# Patient Record
Sex: FEMALE | Race: BLACK OR AFRICAN AMERICAN | NOT HISPANIC OR LATINO | Employment: FULL TIME | URBAN - METROPOLITAN AREA
[De-identification: names, ages, dates, MRNs, and addresses within clinical notes are randomized per-mention and may not be internally consistent; named-entity substitution may affect disease eponyms.]

---

## 2017-07-13 ENCOUNTER — CONVERSION ENCOUNTER (OUTPATIENT)
Dept: MAMMOGRAPHY | Facility: CLINIC | Age: 47
End: 2017-07-13

## 2018-08-13 ENCOUNTER — APPOINTMENT (EMERGENCY)
Dept: RADIOLOGY | Facility: HOSPITAL | Age: 48
End: 2018-08-13
Payer: COMMERCIAL

## 2018-08-13 ENCOUNTER — HOSPITAL ENCOUNTER (EMERGENCY)
Facility: HOSPITAL | Age: 48
Discharge: HOME | End: 2018-08-13
Attending: EMERGENCY MEDICINE
Payer: COMMERCIAL

## 2018-08-13 VITALS — TEMPERATURE: 97.8 F | OXYGEN SATURATION: 98 % | RESPIRATION RATE: 20 BRPM

## 2018-08-13 DIAGNOSIS — M25.512 ACUTE PAIN OF LEFT SHOULDER: ICD-10-CM

## 2018-08-13 DIAGNOSIS — M65.20 CALCIFIC TENDONITIS: Primary | ICD-10-CM

## 2018-08-13 LAB
ANION GAP BLD CALC-SCNC: 17 MMOL/L (ref 4–13)
BUN BLD-MCNC: 18 MG/DL (ref 5–25)
CA-I BLD-SCNC: 1.18 MMOL/L (ref 1.12–1.32)
CHLORIDE BLD-SCNC: 104 MMOL/L (ref 100–108)
CREAT BLD-MCNC: 0.7 MG/DL (ref 0.6–1.3)
GFR SERPL CREATININE-BSD FRML MDRD: 117 ML/MIN/1.73SQ M
GLUCOSE SERPL-MCNC: 100 MG/DL (ref 65–140)
HCT VFR BLD CALC: 41 % (ref 34.8–46.1)
HGB BLDA-MCNC: 13.9 G/DL (ref 11.5–15.4)
PCO2 BLD: 26 MMOL/L (ref 21–32)
POTASSIUM BLD-SCNC: 3.9 MMOL/L (ref 3.5–5.3)
SODIUM BLD-SCNC: 142 MMOL/L (ref 136–145)
SPECIMEN SOURCE: ABNORMAL

## 2018-08-13 PROCEDURE — 99283 EMERGENCY DEPT VISIT LOW MDM: CPT

## 2018-08-13 PROCEDURE — 85014 HEMATOCRIT: CPT

## 2018-08-13 PROCEDURE — 73030 X-RAY EXAM OF SHOULDER: CPT

## 2018-08-13 PROCEDURE — 96372 THER/PROPH/DIAG INJ SC/IM: CPT

## 2018-08-13 PROCEDURE — 80047 BASIC METABLC PNL IONIZED CA: CPT

## 2018-08-13 RX ORDER — LIDOCAINE 50 MG/G
1 PATCH TOPICAL DAILY
Qty: 6 PATCH | Refills: 0 | Status: SHIPPED | OUTPATIENT
Start: 2018-08-13 | End: 2019-03-21

## 2018-08-13 RX ORDER — KETOROLAC TROMETHAMINE 30 MG/ML
15 INJECTION, SOLUTION INTRAMUSCULAR; INTRAVENOUS ONCE
Status: COMPLETED | OUTPATIENT
Start: 2018-08-13 | End: 2018-08-13

## 2018-08-13 RX ORDER — NAPROXEN 500 MG/1
500 TABLET ORAL 2 TIMES DAILY WITH MEALS
Qty: 30 TABLET | Refills: 0 | Status: SHIPPED | OUTPATIENT
Start: 2018-08-13 | End: 2019-03-21

## 2018-08-13 RX ORDER — LIDOCAINE 50 MG/G
1 PATCH TOPICAL ONCE
Status: DISCONTINUED | OUTPATIENT
Start: 2018-08-13 | End: 2018-08-13 | Stop reason: HOSPADM

## 2018-08-13 RX ADMIN — LIDOCAINE 1 PATCH: 50 PATCH TOPICAL at 01:48

## 2018-08-13 RX ADMIN — KETOROLAC TROMETHAMINE 15 MG: 30 INJECTION, SOLUTION INTRAMUSCULAR at 02:01

## 2018-08-13 NOTE — ED PROVIDER NOTES
History  Chief Complaint   Patient presents with    Shoulder Pain     pt  reports shoulder pain and neck pain  "I felt like I slept wrong and I took tylenol with improvement a few days ago and now today I think I did too much again and now it hurts again " Pt  reports taking alieve 30 min prior to arrival with no relief at this time     This is 46year old morbidly obese female who states woke up Friday with left shoulder pain and felt like she slept wrong, she states today she was cleaning and now the shoulder pain is worse  She states she has been taking aleve w/o relief  She states she feels a lump at the deltoid and is unable to lift her arm up  She denies falling  She states she is right handed  History provided by:  Patient and medical records  Shoulder Pain   Location:  Shoulder  Shoulder location:  L shoulder  Injury: no    Pain details:     Quality:  Aching    Radiates to:  Does not radiate    Onset quality:  Gradual    Duration:  3 days    Timing:  Constant    Progression:  Worsening  Handedness:  Right-handed  Dislocation: no    Relieved by:  Nothing  Ineffective treatments:  NSAIDs  Associated symptoms: stiffness        None       Past Medical History:   Diagnosis Date    Hypertension        Past Surgical History:   Procedure Laterality Date    CHOLECYSTECTOMY         History reviewed  No pertinent family history  I have reviewed and agree with the history as documented  Social History   Substance Use Topics    Smoking status: Never Smoker    Smokeless tobacco: Never Used    Alcohol use Yes      Comment: socially        Review of Systems   Constitutional: Negative  HENT: Negative  Eyes: Negative  Respiratory: Negative  Cardiovascular: Negative  Gastrointestinal: Negative  Endocrine: Negative  Genitourinary: Negative  Musculoskeletal: Positive for joint swelling and stiffness  Skin: Negative  Allergic/Immunologic: Negative  Neurological: Negative  Hematological: Negative  Psychiatric/Behavioral: Negative  Physical Exam  Physical Exam   Constitutional: She is oriented to person, place, and time  She appears well-developed and well-nourished  She appears distressed  HENT:   Head: Normocephalic and atraumatic  Eyes: EOM are normal  Pupils are equal, round, and reactive to light  Neck: Normal range of motion  Neck supple  Musculoskeletal: Normal range of motion  She exhibits tenderness  She exhibits no edema or deformity  Unable to lift left arm for eval  TTP with swelling at deltoid area  No redness or warmth    Neurological: She is alert and oriented to person, place, and time  Skin: Skin is warm and dry  Capillary refill takes less than 2 seconds  She is not diaphoretic  Psychiatric: She has a normal mood and affect  Her behavior is normal  Judgment and thought content normal    Nursing note and vitals reviewed        Vital Signs  ED Triage Vitals [08/13/18 0038]   Temperature Pulse Respirations BP SpO2   97 8 °F (36 6 °C) -- 20 -- 98 %      Temp Source Heart Rate Source Patient Position - Orthostatic VS BP Location FiO2 (%)   Temporal Monitor Sitting Right arm --      Pain Score       7           Vitals:    08/13/18 0038   Patient Position - Orthostatic VS: Sitting       Visual Acuity      ED Medications  Medications   lidocaine (LIDODERM) 5 % patch 1 patch (1 patch Transdermal Medication Applied 8/13/18 0148)   ketorolac (TORADOL) injection 15 mg (not administered)       Diagnostic Studies  Results Reviewed     Procedure Component Value Units Date/Time    POCT Chem 8+ [[id number]]  (Abnormal) Collected:  08/13/18 0150    Lab Status:  Final result Updated:  08/13/18 0155     SODIUM, I-STAT 142 mmol/l      Potassium, i-STAT 3 9 mmol/L      Chloride, istat 104 mmol/L      CO2, i-STAT 26 mmol/L      Anion Gap, Istat 17 (H) mmol/L      Calcium, Ionized i-STAT 1 18 mmol/L      BUN, I-STAT 18 mg/dl      Creatinine, i-STAT 0 7 mg/dl eGFR 117 ml/min/1 73sq m      Glucose, i-STAT 100 mg/dl      Hct, i-STAT 41 %      Hgb, i-STAT 13 9 g/dl      Specimen Type VENOUS                 XR shoulder 2+ views LEFT   ED Interpretation by DAVID Mckeon (08/13 0143)   Preliminary reading   + calcifications   No acute osseous abnormality   Waiting on reading  Procedures  Procedures       Phone Contacts  ED Phone Contact    ED Course  ED Course as of Aug 13 0201   Mon Aug 13, 2018   0200 Labs and radiology results reviewed and discussed with pt  Pt verbalizes understanding of all d/c instructions and follow up  MDM  Number of Diagnoses or Management Options  Diagnosis management comments: Differential diagnosis  Bursitis  Muscle spasms    Xray left shoulder  Lidocaine patch  POC Chem 8         Amount and/or Complexity of Data Reviewed  Clinical lab tests: ordered and reviewed  Tests in the radiology section of CPT®: ordered and reviewed  Review and summarize past medical records: yes      CritCare Time    Disposition  Final diagnoses:   Calcific tendonitis   Acute pain of left shoulder     Time reflects when diagnosis was documented in both MDM as applicable and the Disposition within this note     Time User Action Codes Description Comment    8/13/2018  1:43 AM [place] Ink Add [M65 20] Calcific tendonitis     8/13/2018  1:43 AM [place] Ink Add [M25 512] Acute pain of left shoulder       ED Disposition     ED Disposition Condition Comment    Discharge  [facility] discharge to home/self care      Condition at discharge: Good        Follow-up Information     Follow up With Specialties Details Why Contact Info Additional Information       follow up with your PCP      Gayle Juares MD Orthopedic Surgery Schedule an appointment as soon as possible for a visit If symptoms worsen [address] [facility] Emergency Department Emergency Medicine   [address] [place] [address]  [phone] [facility], [address] [facility] [address] [place], [place], [address]          Patient's Medications   Discharge Prescriptions    LIDOCAINE (LIDODERM) 5 %    Place 1 patch on the skin daily Remove & Discard patch within 12 hours or as directed by MD       Start Date: 8/13/2018 End Date: --       Order Dose: 1 patch       Quantity: 6 patch    Refills: 0    NAPROXEN (NAPROSYN) 500 MG TABLET    Take 1 tablet (500 mg total) by mouth 2 (two) times a day with meals       Start Date: 8/13/2018 End Date: --       Order Dose: 500 mg       Quantity: 30 tablet    Refills: 0     No discharge procedures on file      ED Provider  Electronically Signed by           DAVID White  08/13/18 0205

## 2018-08-13 NOTE — DISCHARGE INSTRUCTIONS
Your xray was read by your provider  A radiologist will read the xray and if it is abnormal you will be notified  You appear to have calcific tendonitis  You are being prescribed lidocaine patches  You are to follow up with your PCP and see orthopedics if pain persists  Arm Pain   WHAT YOU NEED TO KNOW:   Your arm pain may be caused by a number of conditions  Examples include arthritis, nerve problems, or an awkward position while you sleep  X-rays did not show a broken bone in your arm or wrist  Arm pain may be a sign of a serious condition that needs immediate care, such as a heart attack  DISCHARGE INSTRUCTIONS:   Call 911 for any of the following: You have any of the following signs of a heart attack:   · Squeezing, pressure, or pain in your chest that lasts longer than 5 minutes or returns    · Discomfort or pain in your back, neck, jaw, stomach, or arm     · Trouble breathing or a fast, fluttery heartbeat    · Nausea or vomiting    · Lightheadedness or a sudden cold sweat, especially with chest pain or trouble breathing  Return to the emergency department if:   · You have severe pain, or pain that spreads from your arm to other areas  · You have swelling, tingling, or numbness in your hand or fingers, or the skin turns blue  · You cannot move your arm  Contact your healthcare provider if:   · You have questions or concerns about your condition or care  Medicines: You may need any of the following:  · Prescription pain medicine  may be given  Ask how to take this medicine safely  · NSAIDs , such as ibuprofen, help decrease swelling, pain, and fever  This medicine is available with or without a doctor's order  NSAIDs can cause stomach bleeding or kidney problems in certain people  If you take blood thinner medicine, always ask your healthcare provider if NSAIDs are safe for you  Always read the medicine label and follow directions  · Take your medicine as directed    Contact your healthcare provider if you think your medicine is not helping or if you have side effects  Tell him or her if you are allergic to any medicine  Keep a list of the medicines, vitamins, and herbs you take  Include the amounts, and when and why you take them  Bring the list or the pill bottles to follow-up visits  Carry your medicine list with you in case of an emergency  Self-care:   · Rest your arm as directed  A sling may be used to keep your arm from moving while it heals  · Apply ice as directed  Ice helps decrease pain and swelling  Ice may also help prevent tissue damage  Use an ice pack, or put crushed ice in a plastic bag  Cover it with a towel  Apply it to your arm for 20 minutes every few hours, or as directed  Ask how many times to apply ice each day, and for how many days  · Elevate your arm above the level of your heart as often as you can  This will help decrease swelling and pain  Prop your arm on pillows or blankets to keep the area elevated comfortably  · Adjust your position if you work in front of a computer  You may need arm or wrist supports or change the height of your chair  · Keep a pain record  Write down when your pain happens and how severe it is  Include any other symptoms you have with your pain  A record will help you keep track of pain cycles  Bring the record with you to your follow-up visits  It may also help your healthcare provider find out what is causing your pain  Follow up with your healthcare provider as directed: You may need physical therapy  You may need to see an orthopedic specialist  Write down your questions so you remember to ask them during your visits  © 2017 [address] Information is for End User's use only and may not be sold, redistributed or otherwise used for commercial purposes  All illustrations and images included in CareNotes® are the copyrighted property of [employer]  or [employer]    The above information is an  only  It is not intended as medical advice for individual conditions or treatments  Talk to your doctor, nurse or pharmacist before following any medical regimen to see if it is safe and effective for you  Shoulder Pain, Ambulatory Care   GENERAL INFORMATION:   Shoulder pain  is a common problem and can affect your daily activities  Pain can be caused by a problem within your shoulder  Shoulder pain may also be caused by pain that spreads to your shoulder from another part of your body  Seek immediate care for the following symptoms:   · Severe pain    · Inability to move your arm or shoulder    · Numbness or tingling in your shoulder or arm  Treatment for shoulder pain  may include any of the following:  · Acetaminophen  decreases pain and fever  It is available without a doctor's order  Ask how much to take and how often to take it  Follow directions  Acetaminophen can cause liver damage if not taken correctly  · NSAIDs  help decrease swelling and pain or fever  This medicine is available with or without a doctor's order  NSAIDs can cause stomach bleeding or kidney problems in certain people  If you take blood thinner medicine, always ask your healthcare provider if NSAIDs are safe for you  Always read the medicine label and follow directions  · A steroid injection  may help decrease pain and swelling  · Surgery  may be needed for long-term pain and loss of function  Manage your symptoms:   · Apply ice  on your shoulder for 20 to 30 minutes every 2 hours or as directed  Use an ice pack, or put crushed ice in a plastic bag  Cover it with a towel  Ice helps prevent tissue damage and decreases swelling and pain  · Apply heat if ice does not help your symptoms  Apply heat on your shoulder for 20 to 30 minutes every 2 hours for as many days as directed  Heat helps decrease pain and muscle spasms  · Go to physical or occupational therapy as directed    A physical therapist teaches you exercises to help improve movement and strength, and to decrease pain  An occupational therapist teaches you skills to help with your daily activities  Prevent shoulder pain:   · Stretch and strengthen your shoulder  Use proper technique during exercises and sports  · Limit activities as directed  Try to avoid repeated overhead movements  Follow up with your healthcare provider or orthopedist as directed:  Write down your questions so you remember to ask them during your visits  CARE AGREEMENT:   You have the right to help plan your care  Learn about your health condition and how it may be treated  Discuss treatment options with your caregivers to decide what care you want to receive  You always have the right to refuse treatment  The above information is an  only  It is not intended as medical advice for individual conditions or treatments  Talk to your doctor, nurse or pharmacist before following any medical regimen to see if it is safe and effective for you  © 2014 [address] is for End User's use only and may not be sold, redistributed or otherwise used for commercial purposes  All illustrations and images included in CareNotes® are the copyrighted property of [employer]  or [employer]  Calcific Tendinitis   WHAT YOU NEED TO KNOW:   Calcific tendinitis is a condition that occurs when calcium collects in the tendons of the shoulder  Tendons are bands of tissue that connect muscle to bone  The calcium can make the tendons swell and cause severe pain  DISCHARGE INSTRUCTIONS:   Medicines: You may need any of the following:  NSAIDs  may decrease swelling and pain  This medicine can be bought with or without a doctor's order  This medicine can cause stomach bleeding or kidney problems in certain people  If you take blood thinner medicine, always ask your healthcare provider if NSAIDs are safe for you   Always read the medicine label and follow the directions on it before using this medicine  Steroids  help decrease inflammation  Take your medicine as directed  Contact your healthcare provider if you think your medicine is not helping or if you have side effects  Tell him or her if you are allergic to any medicine  Keep a list of the medicines, vitamins, and herbs you take  Include the amounts, and when and why you take them  Bring the list or the pill bottles to follow-up visits  Carry your medicine list with you in case of an emergency  Go to physical therapy:  A physical therapist can teach you exercises to help improve movement and strength, and to decrease pain  Apply ice:  Apply ice on your shoulder for 15 to 20 minutes every hour or as directed  Use an ice pack, or put crushed ice in a plastic bag  Cover it with a towel  Ice helps prevent tissue damage and decreases swelling and pain  Apply heat:  Apply heat on your shoulder for 20 to 30 minutes every 2 hours for as many days as directed  Heat helps decrease pain and muscle spasms  Rest your arm:  Healthcare providers may have you place an item, such as a ball, between your side and elbow while you rest  This may help decrease stiffness and pain  Follow up with your healthcare provider as directed:  Bring a list of any questions you have so you remember to ask them during your visits  Contact your healthcare provider if:   You have worse pain and stiffness in your shoulder  You have new or more trouble moving your arm  You have questions or concerns about your condition or care  Return to the emergency department if:   You cannot move your arm  You have severe pain in your arm or shoulder  © 2017 [address] [facility] Information is for End User's use only and may not be sold, redistributed or otherwise used for commercial purposes   All illustrations and images included in CareNotes® are the copyrighted property of [employer] Inc  or [employer]  The above information is an  only  It is not intended as medical advice for individual conditions or treatments  Talk to your doctor, nurse or pharmacist before following any medical regimen to see if it is safe and effective for you

## 2018-10-01 DIAGNOSIS — I10 HYPERTENSION, UNSPECIFIED TYPE: Primary | ICD-10-CM

## 2018-10-01 RX ORDER — LISINOPRIL 10 MG/1
10 TABLET ORAL DAILY
Qty: 30 TABLET | Refills: 0 | Status: SHIPPED | OUTPATIENT
Start: 2018-10-01 | End: 2018-10-19 | Stop reason: SDUPTHER

## 2018-10-01 RX ORDER — AMLODIPINE BESYLATE 5 MG/1
5 TABLET ORAL DAILY
Qty: 30 TABLET | Refills: 0 | Status: SHIPPED | OUTPATIENT
Start: 2018-10-01 | End: 2018-10-19 | Stop reason: SDUPTHER

## 2018-10-10 ENCOUNTER — HOSPITAL ENCOUNTER (OUTPATIENT)
Facility: HOSPITAL | Age: 48
Setting detail: OBSERVATION
Discharge: HOME | End: 2018-10-11
Attending: EMERGENCY MEDICINE | Admitting: FAMILY MEDICINE
Payer: COMMERCIAL

## 2018-10-10 ENCOUNTER — APPOINTMENT (EMERGENCY)
Dept: RADIOLOGY | Facility: HOSPITAL | Age: 48
End: 2018-10-10
Payer: COMMERCIAL

## 2018-10-10 DIAGNOSIS — R10.9 ABDOMINAL PAIN: ICD-10-CM

## 2018-10-10 DIAGNOSIS — R11.2 NAUSEA AND VOMITING: ICD-10-CM

## 2018-10-10 DIAGNOSIS — R07.9 CHEST PAIN: Primary | ICD-10-CM

## 2018-10-10 LAB
ALBUMIN SERPL BCP-MCNC: 4.6 G/DL (ref 3–5.2)
ALP SERPL-CCNC: 52 U/L (ref 43–122)
ALT SERPL W P-5'-P-CCNC: 45 U/L (ref 9–52)
ANION GAP SERPL CALCULATED.3IONS-SCNC: 10 MMOL/L (ref 5–14)
AST SERPL W P-5'-P-CCNC: 25 U/L (ref 14–36)
BASOPHILS # BLD AUTO: 0 THOUSANDS/ΜL (ref 0–0.1)
BASOPHILS NFR BLD AUTO: 1 % (ref 0–1)
BILIRUB SERPL-MCNC: 0.7 MG/DL
BUN SERPL-MCNC: 16 MG/DL (ref 5–25)
CALCIUM SERPL-MCNC: 8.9 MG/DL (ref 8.4–10.2)
CHLORIDE SERPL-SCNC: 103 MMOL/L (ref 97–108)
CO2 SERPL-SCNC: 25 MMOL/L (ref 22–30)
CREAT SERPL-MCNC: 0.58 MG/DL (ref 0.6–1.2)
EOSINOPHIL # BLD AUTO: 0 THOUSAND/ΜL (ref 0–0.4)
EOSINOPHIL NFR BLD AUTO: 0 % (ref 0–6)
ERYTHROCYTE [DISTWIDTH] IN BLOOD BY AUTOMATED COUNT: 13.5 %
EXT PREG TEST URINE: NEGATIVE
GFR SERPL CREATININE-BSD FRML MDRD: 124 ML/MIN/1.73SQ M
GLUCOSE SERPL-MCNC: 130 MG/DL (ref 70–99)
HCT VFR BLD AUTO: 41.1 % (ref 36–46)
HGB BLD-MCNC: 13.7 G/DL (ref 12–16)
LIPASE SERPL-CCNC: 24 U/L (ref 23–300)
LYMPHOCYTES # BLD AUTO: 1.1 THOUSANDS/ΜL (ref 0.5–4)
LYMPHOCYTES NFR BLD AUTO: 14 % (ref 20–50)
MCH RBC QN AUTO: 30.7 PG (ref 26–34)
MCHC RBC AUTO-ENTMCNC: 33.3 G/DL (ref 31–36)
MCV RBC AUTO: 92 FL (ref 80–100)
MONOCYTES # BLD AUTO: 0.3 THOUSAND/ΜL (ref 0.2–0.9)
MONOCYTES NFR BLD AUTO: 4 % (ref 1–10)
NEUTROPHILS # BLD AUTO: 6.1 THOUSANDS/ΜL (ref 1.8–7.8)
NEUTS SEG NFR BLD AUTO: 81 % (ref 45–65)
PLATELET # BLD AUTO: 193 THOUSANDS/UL (ref 150–450)
PMV BLD AUTO: 9.8 FL (ref 8.9–12.7)
POTASSIUM SERPL-SCNC: 4 MMOL/L (ref 3.6–5)
PROT SERPL-MCNC: 7.8 G/DL (ref 5.9–8.4)
RBC # BLD AUTO: 4.47 MILLION/UL (ref 4–5.2)
SODIUM SERPL-SCNC: 138 MMOL/L (ref 137–147)
TROPONIN I SERPL-MCNC: <0.01 NG/ML (ref 0–0.03)
WBC # BLD AUTO: 7.6 THOUSAND/UL (ref 4.5–11)

## 2018-10-10 PROCEDURE — 85025 COMPLETE CBC W/AUTO DIFF WBC: CPT | Performed by: EMERGENCY MEDICINE

## 2018-10-10 PROCEDURE — 71046 X-RAY EXAM CHEST 2 VIEWS: CPT

## 2018-10-10 PROCEDURE — 99285 EMERGENCY DEPT VISIT HI MDM: CPT

## 2018-10-10 PROCEDURE — 83690 ASSAY OF LIPASE: CPT | Performed by: EMERGENCY MEDICINE

## 2018-10-10 PROCEDURE — 84484 ASSAY OF TROPONIN QUANT: CPT | Performed by: EMERGENCY MEDICINE

## 2018-10-10 PROCEDURE — 80053 COMPREHEN METABOLIC PANEL: CPT | Performed by: EMERGENCY MEDICINE

## 2018-10-10 PROCEDURE — 81025 URINE PREGNANCY TEST: CPT | Performed by: EMERGENCY MEDICINE

## 2018-10-10 PROCEDURE — 36415 COLL VENOUS BLD VENIPUNCTURE: CPT | Performed by: EMERGENCY MEDICINE

## 2018-10-10 PROCEDURE — 96374 THER/PROPH/DIAG INJ IV PUSH: CPT

## 2018-10-10 PROCEDURE — 93005 ELECTROCARDIOGRAM TRACING: CPT

## 2018-10-10 PROCEDURE — 96375 TX/PRO/DX INJ NEW DRUG ADDON: CPT

## 2018-10-10 RX ORDER — NITROGLYCERIN 0.4 MG/1
TABLET SUBLINGUAL
Status: DISPENSED
Start: 2018-10-10 | End: 2018-10-11

## 2018-10-10 RX ORDER — MAGNESIUM HYDROXIDE/ALUMINUM HYDROXICE/SIMETHICONE 120; 1200; 1200 MG/30ML; MG/30ML; MG/30ML
SUSPENSION ORAL
Status: DISPENSED
Start: 2018-10-10 | End: 2018-10-11

## 2018-10-10 RX ORDER — ONDANSETRON 2 MG/ML
INJECTION INTRAMUSCULAR; INTRAVENOUS
Status: DISPENSED
Start: 2018-10-10 | End: 2018-10-11

## 2018-10-10 RX ORDER — ASPIRIN 81 MG/1
324 TABLET, CHEWABLE ORAL ONCE
Status: COMPLETED | OUTPATIENT
Start: 2018-10-11 | End: 2018-10-11

## 2018-10-10 RX ORDER — MAGNESIUM HYDROXIDE/ALUMINUM HYDROXICE/SIMETHICONE 120; 1200; 1200 MG/30ML; MG/30ML; MG/30ML
30 SUSPENSION ORAL ONCE
Status: COMPLETED | OUTPATIENT
Start: 2018-10-10 | End: 2018-10-10

## 2018-10-10 RX ORDER — ONDANSETRON 2 MG/ML
4 INJECTION INTRAMUSCULAR; INTRAVENOUS ONCE
Status: COMPLETED | OUTPATIENT
Start: 2018-10-10 | End: 2018-10-10

## 2018-10-10 RX ORDER — NITROGLYCERIN 0.4 MG/1
0.4 TABLET SUBLINGUAL ONCE
Status: COMPLETED | OUTPATIENT
Start: 2018-10-10 | End: 2018-10-10

## 2018-10-10 RX ADMIN — NITROGLYCERIN 0.4 MG: 0.4 TABLET SUBLINGUAL at 23:54

## 2018-10-10 RX ADMIN — ALUMINUM HYDROXIDE, MAGNESIUM HYDROXIDE, AND SIMETHICONE 30 ML: 200; 200; 20 SUSPENSION ORAL at 23:13

## 2018-10-10 RX ADMIN — ONDANSETRON 4 MG: 2 INJECTION, SOLUTION INTRAMUSCULAR; INTRAVENOUS at 23:13

## 2018-10-10 RX ADMIN — FAMOTIDINE 20 MG: 10 INJECTION INTRAVENOUS at 23:13

## 2018-10-10 NOTE — LETTER
[address] [facility]  [address] [facility] [id number] [phone]  Dept: [phone]    October 11, 2018     Patient: René Guaman   YOB: 1970   Date of Visit: 10/10/2018       To Whom it May Concern:    René Guaman is under my professional care  She was seen in the hospital from 10/10/2018  to 10/11/18  She may return to school on 10/12/2018  If you have any questions or concerns, please don't hesitate to call           Sincerely,          Hansel Valenzuela MD  10/11/18  2:11 PM

## 2018-10-11 VITALS
HEIGHT: 68 IN | DIASTOLIC BLOOD PRESSURE: 85 MMHG | TEMPERATURE: 96.5 F | RESPIRATION RATE: 20 BRPM | OXYGEN SATURATION: 96 % | WEIGHT: 293 LBS | BODY MASS INDEX: 44.41 KG/M2 | HEART RATE: 75 BPM | SYSTOLIC BLOOD PRESSURE: 144 MMHG

## 2018-10-11 PROBLEM — K29.70 GASTRITIS: Status: ACTIVE | Noted: 2018-10-11

## 2018-10-11 PROBLEM — R07.9 CHEST PAIN: Status: RESOLVED | Noted: 2018-10-11 | Resolved: 2018-10-11

## 2018-10-11 PROBLEM — K29.70 GASTRITIS: Status: RESOLVED | Noted: 2018-10-11 | Resolved: 2018-10-11

## 2018-10-11 PROBLEM — R07.9 CHEST PAIN: Status: ACTIVE | Noted: 2018-10-11

## 2018-10-11 PROBLEM — E78.5 HLD (HYPERLIPIDEMIA): Status: ACTIVE | Noted: 2018-10-11

## 2018-10-11 LAB
ALBUMIN SERPL BCP-MCNC: 4 G/DL (ref 3–5.2)
ALP SERPL-CCNC: 45 U/L (ref 43–122)
ALT SERPL W P-5'-P-CCNC: 48 U/L (ref 9–52)
ANION GAP SERPL CALCULATED.3IONS-SCNC: 9 MMOL/L (ref 5–14)
AST SERPL W P-5'-P-CCNC: 22 U/L (ref 14–36)
ATRIAL RATE: 83 BPM
ATRIAL RATE: 85 BPM
BASOPHILS # BLD AUTO: 0 THOUSANDS/ΜL (ref 0–0.1)
BASOPHILS NFR BLD AUTO: 0 % (ref 0–1)
BILIRUB SERPL-MCNC: 0.8 MG/DL
BUN SERPL-MCNC: 13 MG/DL (ref 5–25)
CALCIUM SERPL-MCNC: 8.7 MG/DL (ref 8.4–10.2)
CHLORIDE SERPL-SCNC: 104 MMOL/L (ref 97–108)
CHOLEST SERPL-MCNC: 172 MG/DL
CO2 SERPL-SCNC: 25 MMOL/L (ref 22–30)
CREAT SERPL-MCNC: 0.59 MG/DL (ref 0.6–1.2)
EOSINOPHIL # BLD AUTO: 0 THOUSAND/ΜL (ref 0–0.4)
EOSINOPHIL NFR BLD AUTO: 0 % (ref 0–6)
ERYTHROCYTE [DISTWIDTH] IN BLOOD BY AUTOMATED COUNT: 13.7 %
EST. AVERAGE GLUCOSE BLD GHB EST-MCNC: 128 MG/DL
GFR SERPL CREATININE-BSD FRML MDRD: 124 ML/MIN/1.73SQ M
GLUCOSE SERPL-MCNC: 106 MG/DL (ref 70–99)
HBA1C MFR BLD: 6.1 % (ref 4.2–6.3)
HCT VFR BLD AUTO: 38.8 % (ref 36–46)
HDLC SERPL-MCNC: 41 MG/DL (ref 40–59)
HGB BLD-MCNC: 12.9 G/DL (ref 12–16)
LDLC SERPL CALC-MCNC: 120 MG/DL
LYMPHOCYTES # BLD AUTO: 1.5 THOUSANDS/ΜL (ref 0.5–4)
LYMPHOCYTES NFR BLD AUTO: 15 % (ref 20–50)
MAGNESIUM SERPL-MCNC: 1.9 MG/DL (ref 1.6–2.3)
MCH RBC QN AUTO: 30.4 PG (ref 26–34)
MCHC RBC AUTO-ENTMCNC: 33.1 G/DL (ref 31–36)
MCV RBC AUTO: 92 FL (ref 80–100)
MONOCYTES # BLD AUTO: 0.7 THOUSAND/ΜL (ref 0.2–0.9)
MONOCYTES NFR BLD AUTO: 6 % (ref 1–10)
NEUTROPHILS # BLD AUTO: 8 THOUSANDS/ΜL (ref 1.8–7.8)
NEUTS SEG NFR BLD AUTO: 78 % (ref 45–65)
NONHDLC SERPL-MCNC: 131 MG/DL
P AXIS: 22 DEGREES
P AXIS: 70 DEGREES
PHOSPHATE SERPL-MCNC: 4.8 MG/DL (ref 2.5–4.8)
PLATELET # BLD AUTO: 168 THOUSANDS/UL (ref 150–450)
PLATELET # BLD AUTO: 173 THOUSANDS/UL (ref 150–450)
PMV BLD AUTO: 9.2 FL (ref 8.9–12.7)
PMV BLD AUTO: 9.4 FL (ref 8.9–12.7)
POTASSIUM SERPL-SCNC: 3.9 MMOL/L (ref 3.6–5)
PR INTERVAL: 176 MS
PR INTERVAL: 180 MS
PROT SERPL-MCNC: 7.1 G/DL (ref 5.9–8.4)
QRS AXIS: 11 DEGREES
QRS AXIS: 7 DEGREES
QRSD INTERVAL: 84 MS
QRSD INTERVAL: 86 MS
QT INTERVAL: 360 MS
QT INTERVAL: 366 MS
QTC INTERVAL: 423 MS
QTC INTERVAL: 435 MS
RBC # BLD AUTO: 4.24 MILLION/UL (ref 4–5.2)
SODIUM SERPL-SCNC: 138 MMOL/L (ref 137–147)
T WAVE AXIS: -7 DEGREES
T WAVE AXIS: 2 DEGREES
TRIGL SERPL-MCNC: 55 MG/DL
TROPONIN I SERPL-MCNC: 0.02 NG/ML (ref 0–0.03)
TROPONIN I SERPL-MCNC: <0.01 NG/ML (ref 0–0.03)
TSH SERPL DL<=0.05 MIU/L-ACNC: 0.9 UIU/ML (ref 0.47–4.68)
VENTRICULAR RATE: 83 BPM
VENTRICULAR RATE: 85 BPM
WBC # BLD AUTO: 10.2 THOUSAND/UL (ref 4.5–11)

## 2018-10-11 PROCEDURE — 80053 COMPREHEN METABOLIC PANEL: CPT | Performed by: FAMILY MEDICINE

## 2018-10-11 PROCEDURE — 84100 ASSAY OF PHOSPHORUS: CPT | Performed by: FAMILY MEDICINE

## 2018-10-11 PROCEDURE — 84443 ASSAY THYROID STIM HORMONE: CPT | Performed by: FAMILY MEDICINE

## 2018-10-11 PROCEDURE — 85049 AUTOMATED PLATELET COUNT: CPT | Performed by: FAMILY MEDICINE

## 2018-10-11 PROCEDURE — 80061 LIPID PANEL: CPT | Performed by: FAMILY MEDICINE

## 2018-10-11 PROCEDURE — 85025 COMPLETE CBC W/AUTO DIFF WBC: CPT | Performed by: FAMILY MEDICINE

## 2018-10-11 PROCEDURE — 99235 HOSP IP/OBS SAME DATE MOD 70: CPT | Performed by: FAMILY MEDICINE

## 2018-10-11 PROCEDURE — 93005 ELECTROCARDIOGRAM TRACING: CPT

## 2018-10-11 PROCEDURE — 93010 ELECTROCARDIOGRAM REPORT: CPT | Performed by: INTERNAL MEDICINE

## 2018-10-11 PROCEDURE — 84484 ASSAY OF TROPONIN QUANT: CPT | Performed by: FAMILY MEDICINE

## 2018-10-11 PROCEDURE — 83735 ASSAY OF MAGNESIUM: CPT | Performed by: FAMILY MEDICINE

## 2018-10-11 PROCEDURE — 83036 HEMOGLOBIN GLYCOSYLATED A1C: CPT | Performed by: FAMILY MEDICINE

## 2018-10-11 PROCEDURE — 36415 COLL VENOUS BLD VENIPUNCTURE: CPT | Performed by: FAMILY MEDICINE

## 2018-10-11 RX ORDER — HEPARIN SODIUM 5000 [USP'U]/ML
5000 INJECTION, SOLUTION INTRAVENOUS; SUBCUTANEOUS EVERY 8 HOURS SCHEDULED
Status: DISCONTINUED | OUTPATIENT
Start: 2018-10-11 | End: 2018-10-11 | Stop reason: HOSPADM

## 2018-10-11 RX ORDER — ASPIRIN 81 MG/1
TABLET, CHEWABLE ORAL
Status: DISPENSED
Start: 2018-10-11 | End: 2018-10-11

## 2018-10-11 RX ORDER — AMLODIPINE BESYLATE 5 MG/1
5 TABLET ORAL DAILY
Status: DISCONTINUED | OUTPATIENT
Start: 2018-10-11 | End: 2018-10-11 | Stop reason: HOSPADM

## 2018-10-11 RX ORDER — NITROGLYCERIN 0.4 MG/1
0.4 TABLET SUBLINGUAL
Status: DISCONTINUED | OUTPATIENT
Start: 2018-10-11 | End: 2018-10-11 | Stop reason: HOSPADM

## 2018-10-11 RX ORDER — LISINOPRIL 10 MG/1
10 TABLET ORAL DAILY
Status: DISCONTINUED | OUTPATIENT
Start: 2018-10-11 | End: 2018-10-11 | Stop reason: HOSPADM

## 2018-10-11 RX ORDER — ATORVASTATIN CALCIUM 40 MG/1
40 TABLET, FILM COATED ORAL
Status: DISCONTINUED | OUTPATIENT
Start: 2018-10-11 | End: 2018-10-11 | Stop reason: HOSPADM

## 2018-10-11 RX ORDER — ASPIRIN 81 MG/1
81 TABLET ORAL DAILY
Status: DISCONTINUED | OUTPATIENT
Start: 2018-10-11 | End: 2018-10-11 | Stop reason: HOSPADM

## 2018-10-11 RX ORDER — FAMOTIDINE 20 MG/1
20 TABLET, FILM COATED ORAL 2 TIMES DAILY
Qty: 60 TABLET | Refills: 0 | Status: SHIPPED | OUTPATIENT
Start: 2018-10-11 | End: 2018-11-01

## 2018-10-11 RX ADMIN — LISINOPRIL 10 MG: 10 TABLET ORAL at 08:55

## 2018-10-11 RX ADMIN — ASPIRIN 81 MG: 81 TABLET, COATED ORAL at 08:55

## 2018-10-11 RX ADMIN — ASPIRIN 81 MG 324 MG: 81 TABLET ORAL at 00:16

## 2018-10-11 RX ADMIN — HEPARIN SODIUM 5000 UNITS: 5000 INJECTION, SOLUTION INTRAVENOUS; SUBCUTANEOUS at 05:00

## 2018-10-11 RX ADMIN — AMLODIPINE BESYLATE 5 MG: 5 TABLET ORAL at 08:55

## 2018-10-11 NOTE — NURSING NOTE
Patient admitted from ER into 506  Alert and orientated x3  Complaints of abdominal tenderness to upper quadrants upon palpation, denies same to lower quadrants  Bowel sounds hyperactive throughout, no nausea or vomiting at this time  Last bowel movement 10/10, denies diarrhea  Lungs clear throughout, no shortness of breath or chest pain  Trace edema to lower extremities bilaterally, pedal pulses present and strong  Telemetry normal sinus to sinus tach when ambulating and washing up in washroom  Patient ambulates independently, steady gait  Voiding sufficient quantities  Call mccall in reach, educated on plan of care and medication  Will monitor

## 2018-10-11 NOTE — H&P
History and Physical - Dana Mccormack    Patient Information: Jamshid Paez 46 y o  female MRN: [id number]  Unit/Bed#: 5T -01 Encounter: [id number]  Admitting Physician: Moises Patel MD  PCP: Roger Cheema DO  Date of Admission:  10/11/18    Assessment and Plan    * Chest pain   Assessment & Plan    Stable  EKG showed non specific changes, inverted T wave in leads V3-4  No previous ECG available for comparison  Received loading of  mg in ER, found relief with nitroglycerin, first troponin negative  [place] protocol  24 hour telemetry  AM EKG  Keep 02 sat > 94%  Trend troponins  x 2   Atorvastatin 40 mg qd   AM- TSH, A1c           Gastritis   Assessment & Plan    Given onset shortly after eating, associated with emesis and nausea makes this highly suspicious for gastritis, most likely viral   Currently asymptomatic s/p maalox, pepcid and zofran, no emesis noted since admission, will advance diet in morning as tolerated  Benign hypertension   Assessment & Plan    Currently well controlled 147/ 85  Will continue home regimen of Amlodipine and Lisinopril  1+ pitting edema noted on bilateral lower extremities, likely secondary to amlodipine use and occupation as patient is on her feet most of the time  BMI 45 0-49 9, adult [facility])   Assessment & Plan    Patient is educated on the importance of portion control and dieting  Patient is also educated on the importance of exercise  Patient is encouraged to walk 30 min at least 5 times a week  Discussed about benefits of losing weight  Patient acknowledges that they understood what is discussed  - HBa1c / lipid profile in AM     HLD (hyperlipidemia)   Assessment & Plan    As per patient PMHx  Started on Atorvastatin 40mg qd  Discussed and emphasized the importance of diet and exercise  Patient acknowledges that they understood what was discussed    Lipid profile in the AM         VTE Prophylaxis: Heparin  Code Status: Level 1 - Full Code  Anticipated Length of Stay:  Patient will be admitted on an Observation basis with an anticipated length of stay of  Less than 2 midnights  Justification for Hospital Stay: Chest pain  Total Time for Visit, including Counseling / Coordination of Care: 46 mins  Greater than 50% of this total time spent on direct patient counseling and coordination of care  Chief Complaint:     Chief Complaint   Patient presents with    Abdominal Pain     History of Present Illness:    Pam Sahni is a 46 y o  female with a previous medical history of hypertension, hyperlipidemia and cholecystectomy in 2008  presents to ER for mid epigastric pain  States it began this evening around 5pm after eating a buffalo chicken burger and an [employer] chocolate milkshake was associated with nausea and 6 bouts of bilious emesis, found no relief with home Zantac  Describes the pain as a constant pressure in the mid epigastrium initially 10/10 with no radiation, states this has never happened before  Alleviated with rest, exacerbated with exertion, pain reproducible to light palpation  Pain currently 0/10 s/p nitroglycerin, pepcid, maalox and zofran  States she usually moves her bowels once daily and had a BM yesterday  Denies any headache, blurred vision, syncope, radiation to back, constipation, diarrhea, belching, hematemesis, sick contacts, ETOH or tobacco use       Review of Systems:  Review of Systems   Constitutional: Negative for activity change, chills, diaphoresis, fatigue, fever and unexpected weight change  HENT: Negative for congestion, facial swelling, hearing loss, postnasal drip, sneezing, sore throat and trouble swallowing  Eyes: Negative for photophobia, pain and visual disturbance  Respiratory: Negative for apnea, cough, choking, chest tightness, shortness of breath and wheezing  Cardiovascular: Positive for chest pain and leg swelling     Gastrointestinal: Positive for abdominal pain, nausea and vomiting  Negative for constipation and diarrhea  Endocrine: Negative for polyuria  Genitourinary: Negative for difficulty urinating, flank pain and hematuria  Musculoskeletal: Negative for back pain  Neurological: Negative for dizziness, tremors, seizures, syncope, facial asymmetry, numbness and headaches  Psychiatric/Behavioral: Negative for agitation, sleep disturbance and suicidal ideas  Past Medical and Surgical History:   Past Medical History:   Diagnosis Date    Hypertension      Past Surgical History:   Procedure Laterality Date    CHOLECYSTECTOMY       Meds/Allergies: Allergies: No Known Allergies  Prior to Admission Medications   Prescriptions Last Dose Informant Patient Reported? Taking? amLODIPine (NORVASC) 5 mg tablet 10/10/2018 at 0900  No Yes   Sig: Take 1 tablet (5 mg total) by mouth daily   lisinopril (ZESTRIL) 10 mg tablet 10/10/2018 at 0900  No Yes   Sig: Take 1 tablet (10 mg total) by mouth daily      Facility-Administered Medications: None     Social History:     Social History     Social History    Marital status: /Civil Union     Spouse name: N/A    Number of children: N/A    Years of education: N/A     Occupational History    Not on file  Social History Main Topics    Smoking status: Never Smoker    Smokeless tobacco: Never Used    Alcohol use No    Drug use: No    Sexual activity: Not on file     Other Topics Concern    Not on file     Social History Narrative    No narrative on file     Patient Pre-hospital Living Situation: At home with  and child  Patient Pre-hospital Level of Mobility: Full  Patient Pre-hospital Diet Restrictions: None    Family History:  History reviewed  No pertinent family history    Physical Exam:   Vitals:   Blood Pressure: 152/78 (10/11/18 0256)  Pulse: 77 (10/11/18 0256)  Temperature: (!) 97 °F (36 1 °C) (10/11/18 0256)  Temp Source: Temporal (10/11/18 0256)  Respirations: 18 (10/11/18 0256)  Height: 5' 8" (172 7 cm) (10/11/18 0256)  Weight - Scale: 135 kg (298 lb 1 oz) (10/11/18 0256)  SpO2: 97 % (10/11/18 0256)    Physical Exam   Constitutional: She is oriented to person, place, and time  She appears well-developed and well-nourished  No distress  HENT:   Head: Normocephalic  Mouth/Throat: No oropharyngeal exudate  Eyes: Conjunctivae are normal    Neck: Normal range of motion  Neck supple  No thyromegaly present  Cardiovascular: Normal rate, regular rhythm, normal heart sounds and intact distal pulses  No murmur heard  Pulmonary/Chest: Effort normal and breath sounds normal  No respiratory distress  She has no wheezes  Abdominal: Soft  Bowel sounds are normal  She exhibits no mass  There is tenderness  There is no rebound and no guarding  Obese, Mid epigastric tenderness   Musculoskeletal: Normal range of motion  She exhibits edema  1+ pitting edema bilateral lower extremities   Neurological: She is alert and oriented to person, place, and time  Skin: Skin is warm  She is not diaphoretic  Psychiatric: She has a normal mood and affect  Lab Results: I have personally reviewed pertinent reports  Results from last 7 days  Lab Units 10/10/18  2314   WBC Thousand/uL 7 60   HEMOGLOBIN g/dL 13 7   HEMATOCRIT % 41 1   PLATELETS Thousands/uL 193   NEUTROS PCT % 81*   LYMPHS PCT % 14*   MONOS PCT % 4   EOS PCT % 0       Results from last 7 days  Lab Units 10/10/18  2314   SODIUM mmol/L 138   POTASSIUM mmol/L 4 0   CHLORIDE mmol/L 103   CO2 mmol/L 25   BUN mg/dL 16   CREATININE mg/dL 0 58*   CALCIUM mg/dL 8 9   ALK PHOS U/L 52   ALT U/L 45   AST U/L 25   EGFR ml/min/1 73sq m 124           Results from last 7 days  Lab Units 10/11/18  0234 10/10/18  2314   TROPONIN I ng/mL 0 02 <0 01                        Invalid input(s): URIBILINOGEN          Imaging: I have personally reviewed pertinent reports  No results found      EKG, Pathology, and Other Studies Reviewed on Admission:   EKG  Result Date: 10/11/18  Impression:  Normal sinus rhythm, non specific t wave changes V3, V4    [employer] Records Reviewed: Yes    Entire H&P was discussed with Dr Jj Wadsworth who agreed to what is noted above    Hector Tapia MD  10/11/18  3:12 AM

## 2018-10-11 NOTE — PLAN OF CARE
Problem: PAIN - ADULT  Goal: Verbalizes/displays adequate comfort level or baseline comfort level  Interventions:  - Encourage patient to monitor pain and request assistance  - Assess pain using appropriate pain scale  - Administer analgesics based on type and severity of pain and evaluate response  - Implement non-pharmacological measures as appropriate and evaluate response  - Consider cultural and social influences on pain and pain management  - Notify physician/advanced practitioner if interventions unsuccessful or patient reports new pain  Outcome: Progressing      Problem: SAFETY ADULT  Goal: Maintain or return to baseline ADL function  INTERVENTIONS:  -  Assess patient's ability to carry out ADLs; assess patient's baseline for ADL function and identify physical deficits which impact ability to perform ADLs (bathing, care of mouth/teeth, toileting, grooming, dressing, etc )  - Assess/evaluate cause of self-care deficits   - Assess range of motion  - Assess patient's mobility; develop plan if impaired  - Assess patient's need for assistive devices and provide as appropriate  - Encourage maximum independence but intervene and supervise when necessary  ¯ Involve family in performance of ADLs  ¯ Assess for home care needs following discharge   ¯ Request OT consult to assist with ADL evaluation and planning for discharge  ¯ Provide patient education as appropriate  Outcome: Progressing    Goal: Maintain or return mobility status to optimal level  INTERVENTIONS:  - Assess patient's baseline mobility status (ambulation, transfers, stairs, etc )    - Identify cognitive and physical deficits and behaviors that affect mobility  - Identify mobility aids required to assist with transfers and/or ambulation (gait belt, sit-to-stand, lift, walker, cane, etc )  - [place] fall precautions as indicated by assessment  - Record patient progress and toleration of activity level on Mobility SBAR; progress patient to next Phase/Stage  - Instruct patient to call for assistance with activity based on assessment  - Request Rehabilitation consult to assist with strengthening/weightbearing, etc   Outcome: Progressing      Problem: DISCHARGE PLANNING  Goal: Discharge to home or other facility with appropriate resources  INTERVENTIONS:  - Identify barriers to discharge w/patient and caregiver  - Arrange for needed discharge resources and transportation as appropriate  - Identify discharge learning needs (meds, wound care, etc )  - Arrange for interpretive services to assist at discharge as needed  - Refer to Case Management Department for coordinating discharge planning if the patient needs post-hospital services based on physician/advanced practitioner order or complex needs related to functional status, cognitive ability, or social support system  Outcome: Progressing      Problem: Knowledge Deficit  Goal: Patient/family/caregiver demonstrates understanding of disease process, treatment plan, medications, and discharge instructions  Complete learning assessment and assess knowledge base    Interventions:  - Provide teaching at level of understanding  - Provide teaching via preferred learning methods  Outcome: Progressing

## 2018-10-11 NOTE — ED NOTES
RN not available at this time to take report   RN in another room will call back      Gennaro Hampton RN  10/11/18 1263

## 2018-10-11 NOTE — ED TRIAGE NOTES
"At 4:00p m  This afternoon I ate a chicken buffalo sliider and a  Chocolate milk shake and since then I am having this excrutiating abdominal pain  Pain 10/10, with nausea and vomited times 3 since, no diarrhea  Moved bowels today, normal bowel routine is daily  "

## 2018-10-11 NOTE — DISCHARGE INSTRUCTIONS
Angina   AMBULATORY CARE:   Angina  is pain, pressure, or tightness that is usually felt in your chest  It is caused by decreased blood flow and oxygen to your heart  Angina is usually caused by atherosclerosis (hardening of the arteries)  Chest pain may come on when you are stressed or do physical activities, such as walking or exercising  If left untreated, angina may get worse, increase your risk for a heart attack, or become life-threatening  Common symptoms include the following:   · Pressure, tightness, or pain in your neck, jaw, shoulder, or back    · Pain or numbness in either arm    · Discomfort that feels like heartburn    · Shortness of breath, sweating, nausea, or lightheadedness  Call 911 if:   · You have any of the following signs of a heart attack:      ¨ Squeezing, pressure, or pain in your chest that lasts longer than 5 minutes or returns    ¨ Discomfort or pain in your back, neck, jaw, stomach, or arm     ¨ Trouble breathing    ¨ Nausea or vomiting    ¨ Lightheadedness or a sudden cold sweat, especially with chest pain or trouble breathing    · You have chest pain that does not go away after you take medicine as directed  · You lose feeling in your face, arms, or legs, or you suddenly feel weak  Seek care immediately if:   · Your angina is happening more frequently, lasting longer, or causing worse pain  Contact your healthcare provider if:   · You are dizzy or nauseated after you take your medicine  · You have shortness of breath at rest     · You have new or worse swelling in your feet or ankles  · You have questions or concerns about your condition or care  Treatment for angina  may include any of the following  Take your medicine as directed  Call your healthcare provider if you think your medicine is not helping or if you have side effects  Tell him if you are allergic to any medicine  Keep a list of the medicines, vitamins, and herbs you take   Include the amounts, and when and why you take them  Bring the list or the pill bottles to follow-up visits  Carry your medicine list with you in case of an emergency  · Antiplatelets , such as aspirin, help prevent blood clots  Take your antiplatelet medicine exactly as directed  These medicines make it more likely for you to bleed or bruise  If you are told to take aspirin, do not take acetaminophen or ibuprofen instead  · Blood thinners  keep clots from forming in your blood  Clots may cause heart attacks, strokes, or death  This medicine makes it more likely for you to bleed or bruise  · Other medicines  may be given to open the arteries to your heart, slow your heartbeat, or decrease your blood pressure or cholesterol  · Do not take certain medicines without asking your healthcare provider first   These include NSAIDs, herbal or vitamin supplements, or hormones (estrogen or progestin)  · Angioplasty and stenting  help open the coronary arteries and allow blood to flow to the heart  Ask for more information about these procedures  · Coronary artery bypass graft (CABG) , or open heart surgery, can improve blood flow to the heart  This will help decrease your chest pain and prevent a heart attack  Manage angina:   · Do not smoke  Nicotine and other chemicals in cigarettes and cigars can cause heart and lung damage  Ask your healthcare provider for information if you currently smoke and need help to quit  E-cigarettes or smokeless tobacco still contain nicotine  Talk to your healthcare provider before you use these products  · Maintain a healthy weight  When you weigh more than is healthy for you, your heart must work harder  You are at higher risk for serious health problems if you are overweight  Ask your healthcare provider how much you should weigh  Ask him to help you create a weight loss plan if you are overweight  · Ask about activity  Your healthcare provider will tell you which activities to limit or avoid  Ask about the best exercise plan for you  · Eat heart-healthy foods  Include fresh fruits and vegetables in your meal plan  Choose low-fat foods, such as skim or 1% fat milk, low-fat cheese and yogurt, fish, chicken (without skin), and lean meats  Eat two 4-ounce servings of fish high in omega-3 fats each week, such as salmon, fresh tuna, and herring  Do not eat foods that are high in sodium, such as canned foods, potato chips, salty snacks, and cold cuts  Put less table salt on your food  · Avoid activities that cause angina  Pay attention to your symptoms and find out what seems to make your angina worse  · Ask if you should have a flu vaccine  The flu vaccine will decrease your risk for an infection  An infection can put more stress on your heart and worsen your angina  Follow up with your healthcare provider as directed:  Write down your questions so you remember to ask them during your visits  © 2017 [facility] Information is for End User's use only and may not be sold, redistributed or otherwise used for commercial purposes  All illustrations and images included in CareNotes® are the copyrighted property of [employer]  or [employer]  The above information is an  only  It is not intended as medical advice for individual conditions or treatments  Talk to your doctor, nurse or pharmacist before following any medical regimen to see if it is safe and effective for you

## 2018-10-11 NOTE — DISCHARGE SUMMARY
Discharge Summary - Dana Mccormack    Patient Information: Africa Lancaster 46 y o  female MRN: [id number]  Unit/Bed#: 5T -01 Encounter: [id number]    Discharging Physician / Practitioner: Sarah Velez MD  PCP: Aranza Isaac DO  Admission Date:   Admission Orders     Ordered        10/11/18 0133  Place in Observation (expected length of stay for this patient is less than two midnights)  Once             Discharge Date: 10/11/18    Reason for Admission: Epigastric pain    Discharge Diagnoses:     Principal Problem (Resolved):    Chest pain  Active Problems:    Benign hypertension    BMI 45 0-49 9, adult [facility])  Resolved Problems:    Gastritis      Consultations During Hospital Stay:  · None    Procedures Performed:   · None    Significant Findings / Test Results:   · Negative troponins x 3  · EKG normal sinus rhythm, T wave inversions in lead III, AVF, V3- unchanged from prior EKG August 2016  Incidental Findings:   · none    Test Results Pending at Discharge (will require follow up): · None     Outpatient Tests Requested:  · None    Complications:  none    Hospital Course:     Africa Lancaster is a 46 y o  female  who originally presented to the hospital on 10/10/2018 due to epigastric pain s/p ingestion of fast food  Admitted because of EKG abnormalities and significant improvement of abdominal pain after sublingual nitro  Throughout her stay she continued to improve, had a cardiac workup including troponin's and telemetry monitoring which was negative  On day of discharge she has no complaints, states abdominal pain had resolved  EKG has nonspecific ST wave findings but was unchanged when compared to an EKG from 2016  Instructed to follow up with PCP as directed  Given pepcid for suspected GERD       Condition at Discharge: good     Discharge Day Visit / Exam:     Vitals: Blood Pressure: 132/86 (10/11/18 0741)  Pulse: 80 (10/11/18 0741)  Temperature: (!) 97 1 °F (36 2 °C) (10/11/18 0741)  Temp Source: Temporal (10/11/18 0741)  Respirations: 20 (10/11/18 0741)  Height: 5' 8" (172 7 cm) (10/11/18 0256)  Weight - Scale: 135 kg (298 lb 1 oz) (10/11/18 0256)  SpO2: 98 % (10/11/18 0741)  Exam:   Physical Exam   Constitutional: She is oriented to person, place, and time  She appears well-developed and well-nourished  obese   HENT:   Head: Normocephalic and atraumatic  Mouth/Throat: No oropharyngeal exudate  Eyes: Pupils are equal, round, and reactive to light  Conjunctivae and EOM are normal  No scleral icterus  Neck: Normal range of motion  Neck supple  Cardiovascular: Normal rate, regular rhythm and intact distal pulses  Exam reveals no gallop and no friction rub  No murmur heard  Pulmonary/Chest: Effort normal and breath sounds normal  No respiratory distress  She has no wheezes  She has no rales  Abdominal: Soft  Bowel sounds are normal  She exhibits no distension and no mass  There is no tenderness  There is no rebound  Musculoskeletal: Normal range of motion  She exhibits no edema  Neurological: She is alert and oriented to person, place, and time  Skin: Skin is warm and dry  Psychiatric: She has a normal mood and affect  Discharge instructions/Information to patient and family:   See after visit summary for information provided to patient and family  Discharge Medications: AmLODIPine Besylate 5 mg Oral Daily      Famotidine 20 mg Oral 2 times daily      Lisinopril 10 mg Oral Daily     Provisions for Follow-Up Care:  See after visit summary for information related to follow-up care and any pertinent home health orders  Disposition:     Home    For Discharges to [facility] SNF:   · Not Applicable to this Patient - Not Applicable to this Patient    Planned Readmission: none     Discharge Statement:  I spent 30 minutes discharging the patient  This time was spent on the day of discharge  I had direct contact with the patient on the day of discharge  Greater than 50% of the total time was spent examining patient, answering all patient questions, arranging and discussing plan of care with patient as well as directly providing post-discharge instructions  Additional time then spent on discharge activities           ** Please Note: This note has been constructed using a voice recognition system **    Vel Adrian MD  10/11/18  12:12 PM

## 2018-10-11 NOTE — ASSESSMENT & PLAN NOTE
As per patient PMHx  Started on Atorvastatin 40mg qd  Discussed and emphasized the importance of diet and exercise  Patient acknowledges that they understood what was discussed    Lipid profile in the AM

## 2018-10-11 NOTE — NURSING NOTE
Pt  Given DC teaching  Given AVS, has no questions following teaching  Pt  Has all needs within reach  Stated her daughter will be here shortly to pick her up  VSS  No complaints to offer  Will continue to monitor until she leaves the unit

## 2018-10-11 NOTE — ED PROVIDER NOTES
History  Chief Complaint   Patient presents with    Abdominal Pain     78-year-old female with history of hypertension, cholecystectomy many years ago presents with acute onset epigastric burning abdominal pain which started after she ate at [employer] at around 4 o'clock this afternoon  She states that she ate a buffalo chicken Slider and a chocolate milkshake and then developed gradually worsening epigastric discomfort  She states that she had multiple episodes of nonbilious nonbloody vomitus  No diarrhea  The pain is constant and has been getting worse  She did take some Zantac earlier today and it relieved the pain temporarily  She tried taking Pepto-Bismol but threw it up  Otherwise not tried taking anything else for the pain  No similar history in the past   She does have a history of cholecystectomy many years ago but no other abdominal surgeries  She has a history of hypertension and hyperlipidemia but otherwise does not smoke, drink alcohol, family history of heart disease in her father  Prior to Admission Medications   Prescriptions Last Dose Informant Patient Reported? Taking? amLODIPine (NORVASC) 5 mg tablet 10/10/2018 at 0900  No Yes   Sig: Take 1 tablet (5 mg total) by mouth daily   lisinopril (ZESTRIL) 10 mg tablet 10/10/2018 at 0900  No Yes   Sig: Take 1 tablet (10 mg total) by mouth daily      Facility-Administered Medications: None       Past Medical History:   Diagnosis Date    Hypertension        Past Surgical History:   Procedure Laterality Date    CHOLECYSTECTOMY         History reviewed  No pertinent family history  I have reviewed and agree with the history as documented  Social History   Substance Use Topics    Smoking status: Never Smoker    Smokeless tobacco: Never Used    Alcohol use No        Review of Systems   Constitutional: Negative for appetite change and fever  HENT: Negative for rhinorrhea and sore throat      Eyes: Negative for photophobia and visual disturbance  Respiratory: Negative for cough, chest tightness, shortness of breath and wheezing  Cardiovascular: Negative for chest pain, palpitations and leg swelling  Gastrointestinal: Positive for abdominal pain, nausea and vomiting  Negative for abdominal distention, blood in stool, constipation and diarrhea  Genitourinary: Negative for dysuria, flank pain, frequency, hematuria and urgency  Musculoskeletal: Negative for back pain  Skin: Negative for rash  Neurological: Negative for dizziness, weakness and headaches  All other systems reviewed and are negative  Physical Exam  Physical Exam   Constitutional: She is oriented to person, place, and time  She appears well-developed and well-nourished  Obese female in no acute respiratory distress   HENT:   Head: Normocephalic and atraumatic  Eyes: Pupils are equal, round, and reactive to light  EOM are normal    Neck: Normal range of motion  Neck supple  Cardiovascular: Normal rate and regular rhythm  Exam reveals no gallop and no friction rub  No murmur heard  Pulmonary/Chest: Effort normal  She has no wheezes  She has no rales  She exhibits no tenderness  Abdominal: Soft  She exhibits no distension and no mass  There is no rebound and no guarding  Obese abdomen tender to palpation in the epigastric region without rebound or guarding, no overlying skin lesions   Neurological: She is alert and oriented to person, place, and time  Skin: Skin is warm and dry  Psychiatric: She has a normal mood and affect  Nursing note and vitals reviewed        Vital Signs  ED Triage Vitals [10/10/18 2234]   Temperature Pulse Respirations Blood Pressure SpO2   99 °F (37 2 °C) 87 18 (!) 163/105 97 %      Temp Source Heart Rate Source Patient Position - Orthostatic VS BP Location FiO2 (%)   Tympanic Monitor Sitting Left arm --      Pain Score       Worst Possible Pain           Vitals:    10/11/18 0100 10/11/18 0213 10/11/18 0256 10/11/18 0456   BP: 117/68 128/77 152/78 148/72   Pulse: 76 77 77 70   Patient Position - Orthostatic VS: Lying Lying Sitting Lying       Visual Acuity      ED Medications  Medications   lisinopril (ZESTRIL) tablet 10 mg (not administered)   amLODIPine (NORVASC) tablet 5 mg (not administered)   heparin (porcine) subcutaneous injection 5,000 Units (5,000 Units Subcutaneous Given 10/11/18 0500)   atorvastatin (LIPITOR) tablet 40 mg (not administered)   aspirin (ECOTRIN LOW STRENGTH) EC tablet 81 mg (not administered)   nitroglycerin (NITROSTAT) SL tablet 0 4 mg (not administered)   ondansetron (ZOFRAN) injection 4 mg (4 mg Intravenous Given 10/10/18 2313)   aluminum-magnesium hydroxide-simethicone (MYLANTA) 200-200-20 mg/5 mL oral suspension 30 mL (30 mL Oral Given 10/10/18 2313)   famotidine (PEPCID) injection 20 mg (20 mg Intravenous Given 10/10/18 2313)   nitroglycerin (NITROSTAT) SL tablet 0 4 mg (0 4 mg Sublingual Given 10/10/18 2354)   aspirin chewable tablet 324 mg (324 mg Oral Given 10/11/18 0016)       Diagnostic Studies  Results Reviewed     Procedure Component Value Units Date/Time    Troponin I [[id number]]     Lab Status:  No result Specimen:  Blood     Troponin I [[id number]]  (Normal) Collected:  10/11/18 0234    Lab Status:  Final result Specimen:  Blood from Arm, Right Updated:  10/11/18 0303     Troponin I 0 02 ng/mL     Narrative:       Hemolysis    Troponin I [[id number]]  (Normal) Collected:  10/10/18 2314    Lab Status:  Final result Specimen:  Blood from Arm, Left Updated:  10/10/18 2352     Troponin I <0 01 ng/mL     Lipase [[id number]]  (Normal) Collected:  10/10/18 2314    Lab Status:  Final result Specimen:  Blood from Arm, Left Updated:  10/10/18 2342     Lipase 24 u/L     Comprehensive metabolic panel [[id number]]  (Abnormal) Collected:  10/10/18 2314    Lab Status:  Final result Specimen:  Blood from Arm, Left Updated:  10/10/18 2342     Sodium 138 mmol/L      Potassium 4 0 mmol/L      Chloride 103 mmol/L CO2 25 mmol/L      ANION GAP 10 mmol/L      BUN 16 mg/dL      Creatinine 0 58 (L) mg/dL      Glucose 130 (H) mg/dL      Calcium 8 9 mg/dL      AST 25 U/L      ALT 45 U/L      Alkaline Phosphatase 52 U/L      Total Protein 7 8 g/dL      Albumin 4 6 g/dL      Total Bilirubin 0 70 mg/dL      eGFR 124 ml/min/1 73sq m     Narrative:         National Kidney Disease Education Program recommendations are as follows:  GFR calculation is accurate only with a steady state creatinine  Chronic Kidney disease less than 60 ml/min/1 73 sq  meters  Kidney failure less than 15 ml/min/1 73 sq  meters      CBC and differential [[id number]]  (Abnormal) Collected:  10/10/18 2314    Lab Status:  Final result Specimen:  Blood from Arm, Left Updated:  10/10/18 2328     WBC 7 60 Thousand/uL      RBC 4 47 Million/uL      Hemoglobin 13 7 g/dL      Hematocrit 41 1 %      MCV 92 fL      MCH 30 7 pg      MCHC 33 3 g/dL      RDW 13 5 %      MPV 9 8 fL      Platelets 286 Thousands/uL      Neutrophils Relative 81 (H) %      Lymphocytes Relative 14 (L) %      Monocytes Relative 4 %      Eosinophils Relative 0 %      Basophils Relative 1 %      Neutrophils Absolute 6 10 Thousands/µL      Lymphocytes Absolute 1 10 Thousands/µL      Monocytes Absolute 0 30 Thousand/µL      Eosinophils Absolute 0 00 Thousand/µL      Basophils Absolute 0 00 Thousands/µL     POCT pregnancy, urine [[id number]]  (Normal) Resulted:  10/10/18 2316    Lab Status:  Final result Updated:  10/10/18 2316     EXT PREG TEST UR (Ref: Negative) Negative                 XR chest 2 views    (Results Pending)              Procedures  Procedures       Phone Contacts  ED Phone Contact    ED Course  ED Course as of Oct 11 0510   Wed Oct 10, 2018   2243 Procedure Note: EKG  Date/Time: 10/10/18 10:44 PM   Performed by: [facility]  Authorized by: [facility]  Indications / Diagnosis: Abd Pain  ECG reviewed by me, the ED Provider: yes   The EKG demonstrates:  Rhythm: normal sinus  Intervals: normal intervals  Axis: Left axis  QRS/Blocks: Incomplete RBBB  ST Changes: nonspecific ST changes  No previous          2354 Patient states that her pain mildly improved but still having abdominal discomfort, will repeat EKG    Thu Oct 11, 2018   0002 Repeat EKG unchanged                                [facility]  Number of Diagnoses or Management Options  Diagnosis management comments: 68-year-old female presents for evaluation of acute onset abdominal pain  Will treat symptomatically with GI cocktail and will obtain lab work including EKG and troponin to evaluate for cardiac disease  Will re-evaluate patient's response to treatment    CritCare Time    Disposition  Final diagnoses:   Chest pain   Abdominal pain   Nausea and vomiting     Time reflects when diagnosis was documented in both MDM as applicable and the Disposition within this note     Time User Action Codes Description Comment    10/11/2018  1:32 AM Julianne Legions Add [R07 9] Chest pain     10/11/2018  1:32 AM Julianne Legions Add [R10 9] Abdominal pain     10/11/2018  1:32 AM Julianne Legions Add [R11 2] Nausea and vomiting       ED Disposition     ED Disposition Condition Comment    Admit  Case was discussed with [facility] and the patient's admission status was agreed to be Admission Status: observation status to the service of Dr Tianna Campbell   Follow-up Information    None         Current Discharge Medication List      CONTINUE these medications which have NOT CHANGED    Details   amLODIPine (NORVASC) 5 mg tablet Take 1 tablet (5 mg total) by mouth daily  Qty: 30 tablet, Refills: 0    Associated Diagnoses: Hypertension, unspecified type      lisinopril (ZESTRIL) 10 mg tablet Take 1 tablet (10 mg total) by mouth daily  Qty: 30 tablet, Refills: 0    Associated Diagnoses: Hypertension, unspecified type           No discharge procedures on file      ED Provider  Electronically Signed by           Albert Edgar MD  10/11/18 1163

## 2018-10-11 NOTE — ASSESSMENT & PLAN NOTE
Patient is educated on the importance of portion control and dieting  Patient is also educated on the importance of exercise  Patient is encouraged to walk 30 min at least 5 times a week  Discussed about benefits of losing weight  Patient acknowledges that they understood what is discussed      - HBa1c / lipid profile in AM

## 2018-10-11 NOTE — UTILIZATION REVIEW
[address] in the [facility] by [employer] for 2017  Network Utilization Review Department  Phone: [phone]; Fax [phone]  ATTENTION: The Network Utilization Review Department is now centralized for our 7 Facilities  Please call with any questions or concerns to [phone] and carefully follow the prompts so that you are directed to the right person  All voicemails are confidential  Fax any determinations, approvals, denials, and requests for initial or continue stay review clinical to [phone]  Due to HIGH CALL volume, it would be easier if you could please send faxed requests to expedite your requests and in part, help us provide discharge notifications faster   /////////////////////////////////////////////////////////////////////////////////////////////////////////////////    Initial Clinical Review    ADMIT OBS on  10/11  @  0133    Orders Placed This Encounter   Procedures    Place in Observation (expected length of stay for this patient is less than two midnights)     Standing Status:   Standing     Number of Occurrences:   1     Order Specific Question:   Admitting Physician     Answer:   Renetta Yepez [[id number]]     Order Specific Question:   Level of Care     Answer:   Med Surg [16]         ED: Date/Time/Mode of Arrival:   ED Arrival Information     Expected Arrival Acuity Means of Arrival Escorted By Service Admission Type    - 10/10/2018 22:26 Urgent Walk-In Self General Medicine Urgent    Arrival Complaint    abd pain           Chief Complaint:   Chief Complaint   Patient presents with    Abdominal Pain       History of Illness:   49-year-old female with history of hypertension, HLD, cholecystectomy   - acute onset epigastric burning abdominal pain which started after she ate at [employer] at around 4 this afternoon  - multiple episodes of nonbilious nonbloody vomitus  No diarrhea    The pain is constant  -  did take some Zantac earlier today and it relieved the pain temporarily, vomited up Peptobismol    EKG showed non specific changes, inverted T wave in leads V3-4  No previous ECG available for comparison  Received loading of  mg in ER, found relief with nitroglycerin, first troponin negative  Describes the pain as a constant pressure in the mid epigastrium initially 10/10 with no radiation, states this has never happened before  Alleviated with rest, exacerbated with exertion, pain reproducible to light palpation  Pain currently 0/10 s/p nitroglycerin, pepcid, maalox and zofran  PE:  Obese, Mid epigastric tenderness   Maynor Merle Maynor Merle 1+ pitting edema bilateral lower extremities     ED Vital Signs:   ED Triage Vitals [10/10/18 2234]   Temperature Pulse Respirations Blood Pressure SpO2   99 °F (37 2 °C) 87 18 (!) 163/105 97 %      Temp Source Heart Rate Source Patient Position - Orthostatic VS BP Location FiO2 (%)   Tympanic Monitor Sitting Left arm --      Pain Score       Worst Possible Pain        Wt Readings from Last 1 Encounters:   10/11/18 135 kg (298 lb 1 oz)     Abnormal Labs/Diagnostic Test Results:   ekg -  Normal sinus rhythm, non specific t wave changes V3, V4    crt  0 58   0 59  gfr  124    124   Trop  <0 01    0 02    <0 01      ED Treatment:   Medication Administration from 10/10/2018 2226 to 10/11/2018 0247       Date/Time Order Dose Route Action Action by Comments     10/10/2018 2313 ondansetron (ZOFRAN) injection 4 mg 4 mg Intravenous Given Carmelita So RN      10/10/2018 2313 aluminum-magnesium hydroxide-simethicone (MYLANTA) 200-200-20 mg/5 mL oral suspension 30 mL 30 mL Oral Given Carmelita So RN      10/10/2018 2313 famotidine (PEPCID) injection 20 mg 20 mg Intravenous Given Carmelita So RN      10/10/2018 9894 nitroglycerin (NITROSTAT) SL tablet 0 4 mg 0 4 mg Sublingual Given Carmelita So RN      10/11/2018 0016 aspirin chewable tablet 324 mg 324 mg Oral Given Carmelita So RN           Past Medical/Surgical History:  HTN, HLD,  Knee pain    Admitting Diagnosis: Chest pain [R07 9]  Abdominal pain [R10 9]  Nausea and vomiting [R11 2]    Assessment/Plan:   CHEST PAIN  Assessment & Plan     [id number] hour telemetry  AM EKG  Keep 02 sat > 94%  Trend troponins  x 2   Atorvastatin 40 mg qd   AM- TSH, A1c   Gastritis   Assessment & Plan     highly suspicious for gastritis, most likely viral   Currently asymptomatic s/p maalox, pepcid and zofran, no emesis noted since admission, will advance diet in morning as tolerated  Benign hypertension   Assessment & Plan     Currently well controlled 147/ 85  Will continue home regimen of Amlodipine and Lisinopril  1+ pitting edema noted on bilateral lower extremities, likely secondary to amlodipine use and occupation as patient is on her feet most of the time       BMI 45 0-49 9, adult [facility])   Assessment & Plan     Patient is educated on the importance of portion control and dieting, exercise , losing wgt    HLD (hyperlipidemia)   Assessment & Plan     Started on Atorvastatin 40mg qd  Discussed and emphasized the importance of diet and exercise           VTE Prophylaxis: Heparin    Anticipated Length of Stay:  Patient will be admitted on an Observation basis with an anticipated length of stay of  Less than 2 midnights       Justification for Hospital Stay: Chest pain    Admission Orders:  Admit telemetry  Oxygen prn  Sequential compression device to b/l LE  VS q 4 hrs  Up w/assist    Scheduled Meds:   Current Facility-Administered Medications:  amLODIPine 5 mg Oral Daily Sherin Ibarra MD   aspirin 81 mg Oral Daily Sherin Ibarra MD   atorvastatin 40 mg Oral Daily With Anish Vogt MD   heparin (porcine) 5,000 Units Subcutaneous [facility] Sherin Ibarra MD   lisinopril 10 mg Oral Daily Sherin Ibarra MD   nitroglycerin 0 4 mg Sublingual Q5 Min PRN Sherin Ibarra MD

## 2018-10-11 NOTE — NURSING NOTE
Pt  VSS, Prior to leaving with her daughter  Pt  Has no complaints to offer prior to leaving  Pt  Has work excusal note at  to be picked up later  Pt  Left walking with daughter

## 2018-10-11 NOTE — ASSESSMENT & PLAN NOTE
Currently well controlled 147/ 85  Will continue home regimen of Amlodipine and Lisinopril  1+ pitting edema noted on bilateral lower extremities, likely secondary to amlodipine use and occupation as patient is on her feet most of the time

## 2018-10-11 NOTE — ASSESSMENT & PLAN NOTE
Stable  EKG showed non specific changes, inverted T wave in leads V3-4  No previous ECG available for comparison  Received loading of  mg in ER, found relief with nitroglycerin, first troponin negative    [place] protocol  24 hour telemetry  AM EKG  Keep 02 sat > 94%  Trend troponins  x 2   Atorvastatin 40 mg qd   AM- TSH, A1c

## 2018-10-11 NOTE — SOCIAL WORK
SW met with this pt  Pt here with possible gastritis  She is independent with all activities of life and works full time  Pt will likely discharge home with no further assistance needed  SW is following

## 2018-10-11 NOTE — ED NOTES
Pt ambulated to assigned with steady, stable gait  Pt in no distress  Visitor with patient        Kei Crockett RN  10/10/18 5569

## 2018-10-19 ENCOUNTER — OFFICE VISIT (OUTPATIENT)
Dept: FAMILY MEDICINE CLINIC | Facility: CLINIC | Age: 48
End: 2018-10-19
Payer: COMMERCIAL

## 2018-10-19 VITALS
SYSTOLIC BLOOD PRESSURE: 120 MMHG | RESPIRATION RATE: 20 BRPM | TEMPERATURE: 96.9 F | OXYGEN SATURATION: 97 % | WEIGHT: 289 LBS | DIASTOLIC BLOOD PRESSURE: 86 MMHG | HEIGHT: 68 IN | HEART RATE: 113 BPM | BODY MASS INDEX: 43.8 KG/M2

## 2018-10-19 DIAGNOSIS — J06.9 UPPER RESPIRATORY TRACT INFECTION, UNSPECIFIED TYPE: Primary | ICD-10-CM

## 2018-10-19 DIAGNOSIS — I10 HYPERTENSION, UNSPECIFIED TYPE: ICD-10-CM

## 2018-10-19 LAB
ATRIAL RATE: 72 BPM
P AXIS: 34 DEGREES
PR INTERVAL: 204 MS
QRS AXIS: 15 DEGREES
QRSD INTERVAL: 84 MS
QT INTERVAL: 408 MS
QTC INTERVAL: 446 MS
T WAVE AXIS: -14 DEGREES
VENTRICULAR RATE: 72 BPM

## 2018-10-19 PROCEDURE — 93010 ELECTROCARDIOGRAM REPORT: CPT | Performed by: INTERNAL MEDICINE

## 2018-10-19 PROCEDURE — 99213 OFFICE O/P EST LOW 20 MIN: CPT | Performed by: PHYSICIAN ASSISTANT

## 2018-10-19 PROCEDURE — 3008F BODY MASS INDEX DOCD: CPT | Performed by: PHYSICIAN ASSISTANT

## 2018-10-19 RX ORDER — AZITHROMYCIN 250 MG/1
TABLET, FILM COATED ORAL
Qty: 6 TABLET | Refills: 0 | Status: SHIPPED | OUTPATIENT
Start: 2018-10-19 | End: 2018-10-23

## 2018-10-19 RX ORDER — LISINOPRIL 10 MG/1
10 TABLET ORAL DAILY
Qty: 30 TABLET | Refills: 2 | Status: SHIPPED | OUTPATIENT
Start: 2018-10-19 | End: 2019-01-30 | Stop reason: SDUPTHER

## 2018-10-19 RX ORDER — AMLODIPINE BESYLATE 5 MG/1
5 TABLET ORAL DAILY
Qty: 30 TABLET | Refills: 2 | Status: SHIPPED | OUTPATIENT
Start: 2018-10-19 | End: 2019-01-30 | Stop reason: SDUPTHER

## 2018-10-19 NOTE — PROGRESS NOTES
Assessment/Plan:    Upper respiratory tract infection  - will prescribe z-pack since symptoms have been ongoing for almost one week without resolution with nyquil and dayquil   - ibuprofen and warm compress for relief of pain from enlarged lymph node          Diagnoses and all orders for this visit:    Upper respiratory tract infection, unspecified type  -     azithromycin (ZITHROMAX) 250 mg tablet; Take 2 tablets today then 1 tablet daily x 4 days    Hypertension, unspecified type  -     lisinopril (ZESTRIL) 10 mg tablet; Take 1 tablet (10 mg total) by mouth daily  -     amLODIPine (NORVASC) 5 mg tablet; Take 1 tablet (5 mg total) by mouth daily          Subjective: Patient was admitted to [facility] from 10/10- 10/11 after coming in with initial complain of epigastric pain following consumption of fast food  EKG abnormalities warranted a cardiac workup, which all returned negative for ischemia/infarction  EKG was noted to have the same nonspecific ST wave findings as another reading in 2016, lessening concern for new cardiac issues  Patient was discharged in stable condition and given Pepcid to take for suspected GERD  States that she is feeling better taking the Pepcid  Patient states that since Saturday, she has had a worsening cold  She has a dry cough that keeps her awake at night  She denies wheezing and shortness of breath  She has congestion in her nose, and states that the posterior portion of her ear hurts  There is no pain in her ears, no fluid in ears, and no discharge  She has tried taking Dayquil/Nyquil and Coricidin  No improvement  She denies sick contacts  Patient ID: Clarissa Huff is a 46 y o  female  HPI    The following portions of the patient's history were reviewed and updated as appropriate: She  has a past medical history of Hypertension    Patient Active Problem List    Diagnosis Date Noted    Upper respiratory tract infection 10/19/2018    BMI 45 0-49 9, adult ([address] ) 10/11/2018    Abnormal glucose tolerance test 12/29/2015    Benign hypertension 12/07/2015    Knee pain 12/07/2015     She  has a past surgical history that includes Cholecystectomy  Her family history is not on file  She  reports that she has never smoked  She has never used smokeless tobacco  She reports that she does not drink alcohol or use drugs  Current Outpatient Prescriptions   Medication Sig Dispense Refill    amLODIPine (NORVASC) 5 mg tablet Take 1 tablet (5 mg total) by mouth daily 30 tablet 2    azithromycin (ZITHROMAX) 250 mg tablet Take 2 tablets today then 1 tablet daily x 4 days 6 tablet 0    famotidine (PEPCID) 20 mg tablet Take 1 tablet (20 mg total) by mouth 2 (two) times a day 60 tablet 0    lidocaine (LIDODERM) 5 % Place 1 patch on the skin daily Remove & Discard patch within 12 hours or as directed by MD 6 patch 0    lisinopril (ZESTRIL) 10 mg tablet Take 1 tablet (10 mg total) by mouth daily 30 tablet 2    naproxen (NAPROSYN) 500 mg tablet Take 1 tablet (500 mg total) by mouth 2 (two) times a day with meals 30 tablet 0     No current facility-administered medications for this visit  Current Outpatient Prescriptions on File Prior to Visit   Medication Sig    famotidine (PEPCID) 20 mg tablet Take 1 tablet (20 mg total) by mouth 2 (two) times a day    lidocaine (LIDODERM) 5 % Place 1 patch on the skin daily Remove & Discard patch within 12 hours or as directed by MD    naproxen (NAPROSYN) 500 mg tablet Take 1 tablet (500 mg total) by mouth 2 (two) times a day with meals    [DISCONTINUED] amLODIPine (NORVASC) 5 mg tablet Take 1 tablet (5 mg total) by mouth daily    [DISCONTINUED] lisinopril (ZESTRIL) 10 mg tablet Take 1 tablet (10 mg total) by mouth daily     No current facility-administered medications on file prior to visit  She has No Known Allergies       Review of Systems   Constitutional: Negative  HENT: Positive for congestion and sore throat   Negative for rhinorrhea, sinus pain and sinus pressure  Pain behind the right ear    Eyes: Negative for pain and itching  Respiratory: Negative for chest tightness, shortness of breath and wheezing  Cardiovascular: Negative for chest pain and palpitations  Gastrointestinal: Negative for diarrhea, nausea, rectal pain and vomiting  Musculoskeletal: Negative for back pain and myalgias  Skin: Negative for color change and pallor  Neurological: Negative for dizziness, weakness, numbness and headaches  Hematological: Positive for adenopathy  Objective:      /86   Pulse (!) 113   Temp (!) 96 9 °F (36 1 °C) (Temporal)   Resp 20   Ht 5' 8" (1 727 m)   Wt 131 kg (289 lb)   SpO2 97%   BMI 43 94 kg/m²          Physical Exam   Constitutional: She is oriented to person, place, and time  She appears well-developed and well-nourished  No distress  HENT:   Head: Normocephalic and atraumatic  Right Ear: External ear normal    Left Ear: External ear normal    Nose: Nose normal    Mouth/Throat: Oropharynx is clear and moist    Post auricular lymph node on right side    Eyes: Pupils are equal, round, and reactive to light  Conjunctivae and EOM are normal    Neck: Normal range of motion  Neck supple  Cardiovascular: Regular rhythm and normal heart sounds  Pulmonary/Chest: Effort normal and breath sounds normal  No respiratory distress  She has no wheezes  Neurological: She is alert and oriented to person, place, and time  Skin: Skin is warm and dry

## 2018-10-19 NOTE — ASSESSMENT & PLAN NOTE
- will prescribe z-pack since symptoms have been ongoing for almost one week without resolution with nyquil and dayquil   - ibuprofen and warm compress for relief of pain from enlarged lymph node

## 2018-11-01 ENCOUNTER — OFFICE VISIT (OUTPATIENT)
Dept: FAMILY MEDICINE CLINIC | Facility: CLINIC | Age: 48
End: 2018-11-01
Payer: COMMERCIAL

## 2018-11-01 VITALS
HEIGHT: 68 IN | DIASTOLIC BLOOD PRESSURE: 92 MMHG | OXYGEN SATURATION: 96 % | WEIGHT: 292 LBS | SYSTOLIC BLOOD PRESSURE: 144 MMHG | RESPIRATION RATE: 18 BRPM | BODY MASS INDEX: 44.25 KG/M2 | HEART RATE: 90 BPM | TEMPERATURE: 96.9 F

## 2018-11-01 DIAGNOSIS — R73.03 PREDIABETES: Primary | ICD-10-CM

## 2018-11-01 DIAGNOSIS — Z12.39 SCREENING FOR BREAST CANCER: ICD-10-CM

## 2018-11-01 DIAGNOSIS — Z23 NEED FOR VACCINATION: ICD-10-CM

## 2018-11-01 DIAGNOSIS — Z12.11 SCREENING FOR COLON CANCER: ICD-10-CM

## 2018-11-01 PROBLEM — E66.9 OBESITY: Status: ACTIVE | Noted: 2018-11-01

## 2018-11-01 PROBLEM — Z00.00 WELL ADULT EXAM: Status: ACTIVE | Noted: 2018-11-01

## 2018-11-01 PROBLEM — M19.90 ARTHRITIS: Status: ACTIVE | Noted: 2018-11-01

## 2018-11-01 PROBLEM — J06.9 UPPER RESPIRATORY TRACT INFECTION: Status: RESOLVED | Noted: 2018-10-19 | Resolved: 2018-11-01

## 2018-11-01 PROBLEM — H92.01 RIGHT EAR PAIN: Status: ACTIVE | Noted: 2018-11-01

## 2018-11-01 PROCEDURE — 99396 PREV VISIT EST AGE 40-64: CPT | Performed by: FAMILY MEDICINE

## 2018-11-01 PROCEDURE — 90471 IMMUNIZATION ADMIN: CPT | Performed by: FAMILY MEDICINE

## 2018-11-01 PROCEDURE — 90682 RIV4 VACC RECOMBINANT DNA IM: CPT | Performed by: FAMILY MEDICINE

## 2018-11-01 NOTE — ASSESSMENT & PLAN NOTE
Advised about diet control  Patient would like to start metformin    Will give a small dose 500 mg daily

## 2018-11-01 NOTE — ASSESSMENT & PLAN NOTE
Discussed at that due to age and risk factors patient is in need of colonoscopy to screen for colon cancer  Patient verbalized understanding and is willing  Referral made today    Continue to monitor and report any change in stool  Her mother also had colon cancer at age of 61

## 2018-11-01 NOTE — ASSESSMENT & PLAN NOTE
Of bilateral knees  Patient has been doing physical therapy which helped her pain a lot    She is rarely taking naproxen for pain

## 2018-11-01 NOTE — PROGRESS NOTES
Assessment/Plan:    Benign hypertension  Currently blood pressure is controlled at this time  Reviewed BP target goal with patient  Continue to maintain healthy balanced diet with focus on low salt intake  Limit alcohol intake  Obesity  Counseled patient on the importance of working to achieve weight reduction goal   Discussed benefits of weight loss including prevention or control of comorbidities  Discussed role that balanced diet and daily activity play in weight reduction  Set up small attainable weight loss goals  Involve family, friends, and co-workers for support  Encounter for immunization  Will give flu shot today    Screening for colon cancer  Discussed at that due to age and risk factors patient is in need of colonoscopy to screen for colon cancer  Patient verbalized understanding and is willing  Referral made today  Continue to monitor and report any change in stool  Her mother also had colon cancer at age of 61    Well adult exam  Patient is here for physical   Physical exam is within normal limits except for obesity    Prediabetes  Advised about diet control  Patient would like to start metformin  Will give a small dose 500 mg daily    Arthritis  Of bilateral knees  Patient has been doing physical therapy which helped her pain a lot  She is rarely taking naproxen for pain       Diagnoses and all orders for this visit:    Prediabetes  -     metFORMIN (GLUCOPHAGE) 500 mg tablet; Take 1 tablet (500 mg total) by mouth daily with breakfast    Screening for colon cancer  -     Ambulatory referral to Gastroenterology; Future    Screening for breast cancer  -     Mammo screening bilateral w cad; Future    Need for vaccination  -     influenza vaccine, [id number], quadrivalent, recombinant, PF, 0 5 mL, for patients 18 yr+ (FLUBLOK)          Subjective:      Patient ID: Daphne Hogan is a 46 y o  female      HPI  This is a pleasant 23-year-old female with a pas medical history significant for hypertension who is here for follow-up on chronic conditions  She denies any chest pain or shortness of breath  Reports compliance with her med  Her only complaint was occasional pain behind her right ear  Patient admits that she post her hair to the back very tight  Sometimes she thinks that she has a lump there  I explained to her that she could lipoma and her scalp although I could not feel any on physical examination  Encouraged to tie her hair more loose  The following portions of the patient's history were reviewed and updated as appropriate: allergies, current medications, past family history, past medical history, past social history, past surgical history and problem list     Review of Systems   Constitutional: Negative for chills, fatigue and fever  HENT: Negative for sinus pressure and sore throat  Pain behind the right ear   Eyes: Negative for photophobia, pain and visual disturbance  Respiratory: Negative for cough, chest tightness and shortness of breath  Cardiovascular: Negative for chest pain and leg swelling  Gastrointestinal: Negative for abdominal pain and diarrhea  Genitourinary: Negative for dysuria, frequency and urgency  Musculoskeletal: Negative for arthralgias and myalgias  Skin: Negative for rash  Neurological: Negative for dizziness, seizures, syncope and headaches  Hematological: Negative for adenopathy  Objective:      /92 (BP Location: Right arm, Patient Position: Sitting, Cuff Size: Large)   Pulse 90   Temp (!) 96 9 °F (36 1 °C) (Temporal)   Resp 18   Ht 5' 8" (1 727 m)   Wt 132 kg (292 lb)   SpO2 96%   BMI 44 40 kg/m²          Physical Exam   Constitutional: She is oriented to person, place, and time  She appears well-developed  Patient is obese   HENT:   Head: Normocephalic and atraumatic  Right Ear: External ear normal  No swelling  Tympanic membrane is not injected, not erythematous and not bulging   No middle ear effusion  Left Ear: External ear normal  No swelling  Tympanic membrane is not injected, not erythematous and not bulging  No middle ear effusion  Mouth/Throat: Oropharynx is clear and moist  No oropharyngeal exudate  No erythema rash or lesion is noted behind the right ear   Eyes: Pupils are equal, round, and reactive to light  Conjunctivae and EOM are normal  Right eye exhibits no discharge  Left eye exhibits no discharge  Neck: Normal range of motion  Neck supple  No JVD present  No thyromegaly present  Cardiovascular: Normal rate, regular rhythm and normal heart sounds  No murmur heard  Pulmonary/Chest: Effort normal and breath sounds normal  No respiratory distress  She has no wheezes  She has no rales  Abdominal: Soft  Bowel sounds are normal  She exhibits no distension  There is no tenderness  Musculoskeletal: Normal range of motion  She exhibits no edema or deformity  Neurological: She is alert and oriented to person, place, and time  Skin: Skin is warm and dry  Vitals reviewed

## 2018-11-01 NOTE — ASSESSMENT & PLAN NOTE
Secondary to osteoarthritis of the knee Patient has been doing physical therapy which helped her pain a lot    She is rarely taking naproxen for pain

## 2018-11-29 ENCOUNTER — HOSPITAL ENCOUNTER (OUTPATIENT)
Dept: MAMMOGRAPHY | Facility: CLINIC | Age: 48
Discharge: HOME | End: 2018-11-29
Payer: COMMERCIAL

## 2018-11-29 VITALS — BODY MASS INDEX: 44.25 KG/M2 | WEIGHT: 292 LBS | HEIGHT: 68 IN

## 2018-11-29 DIAGNOSIS — Z12.39 SCREENING FOR BREAST CANCER: ICD-10-CM

## 2018-11-29 PROCEDURE — 77067 SCR MAMMO BI INCL CAD: CPT

## 2019-01-28 DIAGNOSIS — R73.03 PREDIABETES: ICD-10-CM

## 2019-01-30 DIAGNOSIS — I10 HYPERTENSION, UNSPECIFIED TYPE: ICD-10-CM

## 2019-01-30 RX ORDER — LISINOPRIL 10 MG/1
10 TABLET ORAL DAILY
Qty: 30 TABLET | Refills: 2 | Status: SHIPPED | OUTPATIENT
Start: 2019-01-30 | End: 2019-02-11 | Stop reason: SDUPTHER

## 2019-01-30 RX ORDER — AMLODIPINE BESYLATE 5 MG/1
5 TABLET ORAL DAILY
Qty: 30 TABLET | Refills: 2 | Status: SHIPPED | OUTPATIENT
Start: 2019-01-30 | End: 2019-02-11 | Stop reason: SDUPTHER

## 2019-01-31 ENCOUNTER — TELEPHONE (OUTPATIENT)
Dept: GASTROENTEROLOGY | Facility: CLINIC | Age: 49
End: 2019-01-31

## 2019-01-31 DIAGNOSIS — Z12.11 COLON CANCER SCREENING: Primary | ICD-10-CM

## 2019-01-31 NOTE — TELEPHONE ENCOUNTER
Jaden scheduled 3/26/19 at [facility] with [address]  Suprep instructions given over the phone and mailed to home address   Suprep sent to pharmacy

## 2019-01-31 NOTE — TELEPHONE ENCOUNTER
Saba Reynolds  1970  [id number] [facility] [address] [facility]  [phone]  Cell Phone     Screened by: Tanya Hernandez ]    Referring Dr :     Pre- Screening:   Has patient been referred for a routine screening Colonoscopy? yes  Is the patient over 48years of age? yes    If the answer is YES to both questions, proceed to the medical questions  Do you have any of the following symptoms? NO        Have you had a coronary or vascular stent within the last year? no    Have you had a heart attack or stroke in the last 6 months? no    Have you had intestinal surgery in the last 3 months? no    Do you have problems with:sleep apnea  no    Do you use:  Oxygen no  CPAP/BiPAP no    Have you been hospitalized in the last Month? no    Have you been diagnosed with a bleeding disorder or anemia? no    Have you had chest pain (angina) or breathing problems  (COPD) in the last 3 months? no     Do you have any difficulty walking up a flight of stairs? no    Have you had Kidney failure or insufficiency? no    Have you had heart valve surgery? no    Are you confined to a wheelchair? no      Do you take insulin for Diabetes no  Name of medication:    : If patient answers NO to medical questions, then schedule procedure  If patient answers YES to medical questions, then schedule office appointment  Previous Colonoscopy yes   (if yes) Date and Facility of last colon[facility]    Patient scheduled for procedure:   Scheduled by:     Time:   Provider:   Location:     Insurance:   Referral Required? Were instructions Mailed? Were instructions sent to [facility]:   Was the prep sent to Pharmacy?      Comments: [ passed Aly Akins  ]

## 2019-02-11 ENCOUNTER — OFFICE VISIT (OUTPATIENT)
Dept: FAMILY MEDICINE CLINIC | Facility: CLINIC | Age: 49
End: 2019-02-11

## 2019-02-11 VITALS
TEMPERATURE: 97.7 F | RESPIRATION RATE: 20 BRPM | HEART RATE: 90 BPM | DIASTOLIC BLOOD PRESSURE: 100 MMHG | BODY MASS INDEX: 44.41 KG/M2 | SYSTOLIC BLOOD PRESSURE: 140 MMHG | HEIGHT: 68 IN | OXYGEN SATURATION: 99 % | WEIGHT: 293 LBS

## 2019-02-11 DIAGNOSIS — E66.9 OBESITY, UNSPECIFIED CLASSIFICATION, UNSPECIFIED OBESITY TYPE, UNSPECIFIED WHETHER SERIOUS COMORBIDITY PRESENT: ICD-10-CM

## 2019-02-11 DIAGNOSIS — R73.03 PREDIABETES: Primary | ICD-10-CM

## 2019-02-11 DIAGNOSIS — I10 HYPERTENSION, UNSPECIFIED TYPE: ICD-10-CM

## 2019-02-11 PROCEDURE — 99213 OFFICE O/P EST LOW 20 MIN: CPT | Performed by: FAMILY MEDICINE

## 2019-02-11 RX ORDER — METFORMIN HYDROCHLORIDE 500 MG/1
500 TABLET, FILM COATED, EXTENDED RELEASE ORAL
Qty: 30 TABLET | Refills: 0 | Status: SHIPPED | OUTPATIENT
Start: 2019-02-11 | End: 2019-03-21

## 2019-02-11 RX ORDER — AMLODIPINE BESYLATE 5 MG/1
5 TABLET ORAL DAILY
Qty: 30 TABLET | Refills: 2 | Status: SHIPPED | OUTPATIENT
Start: 2019-02-11 | End: 2019-05-20 | Stop reason: SDUPTHER

## 2019-02-11 RX ORDER — LISINOPRIL 10 MG/1
10 TABLET ORAL DAILY
Qty: 30 TABLET | Refills: 2 | Status: SHIPPED | OUTPATIENT
Start: 2019-02-11 | End: 2019-05-20 | Stop reason: SDUPTHER

## 2019-02-11 NOTE — ASSESSMENT & PLAN NOTE
Currently blood pressure is controlled at this time  Although diastolic is a little elevated however states that she was walking quickly to the office  Reviewed BP target goal with patient  Continue to maintain healthy balanced diet with focus on low salt intake  Limit alcohol intake

## 2019-02-11 NOTE — PROGRESS NOTES
Assessment/Plan:    Benign hypertension  Currently blood pressure is controlled at this time  Although diastolic is a little elevated however states that she was walking quickly to the office  Reviewed BP target goal with patient  Continue to maintain healthy balanced diet with focus on low salt intake  Limit alcohol intake  Prediabetes  Did not tolerate metformin due to GI side effects  I changd metformin to extended release    Obesity  Counseled patient on the importance of working to achieve weight reduction goal   Discussed benefits of weight loss including prevention or control of comorbidities  Discussed role that balanced diet and daily activity play in weight reduction  Set up small attainable weight loss goals  Involve family, friends, and co-workers for support  She is interested in nutrition consult and evaluation  I offered her referral for bariatric surgery however she declined  She is thinking about medications for weight loss  Will consider Victoza next visit       Diagnoses and all orders for this visit:    Prediabetes  -     metFORMIN (GLUMETZA) 500 MG (MOD) 24 hr tablet; Take 1 tablet (500 mg total) by mouth daily with breakfast    Hypertension, unspecified type  -     amLODIPine (NORVASC) 5 mg tablet; Take 1 tablet (5 mg total) by mouth daily  -     lisinopril (ZESTRIL) 10 mg tablet; Take 1 tablet (10 mg total) by mouth daily          Subjective:      Patient ID: Jamshid Paez is a 46 y o  female  HPI   Patient is here for follow-up  Denies any chest pain or shortness of breath  Requires refills for her medications  She had few questions regarding diet and weight loss  Encouraged just marked shopping  Avoid fried food high intake of butter and canned food    Advised to stick to vegetables and tried to grill food instead of stir victoria    The following portions of the patient's history were reviewed and updated as appropriate: allergies, current medications, past family history, past medical history, past social history, past surgical history and problem list     Review of Systems   Constitutional: Negative for chills, fatigue and fever  HENT: Negative for sinus pressure and sore throat  Eyes: Negative for photophobia, pain and visual disturbance  Respiratory: Negative for cough, chest tightness and shortness of breath  Cardiovascular: Negative for chest pain and leg swelling  Gastrointestinal: Negative for abdominal pain and diarrhea  Genitourinary: Negative for dysuria, frequency and urgency  Musculoskeletal: Negative for arthralgias and myalgias  Skin: Negative for rash  Neurological: Negative for dizziness, seizures, syncope and headaches  Hematological: Negative for adenopathy  Objective:      /100 (BP Location: Right arm, Patient Position: Sitting, Cuff Size: Large)   Pulse 90   Temp 97 7 °F (36 5 °C) (Temporal)   Resp 20   Ht 5' 8" (1 727 m)   Wt 133 kg (294 lb)   SpO2 99%   BMI 44 70 kg/m²          Physical Exam   Constitutional: She is oriented to person, place, and time  She appears well-developed  HENT:   Head: Normocephalic and atraumatic  Mouth/Throat: No oropharyngeal exudate  Eyes: Pupils are equal, round, and reactive to light  Conjunctivae and EOM are normal  Right eye exhibits no discharge  Left eye exhibits no discharge  Neck: Normal range of motion  Neck supple  No JVD present  No thyromegaly present  Cardiovascular: Normal rate, regular rhythm and normal heart sounds  No murmur heard  Pulmonary/Chest: Effort normal and breath sounds normal  No respiratory distress  She has no wheezes  She has no rales  Abdominal: Bowel sounds are normal  She exhibits no distension  There is no tenderness  Musculoskeletal: Normal range of motion  She exhibits no edema or deformity  Neurological: She is alert and oriented to person, place, and time  Skin: Skin is warm and dry  Vitals reviewed

## 2019-02-11 NOTE — ASSESSMENT & PLAN NOTE
Counseled patient on the importance of working to achieve weight reduction goal   Discussed benefits of weight loss including prevention or control of comorbidities  Discussed role that balanced diet and daily activity play in weight reduction  Set up small attainable weight loss goals  Involve family, friends, and co-workers for support  She is interested in nutrition consult and evaluation  I offered her referral for bariatric surgery however she declined  She is thinking about medications for weight loss    Will consider Victoza next visit

## 2019-03-13 ENCOUNTER — ANESTHESIA EVENT (OUTPATIENT)
Dept: PERIOP | Facility: AMBULARY SURGERY CENTER | Age: 49
End: 2019-03-13

## 2019-03-21 ENCOUNTER — HOSPITAL ENCOUNTER (OUTPATIENT)
Facility: HOSPITAL | Age: 49
Setting detail: OUTPATIENT SURGERY
LOS: 1 days | Discharge: HOME | End: 2019-03-22
Attending: EMERGENCY MEDICINE | Admitting: SPECIALIST
Payer: COMMERCIAL

## 2019-03-21 ENCOUNTER — APPOINTMENT (EMERGENCY)
Dept: CT IMAGING | Facility: HOSPITAL | Age: 49
End: 2019-03-21
Payer: COMMERCIAL

## 2019-03-21 DIAGNOSIS — K35.80 ACUTE APPENDICITIS: Primary | ICD-10-CM

## 2019-03-21 LAB
ALBUMIN SERPL BCP-MCNC: 4.6 G/DL (ref 3–5.2)
ALP SERPL-CCNC: 52 U/L (ref 43–122)
ALT SERPL W P-5'-P-CCNC: 49 U/L (ref 9–52)
ANION GAP SERPL CALCULATED.3IONS-SCNC: 10 MMOL/L (ref 5–14)
APTT PPP: 22 SECONDS (ref 23–34)
AST SERPL W P-5'-P-CCNC: 32 U/L (ref 14–36)
BACTERIA UR QL AUTO: ABNORMAL /HPF
BASOPHILS # BLD AUTO: 0 THOUSANDS/ΜL (ref 0–0.1)
BASOPHILS NFR BLD AUTO: 0 % (ref 0–1)
BILIRUB SERPL-MCNC: 0.5 MG/DL
BILIRUB UR QL STRIP: NEGATIVE
BUN SERPL-MCNC: 13 MG/DL (ref 5–25)
CALCIUM SERPL-MCNC: 8.9 MG/DL (ref 8.4–10.2)
CHLORIDE SERPL-SCNC: 103 MMOL/L (ref 97–108)
CLARITY UR: CLEAR
CO2 SERPL-SCNC: 25 MMOL/L (ref 22–30)
COLOR UR: YELLOW
CREAT SERPL-MCNC: 0.54 MG/DL (ref 0.6–1.2)
EOSINOPHIL # BLD AUTO: 0.1 THOUSAND/ΜL (ref 0–0.4)
EOSINOPHIL NFR BLD AUTO: 1 % (ref 0–6)
ERYTHROCYTE [DISTWIDTH] IN BLOOD BY AUTOMATED COUNT: 13.2 %
GFR SERPL CREATININE-BSD FRML MDRD: 126 ML/MIN/1.73SQ M
GLUCOSE SERPL-MCNC: 128 MG/DL (ref 70–99)
GLUCOSE UR STRIP-MCNC: NEGATIVE MG/DL
HCT VFR BLD AUTO: 42.1 % (ref 36–46)
HGB BLD-MCNC: 13.8 G/DL (ref 12–16)
HGB UR QL STRIP.AUTO: NEGATIVE
INR PPP: 1.06 (ref 0.89–1.1)
KETONES UR STRIP-MCNC: NEGATIVE MG/DL
LACTATE SERPL-SCNC: 1.2 MMOL/L (ref 0.7–2)
LEUKOCYTE ESTERASE UR QL STRIP: NEGATIVE
LIPASE SERPL-CCNC: 31 U/L (ref 23–300)
LYMPHOCYTES # BLD AUTO: 1.6 THOUSANDS/ΜL (ref 0.5–4)
LYMPHOCYTES NFR BLD AUTO: 19 % (ref 25–45)
MCH RBC QN AUTO: 29.6 PG (ref 26–34)
MCHC RBC AUTO-ENTMCNC: 32.7 G/DL (ref 31–36)
MCV RBC AUTO: 91 FL (ref 80–100)
MONOCYTES # BLD AUTO: 0.5 THOUSAND/ΜL (ref 0.2–0.9)
MONOCYTES NFR BLD AUTO: 6 % (ref 1–10)
NEUTROPHILS # BLD AUTO: 6.4 THOUSANDS/ΜL (ref 1.8–7.8)
NEUTS SEG NFR BLD AUTO: 74 % (ref 45–65)
NITRITE UR QL STRIP: NEGATIVE
NON-SQ EPI CELLS URNS QL MICRO: ABNORMAL /HPF
PH UR STRIP.AUTO: 6.5 [PH]
PLATELET # BLD AUTO: 202 THOUSANDS/UL (ref 150–450)
PMV BLD AUTO: 9.6 FL (ref 8.9–12.7)
POTASSIUM SERPL-SCNC: 3.7 MMOL/L (ref 3.6–5)
PROT SERPL-MCNC: 8 G/DL (ref 5.9–8.4)
PROT UR STRIP-MCNC: ABNORMAL MG/DL
PROTHROMBIN TIME: 11.2 SECONDS (ref 9.5–11.6)
RBC # BLD AUTO: 4.64 MILLION/UL (ref 4–5.2)
RBC #/AREA URNS AUTO: ABNORMAL /HPF
SODIUM SERPL-SCNC: 138 MMOL/L (ref 137–147)
SP GR UR STRIP.AUTO: 1.02 (ref 1–1.04)
TROPONIN I SERPL-MCNC: <0.01 NG/ML (ref 0–0.03)
UROBILINOGEN UA: NEGATIVE MG/DL
WBC # BLD AUTO: 8.6 THOUSAND/UL (ref 4.5–11)
WBC #/AREA URNS AUTO: ABNORMAL /HPF

## 2019-03-21 PROCEDURE — 83605 ASSAY OF LACTIC ACID: CPT | Performed by: PHYSICIAN ASSISTANT

## 2019-03-21 PROCEDURE — 84703 CHORIONIC GONADOTROPIN ASSAY: CPT | Performed by: SPECIALIST

## 2019-03-21 PROCEDURE — 96361 HYDRATE IV INFUSION ADD-ON: CPT

## 2019-03-21 PROCEDURE — 83690 ASSAY OF LIPASE: CPT | Performed by: PHYSICIAN ASSISTANT

## 2019-03-21 PROCEDURE — 93005 ELECTROCARDIOGRAM TRACING: CPT

## 2019-03-21 PROCEDURE — 85730 THROMBOPLASTIN TIME PARTIAL: CPT | Performed by: PHYSICIAN ASSISTANT

## 2019-03-21 PROCEDURE — 36415 COLL VENOUS BLD VENIPUNCTURE: CPT | Performed by: PHYSICIAN ASSISTANT

## 2019-03-21 PROCEDURE — 96375 TX/PRO/DX INJ NEW DRUG ADDON: CPT

## 2019-03-21 PROCEDURE — 87076 CULTURE ANAEROBE IDENT EACH: CPT | Performed by: PHYSICIAN ASSISTANT

## 2019-03-21 PROCEDURE — 87185 SC STD ENZYME DETCJ PER NZM: CPT | Performed by: PHYSICIAN ASSISTANT

## 2019-03-21 PROCEDURE — 96374 THER/PROPH/DIAG INJ IV PUSH: CPT

## 2019-03-21 PROCEDURE — 85025 COMPLETE CBC W/AUTO DIFF WBC: CPT | Performed by: PHYSICIAN ASSISTANT

## 2019-03-21 PROCEDURE — 84484 ASSAY OF TROPONIN QUANT: CPT | Performed by: PHYSICIAN ASSISTANT

## 2019-03-21 PROCEDURE — 85610 PROTHROMBIN TIME: CPT | Performed by: PHYSICIAN ASSISTANT

## 2019-03-21 PROCEDURE — 74177 CT ABD & PELVIS W/CONTRAST: CPT

## 2019-03-21 PROCEDURE — 81003 URINALYSIS AUTO W/O SCOPE: CPT | Performed by: PHYSICIAN ASSISTANT

## 2019-03-21 PROCEDURE — 87186 SC STD MICRODIL/AGAR DIL: CPT | Performed by: PHYSICIAN ASSISTANT

## 2019-03-21 PROCEDURE — 80053 COMPREHEN METABOLIC PANEL: CPT | Performed by: PHYSICIAN ASSISTANT

## 2019-03-21 PROCEDURE — 99285 EMERGENCY DEPT VISIT HI MDM: CPT

## 2019-03-21 PROCEDURE — 81001 URINALYSIS AUTO W/SCOPE: CPT | Performed by: PHYSICIAN ASSISTANT

## 2019-03-21 PROCEDURE — 87040 BLOOD CULTURE FOR BACTERIA: CPT | Performed by: PHYSICIAN ASSISTANT

## 2019-03-21 RX ORDER — CEFAZOLIN SODIUM 1 G/50ML
1000 SOLUTION INTRAVENOUS ONCE
Status: DISCONTINUED | OUTPATIENT
Start: 2019-03-22 | End: 2019-03-22 | Stop reason: HOSPADM

## 2019-03-21 RX ORDER — SODIUM CHLORIDE 9 MG/ML
100 INJECTION, SOLUTION INTRAVENOUS CONTINUOUS
Status: DISCONTINUED | OUTPATIENT
Start: 2019-03-21 | End: 2019-03-22 | Stop reason: HOSPADM

## 2019-03-21 RX ORDER — ONDANSETRON 2 MG/ML
4 INJECTION INTRAMUSCULAR; INTRAVENOUS ONCE
Status: COMPLETED | OUTPATIENT
Start: 2019-03-21 | End: 2019-03-21

## 2019-03-21 RX ORDER — KETOROLAC TROMETHAMINE 30 MG/ML
15 INJECTION, SOLUTION INTRAMUSCULAR; INTRAVENOUS ONCE
Status: COMPLETED | OUTPATIENT
Start: 2019-03-21 | End: 2019-03-21

## 2019-03-21 RX ORDER — HYDROMORPHONE HCL/PF 1 MG/ML
1 SYRINGE (ML) INJECTION
Status: DISCONTINUED | OUTPATIENT
Start: 2019-03-21 | End: 2019-03-22 | Stop reason: HOSPADM

## 2019-03-21 RX ORDER — ONDANSETRON 2 MG/ML
4 INJECTION INTRAMUSCULAR; INTRAVENOUS EVERY 8 HOURS PRN
Status: DISCONTINUED | OUTPATIENT
Start: 2019-03-21 | End: 2019-03-22 | Stop reason: HOSPADM

## 2019-03-21 RX ORDER — CEFAZOLIN SODIUM 1 G/50ML
1000 SOLUTION INTRAVENOUS ONCE
Status: COMPLETED | OUTPATIENT
Start: 2019-03-21 | End: 2019-03-21

## 2019-03-21 RX ORDER — MAGNESIUM HYDROXIDE/ALUMINUM HYDROXICE/SIMETHICONE 120; 1200; 1200 MG/30ML; MG/30ML; MG/30ML
30 SUSPENSION ORAL ONCE
Status: COMPLETED | OUTPATIENT
Start: 2019-03-21 | End: 2019-03-21

## 2019-03-21 RX ORDER — HEPARIN SODIUM 5000 [USP'U]/ML
5000 INJECTION, SOLUTION INTRAVENOUS; SUBCUTANEOUS EVERY 8 HOURS SCHEDULED
Status: DISCONTINUED | OUTPATIENT
Start: 2019-03-21 | End: 2019-03-22 | Stop reason: HOSPADM

## 2019-03-21 RX ORDER — KETOROLAC TROMETHAMINE 30 MG/ML
15 INJECTION, SOLUTION INTRAMUSCULAR; INTRAVENOUS EVERY 6 HOURS PRN
Status: DISCONTINUED | OUTPATIENT
Start: 2019-03-21 | End: 2019-03-22 | Stop reason: HOSPADM

## 2019-03-21 RX ADMIN — HEPARIN SODIUM 5000 UNITS: 5000 INJECTION INTRAVENOUS; SUBCUTANEOUS at 22:58

## 2019-03-21 RX ADMIN — SODIUM CHLORIDE 100 ML/HR: 9 INJECTION, SOLUTION INTRAVENOUS at 22:57

## 2019-03-21 RX ADMIN — IOHEXOL 100 ML: 350 INJECTION, SOLUTION INTRAVENOUS at 21:07

## 2019-03-21 RX ADMIN — KETOROLAC TROMETHAMINE 15 MG: 30 INJECTION, SOLUTION INTRAMUSCULAR; INTRAVENOUS at 20:12

## 2019-03-21 RX ADMIN — ONDANSETRON HYDROCHLORIDE 4 MG: 2 INJECTION, SOLUTION INTRAMUSCULAR; INTRAVENOUS at 20:09

## 2019-03-21 RX ADMIN — HYDROMORPHONE HYDROCHLORIDE 1 MG: 1 INJECTION, SOLUTION INTRAMUSCULAR; INTRAVENOUS; SUBCUTANEOUS at 23:40

## 2019-03-21 RX ADMIN — METRONIDAZOLE 500 MG: 500 INJECTION, SOLUTION INTRAVENOUS at 22:57

## 2019-03-21 RX ADMIN — LIDOCAINE HYDROCHLORIDE 15 ML: 20 SOLUTION ORAL; TOPICAL at 21:12

## 2019-03-21 RX ADMIN — ALUMINUM HYDROXIDE, MAGNESIUM HYDROXIDE, AND SIMETHICONE 30 ML: 200; 200; 20 SUSPENSION ORAL at 21:11

## 2019-03-21 RX ADMIN — CEFAZOLIN SODIUM 1000 MG: 1 SOLUTION INTRAVENOUS at 21:55

## 2019-03-21 RX ADMIN — SODIUM CHLORIDE 1000 ML: 9 INJECTION, SOLUTION INTRAVENOUS at 20:06

## 2019-03-22 ENCOUNTER — ANESTHESIA (INPATIENT)
Dept: PERIOP | Facility: HOSPITAL | Age: 49
End: 2019-03-22
Payer: COMMERCIAL

## 2019-03-22 ENCOUNTER — TELEPHONE (OUTPATIENT)
Dept: OTHER | Facility: OTHER | Age: 49
End: 2019-03-22

## 2019-03-22 ENCOUNTER — ANESTHESIA EVENT (INPATIENT)
Dept: PERIOP | Facility: HOSPITAL | Age: 49
End: 2019-03-22
Payer: COMMERCIAL

## 2019-03-22 VITALS
WEIGHT: 293 LBS | TEMPERATURE: 98.8 F | DIASTOLIC BLOOD PRESSURE: 95 MMHG | HEART RATE: 99 BPM | OXYGEN SATURATION: 98 % | RESPIRATION RATE: 18 BRPM | SYSTOLIC BLOOD PRESSURE: 156 MMHG | BODY MASS INDEX: 44.41 KG/M2 | HEIGHT: 68 IN

## 2019-03-22 PROBLEM — K35.30 ACUTE APPENDICITIS WITH LOCALIZED PERITONITIS: Status: ACTIVE | Noted: 2019-03-22

## 2019-03-22 PROBLEM — K35.80 ACUTE APPENDICITIS: Status: ACTIVE | Noted: 2019-03-21

## 2019-03-22 LAB
ATRIAL RATE: 77 BPM
HCG SERPL QL: NEGATIVE
P AXIS: 73 DEGREES
PR INTERVAL: 182 MS
QRS AXIS: 14 DEGREES
QRSD INTERVAL: 86 MS
QT INTERVAL: 382 MS
QTC INTERVAL: 432 MS
T WAVE AXIS: 0 DEGREES
VENTRICULAR RATE: 77 BPM

## 2019-03-22 PROCEDURE — 93010 ELECTROCARDIOGRAM REPORT: CPT | Performed by: INTERNAL MEDICINE

## 2019-03-22 PROCEDURE — 99222 1ST HOSP IP/OBS MODERATE 55: CPT | Performed by: SPECIALIST

## 2019-03-22 PROCEDURE — 44970 LAPAROSCOPY APPENDECTOMY: CPT | Performed by: SPECIALIST

## 2019-03-22 PROCEDURE — 88304 TISSUE EXAM BY PATHOLOGIST: CPT | Performed by: PATHOLOGY

## 2019-03-22 PROCEDURE — C9113 INJ PANTOPRAZOLE SODIUM, VIA: HCPCS | Performed by: SPECIALIST

## 2019-03-22 RX ORDER — FENTANYL CITRATE 50 UG/ML
INJECTION, SOLUTION INTRAMUSCULAR; INTRAVENOUS AS NEEDED
Status: DISCONTINUED | OUTPATIENT
Start: 2019-03-22 | End: 2019-03-22 | Stop reason: SURG

## 2019-03-22 RX ORDER — HEPARIN SODIUM 5000 [USP'U]/ML
5000 INJECTION, SOLUTION INTRAVENOUS; SUBCUTANEOUS EVERY 8 HOURS SCHEDULED
Status: DISCONTINUED | OUTPATIENT
Start: 2019-03-22 | End: 2019-03-22 | Stop reason: HOSPADM

## 2019-03-22 RX ORDER — PROPOFOL 10 MG/ML
INJECTION, EMULSION INTRAVENOUS AS NEEDED
Status: DISCONTINUED | OUTPATIENT
Start: 2019-03-22 | End: 2019-03-22 | Stop reason: SURG

## 2019-03-22 RX ORDER — SODIUM CHLORIDE, SODIUM LACTATE, POTASSIUM CHLORIDE, CALCIUM CHLORIDE 600; 310; 30; 20 MG/100ML; MG/100ML; MG/100ML; MG/100ML
75 INJECTION, SOLUTION INTRAVENOUS CONTINUOUS
Status: CANCELLED | OUTPATIENT
Start: 2019-03-22

## 2019-03-22 RX ORDER — FENTANYL CITRATE/PF 50 MCG/ML
25 SYRINGE (ML) INJECTION
Status: DISCONTINUED | OUTPATIENT
Start: 2019-03-22 | End: 2019-03-22 | Stop reason: HOSPADM

## 2019-03-22 RX ORDER — FENTANYL CITRATE/PF 50 MCG/ML
12.5 SYRINGE (ML) INJECTION
Status: DISCONTINUED | OUTPATIENT
Start: 2019-03-22 | End: 2019-03-22 | Stop reason: HOSPADM

## 2019-03-22 RX ORDER — SODIUM CHLORIDE, SODIUM LACTATE, POTASSIUM CHLORIDE, CALCIUM CHLORIDE 600; 310; 30; 20 MG/100ML; MG/100ML; MG/100ML; MG/100ML
INJECTION, SOLUTION INTRAVENOUS CONTINUOUS PRN
Status: DISCONTINUED | OUTPATIENT
Start: 2019-03-22 | End: 2019-03-22 | Stop reason: SURG

## 2019-03-22 RX ORDER — NEOSTIGMINE METHYLSULFATE 1 MG/ML
INJECTION INTRAVENOUS AS NEEDED
Status: DISCONTINUED | OUTPATIENT
Start: 2019-03-22 | End: 2019-03-22 | Stop reason: SURG

## 2019-03-22 RX ORDER — DEXAMETHASONE SODIUM PHOSPHATE 4 MG/ML
4 INJECTION, SOLUTION INTRA-ARTICULAR; INTRALESIONAL; INTRAMUSCULAR; INTRAVENOUS; SOFT TISSUE ONCE AS NEEDED
Status: DISCONTINUED | OUTPATIENT
Start: 2019-03-22 | End: 2019-03-22 | Stop reason: HOSPADM

## 2019-03-22 RX ORDER — MIDAZOLAM HYDROCHLORIDE 1 MG/ML
INJECTION INTRAMUSCULAR; INTRAVENOUS AS NEEDED
Status: DISCONTINUED | OUTPATIENT
Start: 2019-03-22 | End: 2019-03-22 | Stop reason: SURG

## 2019-03-22 RX ORDER — PANTOPRAZOLE SODIUM 40 MG/1
40 INJECTION, POWDER, FOR SOLUTION INTRAVENOUS
Status: DISCONTINUED | OUTPATIENT
Start: 2019-03-22 | End: 2019-03-22 | Stop reason: HOSPADM

## 2019-03-22 RX ORDER — SODIUM CHLORIDE 9 MG/ML
INJECTION, SOLUTION INTRAVENOUS AS NEEDED
Status: DISCONTINUED | OUTPATIENT
Start: 2019-03-22 | End: 2019-03-22 | Stop reason: HOSPADM

## 2019-03-22 RX ORDER — GLYCOPYRROLATE 0.2 MG/ML
INJECTION INTRAMUSCULAR; INTRAVENOUS AS NEEDED
Status: DISCONTINUED | OUTPATIENT
Start: 2019-03-22 | End: 2019-03-22 | Stop reason: SURG

## 2019-03-22 RX ORDER — DEXAMETHASONE SODIUM PHOSPHATE 10 MG/ML
INJECTION, SOLUTION INTRAMUSCULAR; INTRAVENOUS AS NEEDED
Status: DISCONTINUED | OUTPATIENT
Start: 2019-03-22 | End: 2019-03-22 | Stop reason: SURG

## 2019-03-22 RX ORDER — LIDOCAINE HYDROCHLORIDE 10 MG/ML
INJECTION, SOLUTION INFILTRATION; PERINEURAL AS NEEDED
Status: DISCONTINUED | OUTPATIENT
Start: 2019-03-22 | End: 2019-03-22 | Stop reason: SURG

## 2019-03-22 RX ORDER — OXYCODONE HYDROCHLORIDE AND ACETAMINOPHEN 5; 325 MG/1; MG/1
2 TABLET ORAL EVERY 4 HOURS PRN
Status: DISCONTINUED | OUTPATIENT
Start: 2019-03-22 | End: 2019-03-22 | Stop reason: HOSPADM

## 2019-03-22 RX ORDER — ONDANSETRON 2 MG/ML
4 INJECTION INTRAMUSCULAR; INTRAVENOUS EVERY 8 HOURS PRN
Status: DISCONTINUED | OUTPATIENT
Start: 2019-03-22 | End: 2019-03-22 | Stop reason: HOSPADM

## 2019-03-22 RX ORDER — OXYCODONE HYDROCHLORIDE AND ACETAMINOPHEN 5; 325 MG/1; MG/1
1 TABLET ORAL EVERY 4 HOURS PRN
Status: DISCONTINUED | OUTPATIENT
Start: 2019-03-22 | End: 2019-03-22 | Stop reason: HOSPADM

## 2019-03-22 RX ORDER — DIPHENHYDRAMINE HYDROCHLORIDE 50 MG/ML
12.5 INJECTION INTRAMUSCULAR; INTRAVENOUS ONCE
Status: DISCONTINUED | OUTPATIENT
Start: 2019-03-22 | End: 2019-03-22 | Stop reason: HOSPADM

## 2019-03-22 RX ORDER — BUPIVACAINE HYDROCHLORIDE 5 MG/ML
INJECTION, SOLUTION PERINEURAL AS NEEDED
Status: DISCONTINUED | OUTPATIENT
Start: 2019-03-22 | End: 2019-03-22 | Stop reason: HOSPADM

## 2019-03-22 RX ORDER — ROCURONIUM BROMIDE 10 MG/ML
INJECTION, SOLUTION INTRAVENOUS AS NEEDED
Status: DISCONTINUED | OUTPATIENT
Start: 2019-03-22 | End: 2019-03-22 | Stop reason: SURG

## 2019-03-22 RX ADMIN — FENTANYL CITRATE: 50 INJECTION INTRAMUSCULAR; INTRAVENOUS at 10:51

## 2019-03-22 RX ADMIN — ROCURONIUM BROMIDE 20 MG: 10 INJECTION INTRAVENOUS at 09:23

## 2019-03-22 RX ADMIN — OXYCODONE HYDROCHLORIDE AND ACETAMINOPHEN 2 TABLET: 5; 325 TABLET ORAL at 17:41

## 2019-03-22 RX ADMIN — HYDROMORPHONE HYDROCHLORIDE 1 MG: 1 INJECTION, SOLUTION INTRAMUSCULAR; INTRAVENOUS; SUBCUTANEOUS at 04:29

## 2019-03-22 RX ADMIN — ONDANSETRON HYDROCHLORIDE 4 MG: 2 INJECTION, SOLUTION INTRAMUSCULAR; INTRAVENOUS at 15:10

## 2019-03-22 RX ADMIN — PANTOPRAZOLE SODIUM 40 MG: 40 INJECTION, POWDER, FOR SOLUTION INTRAVENOUS at 13:31

## 2019-03-22 RX ADMIN — METRONIDAZOLE 500 MG: 500 SOLUTION INTRAVENOUS at 05:35

## 2019-03-22 RX ADMIN — MIDAZOLAM HYDROCHLORIDE 2 MG: 1 INJECTION, SOLUTION INTRAMUSCULAR; INTRAVENOUS at 08:54

## 2019-03-22 RX ADMIN — LIDOCAINE HYDROCHLORIDE 50 MG: 10 INJECTION, SOLUTION INFILTRATION; PERINEURAL at 08:57

## 2019-03-22 RX ADMIN — SODIUM CHLORIDE, SODIUM LACTATE, POTASSIUM CHLORIDE, AND CALCIUM CHLORIDE: .6; .31; .03; .02 INJECTION, SOLUTION INTRAVENOUS at 08:50

## 2019-03-22 RX ADMIN — KETOROLAC TROMETHAMINE 30 MG: 30 INJECTION, SOLUTION INTRAMUSCULAR; INTRAVENOUS at 10:14

## 2019-03-22 RX ADMIN — Medication 3000 MG: at 09:00

## 2019-03-22 RX ADMIN — SODIUM CHLORIDE 100 ML/HR: 9 INJECTION, SOLUTION INTRAVENOUS at 12:11

## 2019-03-22 RX ADMIN — ROCURONIUM BROMIDE 50 MG: 10 INJECTION INTRAVENOUS at 08:59

## 2019-03-22 RX ADMIN — FENTANYL CITRATE 100 MCG: 50 INJECTION INTRAMUSCULAR; INTRAVENOUS at 08:54

## 2019-03-22 RX ADMIN — HEPARIN SODIUM 5000 UNITS: 5000 INJECTION INTRAVENOUS; SUBCUTANEOUS at 13:32

## 2019-03-22 RX ADMIN — NEOSTIGMINE METHYLSULFATE 4 MG: 1 INJECTION INTRAVENOUS at 10:13

## 2019-03-22 RX ADMIN — GLYCOPYRROLATE 0.4 MG: 0.2 INJECTION, SOLUTION INTRAMUSCULAR; INTRAVENOUS at 10:12

## 2019-03-22 RX ADMIN — HYDROMORPHONE HYDROCHLORIDE 1 MG: 1 INJECTION, SOLUTION INTRAMUSCULAR; INTRAVENOUS; SUBCUTANEOUS at 13:40

## 2019-03-22 RX ADMIN — DEXAMETHASONE SODIUM PHOSPHATE 4 MG: 10 INJECTION, SOLUTION INTRAMUSCULAR; INTRAVENOUS at 09:06

## 2019-03-22 RX ADMIN — PROPOFOL 200 MG: 10 INJECTION, EMULSION INTRAVENOUS at 08:58

## 2019-03-22 RX ADMIN — FENTANYL CITRATE: 50 INJECTION INTRAMUSCULAR; INTRAVENOUS at 11:03

## 2019-03-22 RX ADMIN — HEPARIN SODIUM 5000 UNITS: 5000 INJECTION INTRAVENOUS; SUBCUTANEOUS at 09:10

## 2019-03-22 NOTE — ANESTHESIA PREPROCEDURE EVALUATION
Review of Systems/Medical History  Patient summary reviewed  Chart reviewed      Cardiovascular  EKG reviewed, Hypertension controlled,    Pulmonary       GI/Hepatic      Comment: Acute appendicitis          Endo/Other  Diabetes type 2 Diet controlled,   Obesity  super morbid obesity   GYN  , Prior pregnancy/OB history : 2 Parity: 2,          Hematology   Musculoskeletal    Arthritis     Neurology   Psychology           Physical Exam    Airway    Mallampati score: I  TM Distance: >3 FB  Neck ROM: full     Dental       Cardiovascular  Cardiovascular exam normal    Pulmonary  Pulmonary exam normal     Other Findings  Fixed upper and lower teeth and in good repair      Anesthesia Plan  ASA Score- 3 Emergent    Anesthesia Type- general with ASA Monitors  Additional Monitors:   Airway Plan: ETT  Comment: Likely CHRISTOPH  Plan Factors-Patient not instructed to abstain from smoking on day of procedure  Patient did not smoke on day of surgery  Induction- intravenous  Postoperative Plan- Plan for postoperative opioid use  Informed Consent- Anesthetic plan and risks discussed with patient  I personally reviewed this patient with the CRNA  Discussed and agreed on the Anesthesia Plan with the BRITTANY Conklin

## 2019-03-22 NOTE — ANESTHESIA POSTPROCEDURE EVALUATION
Post-Op Assessment Note    CV Status:  Stable  Pain Score: 2    Pain management: adequate     Mental Status:  Alert and awake   Hydration Status:  Euvolemic   PONV Controlled:  Controlled   Airway Patency:  Patent   Post Op Vitals Reviewed: Yes      Staff: Anesthesiologist, CRNA   Comments: No apnea in pacu noted          /87 (03/22/19 1031)    Temp      Pulse 80 (03/22/19 1031)   Resp 20 (03/22/19 1031)    SpO2 99 % (03/22/19 1031)

## 2019-03-22 NOTE — ANESTHESIA POSTPROCEDURE EVALUATION
Post-Op Assessment Note    CV Status:  Stable  Pain Score: 0    Pain management: adequate     Mental Status:  Alert and awake   Hydration Status:  Euvolemic   PONV Controlled:  Controlled   Airway Patency:  Patent   Post Op Vitals Reviewed: Yes      Staff: CRNA           BP (P) 141/82 (03/22/19 1026)    Temp      Pulse     Resp      SpO2

## 2019-03-25 ENCOUNTER — TELEPHONE (OUTPATIENT)
Dept: GASTROENTEROLOGY | Facility: AMBULARY SURGERY CENTER | Age: 49
End: 2019-03-25

## 2019-03-25 NOTE — TELEPHONE ENCOUNTER
DR Isabel Brenner PT    Pt called requesting to r/s her 03/26/19 procedure due to just having surgery

## 2019-03-25 NOTE — TELEPHONE ENCOUNTER
I spoke with pt she rescheduled procedure with Dr Badillo at [facility] on 5/2/2019

## 2019-03-26 ENCOUNTER — TELEPHONE (OUTPATIENT)
Dept: SURGERY | Facility: CLINIC | Age: 49
End: 2019-03-26

## 2019-03-26 ENCOUNTER — ANESTHESIA (OUTPATIENT)
Dept: PERIOP | Facility: AMBULARY SURGERY CENTER | Age: 49
End: 2019-03-26

## 2019-03-26 LAB
BACTERIA BLD CULT: ABNORMAL
GRAM STN SPEC: ABNORMAL

## 2019-03-27 LAB — BACTERIA BLD CULT: NORMAL

## 2019-04-02 ENCOUNTER — OFFICE VISIT (OUTPATIENT)
Dept: SURGERY | Facility: CLINIC | Age: 49
End: 2019-04-02

## 2019-04-02 VITALS
HEIGHT: 68 IN | SYSTOLIC BLOOD PRESSURE: 124 MMHG | BODY MASS INDEX: 44.41 KG/M2 | HEART RATE: 80 BPM | DIASTOLIC BLOOD PRESSURE: 84 MMHG | RESPIRATION RATE: 14 BRPM | WEIGHT: 293 LBS

## 2019-04-02 DIAGNOSIS — K35.30 ACUTE APPENDICITIS WITH LOCALIZED PERITONITIS, WITHOUT PERFORATION, ABSCESS, OR GANGRENE: Primary | ICD-10-CM

## 2019-04-02 PROCEDURE — 99024 POSTOP FOLLOW-UP VISIT: CPT | Performed by: SPECIALIST

## 2019-04-02 RX ORDER — OXYCODONE HYDROCHLORIDE AND ACETAMINOPHEN 5; 325 MG/1; MG/1
TABLET ORAL
Refills: 0 | Status: ON HOLD | COMMUNITY
Start: 2019-03-22 | End: 2019-05-02

## 2019-04-22 ENCOUNTER — ANESTHESIA EVENT (OUTPATIENT)
Dept: PERIOP | Facility: AMBULARY SURGERY CENTER | Age: 49
End: 2019-04-22
Payer: COMMERCIAL

## 2019-05-02 ENCOUNTER — HOSPITAL ENCOUNTER (OUTPATIENT)
Facility: AMBULARY SURGERY CENTER | Age: 49
Setting detail: OUTPATIENT SURGERY
Discharge: HOME | End: 2019-05-02
Attending: INTERNAL MEDICINE | Admitting: INTERNAL MEDICINE
Payer: COMMERCIAL

## 2019-05-02 ENCOUNTER — ANESTHESIA (OUTPATIENT)
Dept: PERIOP | Facility: AMBULARY SURGERY CENTER | Age: 49
End: 2019-05-02
Payer: COMMERCIAL

## 2019-05-02 VITALS
HEIGHT: 68 IN | RESPIRATION RATE: 18 BRPM | OXYGEN SATURATION: 100 % | HEART RATE: 83 BPM | WEIGHT: 290 LBS | BODY MASS INDEX: 43.95 KG/M2 | TEMPERATURE: 98.9 F | SYSTOLIC BLOOD PRESSURE: 136 MMHG | DIASTOLIC BLOOD PRESSURE: 82 MMHG

## 2019-05-02 PROBLEM — Z12.11 COLON CANCER SCREENING: Status: RESOLVED | Noted: 2019-01-31 | Resolved: 2019-05-02

## 2019-05-02 PROCEDURE — G0121 COLON CA SCRN NOT HI RSK IND: HCPCS | Performed by: INTERNAL MEDICINE

## 2019-05-02 PROCEDURE — NC001 PR NO CHARGE: Performed by: INTERNAL MEDICINE

## 2019-05-02 RX ORDER — PROPOFOL 10 MG/ML
INJECTION, EMULSION INTRAVENOUS AS NEEDED
Status: DISCONTINUED | OUTPATIENT
Start: 2019-05-02 | End: 2019-05-02 | Stop reason: SURG

## 2019-05-02 RX ORDER — SODIUM CHLORIDE 9 MG/ML
INJECTION, SOLUTION INTRAVENOUS CONTINUOUS PRN
Status: DISCONTINUED | OUTPATIENT
Start: 2019-05-02 | End: 2019-05-02 | Stop reason: SURG

## 2019-05-02 RX ORDER — LIDOCAINE HYDROCHLORIDE 10 MG/ML
INJECTION, SOLUTION INFILTRATION; PERINEURAL AS NEEDED
Status: DISCONTINUED | OUTPATIENT
Start: 2019-05-02 | End: 2019-05-02 | Stop reason: SURG

## 2019-05-02 RX ORDER — SODIUM CHLORIDE 9 MG/ML
100 INJECTION, SOLUTION INTRAVENOUS CONTINUOUS
Status: CANCELLED | OUTPATIENT
Start: 2019-05-02

## 2019-05-02 RX ADMIN — LIDOCAINE HYDROCHLORIDE ANHYDROUS 50 MG: 10 INJECTION, SOLUTION INFILTRATION at 12:36

## 2019-05-02 RX ADMIN — PROPOFOL 50 MG: 10 INJECTION, EMULSION INTRAVENOUS at 12:39

## 2019-05-02 RX ADMIN — SODIUM CHLORIDE: 0.9 INJECTION, SOLUTION INTRAVENOUS at 12:23

## 2019-05-02 RX ADMIN — PROPOFOL 150 MG: 10 INJECTION, EMULSION INTRAVENOUS at 12:36

## 2019-05-20 ENCOUNTER — OFFICE VISIT (OUTPATIENT)
Dept: FAMILY MEDICINE CLINIC | Facility: CLINIC | Age: 49
End: 2019-05-20

## 2019-05-20 VITALS
SYSTOLIC BLOOD PRESSURE: 138 MMHG | RESPIRATION RATE: 16 BRPM | HEIGHT: 68 IN | TEMPERATURE: 97.5 F | OXYGEN SATURATION: 97 % | WEIGHT: 293 LBS | HEART RATE: 84 BPM | DIASTOLIC BLOOD PRESSURE: 76 MMHG | BODY MASS INDEX: 44.41 KG/M2

## 2019-05-20 DIAGNOSIS — J30.1 ALLERGIC RHINITIS DUE TO POLLEN, UNSPECIFIED SEASONALITY: Primary | ICD-10-CM

## 2019-05-20 DIAGNOSIS — I10 HYPERTENSION, UNSPECIFIED TYPE: ICD-10-CM

## 2019-05-20 PROBLEM — J30.9 ALLERGIC RHINITIS: Status: ACTIVE | Noted: 2019-05-20

## 2019-05-20 PROCEDURE — 99213 OFFICE O/P EST LOW 20 MIN: CPT | Performed by: FAMILY MEDICINE

## 2019-05-20 RX ORDER — LORATADINE 10 MG/1
10 TABLET ORAL DAILY
Qty: 90 TABLET | Refills: 1 | Status: SHIPPED | OUTPATIENT
Start: 2019-05-20 | End: 2019-11-21 | Stop reason: SDUPTHER

## 2019-05-20 RX ORDER — AMLODIPINE BESYLATE 5 MG/1
5 TABLET ORAL DAILY
Qty: 30 TABLET | Refills: 2 | Status: SHIPPED | OUTPATIENT
Start: 2019-05-20 | End: 2019-05-20 | Stop reason: SDUPTHER

## 2019-05-20 RX ORDER — AMLODIPINE BESYLATE 5 MG/1
5 TABLET ORAL DAILY
Qty: 90 TABLET | Refills: 1 | Status: SHIPPED | OUTPATIENT
Start: 2019-05-20 | End: 2019-08-26 | Stop reason: SDUPTHER

## 2019-05-20 RX ORDER — FLUTICASONE PROPIONATE 50 MCG
1 SPRAY, SUSPENSION (ML) NASAL DAILY
Qty: 1 BOTTLE | Refills: 2 | Status: SHIPPED | OUTPATIENT
Start: 2019-05-20 | End: 2019-11-21 | Stop reason: SDUPTHER

## 2019-05-20 RX ORDER — LORATADINE 10 MG/1
10 TABLET ORAL DAILY
Qty: 30 TABLET | Refills: 2 | Status: SHIPPED | OUTPATIENT
Start: 2019-05-20 | End: 2019-05-20

## 2019-05-20 RX ORDER — LISINOPRIL 10 MG/1
10 TABLET ORAL DAILY
Qty: 30 TABLET | Refills: 2 | Status: SHIPPED | OUTPATIENT
Start: 2019-05-20 | End: 2019-05-20 | Stop reason: SDUPTHER

## 2019-05-20 RX ORDER — LISINOPRIL 10 MG/1
10 TABLET ORAL DAILY
Qty: 90 TABLET | Refills: 1 | Status: SHIPPED | OUTPATIENT
Start: 2019-05-20 | End: 2019-08-26 | Stop reason: SDUPTHER

## 2019-07-08 ENCOUNTER — TELEPHONE (OUTPATIENT)
Dept: FAMILY MEDICINE CLINIC | Facility: CLINIC | Age: 49
End: 2019-07-08

## 2019-07-08 ENCOUNTER — OFFICE VISIT (OUTPATIENT)
Dept: FAMILY MEDICINE CLINIC | Facility: CLINIC | Age: 49
End: 2019-07-08

## 2019-07-08 VITALS
OXYGEN SATURATION: 98 % | WEIGHT: 293 LBS | DIASTOLIC BLOOD PRESSURE: 90 MMHG | BODY MASS INDEX: 44.41 KG/M2 | HEART RATE: 85 BPM | TEMPERATURE: 97.3 F | SYSTOLIC BLOOD PRESSURE: 148 MMHG | HEIGHT: 68 IN

## 2019-07-08 DIAGNOSIS — J02.9 ACUTE PHARYNGITIS, UNSPECIFIED ETIOLOGY: Primary | ICD-10-CM

## 2019-07-08 PROCEDURE — 99213 OFFICE O/P EST LOW 20 MIN: CPT | Performed by: FAMILY MEDICINE

## 2019-07-08 RX ORDER — BROMPHENIRAMINE MALEATE, PSEUDOEPHEDRINE HYDROCHLORIDE, AND DEXTROMETHORPHAN HYDROBROMIDE 2; 30; 10 MG/5ML; MG/5ML; MG/5ML
5 SYRUP ORAL
Qty: 120 ML | Refills: 0 | Status: SHIPPED | OUTPATIENT
Start: 2019-07-08 | End: 2020-08-10

## 2019-07-08 NOTE — PROGRESS NOTES
Assessment/Plan:    Acute pharyngitis  Patient who has previous history of seasonal allergies this report to the clinic with postnasal drip, sore throat, congestion, cough for the past few days  -will start on antibiotics today (Augmentin twice a day for the next 10 days)  - patient taking multiple over-the-counter medications time advised patient to stop taking all other medications are over-the-counter as they will counter act with the medications that I will be prescribing   -will start patient on Bromfed patient is well aware that she can only take Bromfed at night and not to be operating motor vehicle after taking the medication as it will cause drowsiness  Patient aware that this a different in the Bromfed may cause an increase in blood pressure she will be taking her blood pressure medications, and will also be checking blood pressure  Diagnoses and all orders for this visit:    Acute pharyngitis, unspecified etiology          Subjective:      Patient ID: Dexter Tabares is a 44 y o  female  This is a very pleasant 52-year-old female who presents to the clinic for a sick visit  Patient complained of sore throat cough postnasal drip with significant past medical history of seasonal allergies however recently appears to have turned into an acute pharyngitis  Patient also complains of cough this is interfering with her ability to work during the day and sleep at night  Patient tried multiple over-the-counter medications for several days some of them helped very minimally  Will be starting patient on both Bromfed as well as antibiotic patient aware that Bromfed can make her drowsy, as well as pseudoephedrine increase her blood pressure    Patient also aware that the antibiotics      The following portions of the patient's history were reviewed and updated as appropriate: allergies, current medications, past family history, past medical history, past social history, past surgical history and problem list     Review of Systems   Constitutional: Negative for chills and fever  HENT: Positive for postnasal drip, rhinorrhea, sinus pressure, sore throat and trouble swallowing  Negative for congestion  Eyes: Negative for visual disturbance  Respiratory: Positive for cough  Negative for wheezing  Cardiovascular: Negative for chest pain  Gastrointestinal: Negative for abdominal pain, blood in stool and nausea  Endocrine: Negative for cold intolerance and heat intolerance  Genitourinary: Negative for dysuria and hematuria  Musculoskeletal: Negative for gait problem  Skin: Negative for rash  Allergic/Immunologic: Negative for environmental allergies  Neurological: Negative for syncope  Hematological: Does not bruise/bleed easily  Psychiatric/Behavioral: Negative for behavioral problems  Objective:      /90 (BP Location: Left arm, Patient Position: Sitting, Cuff Size: Large)   Pulse 85   Temp (!) 97 3 °F (36 3 °C) (Temporal)   Ht 5' 8" (1 727 m)   Wt (!) 141 kg (310 lb)   SpO2 98%   Breastfeeding? No   BMI 47 14 kg/m²          Physical Exam   Constitutional: She is oriented to person, place, and time  She appears well-developed and well-nourished  No distress  HENT:   Head: Normocephalic and atraumatic  Right Ear: External ear normal    Left Ear: External ear normal    Nose: Nose normal    Mouth/Throat: Oropharyngeal exudate present  Eyes: Pupils are equal, round, and reactive to light  Conjunctivae and EOM are normal  Right eye exhibits no discharge  Left eye exhibits no discharge  Neck: Normal range of motion  Neck supple  No JVD present  No tracheal deviation present  No thyromegaly present  Cardiovascular: Normal rate, regular rhythm, normal heart sounds and intact distal pulses  Exam reveals no gallop and no friction rub  No murmur heard  Pulmonary/Chest: Effort normal and breath sounds normal  No respiratory distress  She has no wheezes   She exhibits no tenderness  Abdominal: Soft  Bowel sounds are normal  She exhibits no distension  There is no tenderness  There is no rebound  Musculoskeletal: Normal range of motion  She exhibits no edema or tenderness  Neurological: She is alert and oriented to person, place, and time  She has normal reflexes  Coordination normal    Skin: Skin is warm and dry  No rash noted  She is not diaphoretic  No erythema  Psychiatric: She has a normal mood and affect   Her behavior is normal  Thought content normal

## 2019-07-08 NOTE — ASSESSMENT & PLAN NOTE
Patient who has previous history of seasonal allergies this report to the clinic with postnasal drip, sore throat, congestion, cough for the past few days  -will start on antibiotics today (Augmentin twice a day for the next 10 days)  - patient taking multiple over-the-counter medications time advised patient to stop taking all other medications are over-the-counter as they will counter act with the medications that I will be prescribing   -will start patient on Bromfed patient is well aware that she can only take Bromfed at night and not to be operating motor vehicle after taking the medication as it will cause drowsiness  Patient aware that this a different in the Bromfed may cause an increase in blood pressure she will be taking her blood pressure medications, and will also be checking blood pressure

## 2019-07-09 RX ORDER — AMOXICILLIN AND CLAVULANATE POTASSIUM 875; 125 MG/1; MG/1
1 TABLET, FILM COATED ORAL EVERY 12 HOURS SCHEDULED
Qty: 20 TABLET | Refills: 0 | Status: SHIPPED | OUTPATIENT
Start: 2019-07-09 | End: 2019-07-19

## 2019-08-19 ENCOUNTER — OFFICE VISIT (OUTPATIENT)
Dept: FAMILY MEDICINE CLINIC | Facility: CLINIC | Age: 49
End: 2019-08-19

## 2019-08-19 VITALS
HEART RATE: 85 BPM | OXYGEN SATURATION: 98 % | WEIGHT: 293 LBS | RESPIRATION RATE: 16 BRPM | SYSTOLIC BLOOD PRESSURE: 146 MMHG | DIASTOLIC BLOOD PRESSURE: 96 MMHG | BODY MASS INDEX: 47.14 KG/M2 | TEMPERATURE: 96.5 F

## 2019-08-19 DIAGNOSIS — M25.511 ACUTE PAIN OF RIGHT SHOULDER: Primary | ICD-10-CM

## 2019-08-19 PROCEDURE — 99213 OFFICE O/P EST LOW 20 MIN: CPT | Performed by: FAMILY MEDICINE

## 2019-08-19 RX ORDER — IBUPROFEN 800 MG/1
800 TABLET ORAL EVERY 12 HOURS PRN
Qty: 10 TABLET | Refills: 0 | Status: SHIPPED | OUTPATIENT
Start: 2019-08-19 | End: 2019-11-21

## 2019-08-19 NOTE — PROGRESS NOTES
Assessment/Plan:    Acute pain of right shoulder  Will inciting events, patient denies any kind of trauma to the area  This is been going on for the past 2 weeks patient tried conservative measures which include a Tylenol of tried is, lidocaine patches, topical cream   -will need an x-ray of the right shoulder  -will schedule patient for a steroid injection of the right shoulder after getting x-ray results  -ibuprofen 800 mg to reduce inflammation, take every 12 hours with food  - previous x-ray of the left shoulder showed calcification most consistent with tendinitis       Diagnoses and all orders for this visit:    Acute pain of right shoulder  -     XR shoulder 2+ vw right; Future  -     ibuprofen (MOTRIN) 800 mg tablet; Take 1 tablet (800 mg total) by mouth every 12 (twelve) hours as needed for mild pain          Subjective:      Patient ID: Marco Conley is a 44 y o  female  This is a very pleasant 24-year-old female presents to the clinic for sick visit  Patient has acute pain of the right shoulder for the past 2 weeks  Patient denies any inciting events, trauma to the area  Patient previously had pain in her left shoulder x-ray at that time showed calcification most consistent with tendinitis  Patient tried multiple over-the-counter medications topical therapy without any resolution  At this time patient would like to have a steroid injection of the right shoulder, however will get an x-ray of the right shoulder 1st and start patient on ibuprofen and mg to reduce inflammation for 5 days  Will have patient come back in 1 week for injection  The following portions of the patient's history were reviewed and updated as appropriate: allergies, current medications, past family history, past medical history, past social history, past surgical history and problem list     Review of Systems   Constitutional: Negative for chills and fever  HENT: Negative for congestion and sore throat      Eyes: Negative for visual disturbance  Respiratory: Negative for wheezing  Cardiovascular: Negative for chest pain  Gastrointestinal: Negative for abdominal pain, blood in stool and nausea  Endocrine: Negative for cold intolerance and heat intolerance  Genitourinary: Negative for dysuria and hematuria  Musculoskeletal: Positive for arthralgias  Negative for gait problem  Skin: Negative for rash  Allergic/Immunologic: Negative for environmental allergies  Neurological: Negative for syncope  Hematological: Does not bruise/bleed easily  Psychiatric/Behavioral: Negative for behavioral problems  Objective:      /96 (BP Location: Left arm, Patient Position: Sitting, Cuff Size: Large)   Pulse 85   Temp (!) 96 5 °F (35 8 °C) (Temporal)   Resp 16   Wt (!) 141 kg (310 lb)   SpO2 98%   BMI 47 14 kg/m²          Physical Exam   Constitutional: She is oriented to person, place, and time  She appears well-developed and well-nourished  No distress  HENT:   Head: Normocephalic and atraumatic  Right Ear: External ear normal    Left Ear: External ear normal    Nose: Nose normal    Mouth/Throat: Oropharynx is clear and moist    Eyes: Pupils are equal, round, and reactive to light  Conjunctivae and EOM are normal  Right eye exhibits no discharge  Left eye exhibits no discharge  Neck: Normal range of motion  Neck supple  No JVD present  No tracheal deviation present  No thyromegaly present  Cardiovascular: Normal rate, regular rhythm, normal heart sounds and intact distal pulses  Exam reveals no gallop and no friction rub  No murmur heard  Pulmonary/Chest: Effort normal and breath sounds normal  No respiratory distress  She has no wheezes  She exhibits no tenderness  Abdominal: Soft  Bowel sounds are normal  She exhibits no distension  There is no tenderness  There is no rebound  Musculoskeletal: Normal range of motion  She exhibits tenderness  She exhibits no edema     Decreased range of motion of the right shoulder   Neurological: She is alert and oriented to person, place, and time  She has normal reflexes  Coordination normal    Skin: Skin is warm and dry  No rash noted  She is not diaphoretic  No erythema  Psychiatric: She has a normal mood and affect  Her behavior is normal  Thought content normal    Nursing note and vitals reviewed

## 2019-08-19 NOTE — ASSESSMENT & PLAN NOTE
Will inciting events, patient denies any kind of trauma to the area    This is been going on for the past 2 weeks patient tried conservative measures which include a Tylenol of tried is, lidocaine patches, topical cream   -will need an x-ray of the right shoulder  -will schedule patient for a steroid injection of the right shoulder after getting x-ray results  -ibuprofen 800 mg to reduce inflammation, take every 12 hours with food  - previous x-ray of the left shoulder showed calcification most consistent with tendinitis

## 2019-08-21 ENCOUNTER — TELEPHONE (OUTPATIENT)
Dept: FAMILY MEDICINE CLINIC | Facility: CLINIC | Age: 49
End: 2019-08-21

## 2019-08-21 ENCOUNTER — HOSPITAL ENCOUNTER (OUTPATIENT)
Dept: RADIOLOGY | Facility: HOSPITAL | Age: 49
Discharge: HOME | End: 2019-08-21
Payer: COMMERCIAL

## 2019-08-21 DIAGNOSIS — M25.511 ACUTE PAIN OF RIGHT SHOULDER: ICD-10-CM

## 2019-08-21 PROCEDURE — 73030 X-RAY EXAM OF SHOULDER: CPT

## 2019-08-21 NOTE — TELEPHONE ENCOUNTER
Pt came in stating she would like her injection before her 5 day helen as per visit with you on 8/19    She states she just completed her xray today on her right shoulder and is in extreme pain   medcation ibuprofen (MOTRIN) 800 mg tablet   isn't helping at all she states  I offered her an appt for the injection but it looks like it wont be until September and pt insisted she cant wait that long  Julee Diaz She would like a call back with results as well as how would you like to proceed with injection appt ?

## 2019-08-22 ENCOUNTER — TELEPHONE (OUTPATIENT)
Dept: FAMILY MEDICINE CLINIC | Facility: CLINIC | Age: 49
End: 2019-08-22

## 2019-08-22 ENCOUNTER — PROCEDURE VISIT (OUTPATIENT)
Dept: FAMILY MEDICINE CLINIC | Facility: CLINIC | Age: 49
End: 2019-08-22

## 2019-08-22 VITALS
TEMPERATURE: 97.4 F | OXYGEN SATURATION: 97 % | BODY MASS INDEX: 44.41 KG/M2 | HEART RATE: 105 BPM | HEIGHT: 68 IN | SYSTOLIC BLOOD PRESSURE: 120 MMHG | DIASTOLIC BLOOD PRESSURE: 80 MMHG | WEIGHT: 293 LBS | RESPIRATION RATE: 18 BRPM

## 2019-08-22 DIAGNOSIS — M75.81 TENDINITIS OF RIGHT ROTATOR CUFF: Primary | ICD-10-CM

## 2019-08-22 PROCEDURE — 20610 DRAIN/INJ JOINT/BURSA W/O US: CPT | Performed by: FAMILY MEDICINE

## 2019-08-22 RX ORDER — TRIAMCINOLONE ACETONIDE 40 MG/ML
40 INJECTION, SUSPENSION INTRA-ARTICULAR; INTRAMUSCULAR
Status: COMPLETED | OUTPATIENT
Start: 2019-08-22 | End: 2019-08-22

## 2019-08-22 RX ORDER — LIDOCAINE HYDROCHLORIDE 20 MG/ML
4 INJECTION, SOLUTION INFILTRATION; PERINEURAL
Status: COMPLETED | OUTPATIENT
Start: 2019-08-22 | End: 2019-08-22

## 2019-08-22 RX ADMIN — TRIAMCINOLONE ACETONIDE 40 MG: 40 INJECTION, SUSPENSION INTRA-ARTICULAR; INTRAMUSCULAR at 15:41

## 2019-08-22 RX ADMIN — LIDOCAINE HYDROCHLORIDE 4 ML: 20 INJECTION, SOLUTION INFILTRATION; PERINEURAL at 15:41

## 2019-08-22 NOTE — PROGRESS NOTES
Large joint arthrocentesis: R subacromial bursa  Date/Time: 8/22/2019 3:41 PM  Consent given by: patient  Site marked: site marked  Timeout: Immediately prior to procedure a time out was called to verify the correct patient, procedure, equipment, support staff and site/side marked as required   Supporting Documentation  Indications: pain and diagnostic evaluation   Procedure Details  Location: shoulder - R subacromial bursa  Needle size: 25 G  Ultrasound guidance: no  Approach: posterolateral  Medications administered: 4 mL lidocaine 2 %; 40 mg triamcinolone acetonide 40 mg/mL    Patient tolerance: patient tolerated the procedure well with no immediate complications  Dressing:  Sterile dressing applied    patient was able to move her arm well without limited range of motion immediately after the steroid injection

## 2019-08-22 NOTE — TELEPHONE ENCOUNTER
Nothing available with anyone until 9/6  Jose Corado Pt is not willing to wait that long    I have a opening for today at 320pm, can I offer that ?

## 2019-08-22 NOTE — ASSESSMENT & PLAN NOTE
Pain and limited range of motion immediately resolved with xylocaine and Kenalog injection today      PT in one week

## 2019-08-22 NOTE — TELEPHONE ENCOUNTER
Pt would like to know when she is able to have shoulder injection appt scheduled since the medication she is currently taking for the pain is not making her feel any better  Pt stated she is unable to sleep because of the pain in her shoulder  Please advice if you would like to see pt sooner for procedure

## 2019-08-25 DIAGNOSIS — I10 HYPERTENSION, UNSPECIFIED TYPE: ICD-10-CM

## 2019-08-26 RX ORDER — LISINOPRIL 10 MG/1
TABLET ORAL
Qty: 90 TABLET | Refills: 2 | OUTPATIENT
Start: 2019-08-26

## 2019-08-26 RX ORDER — AMLODIPINE BESYLATE 5 MG/1
TABLET ORAL
Qty: 90 TABLET | Refills: 2 | OUTPATIENT
Start: 2019-08-26

## 2019-08-29 RX ORDER — AMLODIPINE BESYLATE 5 MG/1
5 TABLET ORAL DAILY
Qty: 90 TABLET | Refills: 1 | Status: SHIPPED | OUTPATIENT
Start: 2019-08-29 | End: 2019-11-21 | Stop reason: SDUPTHER

## 2019-08-29 RX ORDER — LISINOPRIL 10 MG/1
10 TABLET ORAL DAILY
Qty: 90 TABLET | Refills: 1 | Status: SHIPPED | OUTPATIENT
Start: 2019-08-29 | End: 2019-11-21 | Stop reason: SDUPTHER

## 2019-09-19 ENCOUNTER — OFFICE VISIT (OUTPATIENT)
Dept: FAMILY MEDICINE CLINIC | Facility: CLINIC | Age: 49
End: 2019-09-19

## 2019-09-19 VITALS
RESPIRATION RATE: 18 BRPM | DIASTOLIC BLOOD PRESSURE: 80 MMHG | WEIGHT: 293 LBS | SYSTOLIC BLOOD PRESSURE: 108 MMHG | OXYGEN SATURATION: 97 % | TEMPERATURE: 97.3 F | HEART RATE: 105 BPM | HEIGHT: 68 IN | BODY MASS INDEX: 44.41 KG/M2

## 2019-09-19 DIAGNOSIS — M75.51 SUBACROMIAL BURSITIS OF RIGHT SHOULDER JOINT: Primary | ICD-10-CM

## 2019-09-19 PROCEDURE — 99213 OFFICE O/P EST LOW 20 MIN: CPT | Performed by: FAMILY MEDICINE

## 2019-09-19 NOTE — PROGRESS NOTES
Assessment/Plan:    Subacromial bursitis of right shoulder joint  Improved s/p steroid injection 4 weeks ago  90% improvement  Has failed to go to PT   Encouraged to call and go to PT and then take it home and do some PT exercises at home            Diagnoses and all orders for this visit:    Subacromial bursitis of right shoulder joint          Subjective:      Patient ID: Naye Guzman is a 44 y o  female  This is a very pleasant 66-year-old female presents to the clinic to follow up her subacromial bursitis after steroid injection 6 weeks ago  Patient reports feeling well and tolerated the procedure well  Patient reports symptoms gradually improved after approximately a week after the injection  Patient reports pain, range of motion, strength have improved approximately 80%  Patient is satisfied with the results  In denies any other complaints at this time  Patient has failed to call and report to physical therapy  Patient previously had pain in her left shoulder x-ray at that time showed calcification most consistent with tendinitis  Patient tried multiple over-the-counter medications topical therapy without any resolution  Patient denies any headache, chest pain, palpitation, shortness of breath, fevers, chills, nausea, vomiting, diarrhea, constipation,  Patient denies any urinary symptoms  The following portions of the patient's history were reviewed and updated as appropriate: She  has a past medical history of Hypertension, Obesity, and Seasonal allergies    Patient Active Problem List    Diagnosis Date Noted    Subacromial bursitis of right shoulder joint 09/19/2019    Acute pain of right shoulder 08/19/2019    Acute pharyngitis 07/08/2019    Allergic rhinitis 05/20/2019    Acute appendicitis with localized peritonitis 03/22/2019    Acute appendicitis 03/21/2019    Prediabetes 11/01/2018    Obesity 11/01/2018    Screening for colon cancer 11/01/2018    Screening for breast cancer 11/01/2018    Well adult exam 11/01/2018    Encounter for immunization 11/01/2018    Tendinitis of right rotator cuff 11/01/2018    Benign hypertension 12/07/2015     She  has a past surgical history that includes Cholecystectomy; pr lap,appendectomy (N/A, 3/22/2019); Appendectomy; and pr colonoscopy flx dx w/collj spec when pfrmd (Left, 5/2/2019)  Her family history includes Colon cancer (age of onset: 61) in her mother  She  reports that she has never smoked  She has never used smokeless tobacco  She reports that she drinks alcohol  She reports that she does not use drugs  Current Outpatient Medications   Medication Sig Dispense Refill    amLODIPine (NORVASC) 5 mg tablet Take 1 tablet (5 mg total) by mouth daily 90 tablet 1    fluticasone (FLONASE) 50 mcg/act nasal spray 1 spray into each nostril daily 1 Bottle 2    ibuprofen (MOTRIN) 800 mg tablet Take 1 tablet (800 mg total) by mouth every 12 (twelve) hours as needed for mild pain 10 tablet 0    lisinopril (ZESTRIL) 10 mg tablet Take 1 tablet (10 mg total) by mouth daily 90 tablet 1    loratadine (CLARITIN) 10 mg tablet Take 1 tablet (10 mg total) by mouth daily 90 tablet 1    brompheniramine-pseudoephedrine-DM 30-2-10 MG/5ML syrup Take 5 mL by mouth daily at bedtime (Patient not taking: Reported on 8/19/2019) 120 mL 0    Na Sulfate-K Sulfate-Mg Sulf (SUPREP BOWEL PREP KIT) 17 5-3 13-1 6 GM/177ML SOLN Use as directed by office (Patient not taking: Reported on 4/2/2019) 2 Bottle 0     No current facility-administered medications for this visit        Current Outpatient Medications on File Prior to Visit   Medication Sig    amLODIPine (NORVASC) 5 mg tablet Take 1 tablet (5 mg total) by mouth daily    fluticasone (FLONASE) 50 mcg/act nasal spray 1 spray into each nostril daily    ibuprofen (MOTRIN) 800 mg tablet Take 1 tablet (800 mg total) by mouth every 12 (twelve) hours as needed for mild pain    lisinopril (ZESTRIL) 10 mg tablet Take 1 tablet (10 mg total) by mouth daily    loratadine (CLARITIN) 10 mg tablet Take 1 tablet (10 mg total) by mouth daily    brompheniramine-pseudoephedrine-DM 30-2-10 MG/5ML syrup Take 5 mL by mouth daily at bedtime (Patient not taking: Reported on 8/19/2019)    Na Sulfate-K Sulfate-Mg Sulf (SUPREP BOWEL PREP KIT) 17 5-3 13-1 6 GM/177ML SOLN Use as directed by office (Patient not taking: Reported on 4/2/2019)     No current facility-administered medications on file prior to visit  She has No Known Allergies       Review of Systems   HENT: Negative  Respiratory: Negative  Cardiovascular: Negative  Gastrointestinal: Negative  Genitourinary: Negative  Musculoskeletal: Negative  Hematological: Negative  Psychiatric/Behavioral: Negative  Objective:      /80 (BP Location: Left arm, Patient Position: Sitting, Cuff Size: Large)   Pulse 105   Temp (!) 97 3 °F (36 3 °C) (Temporal)   Resp 18   Ht 5' 8" (1 727 m)   Wt (!) 138 kg (304 lb)   SpO2 97%   Breastfeeding? No   BMI 46 22 kg/m²          Physical Exam   Constitutional: She is oriented to person, place, and time  She appears well-developed and well-nourished  Cardiovascular: Normal rate, regular rhythm and normal heart sounds  Pulmonary/Chest: Effort normal    Abdominal: Soft  Bowel sounds are normal  She exhibits no distension  Musculoskeletal:        Right shoulder: She exhibits normal range of motion, no tenderness, no bony tenderness, no swelling, no effusion, no crepitus, no deformity, no laceration, no pain, no spasm, normal pulse and normal strength  Left shoulder: Normal    Neurological: She is alert and oriented to person, place, and time  No sensory deficit  Skin: Skin is warm  Capillary refill takes less than 2 seconds  She is not diaphoretic  Psychiatric: She has a normal mood and affect   Her behavior is normal  Thought content normal

## 2019-09-19 NOTE — ASSESSMENT & PLAN NOTE
Improved s/p steroid injection 4 weeks ago  90% improvement  Has failed to go to PT   Encouraged to call and go to PT and then take it home and do some PT exercises at home

## 2019-11-21 ENCOUNTER — OFFICE VISIT (OUTPATIENT)
Dept: FAMILY MEDICINE CLINIC | Facility: CLINIC | Age: 49
End: 2019-11-21

## 2019-11-21 VITALS
OXYGEN SATURATION: 98 % | SYSTOLIC BLOOD PRESSURE: 130 MMHG | DIASTOLIC BLOOD PRESSURE: 80 MMHG | WEIGHT: 293 LBS | RESPIRATION RATE: 18 BRPM | BODY MASS INDEX: 44.41 KG/M2 | HEIGHT: 68 IN | TEMPERATURE: 97 F | HEART RATE: 89 BPM

## 2019-11-21 DIAGNOSIS — R06.83 SNORES: ICD-10-CM

## 2019-11-21 DIAGNOSIS — I10 BENIGN HYPERTENSION: ICD-10-CM

## 2019-11-21 DIAGNOSIS — J30.1 ALLERGIC RHINITIS DUE TO POLLEN, UNSPECIFIED SEASONALITY: ICD-10-CM

## 2019-11-21 DIAGNOSIS — R60.9 PITTING EDEMA: ICD-10-CM

## 2019-11-21 DIAGNOSIS — R60.0 BILATERAL LOWER EXTREMITY EDEMA: ICD-10-CM

## 2019-11-21 DIAGNOSIS — E66.9 OBESITY, UNSPECIFIED CLASSIFICATION, UNSPECIFIED OBESITY TYPE, UNSPECIFIED WHETHER SERIOUS COMORBIDITY PRESENT: ICD-10-CM

## 2019-11-21 DIAGNOSIS — I10 HYPERTENSION, UNSPECIFIED TYPE: ICD-10-CM

## 2019-11-21 DIAGNOSIS — Z00.00 ENCOUNTER FOR ANNUAL PHYSICAL EXAM: Primary | ICD-10-CM

## 2019-11-21 DIAGNOSIS — Z23 FLU VACCINE NEED: ICD-10-CM

## 2019-11-21 PROCEDURE — 99396 PREV VISIT EST AGE 40-64: CPT | Performed by: FAMILY MEDICINE

## 2019-11-21 PROCEDURE — 90471 IMMUNIZATION ADMIN: CPT | Performed by: FAMILY MEDICINE

## 2019-11-21 PROCEDURE — 90682 RIV4 VACC RECOMBINANT DNA IM: CPT | Performed by: FAMILY MEDICINE

## 2019-11-21 RX ORDER — LORATADINE 10 MG/1
10 TABLET ORAL DAILY
Qty: 90 TABLET | Refills: 1 | Status: SHIPPED | OUTPATIENT
Start: 2019-11-21 | End: 2021-09-09 | Stop reason: SDUPTHER

## 2019-11-21 RX ORDER — AMLODIPINE BESYLATE 5 MG/1
5 TABLET ORAL DAILY
Qty: 90 TABLET | Refills: 1 | Status: SHIPPED | OUTPATIENT
Start: 2019-11-21 | End: 2020-09-24

## 2019-11-21 RX ORDER — FLUTICASONE PROPIONATE 50 MCG
1 SPRAY, SUSPENSION (ML) NASAL DAILY
Qty: 1 BOTTLE | Refills: 2 | Status: SHIPPED | OUTPATIENT
Start: 2019-11-21 | End: 2019-11-21 | Stop reason: SDUPTHER

## 2019-11-21 RX ORDER — LISINOPRIL 10 MG/1
10 TABLET ORAL DAILY
Qty: 90 TABLET | Refills: 1 | Status: SHIPPED | OUTPATIENT
Start: 2019-11-21 | End: 2020-09-24

## 2019-11-21 NOTE — PROGRESS NOTES
Assessment/Plan:    Snores  Complains of intermittent stored most likely secondary to body habitus  Will evaluate for obstructive sleep apnea   Sleep study ordered    Obesity  Counseled patient on the importance of working to achieve weight reduction goal   Discussed benefits of weight loss including prevention or control of comorbidities  Discussed role that balanced diet and daily activity play in weight reduction  Set up small attainable weight loss goals  Involve family, friends, and co-workers for support  Allergic rhinitis  Intermittent symptoms  Flonase  Claritin    Bilateral lower extremity edema  Chronic  Differential diagnosis includes cardiac versus amlodipine induced echocardiogram  Discussed minimizing salt in diet    Benign hypertension  Currently blood pressure is controlled  Reviewed BP target goal with patient  Continue to maintain healthy balanced diet with focus on low salt intake  Limit alcohol intake  Continue norvasc and lisinopril  Will consider switching from Norvasc if echo results are normal for bilateral lower extremity edema    Encounter for annual physical exam  Up-to-date with immunization will give flu shot today  Up-to-date on colonoscopy  Had mammography greater than year ago, will call and make an appointment to get in the mammography by her OBGYN including Pap smear  Discussed diet, lifestyle modification, weight reduction,       Diagnoses and all orders for this visit:    Encounter for annual physical exam    Snores  -     Ambulatory referral to Sleep Medicine; Future  -     Comprehensive metabolic panel; Future    Pitting edema  -     Echo complete with contrast if indicated; Future  -     Comprehensive metabolic panel; Future    Hypertension, unspecified type  -     CBC; Future  -     Comprehensive metabolic panel; Future  -     Microalbumin,Urine  -     amLODIPine (NORVASC) 5 mg tablet;  Take 1 tablet (5 mg total) by mouth daily  -     lisinopril (ZESTRIL) 10 mg tablet; Take 1 tablet (10 mg total) by mouth daily    Obesity, unspecified classification, unspecified obesity type, unspecified whether serious comorbidity present  -     Lipid Panel with Direct LDL reflex; Future    Flu vaccine need  -     Cancel: influenza vaccine, [id number], quadrivalent, 0 5 mL, preservative-free, for adult and pediatric patients 6 mos+ (AFLURIA, FLUARIX, FLULAVAL, FLUZONE)  -     influenza vaccine, [id number], quadrivalent, recombinant, PF, 0 5 mL, for patients 18 yr+ (FLUBLOK)    Allergic rhinitis due to pollen, unspecified seasonality  -     fluticasone (FLONASE) 50 mcg/act nasal spray; 1 spray into each nostril daily  -     loratadine (CLARITIN) 10 mg tablet; Take 1 tablet (10 mg total) by mouth daily    Bilateral lower extremity edema    Benign hypertension          Subjective:      Patient ID: Heriberto Portillo is a 44 y o  female  49-year-old female with significant past medical history hypertension on lisinopril and amlodipine, morbid obesity here today for physical exam   Patient reports feeling her normal state of health offers no complaints this time  Patient denies any headache, chest pain, palpitation, shortness of breath, fevers, chills, nausea, vomiting, diarrhea, constipation  Patient denies any urinary symptoms  Patient has had a recent colonoscopy this year and was normal, she is return in 10 years  Patient had a mammography little more than 1 year ago and she is due for a new one  and she is to schedule an appointment with mammography screening  GYN exam at [facility] at [facility]       The following portions of the patient's history were reviewed and updated as appropriate: She  has a past medical history of Hypertension, Obesity, and Seasonal allergies    Patient Active Problem List    Diagnosis Date Noted    Bilateral lower extremity edema 11/22/2019    Pitting edema 11/22/2019    Flu vaccine need 11/22/2019    Snores 11/21/2019    Subacromial bursitis of right shoulder joint 09/19/2019    Acute pain of right shoulder 08/19/2019    Acute pharyngitis 07/08/2019    Allergic rhinitis 05/20/2019    Acute appendicitis with localized peritonitis 03/22/2019    Acute appendicitis 03/21/2019    Prediabetes 11/01/2018    Obesity 11/01/2018    Screening for colon cancer 11/01/2018    Screening for breast cancer 11/01/2018    Well adult exam 11/01/2018    Encounter for annual physical exam 11/01/2018    Tendinitis of right rotator cuff 11/01/2018    Benign hypertension 12/07/2015     She  has a past surgical history that includes Cholecystectomy; pr lap,appendectomy (N/A, 3/22/2019); Appendectomy; and pr colonoscopy flx dx w/collj spec when pfrmd (Left, 5/2/2019)  Her family history includes Colon cancer (age of onset: 61) in her mother  She  reports that she has never smoked  She has never used smokeless tobacco  She reports that she drinks alcohol  She reports that she does not use drugs  Current Outpatient Medications   Medication Sig Dispense Refill    amLODIPine (NORVASC) 5 mg tablet Take 1 tablet (5 mg total) by mouth daily 90 tablet 1    lisinopril (ZESTRIL) 10 mg tablet Take 1 tablet (10 mg total) by mouth daily 90 tablet 1    brompheniramine-pseudoephedrine-DM 30-2-10 MG/5ML syrup Take 5 mL by mouth daily at bedtime (Patient not taking: Reported on 8/19/2019) 120 mL 0    fluticasone (FLONASE) 50 mcg/act nasal spray 1 spray into each nostril daily 1 Bottle 2    loratadine (CLARITIN) 10 mg tablet Take 1 tablet (10 mg total) by mouth daily 90 tablet 1    Na Sulfate-K Sulfate-Mg Sulf (SUPREP BOWEL PREP KIT) 17 5-3 13-1 6 GM/177ML SOLN Use as directed by office (Patient not taking: Reported on 4/2/2019) 2 Bottle 0     No current facility-administered medications for this visit        Current Outpatient Medications on File Prior to Visit   Medication Sig    brompheniramine-pseudoephedrine-DM 30-2-10 MG/5ML syrup Take 5 mL by mouth daily at bedtime (Patient not taking: Reported on 8/19/2019)    Na Sulfate-K Sulfate-Mg Sulf (SUPREP BOWEL PREP KIT) 17 5-3 13-1 6 GM/177ML SOLN Use as directed by office (Patient not taking: Reported on 4/2/2019)     No current facility-administered medications on file prior to visit  She has No Known Allergies       Review of Systems   Constitutional: Negative  HENT: Negative  Eyes: Negative  Respiratory: Negative  Cardiovascular: Negative  Gastrointestinal: Negative  Genitourinary: Negative  Musculoskeletal: Negative  Neurological: Negative  Hematological: Negative  Psychiatric/Behavioral: Negative  Objective:      /80 (BP Location: Left arm, Patient Position: Sitting, Cuff Size: Large)   Pulse 89   Temp (!) 97 °F (36 1 °C) (Temporal)   Resp 18   Ht 5' 8" (1 727 m)   Wt (!) 138 kg (304 lb)   SpO2 98%   BMI 46 22 kg/m²          Physical Exam   Constitutional: She is oriented to person, place, and time  She appears well-developed and well-nourished  No distress  HENT:   Head: Normocephalic and atraumatic  Mouth/Throat: No oropharyngeal exudate  Eyes: Conjunctivae are normal  Right eye exhibits no discharge  Left eye exhibits no discharge  No scleral icterus  Neck: Normal range of motion  No JVD present  No tracheal deviation present  No thyromegaly present  Cardiovascular: Normal rate, regular rhythm, normal heart sounds and intact distal pulses  Exam reveals no gallop and no friction rub  No murmur heard  Pulmonary/Chest: Effort normal  No stridor  No respiratory distress  She has no wheezes  She exhibits no tenderness  Abdominal: She exhibits no distension  There is no tenderness  There is no rebound and no guarding  Musculoskeletal: Normal range of motion  She exhibits edema (Bilateral lower extremity edema +1)  She exhibits no tenderness or deformity  Neurological: She is alert and oriented to person, place, and time  She has normal reflexes   She displays normal reflexes  No cranial nerve deficit  She exhibits normal muscle tone  Coordination normal    Skin: Skin is warm  No rash noted  She is not diaphoretic  No erythema  No pallor  Psychiatric: She has a normal mood and affect   Her behavior is normal  Judgment and thought content normal

## 2019-11-22 PROBLEM — R60.0 BILATERAL LOWER EXTREMITY EDEMA: Status: ACTIVE | Noted: 2019-11-22

## 2019-11-22 PROBLEM — Z23 FLU VACCINE NEED: Status: ACTIVE | Noted: 2019-11-22

## 2019-11-22 PROBLEM — Z00.00 ENCOUNTER FOR ANNUAL PHYSICAL EXAM: Status: ACTIVE | Noted: 2018-11-01

## 2019-11-22 PROBLEM — R60.9 PITTING EDEMA: Status: ACTIVE | Noted: 2019-11-22

## 2019-11-22 RX ORDER — FLUTICASONE PROPIONATE 50 MCG
SPRAY, SUSPENSION (ML) NASAL
Qty: 48 ML | Refills: 2 | Status: SHIPPED | OUTPATIENT
Start: 2019-11-22 | End: 2020-08-10

## 2019-11-22 NOTE — ASSESSMENT & PLAN NOTE
Complains of intermittent stored most likely secondary to body habitus  Will evaluate for obstructive sleep apnea   Sleep study ordered

## 2019-11-22 NOTE — ASSESSMENT & PLAN NOTE
Up-to-date with immunization will give flu shot today  Up-to-date on colonoscopy  Had mammography greater than year ago, will call and make an appointment to get in the mammography by her OBGYN including Pap smear  Discussed diet, lifestyle modification, weight reduction,

## 2019-11-22 NOTE — ASSESSMENT & PLAN NOTE
Chronic  Differential diagnosis includes cardiac versus amlodipine induced echocardiogram  Discussed minimizing salt in diet

## 2019-11-22 NOTE — ASSESSMENT & PLAN NOTE
Currently blood pressure is controlled  Reviewed BP target goal with patient  Continue to maintain healthy balanced diet with focus on low salt intake  Limit alcohol intake          Continue norvasc and lisinopril  Will consider switching from Norvasc if echo results are normal for bilateral lower extremity edema

## 2020-01-15 ENCOUNTER — TRANSCRIBE ORDERS (OUTPATIENT)
Dept: ADMINISTRATIVE | Facility: HOSPITAL | Age: 50
End: 2020-01-15

## 2020-01-15 DIAGNOSIS — Z12.31 SCREENING MAMMOGRAM FOR HIGH-RISK PATIENT: Primary | ICD-10-CM

## 2020-01-18 ENCOUNTER — APPOINTMENT (OUTPATIENT)
Dept: LAB | Facility: HOSPITAL | Age: 50
End: 2020-01-18
Payer: COMMERCIAL

## 2020-01-18 DIAGNOSIS — R06.83 SNORES: ICD-10-CM

## 2020-01-18 DIAGNOSIS — R60.9 PITTING EDEMA: ICD-10-CM

## 2020-01-18 DIAGNOSIS — I10 HYPERTENSION, UNSPECIFIED TYPE: ICD-10-CM

## 2020-01-18 DIAGNOSIS — E66.9 OBESITY, UNSPECIFIED CLASSIFICATION, UNSPECIFIED OBESITY TYPE, UNSPECIFIED WHETHER SERIOUS COMORBIDITY PRESENT: ICD-10-CM

## 2020-01-18 LAB
ALBUMIN SERPL BCP-MCNC: 4 G/DL (ref 3–5.2)
ALP SERPL-CCNC: 45 U/L (ref 43–122)
ALT SERPL W P-5'-P-CCNC: 46 U/L (ref 9–52)
ANION GAP SERPL CALCULATED.3IONS-SCNC: 8 MMOL/L (ref 5–14)
AST SERPL W P-5'-P-CCNC: 22 U/L (ref 14–36)
BILIRUB SERPL-MCNC: 0.5 MG/DL
BUN SERPL-MCNC: 12 MG/DL (ref 5–25)
CALCIUM SERPL-MCNC: 8.7 MG/DL (ref 8.4–10.2)
CHLORIDE SERPL-SCNC: 106 MMOL/L (ref 97–108)
CHOLEST SERPL-MCNC: 181 MG/DL
CO2 SERPL-SCNC: 26 MMOL/L (ref 22–30)
CREAT SERPL-MCNC: 0.71 MG/DL (ref 0.6–1.2)
ERYTHROCYTE [DISTWIDTH] IN BLOOD BY AUTOMATED COUNT: 13.5 %
GFR SERPL CREATININE-BSD FRML MDRD: 114 ML/MIN/1.73SQ M
GLUCOSE P FAST SERPL-MCNC: 99 MG/DL (ref 70–99)
HCT VFR BLD AUTO: 41.3 % (ref 36–46)
HDLC SERPL-MCNC: 46 MG/DL
HGB BLD-MCNC: 13.6 G/DL (ref 12–16)
LDLC SERPL CALC-MCNC: 121 MG/DL
MCH RBC QN AUTO: 29.8 PG (ref 26–34)
MCHC RBC AUTO-ENTMCNC: 32.9 G/DL (ref 31–36)
MCV RBC AUTO: 91 FL (ref 80–100)
PLATELET # BLD AUTO: 176 THOUSANDS/UL (ref 150–450)
PMV BLD AUTO: 10 FL (ref 8.9–12.7)
POTASSIUM SERPL-SCNC: 3.9 MMOL/L (ref 3.6–5)
PROT SERPL-MCNC: 7.2 G/DL (ref 5.9–8.4)
RBC # BLD AUTO: 4.56 MILLION/UL (ref 4–5.2)
SODIUM SERPL-SCNC: 140 MMOL/L (ref 137–147)
TRIGL SERPL-MCNC: 68 MG/DL
WBC # BLD AUTO: 6 THOUSAND/UL (ref 4.5–11)

## 2020-01-18 PROCEDURE — 82043 UR ALBUMIN QUANTITATIVE: CPT

## 2020-01-18 PROCEDURE — 85027 COMPLETE CBC AUTOMATED: CPT

## 2020-01-18 PROCEDURE — 82570 ASSAY OF URINE CREATININE: CPT

## 2020-01-18 PROCEDURE — 80061 LIPID PANEL: CPT

## 2020-01-18 PROCEDURE — 80053 COMPREHEN METABOLIC PANEL: CPT

## 2020-01-18 PROCEDURE — 36415 COLL VENOUS BLD VENIPUNCTURE: CPT

## 2020-01-19 LAB
CREAT UR-MCNC: 226 MG/DL
MICROALBUMIN UR-MCNC: 23.9 MG/L (ref 0–20)
MICROALBUMIN/CREAT 24H UR: 11 MG/G CREATININE (ref 0–30)

## 2020-01-22 ENCOUNTER — HOSPITAL ENCOUNTER (OUTPATIENT)
Dept: MAMMOGRAPHY | Facility: CLINIC | Age: 50
Discharge: HOME | End: 2020-01-22
Payer: COMMERCIAL

## 2020-01-22 VITALS — WEIGHT: 293 LBS | HEIGHT: 68 IN | BODY MASS INDEX: 44.41 KG/M2

## 2020-01-22 DIAGNOSIS — Z12.31 SCREENING MAMMOGRAM FOR HIGH-RISK PATIENT: ICD-10-CM

## 2020-01-22 PROCEDURE — 77067 SCR MAMMO BI INCL CAD: CPT

## 2020-01-22 PROCEDURE — 77063 BREAST TOMOSYNTHESIS BI: CPT

## 2020-02-03 ENCOUNTER — OFFICE VISIT (OUTPATIENT)
Dept: FAMILY MEDICINE CLINIC | Facility: CLINIC | Age: 50
End: 2020-02-03

## 2020-02-03 VITALS
DIASTOLIC BLOOD PRESSURE: 82 MMHG | OXYGEN SATURATION: 98 % | SYSTOLIC BLOOD PRESSURE: 122 MMHG | HEART RATE: 87 BPM | HEIGHT: 68 IN | WEIGHT: 293 LBS | RESPIRATION RATE: 16 BRPM | BODY MASS INDEX: 44.41 KG/M2 | TEMPERATURE: 96.6 F

## 2020-02-03 DIAGNOSIS — E66.01 CLASS 3 SEVERE OBESITY DUE TO EXCESS CALORIES WITHOUT SERIOUS COMORBIDITY WITH BODY MASS INDEX (BMI) OF 45.0 TO 49.9 IN ADULT (HCC): ICD-10-CM

## 2020-02-03 DIAGNOSIS — L30.9 ECZEMA, UNSPECIFIED TYPE: Primary | ICD-10-CM

## 2020-02-03 PROCEDURE — 3079F DIAST BP 80-89 MM HG: CPT | Performed by: FAMILY MEDICINE

## 2020-02-03 PROCEDURE — 1036F TOBACCO NON-USER: CPT | Performed by: FAMILY MEDICINE

## 2020-02-03 PROCEDURE — 99213 OFFICE O/P EST LOW 20 MIN: CPT | Performed by: FAMILY MEDICINE

## 2020-02-03 PROCEDURE — 3074F SYST BP LT 130 MM HG: CPT | Performed by: FAMILY MEDICINE

## 2020-02-03 PROCEDURE — 3008F BODY MASS INDEX DOCD: CPT | Performed by: FAMILY MEDICINE

## 2020-02-03 RX ORDER — TRIAMCINOLONE ACETONIDE 1 MG/G
CREAM TOPICAL 2 TIMES DAILY
Qty: 80 G | Refills: 0 | Status: SHIPPED | OUTPATIENT
Start: 2020-02-03 | End: 2020-08-26 | Stop reason: ALTCHOICE

## 2020-02-03 NOTE — ASSESSMENT & PLAN NOTE
Diffuse all over the body  Usually seasonal around pillai and summer  Avoid hot water    will use Aveeno or Cetaphil soap   Avoid lotion  Use body cream    problem is alone cream twice a day for 5 days  Avoid using steroids for prolonged periods of time as we have discussed risks side effects of steroids      Avoid using on the face

## 2020-02-03 NOTE — PROGRESS NOTES
Assessment/Plan:    Eczema  Diffuse all over the body  Usually seasonal around pillai and summer  Avoid hot water    will use Aveeno or Cetaphil soap   Avoid lotion  Use body cream    problem is alone cream twice a day for 5 days  Avoid using steroids for prolonged periods of time as we have discussed risks side effects of steroids  Avoid using on the face         Obesity  Counseled patient and education for greater than 25 minutes on the importance of working to achieve weight reduction goal     Discussed benefits of weight loss including prevention or control of comorbidities  Discussed role that balanced diet and daily activity play in weight reduction  Set up small attainable weight loss goals  Involve family, friends, and co-workers for support    -download a carbohydrate calculator  -carbohydrate goal less than 40 total per day  -detailed diet plan including low carb goal and high protein for (3 to 4 weeks)  -intermittent fasting (1st meal 1:00 p m  last meal approximately 6:00 p m )  -Hydration  -Exercise   -what foods and drinks to avoid   -Post a visual board with weights and schedules   -discussed healthy weight loss  -goal to lose approximately 5 lb every 6 weeks  -discussed sighs of symptoms of unhealthy weight loss and what to do if they occur     -discuss alarming symptoms and if develop any sign symptoms to stop diet, resume her regualr diet, and call office immediately    -patient advised to get sleep study done as she has missed a previously, as obstructive sleep apnea this may be contributing to her weight gain  -discussed the risk associated obstructive sleep apnea in regards her heart on other vital organs systems       Diagnoses and all orders for this visit:    Eczema, unspecified type  -     triamcinolone (KENALOG) 0 1 % cream; Apply topically 2 (two) times a day Do not use more than  5 days at a time, give your skin a rest,  avoid using on face    Class 3 severe obesity due to excess calories without serious comorbidity with body mass index (BMI) of 45 0 to 49 9 in adult [facility])          Subjective:      Patient ID: Silvia Ramon is a 44 y o  female  58-year-old female with significant past medical history suspicious for obstructive sleep apnea, severe morbid obesity, presents today to discuss weight reduction and dry skin  Reports eating:   Potato chips, Cheetos snaks  Snores     Denies drinking excessive amount of soda, juice coffee or tea, soda, juice, coffe, tea, desert and added sugars        Rash   This is a chronic problem  The current episode started more than 1 year ago  The problem has been waxing and waning (Seasonal unchanged) since onset  The affected locations include the back, torso, left shoulder, left arm, right lower leg, right upper leg, left lower leg and left upper leg  The rash is characterized by dryness and itchiness  Associated with: Hot showers, Dove soap, dry radiating heat  Pertinent negatives include no fever  Treatments tried: dove soap  The treatment provided mild relief  There is no history of allergies or asthma  The following portions of the patient's history were reviewed and updated as appropriate: She  has a past medical history of Hypertension, Obesity, and Seasonal allergies    Patient Active Problem List    Diagnosis Date Noted    Eczema 02/03/2020    Bilateral lower extremity edema 11/22/2019    Pitting edema 11/22/2019    Flu vaccine need 11/22/2019    Snores 11/21/2019    Subacromial bursitis of right shoulder joint 09/19/2019    Acute pain of right shoulder 08/19/2019    Acute pharyngitis 07/08/2019    Allergic rhinitis 05/20/2019    Acute appendicitis with localized peritonitis 03/22/2019    Acute appendicitis 03/21/2019    Prediabetes 11/01/2018    Obesity 11/01/2018    Screening for colon cancer 11/01/2018    Screening for breast cancer 11/01/2018    Well adult exam 11/01/2018    Encounter for annual physical exam 11/01/2018  Tendinitis of right rotator cuff 11/01/2018    Benign hypertension 12/07/2015     She  has a past surgical history that includes Cholecystectomy; pr lap,appendectomy (N/A, 3/22/2019); Appendectomy; and pr colonoscopy flx dx w/collj spec when pfrmd (Left, 5/2/2019)  Her family history includes Colon cancer (age of onset: 61) in her mother; No Known Problems in her daughter, father, maternal grandfather, maternal grandmother, paternal aunt, paternal aunt, paternal grandfather, paternal grandmother, and sister  She  reports that she has never smoked  She has never used smokeless tobacco  She reports that she drinks alcohol  She reports that she does not use drugs  Current Outpatient Medications   Medication Sig Dispense Refill    amLODIPine (NORVASC) 5 mg tablet Take 1 tablet (5 mg total) by mouth daily 90 tablet 1    fluticasone (FLONASE) 50 mcg/act nasal spray SHAKE LIQUID AND USE 1 SPRAY IN EACH NOSTRIL DAILY 48 mL 2    lisinopril (ZESTRIL) 10 mg tablet Take 1 tablet (10 mg total) by mouth daily 90 tablet 1    loratadine (CLARITIN) 10 mg tablet Take 1 tablet (10 mg total) by mouth daily 90 tablet 1    brompheniramine-pseudoephedrine-DM 30-2-10 MG/5ML syrup Take 5 mL by mouth daily at bedtime (Patient not taking: Reported on 8/19/2019) 120 mL 0    Na Sulfate-K Sulfate-Mg Sulf (SUPREP BOWEL PREP KIT) 17 5-3 13-1 6 GM/177ML SOLN Use as directed by office (Patient not taking: Reported on 4/2/2019) 2 Bottle 0    triamcinolone (KENALOG) 0 1 % cream Apply topically 2 (two) times a day Do not use more than  5 days at a time, give your skin a rest,  avoid using on face 80 g 0     No current facility-administered medications for this visit        Current Outpatient Medications on File Prior to Visit   Medication Sig    amLODIPine (NORVASC) 5 mg tablet Take 1 tablet (5 mg total) by mouth daily    fluticasone (FLONASE) 50 mcg/act nasal spray SHAKE LIQUID AND USE 1 SPRAY IN EACH NOSTRIL DAILY    lisinopril (ZESTRIL) 10 mg tablet Take 1 tablet (10 mg total) by mouth daily    loratadine (CLARITIN) 10 mg tablet Take 1 tablet (10 mg total) by mouth daily    brompheniramine-pseudoephedrine-DM 30-2-10 MG/5ML syrup Take 5 mL by mouth daily at bedtime (Patient not taking: Reported on 8/19/2019)    Na Sulfate-K Sulfate-Mg Sulf (SUPREP BOWEL PREP KIT) 17 5-3 13-1 6 GM/177ML SOLN Use as directed by office (Patient not taking: Reported on 4/2/2019)     No current facility-administered medications on file prior to visit  She has No Known Allergies       Review of Systems   Constitutional: Negative  Negative for fever  HENT: Negative  Respiratory: Negative  Negative for orthopnea  Does report snoring   Cardiovascular: Positive for leg swelling (Chronic)  Negative for chest pain and palpitations  Gastrointestinal: Negative  Endocrine: Negative  Genitourinary: Negative  Musculoskeletal: Negative  Skin: Positive for rash  Neurological: Negative  Hematological: Negative  Psychiatric/Behavioral: Negative  Negative for agitation, behavioral problems, confusion, hallucinations, self-injury, sleep disturbance and suicidal ideas  The patient is not nervous/anxious  Objective:      /82 (BP Location: Left arm, Patient Position: Sitting, Cuff Size: Large)   Pulse 87   Temp (!) 96 6 °F (35 9 °C) (Temporal)   Resp 16   Ht 5' 8" (1 727 m)   Wt (!) 140 kg (308 lb 11 2 oz)   SpO2 98%   BMI 46 94 kg/m²          Physical Exam   Constitutional: She is oriented to person, place, and time  She appears well-developed and well-nourished  No distress  HENT:   Head: Normocephalic and atraumatic  Mouth/Throat: No oropharyngeal exudate  Eyes: Conjunctivae and EOM are normal  Right eye exhibits no discharge  Left eye exhibits no discharge  No scleral icterus  Neck: Normal range of motion  Cardiovascular: Normal rate, regular rhythm, normal heart sounds and intact distal pulses   Exam reveals no gallop and no friction rub  No murmur heard  Pulmonary/Chest: Effort normal  No stridor  No respiratory distress  She has no wheezes  She exhibits no tenderness  Abdominal: She exhibits no distension  There is no tenderness  There is no rebound and no guarding  Musculoskeletal: Normal range of motion  She exhibits edema (Plus two bilateral pitting edema lower extremities)  She exhibits no tenderness or deformity  Neurological: She is alert and oriented to person, place, and time  She has normal reflexes  She displays normal reflexes  No cranial nerve deficit  She exhibits normal muscle tone  Coordination normal    Skin: Skin is warm and dry  Capillary refill takes less than 2 seconds  No rash noted  She is not diaphoretic  No erythema  No pallor  Diffuse dry, cracking skin (bilateral legs and arms, back)   Psychiatric: She has a normal mood and affect   Her behavior is normal  Judgment and thought content normal

## 2020-02-04 NOTE — ASSESSMENT & PLAN NOTE
Counseled patient and education for greater than 25 minutes on the importance of working to achieve weight reduction goal     Discussed benefits of weight loss including prevention or control of comorbidities  Discussed role that balanced diet and daily activity play in weight reduction  Set up small attainable weight loss goals  Involve family, friends, and co-workers for support    -download a carbohydrate calculator  -carbohydrate goal less than 40 total per day  -detailed diet plan including low carb goal and high protein for (3 to 4 weeks)  -intermittent fasting (1st meal 1:00 p m  last meal approximately 6:00 p m )  -Hydration  -Exercise   -what foods and drinks to avoid   -Post a visual board with weights and schedules   -discussed healthy weight loss  -goal to lose approximately 5 lb every 6 weeks  -discussed sighs of symptoms of unhealthy weight loss and what to do if they occur     -discuss alarming symptoms and if develop any sign symptoms to stop diet, resume her regualr diet, and call office immediately    -patient advised to get sleep study done as she has missed a previously, as obstructive sleep apnea this may be contributing to her weight gain  -discussed the risk associated obstructive sleep apnea in regards her heart on other vital organs systems

## 2020-02-25 ENCOUNTER — HOSPITAL ENCOUNTER (OUTPATIENT)
Dept: NON INVASIVE DIAGNOSTICS | Facility: HOSPITAL | Age: 50
Discharge: HOME | End: 2020-02-25
Payer: COMMERCIAL

## 2020-02-25 DIAGNOSIS — R60.9 PITTING EDEMA: ICD-10-CM

## 2020-02-25 PROCEDURE — 93306 TTE W/DOPPLER COMPLETE: CPT

## 2020-02-26 PROCEDURE — 93306 TTE W/DOPPLER COMPLETE: CPT | Performed by: INTERNAL MEDICINE

## 2020-04-24 NOTE — TELEPHONE ENCOUNTER
June 8, 2020       Becca J Fred  [address]  [facility] [address]    Dear Ms. Ibarra,    Your procedure is scheduled with Tino Fuentes MD on June 25, 2020, at [facility]. The start time of your procedure is tentatively scheduled for 7:15 AM. You can expect to be contacted 1 to 3 days prior to the surgery to confirm arrival and surgery time. Occasionally these times may change.    Please arrive to register for your procedure at 5:30 AM. The address of the facility is:      [address]  [address]  [phone]    The following appointment(s) have been scheduled for you:     Preop exam with Dr. Live at the [facility] on June 12, 2020 at 8:00 AM    Preop exam with KAVIN Iyer at the [facility] on June 18, 2020 at 3:45 PM                -------------------------------------------------------------------------  2 week post op with KAVIN Iyer at the [facility] on July 2, 2020 at 1:30 PM     Here are instructions for your surgery:    You are scheduled for you Pre-Surgery Covid 19 test at [facility] on June 23rd at 8 AM.  REMINDER- You will have Covid 19 testing done 48hrs prior to your procedure. You must quarantine at home after your test until your scheduled procedure or it will be cancelled. Your procedure also may need to be rescheduled based on your test results.       · If your surgeon has not given you a special skin cleanser with instructions on how to use at home before your surgery, please call that doctor's office.     · Do not eat or drink after midnight the night before your surgery.    · You will need someone to drive you home and remain with you up to 24 hours after you have been discharged.     5 days prior to your appointment DO NOT TAKE ASPIRIN OR ASPIRIN CONTAINING PRODUCTS. This includes products such as  Called and spoke to [facility]  She stated that she is a bit "black and blue" and asked if it was normal   I stated that it was and she should refer to the provided discharge instruction  Also to call the office if problems or other concerns  Liss-Williams, Pepto Bismol, Motrin, Ibuprofen and Advil should also be avoided.  (Only Tylenol is aspirin free).     If you take coumadin or other blood thinners contact your primary care physician.     · If you take the diet pill Phentermine, this must be stopped 14 days prior to your surgery date.   ·   · Please start your high protein liquid diet on  June 11th          If you have any questions or need to reschedule, please contact me at the telephone number and extension listed below.     Thank you,    Mary Ellen GARCIA [phone]  Surgery Scheduler for Tino Fuentes MD   [facility] Pre-Admit Department

## 2020-05-27 ENCOUNTER — TELEMEDICINE (OUTPATIENT)
Dept: FAMILY MEDICINE CLINIC | Facility: CLINIC | Age: 50
End: 2020-05-27

## 2020-05-27 DIAGNOSIS — E66.01 CLASS 3 SEVERE OBESITY DUE TO EXCESS CALORIES WITHOUT SERIOUS COMORBIDITY IN ADULT, UNSPECIFIED BMI (HCC): Primary | ICD-10-CM

## 2020-05-27 PROCEDURE — 99213 OFFICE O/P EST LOW 20 MIN: CPT | Performed by: FAMILY MEDICINE

## 2020-07-30 NOTE — PROGRESS NOTES
Assessment/Plan:    Abdominal hernia  Left lower quadrant abdominal pain x 4 months   Near one of the port sites from patients appendectomy in March 2019  Tender on palpation and reducible   Refer to general surgery, Dr Estrella Ernandez who performed appendectomy      Diagnoses and all orders for this visit:    Constipation, unspecified constipation type  Comments:  Contributing to abdominal pain   Start Probiotic and Colace  Educated on adequate hydration  F/U in 1 mo   Orders:  -     saccharomyces boulardii (FLORASTOR) 250 mg capsule; Take 1 capsule (250 mg total) by mouth 2 (two) times a day  -     docusate sodium (COLACE) 250 MG capsule; Take 1 capsule (250 mg total) by mouth daily    Incisional hernia, without obstruction or gangrene  -     Ambulatory referral to General Surgery; Future    Subjective:      Patient ID: Ariana Bauman is a 44 y o  female  This is a very pleasant 44 yr old female presenting to the clinic with abdominal pain  She has a past medical history of obesity, prediabetes and HTN  Abdominal Pain   This is a new problem  The current episode started more than 1 month ago  The onset quality is gradual  The problem occurs intermittently  The problem has been gradually worsening  The pain is located in the LLQ  The pain is at a severity of 6/10  The pain is moderate  The quality of the pain is colicky and dull  The abdominal pain does not radiate  Associated symptoms include constipation and diarrhea  Pertinent negatives include no arthralgias, belching, dysuria, fever, frequency, headaches, hematuria, myalgias, nausea or vomiting  The pain is aggravated by bowel movement (sitting for too long)  The pain is relieved by bowel movements  She has tried nothing for the symptoms  Abdominal Cramping   Associated symptoms include constipation and diarrhea  Pertinent negatives include no arthralgias, belching, dysuria, fever, frequency, headaches, hematuria, myalgias, nausea or vomiting       The following portions of the patient's history were reviewed and updated as appropriate: allergies, current medications, past family history, past medical history, past social history, past surgical history and problem list     Review of Systems   Constitutional: Negative for activity change, appetite change, chills and fever  HENT: Negative for congestion, dental problem, hearing loss, rhinorrhea, sinus pain and sore throat  Eyes: Negative for photophobia, pain and visual disturbance  Respiratory: Negative for cough, chest tightness, shortness of breath and wheezing  Cardiovascular: Negative for chest pain, palpitations and leg swelling  Gastrointestinal: Positive for abdominal pain, constipation and diarrhea  Negative for blood in stool, nausea and vomiting  Genitourinary: Negative for difficulty urinating, dysuria, frequency, hematuria and urgency  Musculoskeletal: Negative for arthralgias, back pain, myalgias and neck pain  Skin: Negative for rash  Allergic/Immunologic: Negative  Neurological: Negative for dizziness, weakness, light-headedness, numbness and headaches  Hematological: Negative for adenopathy  Psychiatric/Behavioral: Negative for agitation, behavioral problems, sleep disturbance and suicidal ideas  Objective:    /80 (BP Location: Left arm, Patient Position: Sitting, Cuff Size: Large)   Pulse 80   Temp (!) 97 3 °F (36 3 °C) (Skin)   Resp 16   Ht 5' 8" (1 727 m)   Wt 132 kg (291 lb 1 6 oz)   LMP  (LMP Unknown)   SpO2 98%   Breastfeeding No Comment: LMP around age 38  BMI 44 26 kg/m²      Physical Exam   Constitutional: She is oriented to person, place, and time  She appears well-developed and well-nourished  HENT:   Head: Normocephalic and atraumatic  Eyes: Pupils are equal, round, and reactive to light  EOM are normal    Neck: Normal range of motion  Neck supple  Cardiovascular: Normal rate, regular rhythm, normal heart sounds and intact distal pulses     No murmur heard  Pulmonary/Chest: Effort normal and breath sounds normal  No respiratory distress  She has no wheezes  She exhibits no tenderness  Abdominal: Soft  Bowel sounds are normal  She exhibits no distension  There is tenderness (LLQ)  A hernia (LLQ) is present  Well healed port site incisional scars visualized    Musculoskeletal: Normal range of motion  She exhibits no tenderness  Neurological: She is alert and oriented to person, place, and time  Skin: Skin is warm and dry  Capillary refill takes less than 2 seconds  No rash noted  No erythema  Psychiatric: She has a normal mood and affect  Her behavior is normal    Nursing note and vitals reviewed        Marck Degroot MD  08/01/20  12:14 PM

## 2020-07-31 ENCOUNTER — OFFICE VISIT (OUTPATIENT)
Dept: FAMILY MEDICINE CLINIC | Facility: CLINIC | Age: 50
End: 2020-07-31

## 2020-07-31 VITALS
SYSTOLIC BLOOD PRESSURE: 110 MMHG | WEIGHT: 291.1 LBS | HEIGHT: 68 IN | RESPIRATION RATE: 16 BRPM | DIASTOLIC BLOOD PRESSURE: 80 MMHG | OXYGEN SATURATION: 98 % | HEART RATE: 80 BPM | BODY MASS INDEX: 44.12 KG/M2 | TEMPERATURE: 97.3 F

## 2020-07-31 DIAGNOSIS — K59.00 CONSTIPATION, UNSPECIFIED CONSTIPATION TYPE: ICD-10-CM

## 2020-07-31 DIAGNOSIS — K43.2 INCISIONAL HERNIA, WITHOUT OBSTRUCTION OR GANGRENE: Primary | ICD-10-CM

## 2020-07-31 DIAGNOSIS — Z23 NEED FOR VACCINATION: ICD-10-CM

## 2020-07-31 PROBLEM — K35.30 ACUTE APPENDICITIS WITH LOCALIZED PERITONITIS: Status: RESOLVED | Noted: 2019-03-22 | Resolved: 2020-07-31

## 2020-07-31 PROBLEM — J02.9 ACUTE PHARYNGITIS: Status: RESOLVED | Noted: 2019-07-08 | Resolved: 2020-07-31

## 2020-07-31 PROBLEM — K35.80 ACUTE APPENDICITIS: Status: RESOLVED | Noted: 2019-03-21 | Resolved: 2020-07-31

## 2020-07-31 PROCEDURE — 3074F SYST BP LT 130 MM HG: CPT | Performed by: FAMILY MEDICINE

## 2020-07-31 PROCEDURE — 90715 TDAP VACCINE 7 YRS/> IM: CPT

## 2020-07-31 PROCEDURE — 3079F DIAST BP 80-89 MM HG: CPT | Performed by: FAMILY MEDICINE

## 2020-07-31 PROCEDURE — 99213 OFFICE O/P EST LOW 20 MIN: CPT | Performed by: FAMILY MEDICINE

## 2020-07-31 PROCEDURE — 1036F TOBACCO NON-USER: CPT | Performed by: FAMILY MEDICINE

## 2020-07-31 PROCEDURE — 3008F BODY MASS INDEX DOCD: CPT | Performed by: FAMILY MEDICINE

## 2020-07-31 PROCEDURE — 90471 IMMUNIZATION ADMIN: CPT

## 2020-07-31 RX ORDER — SACCHAROMYCES BOULARDII 250 MG
250 CAPSULE ORAL 2 TIMES DAILY
Qty: 60 CAPSULE | Refills: 0 | Status: SHIPPED | OUTPATIENT
Start: 2020-07-31 | End: 2020-08-30

## 2020-07-31 RX ORDER — DOCUSATE SODIUM 250 MG
250 CAPSULE ORAL DAILY
Qty: 30 CAPSULE | Refills: 1 | Status: SHIPPED | OUTPATIENT
Start: 2020-07-31 | End: 2021-10-07

## 2020-07-31 NOTE — PATIENT INSTRUCTIONS
Lactaid pills for when you eat dairy  You can get them at any pharmacy  Lactose Intolerance   WHAT YOU NEED TO KNOW:   What is lactose intolerance? Lactose intolerance is when your body does not produce enough enzymes to properly digest lactose  Lactose is a sugar found in milk and other dairy foods  Lactose intolerance can lead to low levels of calcium if you do not eat or drink enough dairy foods  What are the signs and symptoms of lactose intolerance? Signs and symptoms usually depend on the amount of lactose you eat  You may have any of the following:  · Abdominal bloating     · Gas    · Abdominal pain    · Diarrhea    · Nausea  How is lactose intolerance diagnosed? Your healthcare provider will ask about your symptoms and when they occur  He may also ask about any medical conditions you have  Your healthcare provider will also examine you  You may need any of the following tests:  · A hydrogen breath test  measures the amount of hydrogen in your breath  You will be asked to drink a liquid with lactose  You will then breathe into a balloon-type container  High levels of hydrogen are usually present in your breath if your body is not digesting lactose properly  · A stool acidity test  measures the amount of lactic acid and other fatty acids in your stool  These are substances that are produced from undigested lactose  The stool sample will then be sent to the lab for tests  How is lactose intolerance managed? · Limit or avoid dairy foods  Your healthcare provider may recommend that you avoid dairy foods at first  Then, you can slowly introduce them back into your diet  You may find that you can eat small amounts of dairy foods at one time  · Eat and drink lactose-free or low-lactose foods  Try lactose-free, almond, rice, or soy milk  Infants with lactose intolerance may need to drink a lactose-free formula   Low-lactose foods include aged cheese (Swiss, cheddar, or parmesan), cream cheese, cottage cheese, or ricotta cheese  Read labels on all foods carefully because lactose is found in many foods  Ask your dietitian for more information about how to avoid or limit foods that contain lactose  · Avoid eating a dairy food by itself  You may be able to tolerate dairy foods better if you have them with other non-dairy foods  For example, have milk with cereal or cheese with crackers  · Ask your healthcare provider about enzyme supplements  You may be able to tolerate some dairy foods if you take an enzyme supplement (lactase tablet) right before you eat the dairy food  · Get enough calcium  If you eat very little or no dairy foods, you need to get calcium and vitamin D from other sources  Other foods that contain calcium include sardines, canned salmon, tofu, shellfish, dried beans, and almonds  Kale, spinach, broccoli, turnip greens, almonds, and calcium-fortified orange juice also contain calcium  Ask your healthcare provider if you need to take calcium supplements  When should I contact my healthcare provider? · You continue to have symptoms, even after you make suggested changes  · You have questions or concerns about your condition or care  CARE AGREEMENT:   You have the right to help plan your care  Learn about your health condition and how it may be treated  Discuss treatment options with your caregivers to decide what care you want to receive  You always have the right to refuse treatment  The above information is an  only  It is not intended as medical advice for individual conditions or treatments  Talk to your doctor, nurse or pharmacist before following any medical regimen to see if it is safe and effective for you  © 2017 [address]  Information is for End User's use only and may not be sold, redistributed or otherwise used for commercial purposes   All illustrations and images included in CareNotes® are the copyrighted property of SYD HERNANDEZ Beth  or [employer]  Constipation   AMBULATORY CARE:   Constipation  is when you have hard, dry bowel movements, or you go longer than usual between bowel movements  Constipation may be caused by a lack of water or high-fiber foods  Medicines used to treat pain or depression, or a lack of physical activity may also cause constipation  Common symptoms include the following:   · Trouble pushing out your bowel movement    · Pain or bleeding during your bowel movement    · A feeling that you did not finish having your bowel movement    · Nausea    · Bloating    · Headache  Seek immediate care for the following symptoms:   · Blood in your bowel movement    · A fever and abdominal pain with the constipation  Contact your healthcare provider if:   · Your constipation gets worse  · You start to vomit  · You have questions or concerns about your condition or care  Medicines:   · Medicine or a fiber supplement  may help make your bowel movement softer  A laxative may help relax and loosen your intestines to help you have a bowel movement  You may also be given medicine to increase fluid in your intestines  The fluid may help move bowel movements through your intestines  · Take your medicine as directed  Contact your healthcare provider if you think your medicine is not helping or if you have side effects  Tell him of her if you are allergic to any medicine  Keep a list of the medicines, vitamins, and herbs you take  Include the amounts, and when and why you take them  Bring the list or the pill bottles to follow-up visits  Carry your medicine list with you in case of an emergency  Manage or prevent constipation:   · Drink liquids as directed  You may need to drink extra liquids to help soften and move your bowels  Ask how much liquid to drink each day and which liquids are best for you  · Eat high-fiber foods  This may help decrease constipation by adding bulk to your bowel movements  High-fiber foods include fruit, vegetables, whole-grain breads and cereals, and beans  Your healthcare provider or dietitian can help you create a high-fiber meal plan  · Exercise regularly  Regular physical activity can help stimulate your intestines  Ask which exercises are best for you  · Schedule a time each day to have a bowel movement  This may help train your body to have regular bowel movements  Bend forward while you are on the toilet to help move the bowel movement out  Sit on the toilet for at least 10 minutes, even if you do not have a bowel movement  Follow up with your healthcare provider as directed:  Write down your questions so you remember to ask them during your visits  © 2017 [address] Information is for End User's use only and may not be sold, redistributed or otherwise used for commercial purposes  All illustrations and images included in CareNotes® are the copyrighted property of [employer]  or [employer]  The above information is an  only  It is not intended as medical advice for individual conditions or treatments  Talk to your doctor, nurse or pharmacist before following any medical regimen to see if it is safe and effective for you

## 2020-08-01 PROBLEM — K46.9 ABDOMINAL HERNIA: Status: ACTIVE | Noted: 2020-08-01

## 2020-08-01 NOTE — ASSESSMENT & PLAN NOTE
Left lower quadrant abdominal pain x 4 months   Near one of the port sites from patients appendectomy in March 2019  Tender on palpation and reducible   Refer to general surgery, Dr Kranthi Saucedo who performed appendectomy

## 2020-08-10 ENCOUNTER — OFFICE VISIT (OUTPATIENT)
Dept: OBGYN CLINIC | Facility: CLINIC | Age: 50
End: 2020-08-10

## 2020-08-10 VITALS
BODY MASS INDEX: 44.28 KG/M2 | SYSTOLIC BLOOD PRESSURE: 140 MMHG | HEART RATE: 80 BPM | TEMPERATURE: 98 F | DIASTOLIC BLOOD PRESSURE: 86 MMHG | WEIGHT: 291.2 LBS

## 2020-08-10 DIAGNOSIS — Z12.4 SCREENING FOR CERVICAL CANCER: ICD-10-CM

## 2020-08-10 DIAGNOSIS — Z12.39 SCREENING FOR BREAST CANCER: ICD-10-CM

## 2020-08-10 DIAGNOSIS — Z11.3 SCREEN FOR STD (SEXUALLY TRANSMITTED DISEASE): ICD-10-CM

## 2020-08-10 DIAGNOSIS — Z01.419 ENCOUNTER FOR GYNECOLOGICAL EXAMINATION WITHOUT ABNORMAL FINDING: Primary | ICD-10-CM

## 2020-08-10 PROBLEM — Z23 FLU VACCINE NEED: Status: RESOLVED | Noted: 2019-11-22 | Resolved: 2020-08-10

## 2020-08-10 PROBLEM — J30.9 ALLERGIC RHINITIS: Status: RESOLVED | Noted: 2019-05-20 | Resolved: 2020-08-10

## 2020-08-10 PROBLEM — Z00.00 WELL ADULT EXAM: Status: RESOLVED | Noted: 2018-11-01 | Resolved: 2020-08-10

## 2020-08-10 PROBLEM — Z12.11 SCREENING FOR COLON CANCER: Status: RESOLVED | Noted: 2018-11-01 | Resolved: 2020-08-10

## 2020-08-10 PROBLEM — Z00.00 ENCOUNTER FOR ANNUAL PHYSICAL EXAM: Status: RESOLVED | Noted: 2018-11-01 | Resolved: 2020-08-10

## 2020-08-10 PROCEDURE — 87591 N.GONORRHOEAE DNA AMP PROB: CPT | Performed by: NURSE PRACTITIONER

## 2020-08-10 PROCEDURE — G0145 SCR C/V CYTO,THINLAYER,RESCR: HCPCS | Performed by: NURSE PRACTITIONER

## 2020-08-10 PROCEDURE — 99386 PREV VISIT NEW AGE 40-64: CPT | Performed by: NURSE PRACTITIONER

## 2020-08-10 PROCEDURE — 1036F TOBACCO NON-USER: CPT | Performed by: NURSE PRACTITIONER

## 2020-08-10 PROCEDURE — 87624 HPV HI-RISK TYP POOLED RSLT: CPT | Performed by: NURSE PRACTITIONER

## 2020-08-10 PROCEDURE — 87491 CHLMYD TRACH DNA AMP PROBE: CPT | Performed by: NURSE PRACTITIONER

## 2020-08-10 NOTE — PATIENT INSTRUCTIONS
OBESITY     Obesity is defined as a body mass index (BMI) which is greater than 30  Your Body mass index is 44 28 kg/m²       The risks of obesity include  many health problems, such as injuries or physical disability  You may need tests to check for the following:  · Diabetes     · High blood pressure or high cholesterol     · Heart disease     · Gallbladder or liver disease     · Cancer of the colon, breast, prostate, liver, or kidney     · Sleep apnea     · Arthritis or gout    Seek care immediately if:   · You have a severe headache, confusion, or difficulty speaking  · You have weakness on one side of your body  · You have chest pain, sweating, or shortness of breath  Contact your healthcare provider if:   · You have symptoms of gallbladder or liver disease, such as pain in your upper abdomen  · You have knee or hip pain and discomfort while walking  · You have symptoms of diabetes, such as intense hunger and thirst, and frequent urination  · You have symptoms of sleep apnea, such as snoring or daytime sleepiness  · You have questions or concerns about your condition or care  Treatment for obesity  focuses on helping you lose weight to improve your health  Even a small decrease in BMI can reduce the risk for many health problems  Your healthcare provider will help you set a weight-loss goal   · Lifestyle changes  are the first step in treating obesity  These include making healthy food choices and getting regular physical activity  Your healthcare provider may suggest a weight-loss program that involves coaching, education, and therapy  · Medicine  may help you lose weight when it is used with a healthy diet and physical activity  · Surgery  can help you lose weight if you are very obese and have other health problems  There are several types of weight-loss surgery  Ask your healthcare provider for more information      Be successful losing weight:   · Set small, realistic goals  An example of a small goal is to walk for 20 minutes 5 days a week  Anther goal is to lose 5% of your body weight  · Tell friends, family members, and coworkers about your goals  and ask for their support  Ask a friend to lose weight with you, or join a weight-loss support group  · Identify foods or triggers that may cause you to overeat , and find ways to avoid them  Remove tempting high-calorie foods from your home and workplace  Place a bowl of fresh fruit on your kitchen counter  If stress causes you to eat, then find other ways to cope with stress  · Keep a diary to track what you eat and drink  Also write down how many minutes of physical activity you do each day  Weigh yourself once a week and record it in your diary  Eating changes: You will need to eat 500 to 1,000 fewer calories each day than you currently eat to lose 1 to 2 pounds a week  The following changes will help you cut calories:  · Eat smaller portions  Use small plates, no larger than 9 inches in diameter  Fill your plate half full of fruits and vegetables  Measure your food using measuring cups until you know what a serving size looks like  · Eat 3 meals and 1 or 2 snacks each day  Plan your meals in advance  Maryana Dunn and eat at home most of the time  Eat slowly  · Eat fruits and vegetables at every meal   They are low in calories and high in fiber, which makes you feel full  Do not add butter, margarine, or cream sauce to vegetables  Use herbs to season steamed vegetables  · Eat less fat and fewer fried foods  Eat more baked or grilled chicken and fish  These protein sources are lower in calories and fat than red meat  Limit fast food  Dress your salads with olive oil and vinegar instead of bottled dressing  · Limit the amount of sugar you eat  Do not drink sugary beverages  Limit alcohol  Activity changes:  Physical activity is good for your body in many ways   It helps you burn calories and build strong muscles  It decreases stress and depression, and improves your mood  It can also help you sleep better  Talk to your healthcare provider before you begin an exercise program   · Exercise for at least 30 minutes 5 days a week  Start slowly  Set aside time each day for physical activity that you enjoy and that is convenient for you  It is best to do both weight training and an activity that increases your heart rate, such as walking, bicycling, or swimming  · Find ways to be more active  Do yard work and housecleaning  Walk up the stairs instead of using elevators  Spend your leisure time going to events that require walking, such as outdoor festivals or fairs  This extra physical activity can help you lose weight and keep it off  Follow up with your primary healthcare provider as directed  You may need to meet with a dietitian  Write down your questions so you remember to ask them during your visits

## 2020-08-10 NOTE — PROGRESS NOTES
Ronni Abarca is a 44 y o  female who presents today for annual GYN exam   Her last pap smear was performed several years and result was WNL  She reports no history of abnormal pap smears in her past  Her last mammogram was performed 2020 and result was WNL  She had colonoscopy done 2019  She reports menses as absent since   Her general medical history has been reviewed and she reports it as follows:    Past Medical History:   Diagnosis Date    Hypertension      Past Surgical History:   Procedure Laterality Date    CHOLECYSTECTOMY      CO COLONOSCOPY FLX DX W/COLLJ SPEC WHEN PFRMD Left 2019    Procedure: COLONOSCOPY;  Surgeon: Laurence Bernal MD;  Location: [facility]; Service: Gastroenterology    CO LAP,APPENDECTOMY N/A 3/22/2019    Procedure: Abi Levy;  Surgeon: Shannon Land MD;  Location: [address] [place] [address] OR;  Service: General     OB History        4    Para   2    Term   2       0    AB   2    Living   2       SAB   2    TAB   0    Ectopic   0    Multiple   0    Live Births   2               Social History     Tobacco Use    Smoking status: Never Smoker    Smokeless tobacco: Never Used   Substance Use Topics    Alcohol use: Yes     Frequency: 2-4 times a month     Drinks per session: 1 or 2     Binge frequency: Never    Drug use: Never     Cancer-related family history includes Colon cancer (age of onset: 61) in her mother  There is no history of Breast cancer      Current Outpatient Medications:     amLODIPine (NORVASC) 5 mg tablet, Take 1 tablet (5 mg total) by mouth daily, Disp: 90 tablet, Rfl: 1    docusate sodium (COLACE) 250 MG capsule, Take 1 capsule (250 mg total) by mouth daily, Disp: 30 capsule, Rfl: 1    lisinopril (ZESTRIL) 10 mg tablet, Take 1 tablet (10 mg total) by mouth daily, Disp: 90 tablet, Rfl: 1    loratadine (CLARITIN) 10 mg tablet, Take 1 tablet (10 mg total) by mouth daily, Disp: 90 tablet, Rfl: 1   saccharomyces boulardii (FLORASTOR) 250 mg capsule, Take 1 capsule (250 mg total) by mouth 2 (two) times a day, Disp: 60 capsule, Rfl: 0    triamcinolone (KENALOG) 0 1 % cream, Apply topically 2 (two) times a day Do not use more than  5 days at a time, give your skin a rest,  avoid using on face, Disp: 80 g, Rfl: 0    Review of Systems:  Review of Systems   Constitutional: Negative  Gastrointestinal: Negative  Genitourinary: Negative for difficulty urinating, menstrual problem, pelvic pain and vaginal discharge  Skin: Negative  Physical Exam:  /86   Pulse 80   Temp 98 °F (36 7 °C)   Wt 132 kg (291 lb 3 2 oz)   LMP  (LMP Unknown)   BMI 44 28 kg/m²   Physical Exam  Constitutional:       Appearance: She is well-developed  Genitourinary:      Vagina and uterus normal       No lesions in the vagina  No vaginal discharge or rugosity  No cervical motion tenderness, lesion or pinkness  Uterus is not tender  No right or left adnexal mass present  Right adnexa not tender  Left adnexa not tender  Neck:      Musculoskeletal: Neck supple  Thyroid: No thyromegaly  Cardiovascular:      Rate and Rhythm: Normal rate and regular rhythm  Pulmonary:      Effort: Pulmonary effort is normal       Breath sounds: Normal breath sounds  Chest:      Breasts:         Right: No mass, nipple discharge, skin change or tenderness  Left: No mass, nipple discharge, skin change or tenderness  Abdominal:      General: Bowel sounds are normal       Palpations: Abdomen is soft  Neurological:      Mental Status: She is alert and oriented to person, place, and time  Skin:     General: Skin is warm and dry  Assessment/Plan:   1  Normal well-woman GYN exam   2  Cervical cancer screening:  Normal pelvic exam   Pap smear done with HPV co-testing  3  STD screening:  Orders placed for vaginal GC/CT cultures    Declines screening with serum anti-HIV, anti-HCV, HbsAg, RPR    4  Breast cancer screening:  Normal breast exam   Mammogram up to date  Reviewed breast self-awareness  5  Colon cancer screening:  Up to date  6  Depression Screening: Patient's depression screening was assessed with a PHQ-2 score of 0  Their PHQ-9 score was 0  Clinically patient does not have depression  No treatment is required  7  BMI Counseling: Body mass index is 44 28 kg/m²  Discussed the patient's BMI with her  The BMI is above normal  Patient referred to PCP due to patient being obese     8  Return to office in 1 year for annual GYN exam

## 2020-08-10 NOTE — LETTER
2020    To Clarissa Huff  : 1970      This letter is to advise you that your recent CULTURE results were reviewed by me and are NORMAL  Please contact the office for an appointment if you have any additional concerns      DAVID Stewart

## 2020-08-10 NOTE — LETTER
2020    To Renetta Moctezuma  : 1970      This letter is to advise you that your recent PAP SMEAR results were reviewed by me and are NORMAL    We will see you in 1 year for your annual exam     Orpha DAVID Jackson

## 2020-08-12 LAB
HPV HR 12 DNA CVX QL NAA+PROBE: NEGATIVE
HPV16 DNA CVX QL NAA+PROBE: NEGATIVE
HPV18 DNA CVX QL NAA+PROBE: NEGATIVE

## 2020-08-13 LAB
C TRACH DNA SPEC QL NAA+PROBE: NEGATIVE
LAB AP GYN PRIMARY INTERPRETATION: NORMAL
Lab: NORMAL
N GONORRHOEA DNA SPEC QL NAA+PROBE: NEGATIVE

## 2020-08-18 ENCOUNTER — CONSULT (OUTPATIENT)
Dept: SURGERY | Facility: CLINIC | Age: 50
End: 2020-08-18
Payer: COMMERCIAL

## 2020-08-18 VITALS
WEIGHT: 293 LBS | HEIGHT: 68 IN | BODY MASS INDEX: 44.41 KG/M2 | TEMPERATURE: 97.3 F | SYSTOLIC BLOOD PRESSURE: 138 MMHG | RESPIRATION RATE: 16 BRPM | DIASTOLIC BLOOD PRESSURE: 76 MMHG | HEART RATE: 86 BPM

## 2020-08-18 DIAGNOSIS — K43.2 INCISIONAL HERNIA, WITHOUT OBSTRUCTION OR GANGRENE: ICD-10-CM

## 2020-08-18 PROCEDURE — 3008F BODY MASS INDEX DOCD: CPT | Performed by: NURSE PRACTITIONER

## 2020-08-18 PROCEDURE — 3078F DIAST BP <80 MM HG: CPT | Performed by: SPECIALIST

## 2020-08-18 PROCEDURE — 3008F BODY MASS INDEX DOCD: CPT | Performed by: SPECIALIST

## 2020-08-18 PROCEDURE — 3075F SYST BP GE 130 - 139MM HG: CPT | Performed by: SPECIALIST

## 2020-08-18 PROCEDURE — 99214 OFFICE O/P EST MOD 30 MIN: CPT | Performed by: SPECIALIST

## 2020-08-18 PROCEDURE — 1036F TOBACCO NON-USER: CPT | Performed by: SPECIALIST

## 2020-08-18 NOTE — H&P (VIEW-ONLY)
Chief Complaint:  Incisional hernia      History of Present Illness:   Patient is a 56-year-old black female originally from [facility] who underwent laparoscopic appendectomy for acute appendicitis in March of 2019  She did well from this but to about 2-3 months ago she developed pressure in the left lower quadrant of her abdomen  She thought she felt a bulge in this area and was seen in the office of her family physician  At that time a port site hernia was diagnosed  She was referred office for evaluation  The patient states he has been getting larger over the past few weeks and starting to cause her some discomfort  She denies any significant nausea vomiting   She says this gets worse when she is constipated which does occur on occasion     She is here today in regards to this problem  Past Medical History:   Past Medical History:   Diagnosis Date    Hypertension          Past Surgical History:    Past Surgical History:   Procedure Laterality Date    CHOLECYSTECTOMY  2006    [place] COLONOSCOPY FLX DX W/COLLJ SPEC WHEN PFRMD Left 5/2/2019    Procedure: COLONOSCOPY;  Surgeon: Rosemarie Parker MD;  [facility];   Service: Gastroenterology    MN LAP,APPENDECTOMY N/A 3/22/2019    Procedure: Sera Wren;  Surgeon: Luis Marino MD;  [facility]: [facility];  Service: General         Allergies:  No Known Allergies      Medications:    Current Outpatient Medications:     amLODIPine (NORVASC) 5 mg tablet, Take 1 tablet (5 mg total) by mouth daily, Disp: 90 tablet, Rfl: 1    docusate sodium (COLACE) 250 MG capsule, Take 1 capsule (250 mg total) by mouth daily, Disp: 30 capsule, Rfl: 1    lisinopril (ZESTRIL) 10 mg tablet, Take 1 tablet (10 mg total) by mouth daily, Disp: 90 tablet, Rfl: 1    loratadine (CLARITIN) 10 mg tablet, Take 1 tablet (10 mg total) by mouth daily, Disp: 90 tablet, Rfl: 1    saccharomyces boulardii (FLORASTOR) 250 mg capsule, Take 1 capsule (250 mg total) by mouth 2 (two) times a day, Disp: 60 capsule, Rfl: 0    triamcinolone (KENALOG) 0 1 % cream, Apply topically 2 (two) times a day Do not use more than  5 days at a time, give your skin a rest,  avoid using on face, Disp: 80 g, Rfl: 0      Social History:  Social History     Social History     Substance and Sexual Activity   Alcohol Use Yes    Frequency: 2-4 times a month    Drinks per session: 1 or 2    Binge frequency: Never     Social History     Substance and Sexual Activity   Drug Use Never     Social History     Tobacco Use   Smoking Status Never Smoker   Smokeless Tobacco Never Used         Family History:    Family History   Problem Relation Age of Onset    Colon cancer Mother 61    Stroke Father     No Known Problems Sister     No Known Problems Daughter     No Known Problems Maternal Grandmother     No Known Problems Maternal Grandfather     No Known Problems Paternal Grandmother     No Known Problems Paternal Grandfather     No Known Problems Paternal Aunt     No Known Problems Paternal Aunt     Breast cancer Neg Hx          Review of Systems:    As per the HPI  Some discomfort left lower quadrant with an expanding bulge  Symptoms worsen when she is constipated which does occur on occasion  No weight loss weight gain fever chills night sweats chest pain nausea vomiting diarrhea shortness of breath headaches sore throat double vision blurry vision dysuria hematuria fatigue weakness etc all other review of systems are negative  Vitals:  Vitals:    08/18/20 1505   BP: 138/76   Pulse: 86   Resp: 16   Temp: (!) 97 3 °F (36 3 °C)       Physical Exam:  Patient is a middle-aged obese black female who is awake alert no distress  Vital signs as above  Skin warm dry  Head normocephalic and atraumatic  Eyes ANAYELI a m  intact  Ears nose within normal limits  Throat gag reflex intact  Neck no masses thyromegaly lymphadenopathy palpable    Back no CVA or spinal tenderness  Lungs clear to a and P  Cor regular rate and rhythm no murmurs carotid bruits  Abdomen morbidly obese soft laparoscopic incisions are present and appear to be healing well  Left lower quadrant to deep palpation appears to be a somewhat tender mass at the area of her left lower quadrant port site  This is consistent with a hernia  No other abdominal masses or hernias noted  Extremities negative CC E  Neurologically A&O x3 cranial nerves 2-12 intact  Lymphatics no lymphadenopathy palpable  Lab Results: I have personally reviewed pertinent reports  See below  Imaging: I have personally reviewed pertinent reports  EKG, Pathology, and Other Studies: I have personally reviewed pertinent reports  No visits with results within 1 Day(s) from this visit  Latest known visit with results is:   Office Visit on 08/10/2020   Component Date Value    Case Report 08/10/2020                      Value:Gynecologic Cytology Report                       Case: [id number]                                  Authorizing Provider:  DAVID Bell        Collected:           08/10/2020 1637              Ordering Location:     [facility]       Received:            08/10/2020 [address] [place] [address]                                                                 First Screen:          Regi Bourne, CT                                                       Specimen:    LIQUID-BASED PAP, SCREENING, Cervix                                                        Primary Interpretation 08/10/2020 Negative for intraepithelial lesion or malignancy     Specimen Adequacy 08/10/2020 Satisfactory for evaluation  Endocervical/transformation zone component present   Additional Information 08/10/2020                      Value: This result contains rich text formatting which cannot be displayed here      N gonorrhoeae, DNA Probe 08/10/2020 Negative     Chlamydia trachomatis, D* 08/10/2020 Negative     HPV Other HR 08/10/2020 Negative     HPV16 08/10/2020 Negative     HPV18 08/10/2020 Negative          Impression:  Port site hernia left lower quadrant  Patient prefers to have this repaired as soon as possible  Plan: Will scheduled for a repair of this hernia at the earliest possible date

## 2020-08-18 NOTE — H&P
Chief Complaint:  Incisional hernia      History of Present Illness:   Patient is a 61-year-old black female originally from [facility] who underwent laparoscopic appendectomy for acute appendicitis in March of 2019  She did well from this but to about 2-3 months ago she developed pressure in the left lower quadrant of her abdomen  She thought she felt a bulge in this area and was seen in the office of her family physician  At that time a port site hernia was diagnosed  She was referred office for evaluation  The patient states he has been getting larger over the past few weeks and starting to cause her some discomfort  She denies any significant nausea vomiting   She says this gets worse when she is constipated which does occur on occasion     She is here today in regards to this problem  Past Medical History:   Past Medical History:   Diagnosis Date    Hypertension          Past Surgical History:    Past Surgical History:   Procedure Laterality Date    CHOLECYSTECTOMY  2006    DE COLONOSCOPY FLX DX W/COLLJ SPEC WHEN PFRMD Left 5/2/2019    Procedure: COLONOSCOPY;  Surgeon: Nacho Cruz MD;  [facility]: [facility];   Service: Gastroenterology    DE LAP,APPENDECTOMY N/A 3/22/2019    Procedure: Valeriy Cao;  Surgeon: Alla Rossi MD;  Location: [facility];  Service: General         Allergies:  No Known Allergies      Medications:    Current Outpatient Medications:     amLODIPine (NORVASC) 5 mg tablet, Take 1 tablet (5 mg total) by mouth daily, Disp: 90 tablet, Rfl: 1    docusate sodium (COLACE) 250 MG capsule, Take 1 capsule (250 mg total) by mouth daily, Disp: 30 capsule, Rfl: 1    lisinopril (ZESTRIL) 10 mg tablet, Take 1 tablet (10 mg total) by mouth daily, Disp: 90 tablet, Rfl: 1    loratadine (CLARITIN) 10 mg tablet, Take 1 tablet (10 mg total) by mouth daily, Disp: 90 tablet, Rfl: 1    saccharomyces boulardii (FLORASTOR) 250 mg capsule, Take 1 capsule (250 mg total) by mouth 2 (two) times a day, Disp: 60 capsule, Rfl: 0    triamcinolone (KENALOG) 0 1 % cream, Apply topically 2 (two) times a day Do not use more than  5 days at a time, give your skin a rest,  avoid using on face, Disp: 80 g, Rfl: 0      Social History:  Social History     Social History     Substance and Sexual Activity   Alcohol Use Yes    Frequency: 2-4 times a month    Drinks per session: 1 or 2    Binge frequency: Never     Social History     Substance and Sexual Activity   Drug Use Never     Social History     Tobacco Use   Smoking Status Never Smoker   Smokeless Tobacco Never Used         Family History:    Family History   Problem Relation Age of Onset    Colon cancer Mother 61    Stroke Father     No Known Problems Sister     No Known Problems Daughter     No Known Problems Maternal Grandmother     No Known Problems Maternal Grandfather     No Known Problems Paternal Grandmother     No Known Problems Paternal Grandfather     No Known Problems Paternal Aunt     No Known Problems Paternal Aunt     Breast cancer Neg Hx          Review of Systems:    As per the HPI  Some discomfort left lower quadrant with an expanding bulge  Symptoms worsen when she is constipated which does occur on occasion  No weight loss weight gain fever chills night sweats chest pain nausea vomiting diarrhea shortness of breath headaches sore throat double vision blurry vision dysuria hematuria fatigue weakness etc all other review of systems are negative  Vitals:  Vitals:    08/18/20 1505   BP: 138/76   Pulse: 86   Resp: 16   Temp: (!) 97 3 °F (36 3 °C)       Physical Exam:  Patient is a middle-aged obese black female who is awake alert no distress  Vital signs as above  Skin warm dry  Head normocephalic and atraumatic  Eyes ANAYELI a m  intact  Ears nose within normal limits  Throat gag reflex intact  Neck no masses thyromegaly lymphadenopathy palpable    Back no CVA or spinal tenderness  Lungs clear to a and P  Cor regular rate and rhythm no murmurs carotid bruits  Abdomen morbidly obese soft laparoscopic incisions are present and appear to be healing well  Left lower quadrant to deep palpation appears to be a somewhat tender mass at the area of her left lower quadrant port site  This is consistent with a hernia  No other abdominal masses or hernias noted  Extremities negative CC E  Neurologically A&O x3 cranial nerves 2-12 intact  Lymphatics no lymphadenopathy palpable  Lab Results: I have personally reviewed pertinent reports  See below  Imaging: I have personally reviewed pertinent reports  EKG, Pathology, and Other Studies: I have personally reviewed pertinent reports  No visits with results within 1 Day(s) from this visit  Latest known visit with results is:   Office Visit on 08/10/2020   Component Date Value    Case Report 08/10/2020                      Value:Gynecologic Cytology Report                       Case: [id number]                                  Authorizing Provider:  DAVID Arevalo        Collected:           08/10/2020 1637              Ordering Location:     [facility]       Received:            08/10/2020 [address] [place] [address]                                                                 First Screen:          ROSCOE [place]                                                       Specimen:    LIQUID-BASED PAP, SCREENING, Cervix                                                        Primary Interpretation 08/10/2020 Negative for intraepithelial lesion or malignancy     Specimen Adequacy 08/10/2020 Satisfactory for evaluation  Endocervical/transformation zone component present   Additional Information 08/10/2020                      Value: This result contains rich text formatting which cannot be displayed here      N gonorrhoeae, DNA Probe 08/10/2020 Negative     Chlamydia trachomatis, D* 08/10/2020 Negative     HPV Other HR 08/10/2020 Negative     HPV16 08/10/2020 Negative     HPV18 08/10/2020 Negative          Impression:  Port site hernia left lower quadrant  Patient prefers to have this repaired as soon as possible  Plan: Will scheduled for a repair of this hernia at the earliest possible date

## 2020-08-26 NOTE — PRE-PROCEDURE INSTRUCTIONS
Pre-Surgery Instructions:   Medication Instructions    amLODIPine (NORVASC) 5 mg tablet Instructed patient per Anesthesia Guidelines   docusate sodium (COLACE) 250 MG capsule Instructed patient per Anesthesia Guidelines   lisinopril (ZESTRIL) 10 mg tablet Instructed patient per Anesthesia Guidelines   loratadine (CLARITIN) 10 mg tablet Instructed patient per Anesthesia Guidelines   saccharomyces boulardii (FLORASTOR) 250 mg capsule Instructed patient per Anesthesia Guidelines  Phone assessment completed with verbal understanding , NPO MN including gum and candy  Will take Amlodipine and Loratadine preop, Shower with CHG X2, insurance card and photo ID along with  for disch home  Preop call from [facility] pm 8/27/2020

## 2020-08-27 ENCOUNTER — ANESTHESIA EVENT (OUTPATIENT)
Dept: PERIOP | Facility: HOSPITAL | Age: 50
End: 2020-08-27
Payer: COMMERCIAL

## 2020-08-27 PROBLEM — M19.90 ARTHRITIS: Status: ACTIVE | Noted: 2020-08-27

## 2020-08-27 PROBLEM — E66.01 MORBID OBESITY WITH BMI OF 40.0-44.9, ADULT (HCC): Status: ACTIVE | Noted: 2020-08-27

## 2020-08-27 NOTE — ANESTHESIA PREPROCEDURE EVALUATION
Procedure:  REPAIR INCISIONAL HERNIA (N/A Abdomen)    Relevant Problems   ANESTHESIA   (+) PONV (postoperative nausea and vomiting)      CARDIO   (+) Benign hypertension   (-) Chest pain      ENDO  pre-diabetes      MUSCULOSKELETAL   (+) Arthritis      PULMONARY   (-) Smoking   (-) URI (upper respiratory infection)      Other   (+) Morbid obesity with BMI of 40 0-44 9, adult [facility])           [address]  [place] [address]     Transthoracic Echocardiogram  2D, M-mode, Doppler, and Color Doppler     Study date:  2020     Patient: Dwayne Mahan  MR number: [id number]  Account number: [de-identified]  : 1970  Age: 44 years  Gender: Female  Status: Outpatient  Location: [facility]  Height: 68 in  Weight: 308 lb  BP: 130/ 80 mmHg     Indications: Edema     Diagnoses: R60 9 - Edema, unspecified     Sonographer:  CARLA Clark  Referring Physician:  Kahlil Dotson MD  Group:  [id number]  Interpreting Physician:  Carole Dominguez MD     SUMMARY     LEFT VENTRICLE:  Normal left ventricular systolic function, EF 85%  Normal left ventricular cavity size  Normal left ventricular wall thickness  Normal left ventricular wall motion without regional wall motion abnormalities  Normal left ventricular  diastolic function  Normal left atrial pressures      MITRAL VALVE:  There was trace regurgitation      TRICUSPID VALVE:  There was mild regurgitation      PULMONIC VALVE:  There was mild regurgitation      HISTORY: PRIOR HISTORY: Risk factors: hypertension and morbid obesity      PROCEDURE: The study was performed in the [facility]  This was a routine study  The transthoracic approach was used  The study included complete 2D imaging, M-mode, complete spectral Doppler, and color Doppler  The heart rate was 68  bpm, at the start of the study  Images were obtained from the parasternal, apical, subcostal, and suprasternal notch acoustic windows  Image quality was adequate      LEFT VENTRICLE: Normal left ventricular systolic function, EF 46%  Normal left ventricular cavity size  Normal left ventricular wall thickness  Normal left ventricular wall motion without regional wall motion abnormalities  Normal left  ventricular diastolic function  Normal left atrial pressures      RIGHT VENTRICLE: The size was normal  Systolic function was normal  Wall thickness was normal      LEFT ATRIUM: Size was normal      RIGHT ATRIUM: Size was normal      MITRAL VALVE: Valve structure was normal  There was normal leaflet separation  DOPPLER: The transmitral velocity was within the normal range  There was no evidence for stenosis  There was trace regurgitation      AORTIC VALVE: The valve was trileaflet  Leaflets exhibited normal thickness and normal cuspal separation  DOPPLER: Transaortic velocity was within the normal range  There was no evidence for stenosis  There was no regurgitation      TRICUSPID VALVE: The valve structure was normal  There was normal leaflet separation  DOPPLER: The transtricuspid velocity was within the normal range  There was no evidence for stenosis  There was mild regurgitation      PULMONIC VALVE: Leaflets exhibited normal thickness, no calcification, and normal cuspal separation  DOPPLER: The transpulmonic velocity was within the normal range  There was mild regurgitation      PERICARDIUM: There was no pericardial effusion   The pericardium was normal in appearance      AORTA: The root exhibited normal size      PULMONARY ARTERY: The size was normal  DOPPLER: Systolic pressure was within the normal range      SSTEM MEASUREMENT TABLES            Den Doss MD  Signed 26-Feb-2020 07:35:06        Physical Exam    Airway    Mallampati score: II  TM Distance: >3 FB  Neck ROM: full     Dental   No notable dental hx     Cardiovascular  Cardiovascular exam normal    Pulmonary  Pulmonary exam normal     Other Findings        Anesthesia Plan  ASA Score- 3     Anesthesia Type- IV sedation with anesthesia with ASA Monitors  Additional Monitors:   Airway Plan:           Plan Factors-Exercise tolerance (METS): >4 METS  Chart reviewed  Induction-     Postoperative Plan-     Informed Consent- Anesthetic plan and risks discussed with patient  I personally reviewed this patient with the CRNA  Discussed and agreed on the Anesthesia Plan with the CRNA  Norman Leon

## 2020-08-28 ENCOUNTER — ANESTHESIA (OUTPATIENT)
Dept: PERIOP | Facility: HOSPITAL | Age: 50
End: 2020-08-28
Payer: COMMERCIAL

## 2020-08-28 ENCOUNTER — HOSPITAL ENCOUNTER (OUTPATIENT)
Facility: HOSPITAL | Age: 50
Setting detail: OUTPATIENT SURGERY
Discharge: HOME | End: 2020-08-28
Attending: SPECIALIST | Admitting: SPECIALIST
Payer: COMMERCIAL

## 2020-08-28 VITALS
OXYGEN SATURATION: 98 % | TEMPERATURE: 96.6 F | DIASTOLIC BLOOD PRESSURE: 64 MMHG | RESPIRATION RATE: 18 BRPM | SYSTOLIC BLOOD PRESSURE: 117 MMHG | HEART RATE: 68 BPM

## 2020-08-28 DIAGNOSIS — K46.9 ABDOMINAL HERNIA: Primary | ICD-10-CM

## 2020-08-28 PROBLEM — Z98.890 PONV (POSTOPERATIVE NAUSEA AND VOMITING): Status: ACTIVE | Noted: 2020-08-28

## 2020-08-28 PROBLEM — R11.2 PONV (POSTOPERATIVE NAUSEA AND VOMITING): Status: ACTIVE | Noted: 2020-08-28

## 2020-08-28 LAB
EXT PREGNANCY TEST URINE: NEGATIVE
EXT. CONTROL: NORMAL

## 2020-08-28 PROCEDURE — 81025 URINE PREGNANCY TEST: CPT | Performed by: SPECIALIST

## 2020-08-28 PROCEDURE — C1781 MESH (IMPLANTABLE): HCPCS | Performed by: SPECIALIST

## 2020-08-28 PROCEDURE — 49560 PR REPAIR INCISIONAL HERNIA,REDUCIBLE: CPT | Performed by: SPECIALIST

## 2020-08-28 DEVICE — VENTRALEX ST HERNIA PATCH
Type: IMPLANTABLE DEVICE | Site: ABDOMEN | Status: FUNCTIONAL
Brand: VENTRALEX ST HERNIA PATCH

## 2020-08-28 RX ORDER — PROPOFOL 10 MG/ML
INJECTION, EMULSION INTRAVENOUS CONTINUOUS PRN
Status: DISCONTINUED | OUTPATIENT
Start: 2020-08-28 | End: 2020-08-28

## 2020-08-28 RX ORDER — BUPIVACAINE HYDROCHLORIDE 5 MG/ML
INJECTION, SOLUTION PERINEURAL AS NEEDED
Status: DISCONTINUED | OUTPATIENT
Start: 2020-08-28 | End: 2020-08-28 | Stop reason: HOSPADM

## 2020-08-28 RX ORDER — MIDAZOLAM HYDROCHLORIDE 2 MG/2ML
INJECTION, SOLUTION INTRAMUSCULAR; INTRAVENOUS AS NEEDED
Status: DISCONTINUED | OUTPATIENT
Start: 2020-08-28 | End: 2020-08-28

## 2020-08-28 RX ORDER — LIDOCAINE HYDROCHLORIDE 10 MG/ML
INJECTION, SOLUTION EPIDURAL; INFILTRATION; INTRACAUDAL; PERINEURAL AS NEEDED
Status: DISCONTINUED | OUTPATIENT
Start: 2020-08-28 | End: 2020-08-28 | Stop reason: HOSPADM

## 2020-08-28 RX ORDER — SODIUM CHLORIDE, SODIUM LACTATE, POTASSIUM CHLORIDE, CALCIUM CHLORIDE 600; 310; 30; 20 MG/100ML; MG/100ML; MG/100ML; MG/100ML
75 INJECTION, SOLUTION INTRAVENOUS CONTINUOUS
Status: DISCONTINUED | OUTPATIENT
Start: 2020-08-28 | End: 2020-08-28 | Stop reason: HOSPADM

## 2020-08-28 RX ORDER — OXYCODONE HYDROCHLORIDE AND ACETAMINOPHEN 5; 325 MG/1; MG/1
1 TABLET ORAL EVERY 4 HOURS PRN
Qty: 20 TABLET | Refills: 0 | Status: SHIPPED | OUTPATIENT
Start: 2020-08-28 | End: 2020-09-07

## 2020-08-28 RX ORDER — GLYCOPYRROLATE 0.2 MG/ML
INJECTION INTRAMUSCULAR; INTRAVENOUS AS NEEDED
Status: DISCONTINUED | OUTPATIENT
Start: 2020-08-28 | End: 2020-08-28

## 2020-08-28 RX ORDER — MAGNESIUM HYDROXIDE 1200 MG/15ML
LIQUID ORAL AS NEEDED
Status: DISCONTINUED | OUTPATIENT
Start: 2020-08-28 | End: 2020-08-28 | Stop reason: HOSPADM

## 2020-08-28 RX ORDER — FENTANYL CITRATE/PF 50 MCG/ML
25 SYRINGE (ML) INJECTION
Status: DISCONTINUED | OUTPATIENT
Start: 2020-08-28 | End: 2020-08-28 | Stop reason: HOSPADM

## 2020-08-28 RX ORDER — OXYCODONE HYDROCHLORIDE AND ACETAMINOPHEN 5; 325 MG/1; MG/1
2 TABLET ORAL EVERY 4 HOURS PRN
Status: DISCONTINUED | OUTPATIENT
Start: 2020-08-28 | End: 2020-08-28 | Stop reason: HOSPADM

## 2020-08-28 RX ORDER — DIPHENHYDRAMINE HYDROCHLORIDE 50 MG/ML
12.5 INJECTION INTRAMUSCULAR; INTRAVENOUS ONCE AS NEEDED
Status: DISCONTINUED | OUTPATIENT
Start: 2020-08-28 | End: 2020-08-28 | Stop reason: HOSPADM

## 2020-08-28 RX ORDER — HYDROMORPHONE HCL/PF 1 MG/ML
0.5 SYRINGE (ML) INJECTION
Status: DISCONTINUED | OUTPATIENT
Start: 2020-08-28 | End: 2020-08-28 | Stop reason: HOSPADM

## 2020-08-28 RX ORDER — PROPOFOL 10 MG/ML
INJECTION, EMULSION INTRAVENOUS AS NEEDED
Status: DISCONTINUED | OUTPATIENT
Start: 2020-08-28 | End: 2020-08-28

## 2020-08-28 RX ORDER — KETOROLAC TROMETHAMINE 30 MG/ML
INJECTION, SOLUTION INTRAMUSCULAR; INTRAVENOUS AS NEEDED
Status: DISCONTINUED | OUTPATIENT
Start: 2020-08-28 | End: 2020-08-28

## 2020-08-28 RX ORDER — LIDOCAINE HYDROCHLORIDE 10 MG/ML
INJECTION, SOLUTION EPIDURAL; INFILTRATION; INTRACAUDAL; PERINEURAL AS NEEDED
Status: DISCONTINUED | OUTPATIENT
Start: 2020-08-28 | End: 2020-08-28

## 2020-08-28 RX ORDER — ONDANSETRON 2 MG/ML
INJECTION INTRAMUSCULAR; INTRAVENOUS AS NEEDED
Status: DISCONTINUED | OUTPATIENT
Start: 2020-08-28 | End: 2020-08-28

## 2020-08-28 RX ORDER — ONDANSETRON 2 MG/ML
4 INJECTION INTRAMUSCULAR; INTRAVENOUS EVERY 8 HOURS PRN
Status: DISCONTINUED | OUTPATIENT
Start: 2020-08-28 | End: 2020-08-28 | Stop reason: HOSPADM

## 2020-08-28 RX ORDER — FENTANYL CITRATE 50 UG/ML
INJECTION, SOLUTION INTRAMUSCULAR; INTRAVENOUS AS NEEDED
Status: DISCONTINUED | OUTPATIENT
Start: 2020-08-28 | End: 2020-08-28

## 2020-08-28 RX ORDER — SODIUM CHLORIDE, SODIUM LACTATE, POTASSIUM CHLORIDE, CALCIUM CHLORIDE 600; 310; 30; 20 MG/100ML; MG/100ML; MG/100ML; MG/100ML
INJECTION, SOLUTION INTRAVENOUS CONTINUOUS PRN
Status: DISCONTINUED | OUTPATIENT
Start: 2020-08-28 | End: 2020-08-28

## 2020-08-28 RX ORDER — CEFAZOLIN SODIUM 2 G/50ML
2000 SOLUTION INTRAVENOUS ONCE
Status: COMPLETED | OUTPATIENT
Start: 2020-08-28 | End: 2020-08-28

## 2020-08-28 RX ORDER — CEFAZOLIN SODIUM 1 G/50ML
1000 SOLUTION INTRAVENOUS ONCE
Status: COMPLETED | OUTPATIENT
Start: 2020-08-28 | End: 2020-08-28

## 2020-08-28 RX ORDER — OXYCODONE HYDROCHLORIDE AND ACETAMINOPHEN 5; 325 MG/1; MG/1
1 TABLET ORAL EVERY 4 HOURS PRN
Status: DISCONTINUED | OUTPATIENT
Start: 2020-08-28 | End: 2020-08-28 | Stop reason: HOSPADM

## 2020-08-28 RX ADMIN — MIDAZOLAM HYDROCHLORIDE 2 MG: 1 INJECTION, SOLUTION INTRAMUSCULAR; INTRAVENOUS at 07:46

## 2020-08-28 RX ADMIN — KETOROLAC TROMETHAMINE 30 MG: 30 INJECTION, SOLUTION INTRAMUSCULAR; INTRAVENOUS at 09:24

## 2020-08-28 RX ADMIN — FENTANYL CITRATE 25 MCG: 50 INJECTION INTRAMUSCULAR; INTRAVENOUS at 09:11

## 2020-08-28 RX ADMIN — CEFAZOLIN SODIUM 2000 MG: 2 SOLUTION INTRAVENOUS at 07:37

## 2020-08-28 RX ADMIN — PROPOFOL 100 MG: 10 INJECTION, EMULSION INTRAVENOUS at 07:52

## 2020-08-28 RX ADMIN — GLYCOPYRROLATE 0.3 MG: 0.2 INJECTION, SOLUTION INTRAMUSCULAR; INTRAVENOUS at 07:48

## 2020-08-28 RX ADMIN — FENTANYL CITRATE 50 MCG: 50 INJECTION INTRAMUSCULAR; INTRAVENOUS at 08:53

## 2020-08-28 RX ADMIN — PHENYLEPHRINE HYDROCHLORIDE 100 MCG: 10 INJECTION INTRAVENOUS at 08:11

## 2020-08-28 RX ADMIN — LIDOCAINE HYDROCHLORIDE 50 MG: 10 INJECTION, SOLUTION EPIDURAL; INFILTRATION; INTRACAUDAL; PERINEURAL at 07:52

## 2020-08-28 RX ADMIN — PHENYLEPHRINE HYDROCHLORIDE 100 MCG: 10 INJECTION INTRAVENOUS at 08:09

## 2020-08-28 RX ADMIN — FENTANYL CITRATE 25 MCG: 50 INJECTION INTRAMUSCULAR; INTRAVENOUS at 10:01

## 2020-08-28 RX ADMIN — ONDANSETRON HYDROCHLORIDE 4 MG: 2 INJECTION, SOLUTION INTRAMUSCULAR; INTRAVENOUS at 09:07

## 2020-08-28 RX ADMIN — FENTANYL CITRATE 25 MCG: 50 INJECTION INTRAMUSCULAR; INTRAVENOUS at 10:06

## 2020-08-28 RX ADMIN — PROPOFOL 100 MCG/KG/MIN: 10 INJECTION, EMULSION INTRAVENOUS at 07:55

## 2020-08-28 RX ADMIN — CEFAZOLIN SODIUM 1000 MG: 1 SOLUTION INTRAVENOUS at 07:37

## 2020-08-28 RX ADMIN — FENTANYL CITRATE 100 MCG: 50 INJECTION INTRAMUSCULAR; INTRAVENOUS at 07:46

## 2020-08-28 RX ADMIN — FENTANYL CITRATE 25 MCG: 50 INJECTION INTRAMUSCULAR; INTRAVENOUS at 09:24

## 2020-08-28 RX ADMIN — SODIUM CHLORIDE, SODIUM LACTATE, POTASSIUM CHLORIDE, AND CALCIUM CHLORIDE 75 ML/HR: .6; .31; .03; .02 INJECTION, SOLUTION INTRAVENOUS at 09:53

## 2020-08-28 RX ADMIN — SODIUM CHLORIDE, SODIUM LACTATE, POTASSIUM CHLORIDE, AND CALCIUM CHLORIDE: .6; .31; .03; .02 INJECTION, SOLUTION INTRAVENOUS at 07:31

## 2020-08-28 NOTE — OP NOTE
OPERATIVE REPORT  PATIENT NAME: Saba Reynolds    :  1970  MRN: [id number]  Pt Location:  OR ROOM 07    SURGERY DATE: 2020    Surgeon(s) and Role:     * Idalia Teixeira MD - Primary    Preop Diagnosis:  Incisional hernia, without obstruction or gangrene [K43 2]    Post-Op Diagnosis Codes:     * Incisional hernia, without obstruction or gangrene [K43 2]    Procedure(s) (LRB):  REPAIR INCISIONAL HERNIA (N/A)    Specimen(s):  * No specimens in log *    Estimated Blood Loss:   Minimal    Drains:  * No LDAs found *    Anesthesia Type:   IV Sedation with Anesthesia    Operative Indications:  Incisional hernia, without obstruction or gangrene [K43 2]      Operative Findings:      Complications:   None    Procedure and Technique:  Patient brought the operating room postop table supine position  Under adequate IV sedation the abdomen was prepped and draped in usual sterile fashion  1% lidocaine was used to infiltrate an old scar in the left lower quadrant  15  Scalp blade was used make a transverse incision directly over this scar  Carried down subcutaneous tissue using the Bovie  Bleeders cauterized at that time  Patient was morbidly obese and exposure was quite trying the entire case  Saeed retractors were inserted and dissection carried deep  Palpating the area the hernia was not readily apparent  Dissecting deeper was met with a significant amount of adipose tissue  Eventually the fascia was identified  No hernias palpated  No hernia sac was identified  Making the incisions slightly longer narrow Nelson retractors were obtained passed through the wound  Eventually a dense conglomeration of scar tissue was identified  It was grasped with a hemostat and dissected free  Through this scar a lipoma was identified and this was the hernia  It palpated the tip of the index finger  Mobilizing the lipoma was replaced back into the abdominal cavity    Additional scars resected to allow access to the edges of the hernia  It was grasped with a an Allis clamp and a sponge was placed through the defect  At that point small Ventralex mesh was obtained  The sponges removed and Ventralex was passed through the defect  It was deployed appropriately to effectively repaired the hernia  It was sutured in place with 0 Ethibond suture in figure-of-eight fashion  Upon completion the hernia was repaired  The areas infiltrated with 0 5% Marcaine  Bleeding was controlled electrocautery  After adequate hemostasis obtain the subcutaneous tissue was reapproximated 3 0 Vicryl in interrupted fashion  Skin closed 4 Monocryl in a running subcuticular fashion  Benzoin Steri-Strips were applied  The estimated blood was minimal patient tolerated procedure well she was delivered to the recovery room stable condition  Thank     I was present for the entire procedure    Patient Disposition:  PACU     SIGNATURE: Fazal Garner MD  DATE: August 28, 2020  TIME: 9:44 AM

## 2020-08-28 NOTE — ANESTHESIA POSTPROCEDURE EVALUATION
Post-Op Assessment Note    CV Status:  Stable    Pain management: adequate     Hydration Status:  Euvolemic   PONV Controlled:  None   Airway Patency:  Patent      Post Op Vitals Reviewed: Yes      Staff: Anesthesiologist

## 2020-08-28 NOTE — DISCHARGE INSTRUCTIONS
Incisional Hernia   WHAT YOU NEED TO KNOW:   What is an incisional hernia? An incisional hernia is a bulge through the healed incision of a previous surgery in your abdomen  An incisional hernia is usually caused by weakness in the tissues and muscles of your abdomen  The bulge is usually caused by a part of your intestine, but it may also be tissue or fat pushing through the weakness  What increases my risk for an incisional hernia? · Older age    · Obesity or poor nutrition    · Pregnancy    · Previous infection at the incision site    · Smoking    · Straining during bowel movements, coughing, or heavy lifting    · Medicines, such as steroids  What are the signs and symptoms of an incisional hernia? · Pain    · Nausea or vomiting    · Swelling or a soft bulge along your old incision    · Bloating  How is an incisional hernia diagnosed? Your healthcare provider will look and feel for a bulge in your incision  He may ask you to lie down with your legs bent  He may also ask you to cough while standing up  You may need a CT scan to show the hernia  You may be asked to drink contrast liquid to help the hernia show up better in the pictures  Tell the healthcare provider if you have ever had an allergic reaction to contrast liquid  How is an incisional hernia treated? · Medicines:      ¨ NSAIDs , such as ibuprofen, help decrease swelling, pain, and fever  This medicine is available with or without a doctor's order  NSAIDs can cause stomach bleeding or kidney problems in certain people  If you take blood thinner medicine, always ask your healthcare provider if NSAIDs are safe for you  Always read the medicine label and follow directions  ¨ Prescription pain medicine  may be given  Ask your how to take this medicine safely  · A hernia reduction  is when your healthcare provider pushes the hernia back into your body without surgery   You may be given medicine to relax your muscles to make it easier to push the bulge back in  · Surgery  may be needed to repair your hernia  Surgery may be done through a few small incisions in your abdomen or one larger incision  Mesh may be placed to strengthen your abdominal tissues  How can I help prevent another incisional hernia? · Maintain a healthy weight  Ask your healthcare provider how much you should weigh  Ask him to help you create a weight loss plan if you are overweight  Ask your healthcare provider what types of food you should eat  · Do not strain  when you have a bowel movement  Take an over-the-counter bowel movement softener and drink plenty of water  When you cough, hold a pillow against your incision to prevent strain  · Do not smoke  If you smoke, it is never too late to quit  Ask for information if you need help quitting  · Wear your support device as directed  You may need to wear a support device, such as an abdominal binder for up to 2 weeks after surgery  This helps decrease pain and the risk of fluid collecting under your skin  · Return to your usual activities as directed  Do not lift more than 10 pounds or do strenuous activity for up to 6 weeks  Call 911 for any of the following:   · You have sudden trouble breathing  When should I seek immediate care? · You have severe pain  · You have bloody bowel movements  · You stop having bowel movements or passing gas  · Your abdomen is suddenly very hard  When should I contact my healthcare provider? · You have a fever  · You have nausea and are vomiting  · You are constipated  · Your hernia has returned  · You have a lump, or collection of fluid, under your skin  · You have pain that does not go away, even after you take pain medicine  · You have questions or concerns about your condition or care  CARE AGREEMENT:   You have the right to help plan your care  Learn about your health condition and how it may be treated   Discuss treatment options with your caregivers to decide what care you want to receive  You always have the right to refuse treatment  The above information is an  only  It is not intended as medical advice for individual conditions or treatments  Talk to your doctor, nurse or pharmacist before following any medical regimen to see if it is safe and effective for you  © 2017 [employer] Information is for End User's use only and may not be sold, redistributed or otherwise used for commercial purposes  All illustrations and images included in CareNotes® are the copyrighted property of [employer]  or [employer]  Incisional Hernia   WHAT YOU NEED TO KNOW:   An incisional hernia is a bulge through the healed incision of a previous surgery in your abdomen  An incisional hernia is usually caused by weakness in the tissues and muscles of your abdomen  The bulge is usually caused by a part of your intestine, but it may also be tissue or fat pushing through the weakness  DISCHARGE INSTRUCTIONS:   Call 911 for any of the following:   · You have sudden trouble breathing  Seek care immediately if:   · You have severe pain  · You have bloody bowel movements  · You stop having bowel movements or passing gas  · Your abdomen is suddenly very hard  Contact your healthcare provider if:   · You have a fever  · You have nausea and are vomiting  · You are constipated  · Your hernia has returned  · You have a lump, or collection of fluid, under your skin  · You have pain that does not go away, even after you take pain medicine  · You have questions or concerns about your condition or care  Medicines: You may need any of the following:  · NSAIDs , such as ibuprofen, help decrease swelling, pain, and fever  This medicine is available with or without a doctor's order  NSAIDs can cause stomach bleeding or kidney problems in certain people   If you take blood thinner medicine, always ask your healthcare provider if NSAIDs are safe for you  Always read the medicine label and follow directions  · Prescription pain medicine  may be given  Ask your how to take this medicine safely  · Take your medicine as directed  Contact your healthcare provider if you think your medicine is not helping or if you have side effects  Tell him or her if you are allergic to any medicine  Keep a list of the medicines, vitamins, and herbs you take  Include the amounts, and when and why you take them  Bring the list or the pill bottles to follow-up visits  Carry your medicine list with you in case of an emergency  Prevent another incisional hernia:   · Maintain a healthy weight  Ask your healthcare provider how much you should weigh  Ask him to help you create a weight loss plan if you are overweight  Ask your healthcare provider what types of food you should eat  · Do not strain  when you have a bowel movement  Take an over-the-counter bowel movement softener and drink plenty of water  When you cough, hold a pillow against your incision to prevent strain  · Do not smoke  If you smoke, it is never too late to quit  Ask for information if you need help quitting  · Wear your support device as directed  You may need to wear a support device, such as an abdominal binder for up to 2 weeks after surgery  This helps decrease pain and the risk of fluid collecting under your skin  · Return to your usual activities as directed  Do not lift more than 10 pounds or do strenuous activity for up to 6 weeks  Follow up with your healthcare provider as directed:  Write down your questions so you remember to ask them during your visits  © 2017 [address] [facility] Information is for End User's use only and may not be sold, redistributed or otherwise used for commercial purposes  All illustrations and images included in CareNotes® are the copyrighted property of [employer]  or [employer]    The above information is an  only  It is not intended as medical advice for individual conditions or treatments  Talk to your doctor, nurse or pharmacist before following any medical regimen to see if it is safe and effective for you

## 2020-09-16 ENCOUNTER — OFFICE VISIT (OUTPATIENT)
Dept: SURGERY | Facility: CLINIC | Age: 50
End: 2020-09-16

## 2020-09-16 VITALS
HEART RATE: 98 BPM | HEIGHT: 68 IN | TEMPERATURE: 97.9 F | DIASTOLIC BLOOD PRESSURE: 72 MMHG | BODY MASS INDEX: 44.41 KG/M2 | WEIGHT: 293 LBS | SYSTOLIC BLOOD PRESSURE: 122 MMHG

## 2020-09-16 DIAGNOSIS — K43.2 INCISIONAL HERNIA, WITHOUT OBSTRUCTION OR GANGRENE: Primary | ICD-10-CM

## 2020-09-16 PROCEDURE — 99024 POSTOP FOLLOW-UP VISIT: CPT | Performed by: SPECIALIST

## 2020-09-16 NOTE — PROGRESS NOTES
The patient presents today for postop visit status post repair of a port site hernia  She complains of the area being hard but not necessarily tender  She is tolerating her diet is good GI function  Physical exam:  Middle-aged morbidly obese black female awake alert no distress    Left lower quadrant incision healing well  The area is quite firm not particularly tender  The incision is healing well with no evidence of infection or recurrent hernia  She has excellent cosmetic result  The operative site is inspected with ultrasound and there is an obvious fluid collection  There is prepped with alcohol pad and under sonographic guidance the fluid collection is aspirated  75 cc of seroma is removed without difficulty  The patient tolerated well  Dry sterile dressing was applied  Impression:  Status post repair of port site hernia  Doing well  Plan:  No work for an additional 2 weeks  This will discharge her from the office  Any problems of course always available  It was nice to see her again

## 2020-09-22 DIAGNOSIS — I10 HYPERTENSION, UNSPECIFIED TYPE: ICD-10-CM

## 2020-09-24 RX ORDER — LISINOPRIL 10 MG/1
TABLET ORAL
Qty: 90 TABLET | Refills: 1 | Status: SHIPPED | OUTPATIENT
Start: 2020-09-24 | End: 2021-03-22 | Stop reason: SDUPTHER

## 2020-09-24 RX ORDER — AMLODIPINE BESYLATE 5 MG/1
TABLET ORAL
Qty: 90 TABLET | Refills: 1 | Status: SHIPPED | OUTPATIENT
Start: 2020-09-24 | End: 2021-03-22 | Stop reason: SDUPTHER

## 2021-03-22 DIAGNOSIS — I10 HYPERTENSION, UNSPECIFIED TYPE: ICD-10-CM

## 2021-03-22 RX ORDER — AMLODIPINE BESYLATE 5 MG/1
5 TABLET ORAL DAILY
Qty: 15 TABLET | Refills: 0 | Status: SHIPPED | OUTPATIENT
Start: 2021-03-22 | End: 2021-04-05 | Stop reason: SDUPTHER

## 2021-03-22 RX ORDER — LISINOPRIL 10 MG/1
10 TABLET ORAL DAILY
Qty: 15 TABLET | Refills: 0 | Status: SHIPPED | OUTPATIENT
Start: 2021-03-22 | End: 2021-04-05 | Stop reason: SDUPTHER

## 2021-03-22 NOTE — TELEPHONE ENCOUNTER
Patient Is calling to get a refill on the following meds   amLODIPine (NORVASC) 5 mg tablet  lisinopril (ZESTRIL) 10 mg tablet     Patient is scheduled for April for f/u since its been awhile she was seen,

## 2021-03-22 NOTE — TELEPHONE ENCOUNTER
Two week supply provided until patient's upcoming appointment on 4/5  Patient has history of no show and needs to be seen for continuing medications

## 2021-04-05 ENCOUNTER — OFFICE VISIT (OUTPATIENT)
Dept: FAMILY MEDICINE CLINIC | Facility: CLINIC | Age: 51
End: 2021-04-05

## 2021-04-05 VITALS
HEART RATE: 85 BPM | BODY MASS INDEX: 44.41 KG/M2 | TEMPERATURE: 97.6 F | DIASTOLIC BLOOD PRESSURE: 82 MMHG | RESPIRATION RATE: 18 BRPM | OXYGEN SATURATION: 97 % | WEIGHT: 293 LBS | HEIGHT: 68 IN | SYSTOLIC BLOOD PRESSURE: 118 MMHG

## 2021-04-05 DIAGNOSIS — M25.562 BILATERAL CHRONIC KNEE PAIN: Primary | ICD-10-CM

## 2021-04-05 DIAGNOSIS — M25.561 BILATERAL CHRONIC KNEE PAIN: Primary | ICD-10-CM

## 2021-04-05 DIAGNOSIS — J30.1 ALLERGIC RHINITIS DUE TO POLLEN, UNSPECIFIED SEASONALITY: ICD-10-CM

## 2021-04-05 DIAGNOSIS — K64.4 EXTERNAL HEMORRHOID: ICD-10-CM

## 2021-04-05 DIAGNOSIS — Z59.9 FINANCIAL DIFFICULTIES: ICD-10-CM

## 2021-04-05 DIAGNOSIS — I10 HYPERTENSION, UNSPECIFIED TYPE: ICD-10-CM

## 2021-04-05 DIAGNOSIS — G89.29 BILATERAL CHRONIC KNEE PAIN: Primary | ICD-10-CM

## 2021-04-05 PROCEDURE — 1036F TOBACCO NON-USER: CPT | Performed by: FAMILY MEDICINE

## 2021-04-05 PROCEDURE — 99213 OFFICE O/P EST LOW 20 MIN: CPT | Performed by: FAMILY MEDICINE

## 2021-04-05 PROCEDURE — 3725F SCREEN DEPRESSION PERFORMED: CPT | Performed by: FAMILY MEDICINE

## 2021-04-05 PROCEDURE — 3008F BODY MASS INDEX DOCD: CPT | Performed by: FAMILY MEDICINE

## 2021-04-05 RX ORDER — MELOXICAM 7.5 MG/1
7.5 TABLET ORAL DAILY
Qty: 30 TABLET | Refills: 1 | Status: SHIPPED | OUTPATIENT
Start: 2021-04-05 | End: 2021-07-20

## 2021-04-05 RX ORDER — HYDROCORTISONE 25 MG/G
CREAM TOPICAL 2 TIMES DAILY
Qty: 28 G | Refills: 2 | Status: SHIPPED | OUTPATIENT
Start: 2021-04-05 | End: 2021-09-09 | Stop reason: SDUPTHER

## 2021-04-05 RX ORDER — AMLODIPINE BESYLATE 5 MG/1
5 TABLET ORAL DAILY
Qty: 15 TABLET | Refills: 0 | Status: SHIPPED | OUTPATIENT
Start: 2021-04-05 | End: 2021-04-20 | Stop reason: SDUPTHER

## 2021-04-05 RX ORDER — LISINOPRIL 10 MG/1
10 TABLET ORAL DAILY
Qty: 15 TABLET | Refills: 0 | Status: SHIPPED | OUTPATIENT
Start: 2021-04-05 | End: 2021-04-20 | Stop reason: SDUPTHER

## 2021-04-05 SDOH — ECONOMIC STABILITY - INCOME SECURITY: PROBLEM RELATED TO HOUSING AND ECONOMIC CIRCUMSTANCES, UNSPECIFIED: Z59.9

## 2021-04-05 NOTE — PATIENT INSTRUCTIONS
High Fiber Diet   WHAT YOU NEED TO KNOW:   A high-fiber diet includes foods that have a high amount of fiber  Fiber is the part of fruits, vegetables, and grains that is not broken down by your body  Fiber keeps your bowel movements regular  Fiber can also help lower your cholesterol level, control blood sugar in people with diabetes, and relieve constipation  Fiber can also help you control your weight because it helps you feel full faster  Most adults should eat 25 to 35 grams of fiber each day  Talk to your dietitian or healthcare provider about the amount of fiber you need  DISCHARGE INSTRUCTIONS:   Good sources of fiber:       · Foods with at least 4 grams of fiber per serving:      ? ? to ½ cup of high-fiber cereal (check the nutrition label on the box)    ? ½ cup of blackberries or raspberries    ? 4 dried prunes    ? 1 cooked artichoke    ? ½ cup of cooked legumes, such as lentils, or red, kidney, and capellan beans    · Foods with 1 to 3 grams of fiber per serving:      ? 1 slice of whole-wheat, pumpernickel, or rye bread    ? ½ cup of cooked brown rice    ? 4 whole-wheat crackers    ? 1 cup of oatmeal    ? ½ cup of cereal with 1 to 3 grams of fiber per serving (check the nutrition label on the box)    ? 1 small piece of fruit, such as an apple, banana, pear, kiwi, or orange    ? 3 dates    ? ½ cup of canned apricots, fruit cocktail, peaches, or pears    ? ½ cup of raw or cooked vegetables, such as carrots, cauliflower, cabbage, spinach, squash, or corn  Ways that you can increase fiber in your diet:   · Choose brown or wild rice instead of white rice  · Use whole wheat flour in recipes instead of white or all-purpose flour  · Add beans and peas to casseroles or soups  · Choose fresh fruit and vegetables with peels or skins on instead of juices  Other diet guidelines to follow:   · Add fiber to your diet slowly    You may have abdominal discomfort, bloating, and gas if you add fiber to your diet too quickly  · Drink plenty of liquids as you add fiber to your diet  You may have nausea or develop constipation if you do not drink enough water  Ask how much liquid to drink each day and which liquids are best for you  © Copyright [employer] Information is for End User's use only and may not be sold, redistributed or otherwise used for commercial purposes  All illustrations and images included in CareNotes® are the copyrighted property of A D A M , Inc  or [facility] Jordy Mccann   The above information is an  only  It is not intended as medical advice for individual conditions or treatments  Talk to your doctor, nurse or pharmacist before following any medical regimen to see if it is safe and effective for you  Hemorrhoids   WHAT YOU NEED TO KNOW:   Hemorrhoids are swollen blood vessels inside your rectum (internal hemorrhoids) or on your anus (external hemorrhoids)  Sometimes a hemorrhoid may prolapse  This means it extends out of your anus  DISCHARGE INSTRUCTIONS:   Return to the emergency department if:   · You have severe pain in your rectum or around your anus  · You have severe pain in your abdomen and you are vomiting  · You have bleeding from your anus that soaks through your underwear  Contact your healthcare provider if:   · You have frequent and painful bowel movements  · Your hemorrhoid looks or feels more swollen than usual      · You do not have a bowel movement for 2 days or more  · You see or feel tissue coming through your anus  · You have questions or concerns about your condition or care  Medicines: You may  need any of the following:  · A pad, cream, or ointment  can help decrease pain, swelling, and itching  · Stool softeners  help treat or prevent constipation  · NSAIDs , such as ibuprofen, help decrease swelling, pain, and fever  NSAIDs can cause stomach bleeding or kidney problems in certain people   If you take blood thinner medicine, always ask your healthcare provider if NSAIDs are safe for you  Always read the medicine label and follow directions  · Take your medicine as directed  Contact your healthcare provider if you think your medicine is not helping or if you have side effects  Tell him or her if you are allergic to any medicine  Keep a list of the medicines, vitamins, and herbs you take  Include the amounts, and when and why you take them  Bring the list or the pill bottles to follow-up visits  Carry your medicine list with you in case of an emergency  Manage your symptoms:   · Apply ice on your anus for 15 to 20 minutes every hour or as directed  Use an ice pack, or put crushed ice in a plastic bag  Cover it with a towel before you apply it to your anus  Ice helps prevent tissue damage and decreases swelling and pain  · Take a sitz bath  Fill a bathtub with 4 to 6 inches of warm water  You may also use a sitz bath pan that fits inside a toilet bowl  Sit in the sitz bath for 15 minutes  Do this 3 times a day, and after each bowel movement  The warm water can help decrease pain and swelling  · Keep your anal area clean  Gently wash the area with warm water daily  Soap may irritate the area  After a bowel movement, wipe with moist towelettes or wet toilet paper  Dry toilet paper can irritate the area  Prevent hemorrhoids:   · Do not strain to have a bowel movement  Do not sit on the toilet too long  These actions can increase pressure on the tissues in your rectum and anus  · Drink plenty of liquids  Liquids can help prevent constipation  Ask how much liquid to drink each day and which liquids are best for you  · Eat a variety of high-fiber foods  Examples include fruits, vegetables, and whole grains  Ask your healthcare provider how much fiber you need each day  You may need to take a fiber supplement  · Exercise as directed    Exercise, such as walking, may make it easier to have a bowel movement  Ask your healthcare provider to help you create an exercise plan  · Do not have anal sex  Anal sex can weaken the skin around your rectum and anus  · Avoid heavy lifting  This can cause straining and increase your risk for another hemorrhoid  Follow up with your healthcare provider as directed:  Write down your questions so you remember to ask them during your visits  © Copyright [address] Information is for End User's use only and may not be sold, redistributed or otherwise used for commercial purposes  All illustrations and images included in CareNotes® are the copyrighted property of A D A M , Inc  or [facility] Jordy Mccann   The above information is an  only  It is not intended as medical advice for individual conditions or treatments  Talk to your doctor, nurse or pharmacist before following any medical regimen to see if it is safe and effective for you  Knee Exercises   AMBULATORY CARE:   What you need to know about knee exercises:  Knee exercises help strengthen the muscles around your knee  Strong muscles can help reduce pain and decrease your risk of future injury  Knee exercises also help you heal after an injury or surgery  · Start slow  These are beginning exercises  Ask your healthcare provider if you need to see a physical therapist for more advanced exercises  As you get stronger, you may be able to do more sets of each exercise or add weights  · Stop if you feel pain  It is normal to feel some discomfort at first  Regular exercise will help decrease your discomfort over time  · Do the exercises on both legs  Do this so both knees remain strong  · Warm up before you do knee exercises  Walk or ride a stationary bike for 5 or 10 minutes to warm your muscles  How to perform knee stretches safely:  Always stretch before you do strengthening exercises  Do these stretching exercises again after you do the strengthening exercises   Do these stretches 4 or 5 days a week, or as directed  · Standing calf stretch: Face a wall and place both palms flat on the wall, or hold the back of a chair for balance  Keep a slight bend in your knees  Take a big step backward with one leg  Keep your other leg directly under you  Keep both heels flat and press your hips forward  Hold the stretch for 30 seconds, and then relax for 30 seconds  Switch legs  Repeat 2 or 3 times on each leg  · Standing quadriceps stretch:  Stand and place one hand against a wall or hold the back of a chair for balance  With your weight on one leg, bend your other leg and grab your ankle  Bring your heel toward your buttocks  Hold the stretch for 30 to 60 seconds  Switch legs  Repeat 2 or 3 times on each leg  · Sitting hamstring stretch:  Sit with both legs straight in front of you  Do not point or flex your toes  Place your palms on the floor and slide your hands forward until you feel the stretch  Do not round your back  Hold the stretch for 30 seconds  Repeat 2 or 3 times  How to perform knee strengthening exercises safely:  Do these exercises 4 or 5 days a week, or as directed  · Standing half squats:  Stand with your feet shoulder-width apart  Lean your back against a wall or hold the back of a chair for balance, if needed  Slowly sit down about 10 inches, as if you are going to sit in a chair  Your body weight should be mostly over your heels  Hold the squat for 5 seconds, then rise to a standing position  Do 3 sets of 10 squats to strengthen your buttocks and thighs  · Standing hamstring curls: Face a wall and place both palms flat on the wall, or hold the back of a chair for balance  With your weight on one leg, lift your other foot as close to your buttocks as you can  Hold for 5 seconds and then lower your leg  Do 2 sets of 10 curls on each leg  This exercise strengthens the muscles in the back of your thigh           · Standing calf raises:  Face a wall and place both palms flat on the wall, or hold the back of a chair for balance  Stand up straight, and do not lean  Place all your weight on one leg by lifting the other foot off the floor  Raise the heel of the foot that is on the floor as high as you can and then lower it  Do 2 sets of 10 calf raises on each leg to strengthen your calf muscles  · Straight leg lifts:  Lie on your stomach with straight legs  Fold your arms in front of you and rest your head in your arms  Tighten your leg muscles and raise one leg as high as you can  Hold for 5 seconds, then lower your leg  Do 2 sets of 10 lifts on each leg to strengthen your buttocks  · Sitting leg lifts:  Sit in a chair  Slowly straighten and raise one leg  Squeeze your thigh muscles and hold for 5 seconds  Relax and return your foot to the floor  Do 2 sets of 10 lifts on each leg  This helps strengthen the muscles in the front of your thigh  Contact your healthcare provider if:   · You have new pain or your pain becomes worse  · You have questions or concerns about your condition or care  © Copyright [address] Information is for End User's use only and may not be sold, redistributed or otherwise used for commercial purposes  All illustrations and images included in CareNotes® are the copyrighted property of A D A M , Inc  or [address] [place] [address]  The above information is an  only  It is not intended as medical advice for individual conditions or treatments  Talk to your doctor, nurse or pharmacist before following any medical regimen to see if it is safe and effective for you  Hamstring Exercises   WHAT YOU NEED TO KNOW:   Hamstring exercises help strengthen and stretch the muscles that support your lower back, hips, and knee  This decreases pain, improves movement, and decreases your risk of future injury     DISCHARGE INSTRUCTIONS:   Contact your healthcare provider if:   · You have sharp or worsening pain during exercise or at rest     · You have questions or concern about your condition, care, or exercise program     Exercise safely:   · Move slowly and smoothly  Avoid fast or jerky motions  This will help prevent another injury  · Breathe normally  Do not hold your breath  It is important to breathe in and out so you do not tense up during exercise  Tension could prevent your muscles from stretching  · Do the exercises and stretches on both legs  Do this so the muscles on both legs remain strong and flexible  · Stop if you feel sharp pain or an increase in pain  Stop the exercise and contact your healthcare provider if you have these symptoms  It is normal to feel some discomfort, such as a dull ache, during exercise  Regular exercise will help decrease your discomfort over time  · Warm up before you stretch and exercise  This will help prevent an injury  Walk or ride a stationary bike for 5 to 10 minutes  How to perform stretching exercises:  Ask your healthcare provider how often to do these stretches:  · Hamstring stretch with a towel:  Lie on your back on the floor  Bend both legs so your feet rest flat  Lift one leg off the floor and loop a towel around your foot  Grasp the ends of the towel and slowly straighten your lifted leg  Use the towel to gently pull your leg toward you until you feel the stretch  Keep your leg straight and your foot flexed toward your body  Hold for 30 seconds  Use a longer towel if needed  · Sitting hamstring stretch:  Sit on the floor with both legs straight in front of you  Do not point your toes or flex your feet  Place your palms on the floor and slide your hands forward until you feel the stretch  Keep your back straight and do not lock your knees  Hold the stretch for 30 seconds  · Standing hamstring stretch:  Stand with your feet hips distance apart  Place one leg so it rests on a firm surface, such as a table or chair   Keep your toes pointing up  Slide both hands down the outside of your leg until you feel a stretch  Keep your chest lifted and your back straight  Hold for 30 seconds  · Sitting wide-leg stretch:  Sit on the floor and extend your legs as wide as possible  Keep your legs straight and lean over one leg  Slide your hands forward until you feel a stretch  Keep your chest lifted and your back straight  Hold for 30 seconds  How to perform strengthening exercises:  Always do strengthening exercises after you stretch  As you get stronger your healthcare provider may tell you to you add weights or more repetitions to your strengthening exercises  · Hamstring curls:  Place your hand on a wall or the back of a chair for balance  Place the weight in one of your legs  Lift the other leg and raise your heel toward your buttocks  Hold for 5 seconds  Slowly lower your leg until it is a few inches off the floor  Do 3 sets of 10  Repeat on other side  · Straight leg raise:  Lie on the floor with your face down  Rest your forehead on your folded arms  Keep your body in a straight line  Keep your hip bones on the floor, and tighten the butt and thigh muscles of your injured leg  Keep one leg straight and raise it toward the ceiling as high as you can  Hold for 5 seconds  Slowly return to the starting position  Do 3 sets of 10  Repeat on other side  · Half squats:  Stand with your feet shoulder distance apart  Rest your hands on the front of your thighs or reach them out in front of you  You may hold on to the back of a chair or wall for balance  Keep your chest lifted and lower your hips about 10 inches, as if you are going to sit  Make sure your weight is in your heels and hold for 5 seconds  Keep your weight in your heels and slowly stand  Do 3 sets of 10  Follow up with your healthcare provider as directed:  Write down your questions so you remember to ask them during your visits     © Copyright [employer] [address] [place] [address] Information is for [employer] use only and may not be sold, redistributed or otherwise used for commercial purposes  All illustrations and images included in CareNotes® are the copyrighted property of [employer]  or Olu Rouse  The above information is an  only  It is not intended as medical advice for individual conditions or treatments  Talk to your doctor, nurse or pharmacist before following any medical regimen to see if it is safe and effective for you  Quadriceps Exercises   AMBULATORY CARE:   Quadriceps exercises  help reduce knee pain, keep muscles strong and flexible, and improve function after injury  · Start slowly  These are beginning exercises  Ask your healthcare provider if you need to see a physical therapist for more advanced exercises  As you get stronger, you may be able to do more sets of each exercise or add weights  · Stop if you feel pain  It is normal to feel some discomfort at first  Regular exercise will help decrease your discomfort over time  · Warm up before you do quadriceps exercises  Ride a stationary bike or walk for 5 or 10 minutes to warm your muscles  · Follow the exercise program recommended by your healthcare provider  He or she will tell you which exercises are best for your condition  He or she will also tell you how many repetitions to do and how often you should do the exercises  Call your healthcare provider if:   · Your pain becomes worse or you have new pain  · You have questions or concerns about your condition, care, or exercise program     Perform quadriceps stretches safely:   · Kneeling hip flexor stretch:  Kneel on your left knee  Place your right foot in front of you  Keep your right knee bent and your foot flat on the floor  Your knee should be directly above your ankle  Put both of your hands on top of your right thigh  Keep your back straight and abdominal muscles tight   Put weight on your right leg and lean forward until you feel a stretch in your left thigh  Hold the stretch for about 30 seconds  Repeat 2 or 3 times on each leg or as directed  · Standing quadriceps stretch:  Stand and place one hand against a wall or hold the back of a chair for balance  With your weight on one leg, bend your other leg and grab your ankle  Bring your heel toward your buttocks  Hold the stretch for 30 to 60 seconds  Switch legs  Repeat 2 or 3 times on each leg  Perform quadriceps strengthening exercises safely:   · Quad set exercise:  Lie on a flat, firm surface  Bend your left leg until your foot is flat on the floor  Keep your right leg straight  Tighten the top of your right thigh and hold for 10 to 20 seconds, and then relax  Repeat this exercise 5 to 10 times and then repeat on your other leg  · Straight leg lift:  Lie on a flat, firm surface  Bend your left leg until your foot is flat on the floor  Raise your right leg several inches off the floor and hold for 5 to 10 seconds  Lower your leg slowly  Repeat this exercise 5 to 10 times and then repeat on your other leg  · Sitting leg lifts:  Sit in a chair  Slowly straighten and raise one leg  Squeeze your thigh muscles and hold for 5 seconds  Relax and return your foot to the floor  Do 2 sets of 10 lifts on each leg  · Standing half squats:  Stand with your feet shoulder-width apart  Lean your back against a wall or hold the back of a chair for balance, if needed  Slowly sit down about 10 inches, as if you are going to sit in a chair  Your body weight should be mostly over your heels  Hold the squat for 5 seconds, then rise to a standing position  Repeat 10 times for 1 set  Rest for 1 to 2 minutes  Do another set of 10  · Step ups:  Use a 6-inch stool, step, or other platform to do this exercise  Place one foot on top of the platform  Lift your other foot off the floor and let it hang loosely   Stay in that position for 3 to 5 seconds  Then, slowly lower your foot to the floor  Lower your other foot off the platform surface and onto the floor  Repeat this exercise 5 to 10 times and then repeat on your other leg  © Copyright [address] Information is for End User's use only and may not be sold, redistributed or otherwise used for commercial purposes  All illustrations and images included in CareNotes® are the copyrighted property of A D A M , Inc  or Former[facility] named [facility] Jordy Mccann   The above information is an  only  It is not intended as medical advice for individual conditions or treatments  Talk to your doctor, nurse or pharmacist before following any medical regimen to see if it is safe and effective for you

## 2021-04-05 NOTE — PROGRESS NOTES
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  Will get imaging and proceed with conservative management of Meloxicam daily and topical voltaren gel  No longer has relief with naproxen  Patient unsure if she can afford PT, however she is eager to get into PT as she attended in 2018 with great improvement  Therefore provided exercises in AVS and will have financial aid/referral teams assist in setting up PT  If patient's pain is not much improved with conservative then we will increase management to include steroid injections  Orders:  -     Ambulatory referral to Physical Therapy; Future  -     XR knee 3 vw left non injury; Future  -     XR knee 3 vw right non injury; Future  -     meloxicam (MOBIC) 7 5 mg tablet; Take 1 tablet (7 5 mg total) by mouth daily  -     Diclofenac Sodium (VOLTAREN) 1 %; Apply 2 g topically 4 (four) times a day    Financial difficulties  Comments:  Interested in changing insurance provider as coverages have changed for patient   Orders:  -     Ambulatory referral to financial counseling program; Future    Hypertension, unspecified type  Comments: Well controlled  Continue current management  refills provided  Orders:  -     amLODIPine (NORVASC) 5 mg tablet; Take 1 tablet (5 mg total) by mouth daily  -     lisinopril (ZESTRIL) 10 mg tablet; Take 1 tablet (10 mg total) by mouth daily    Allergic rhinitis due to pollen, unspecified seasonality    External hemorrhoid  Comments:  Discussed dietary changes to include fiber  Can use anusol prn  Orders:  -     hydrocortisone (ANUSOL-HC) 2 5 % rectal cream; Apply topically 2 (two) times a day          Subjective:      Patient ID: Kehinde Carbone is a 46 y o  female  This is a very pleasant 46 y o  female who presents to the clinic for management of their chronic medical conditions    Patient's medical conditions are stable unless noted otherwise above  Patient has not had any recent hospitalizations, or medical emergencies since last visit  Today she would like to discuss her bilateral knee pain  She feels it was worse during the winter when she was wearing snow boots with little support, as well as having some weight gain  She is motivated to start losing weight by exercise and improving her diet  However she is experiencing too much pain in her knees to be more active  Patient does not have any recent imaging of knees  She denies any trauma to the area  She also reports unilateral should pain that is chronic, however her knees are more painful and she wants to focus on them first   Patient has no further complaints other than what is mentioned in the ROS  The following portions of the patient's history were reviewed and updated as appropriate: allergies, current medications, past family history, past medical history, past social history, past surgical history and problem list     Review of Systems   Constitutional: Negative for activity change, appetite change, chills and fever  HENT: Negative for congestion, dental problem, hearing loss, rhinorrhea, sinus pain and sore throat  Eyes: Negative for photophobia, pain and visual disturbance  Respiratory: Negative for cough, chest tightness, shortness of breath and wheezing  Cardiovascular: Negative for chest pain, palpitations and leg swelling  Gastrointestinal: Positive for anal bleeding (on tissue paper) and constipation  Negative for abdominal pain (lump near hernia repair incision), blood in stool, diarrhea, nausea and vomiting  Genitourinary: Negative for difficulty urinating, dysuria, frequency, hematuria and urgency  Musculoskeletal: Positive for arthralgias (bilateral knee, L>R & shoulder pain) and gait problem (from knee pain)  Negative for back pain, myalgias and neck pain  Skin: Negative for rash     Allergic/Immunologic: Negative  Neurological: Negative for dizziness, weakness, light-headedness, numbness and headaches  Hematological: Negative for adenopathy  Psychiatric/Behavioral: Negative for agitation, behavioral problems, sleep disturbance and suicidal ideas  Objective:    /82 (BP Location: Left arm, Patient Position: Sitting, Cuff Size: Large)   Pulse 85   Temp 97 6 °F (36 4 °C) (Temporal)   Resp 18   Ht 5' 8" (1 727 m)   Wt (!) 147 kg (323 lb)   LMP  (LMP Unknown)   SpO2 97%   BMI 49 11 kg/m²      Physical Exam  Vitals signs and nursing note reviewed  Constitutional:       General: She is not in acute distress  Appearance: Normal appearance  She is well-developed  She is obese  She is not ill-appearing  HENT:      Head: Normocephalic and atraumatic  Eyes:      Extraocular Movements: Extraocular movements intact  Conjunctiva/sclera: Conjunctivae normal       Pupils: Pupils are equal, round, and reactive to light  Neck:      Musculoskeletal: Normal range of motion and neck supple  Cardiovascular:      Rate and Rhythm: Normal rate and regular rhythm  Pulses: Normal pulses  Heart sounds: Normal heart sounds  No murmur  Pulmonary:      Effort: Pulmonary effort is normal  No respiratory distress  Breath sounds: Normal breath sounds  No wheezing, rhonchi or rales  Chest:      Chest wall: No tenderness  Abdominal:      General: Bowel sounds are normal  There is no distension  Palpations: Abdomen is soft  Tenderness: There is no abdominal tenderness  Hernia: No hernia (small palpable non tender lump on the LLQ hernia incision scar) is present  Musculoskeletal: Normal range of motion  General: Tenderness (bilateral tenderness of the medial joint line of the knees  Increased pain elicited in flexion of left knee  Full knee exam difficult due to patients weight) present  No swelling, deformity or signs of injury  Left lower leg: No edema  Skin:     General: Skin is warm and dry  Capillary Refill: Capillary refill takes less than 2 seconds  Findings: No erythema or rash  Neurological:      General: No focal deficit present  Mental Status: She is alert and oriented to person, place, and time     Psychiatric:         Mood and Affect: Mood normal          Behavior: Behavior normal          Gabbie Man MD  04/06/21  10:03 AM

## 2021-04-06 PROBLEM — Z98.890 PONV (POSTOPERATIVE NAUSEA AND VOMITING): Status: RESOLVED | Noted: 2020-08-28 | Resolved: 2021-04-06

## 2021-04-06 PROBLEM — R11.2 PONV (POSTOPERATIVE NAUSEA AND VOMITING): Status: RESOLVED | Noted: 2020-08-28 | Resolved: 2021-04-06

## 2021-04-19 ENCOUNTER — TELEPHONE (OUTPATIENT)
Dept: FAMILY MEDICINE CLINIC | Facility: CLINIC | Age: 51
End: 2021-04-19

## 2021-04-20 DIAGNOSIS — I10 HYPERTENSION, UNSPECIFIED TYPE: ICD-10-CM

## 2021-04-20 RX ORDER — LISINOPRIL 10 MG/1
TABLET ORAL
Qty: 90 TABLET | OUTPATIENT
Start: 2021-04-20

## 2021-04-20 RX ORDER — LISINOPRIL 10 MG/1
10 TABLET ORAL DAILY
Qty: 30 TABLET | Refills: 4 | Status: SHIPPED | OUTPATIENT
Start: 2021-04-20 | End: 2021-09-09 | Stop reason: SDUPTHER

## 2021-04-20 RX ORDER — AMLODIPINE BESYLATE 5 MG/1
5 TABLET ORAL DAILY
Qty: 90 TABLET | Refills: 2 | Status: SHIPPED | OUTPATIENT
Start: 2021-04-20 | End: 2021-09-09 | Stop reason: SDUPTHER

## 2021-05-03 ENCOUNTER — HOSPITAL ENCOUNTER (OUTPATIENT)
Dept: RADIOLOGY | Facility: HOSPITAL | Age: 51
Discharge: HOME | End: 2021-05-03
Payer: COMMERCIAL

## 2021-05-03 DIAGNOSIS — G89.29 BILATERAL CHRONIC KNEE PAIN: ICD-10-CM

## 2021-05-03 DIAGNOSIS — M25.561 BILATERAL CHRONIC KNEE PAIN: ICD-10-CM

## 2021-05-03 DIAGNOSIS — M25.562 BILATERAL CHRONIC KNEE PAIN: ICD-10-CM

## 2021-05-03 PROCEDURE — 73562 X-RAY EXAM OF KNEE 3: CPT

## 2021-05-05 ENCOUNTER — PROCEDURE VISIT (OUTPATIENT)
Dept: FAMILY MEDICINE CLINIC | Facility: CLINIC | Age: 51
End: 2021-05-05

## 2021-05-05 VITALS
DIASTOLIC BLOOD PRESSURE: 68 MMHG | OXYGEN SATURATION: 97 % | RESPIRATION RATE: 16 BRPM | HEIGHT: 68 IN | BODY MASS INDEX: 44.41 KG/M2 | HEART RATE: 89 BPM | WEIGHT: 293 LBS | SYSTOLIC BLOOD PRESSURE: 122 MMHG | TEMPERATURE: 97.6 F

## 2021-05-05 DIAGNOSIS — R22.32 MASS OF LEFT UPPER EXTREMITY: ICD-10-CM

## 2021-05-05 DIAGNOSIS — E66.01 MORBID OBESITY WITH BMI OF 40.0-44.9, ADULT (HCC): ICD-10-CM

## 2021-05-05 DIAGNOSIS — M17.0 PRIMARY OSTEOARTHRITIS OF BOTH KNEES: ICD-10-CM

## 2021-05-05 DIAGNOSIS — Z12.31 ENCOUNTER FOR SCREENING MAMMOGRAM FOR BREAST CANCER: Primary | ICD-10-CM

## 2021-05-05 PROBLEM — E66.9 OBESITY: Status: RESOLVED | Noted: 2018-11-01 | Resolved: 2021-05-05

## 2021-05-05 PROBLEM — M19.90 ARTHRITIS: Status: RESOLVED | Noted: 2020-08-27 | Resolved: 2021-05-05

## 2021-05-05 PROCEDURE — 99213 OFFICE O/P EST LOW 20 MIN: CPT | Performed by: FAMILY MEDICINE

## 2021-05-05 PROCEDURE — 1036F TOBACCO NON-USER: CPT | Performed by: FAMILY MEDICINE

## 2021-05-05 PROCEDURE — 20610 DRAIN/INJ JOINT/BURSA W/O US: CPT | Performed by: FAMILY MEDICINE

## 2021-05-05 RX ORDER — LIDOCAINE HYDROCHLORIDE 5 MG/ML
1 INJECTION, SOLUTION INFILTRATION; PERINEURAL
Status: COMPLETED | OUTPATIENT
Start: 2021-05-05 | End: 2021-05-05

## 2021-05-05 RX ORDER — TRIAMCINOLONE ACETONIDE 40 MG/ML
80 INJECTION, SUSPENSION INTRA-ARTICULAR; INTRAMUSCULAR
Status: COMPLETED | OUTPATIENT
Start: 2021-05-05 | End: 2021-05-05

## 2021-05-05 RX ADMIN — LIDOCAINE HYDROCHLORIDE 1 ML: 5 INJECTION, SOLUTION INFILTRATION; PERINEURAL at 16:46

## 2021-05-05 RX ADMIN — TRIAMCINOLONE ACETONIDE 80 MG: 40 INJECTION, SUSPENSION INTRA-ARTICULAR; INTRAMUSCULAR at 16:46

## 2021-05-05 NOTE — PATIENT INSTRUCTIONS
[phone] - call to schedule your ultrasound for your left arm & your weight management appointment  Steroid Joint Injection   AMBULATORY CARE:   What you need to know about steroid joint injection:  A steroid joint injection is a procedure to inject steroid medicine into a joint  Steroid medicine decreases pain and inflammation  The injection may also contain an anesthetic (numbing medicine) to decrease pain  It may be done to treat conditions such as arthritis, gout, or carpal tunnel syndrome  The injections may be given in your knee, ankle, shoulder, elbow, or wrist  Injections may also be given in your hip, toe, thumb, or finger  How to prepare for steroid joint injection:  Your healthcare provider will talk to you about how to prepare for this procedure  He will tell you what medicines to take or not take on the day of your procedure  You may need to stop taking blood thinners several days before your procedure  What will happen during steroid joint injection:  You may be given local anesthesia to numb the area where the injection will be given  With local anesthesia, you may still feel pressure during the procedure, but you should not feel any pain  Your healthcare provider may use ultrasound or fluoroscopy (a type of x-ray) to guide the needle to the right area  He will then inject the steroid into your joint  A bandage will be placed on the injection site  What will happen after steroid joint injection:  You may have redness, warmth, or sweating in your face and chest right after the steroid injection  Steroids can affect blood sugar levels  If you have diabetes, you should check your blood sugars closely in the first 24 hours after your procedure  Risks of steroid joint injection:  You may get an infection in your joint  The injection may also cause more pain during the first 24 to 36 hours  You may need more than one injection to feel pain relief   The skin near the injection site may be damaged and become discolored or indented  This can happen if the steroid is placed too close to your skin  A tendon near the injection site may rupture or a nerve can be damaged  Contact your healthcare provider if:   · You have fever or chills  · You have redness or swelling in the injection site  · You have more pain than usual in your joint for more than 72 hours  · You have questions or concerns about your condition or care  Medicines:   · Pain medicine  may be given  Ask how to take this medicine safely  · Take your medicine as directed  Contact your healthcare provider if you think your medicine is not helping or if you have side effects  Tell him or her if you are allergic to any medicine  Keep a list of the medicines, vitamins, and herbs you take  Include the amounts, and when and why you take them  Bring the list or the pill bottles to follow-up visits  Carry your medicine list with you in case of an emergency  Self-care:   · Leave the bandage on for 8 to 12 hours  Care for your wound as directed  · Rest the area  as directed  You may need to decrease weight on certain joints, such as the knee, for a period of time  Ask when you can return to your daily activities  · Elevate  your limb where the steroid injection was given  Elevate the limb above the level of your heart as often as you can  This will help decrease swelling and pain  Prop your limb on pillows or blankets to keep it elevated comfortably  · Apply ice  on your joint for 15 to 20 minutes every hour or as directed  Use an ice pack, or put crushed ice in a plastic bag  Cover it with a towel  Ice helps prevent tissue damage and decreases swelling and pain  Follow up with your healthcare provider as directed:  Write down your questions so you remember to ask them during your visits     © Copyright [address] Information is for End User's use only and may not be sold, redistributed or otherwise used for commercial purposes  All illustrations and images included in CareNotes® are the copyrighted property of A D [employer] M [employer]  or [employer]  The above information is an  only  It is not intended as medical advice for individual conditions or treatments  Talk to your doctor, nurse or pharmacist before following any medical regimen to see if it is safe and effective for you

## 2021-05-05 NOTE — ASSESSMENT & PLAN NOTE
Patient has recently started a keto diet, and has lost 10 lb  Applauded patient on her weight loss, however encouraged patient to make realistic changes to her diet that can be lifelong rather than the fad diet  We discussed options of incorporating salad with things she likes such as fruit and avocado to make more apealing  As well as portion control and frequency of carbs, as patient states rice is a staple for her  background  Patient also educated on the importance of exercise  Encouraged to walk 30 min at least 5 times a week  - Discussed benefits of losing weight    - Refer to bariatric surgery for weight management/evaluation for possible surgery

## 2021-05-05 NOTE — PROGRESS NOTES
Assessment/Plan: Morbid obesity with BMI of 40 0-44 9, adult [facility])  Patient has recently started a keto diet, and has lost 10 lb  Applauded patient on her weight loss, however encouraged patient to make realistic changes to her diet that can be lifelong rather than the fad diet  We discussed options of incorporating salad with things she likes such as fruit and avocado to make more apealing  As well as portion control and frequency of carbs, as patient states rice is a staple for her  background  Patient also educated on the importance of exercise  Encouraged to walk 30 min at least 5 times a week  - Discussed benefits of losing weight  - Refer to bariatric surgery for weight management/evaluation for possible surgery    Primary osteoarthritis of both knees  Secondary to lifestyle and weight   Evidence on xrays reviewed with patient, final read pending    A steroid injection was performed at left & right knee using 1 5 ml of 1% plain Lidocaine and 60 mg of Kenalog in both knees  This was well tolerated  Patient was counseled on side effects of knee injection including infection and was told that if knee was to become red or swollen patient was to call the office or head over to the ER  Patient was also advised to use cold compresses for the 1st 24 hr if pain increases  Information provided in AVS       Diagnoses and all orders for this visit:    Encounter for screening mammogram for breast cancer  -     Mammo screening bilateral w cad; Future    Mass of left upper extremity  Comments:  Suspected lipoma of the lateral aspect of left shoulder  Reports mild intermittent pain but is growing in size slowly  Will get msk US  No ifxn suspicion   Orders:  -     US MSK complete; Future    Morbid obesity with BMI of 40 0-44 9, adult ([address] )  -     Ambulatory referral to Weight Management;  Future    Primary osteoarthritis of both knees  -     Large joint arthrocentesis: bilateral knee      Subjective: Patient ID: Natividad Aguilar is a 46 y o  female  This is a very pleasant 46 y o  female who presents to the clinic for bilateral knee injections  She has a past medical history of hypertension, eczema, morbid obesity and prediabetes  She reports that her bilateral knee pain is okay today, and her right knee is worse than her left knee as usual   She states the Mobic and the Voltaren gel have been helping to reduce her pain  Patient's medical conditions are stable unless noted otherwise above  Patient has not had any recent hospitalizations, or medical emergencies since last visit  Patient has no further complaints other than what is mentioned in the ROS  The following portions of the patient's history were reviewed and updated as appropriate: allergies, current medications, past family history, past medical history, past social history, past surgical history and problem list     Review of Systems   Constitutional: Negative for activity change, appetite change, chills, fatigue and fever  HENT: Negative for congestion, rhinorrhea, sinus pain and sore throat  Eyes: Negative for visual disturbance  Respiratory: Negative for cough, chest tightness, shortness of breath and wheezing  Cardiovascular: Negative for chest pain and palpitations  Gastrointestinal: Negative for abdominal pain, constipation, diarrhea, nausea and vomiting  Genitourinary: Negative for difficulty urinating  Musculoskeletal: Positive for arthralgias and gait problem  Negative for back pain, myalgias and neck pain  Skin: Negative for rash and wound  Neurological: Negative for dizziness, weakness, light-headedness, numbness and headaches  Psychiatric/Behavioral: Negative for agitation, behavioral problems, sleep disturbance and suicidal ideas         Objective:    /68 (BP Location: Left arm, Patient Position: Sitting, Cuff Size: Large)   Pulse 89   Temp 97 6 °F (36 4 °C) (Temporal)   Resp 16   Ht 5' 8" (1 727 m) Wt (!) 142 kg (313 lb 4 8 oz)   LMP  (LMP Unknown)   SpO2 97%   BMI 47 64 kg/m²      Physical Exam  Vitals signs and nursing note reviewed  Constitutional:       General: She is not in acute distress  Appearance: Normal appearance  She is well-developed  She is obese  She is not ill-appearing  HENT:      Head: Normocephalic and atraumatic  Right Ear: External ear normal       Left Ear: External ear normal    Eyes:      Extraocular Movements: Extraocular movements intact  Conjunctiva/sclera: Conjunctivae normal       Pupils: Pupils are equal, round, and reactive to light  Neck:      Musculoskeletal: Normal range of motion and neck supple  Cardiovascular:      Rate and Rhythm: Normal rate and regular rhythm  Pulses: Normal pulses  Heart sounds: Normal heart sounds  No murmur  Pulmonary:      Effort: Pulmonary effort is normal  No respiratory distress  Breath sounds: Normal breath sounds  No wheezing, rhonchi or rales  Chest:      Chest wall: No tenderness  Abdominal:      General: Bowel sounds are normal  There is no distension  Palpations: Abdomen is soft  Tenderness: There is no abdominal tenderness  Musculoskeletal: Normal range of motion  General: Tenderness (bilateral tenderness of the medial joint line of the knees) present  No swelling, deformity or signs of injury  Left lower leg: No edema  Skin:     General: Skin is warm and dry  Capillary Refill: Capillary refill takes less than 2 seconds  Findings: No erythema or rash  Neurological:      General: No focal deficit present  Mental Status: She is alert and oriented to person, place, and time  Psychiatric:         Mood and Affect: Mood normal          Behavior: Behavior normal            Large joint arthrocentesis: bilateral knee  Universal Protocol:  Procedure performed by: (Dr Cammy Jean Baptiste)  Consent: Verbal consent obtained    Risks and benefits: risks, benefits and alternatives were discussed  Consent given by: patient  Time out: Immediately prior to procedure a "time out" was called to verify the correct patient, procedure, equipment, support staff and site/side marked as required  Patient understanding: patient states understanding of the procedure being performed  Patient consent: the patient's understanding of the procedure matches consent given  Procedure consent: procedure consent matches procedure scheduled  Relevant documents: relevant documents present and verified  Test results: test results available and properly labeled  Site marked: the operative site was marked  Radiology Images displayed and confirmed   If images not available, report reviewed: imaging studies available (Discussed with patient in the room)  Patient identity confirmed: verbally with patient    Supporting Documentation  Indications: pain   Procedure Details  Location: knee - bilateral knee  Preparation: Patient was prepped and draped in the usual sterile fashion  Needle size: 25 G  Ultrasound guidance: no  Approach: anterolateral    Medications (Right): 1 mL lidocaine 0 5 %; 80 mg triamcinolone acetonide 40 mg/mLMedications (Left): 1 mL lidocaine 0 5 %; 80 mg triamcinolone acetonide 40 mg/mL   Patient tolerance: patient tolerated the procedure well with no immediate complications  Dressing:  Sterile dressing applied        Jerica Tong MD  05/05/21  4:46 PM

## 2021-05-05 NOTE — ASSESSMENT & PLAN NOTE
Secondary to lifestyle and weight   Evidence on xrays reviewed with patient, final read pending    A steroid injection was performed at left & right knee using 1 5 ml of 1% plain Lidocaine and 60 mg of Kenalog in both knees  This was well tolerated  Patient was counseled on side effects of knee injection including infection and was told that if knee was to become red or swollen patient was to call the office or head over to the ER  Patient was also advised to use cold compresses for the 1st 24 hr if pain increases    Information provided in AVS

## 2021-05-10 NOTE — RESULT ENCOUNTER NOTE
Please call patient and inform her the xrays of her knees was officially read by the radiologist to show osteoarthritis  These are the same as the findings we discussed during our recent visit together  There were not other concerning findings  If patient has any further questions please let me know  Thank you       Abner Ramirez MD  05/10/21  12:38 PM

## 2021-05-17 NOTE — PROGRESS NOTES
Weight Management Medical Nutrition Assessment  Vance Pablo is here for medical meal planning (bypass MD)  Current wt: 313 9 lbs  Over the last month has lost 10 lbs eating less and eliminating triggers  Wanting to lose ~130 lbs  Realistic weight loss goals discussed  She is not interested in surgical weight management  Has been trying to eliminate carbs but then eats carb on the w/e  Discussed importance of consistency and what would work for her long term  Recommend food logging and explained the importance of interval eating  Meal plan provided along with other resources  Questions answered re: exercise  She will f/u in 5wks for body comp      Patient seen by Medical Provider in past 6 months: yes  Requested to schedule appointment with Medical Provider: No      Anthropometric Measurements  Start Weight (#): 313 9 lbs  Ideal Body Weight (#): 130 lbs   Goal Weight (#): -190 lbs   Highest: 314 lbs  Lowest: unknown   lbs     Weight Loss History  Previous weight loss attempts: Exercise  Self Created Diets (Portion Control, Healthy Food Choices, etc )    Food and Nutrition Related History  Wake up: 6:30   Bed Time: 11-12:00     Food Recall  Breakfast: 8-9:00: 2 eggs, ham or sausage     Snack: skip  Lunch: 12:00: low carb wrap w/tuna or chicken, <1 tbsp abdullahi  Snack: skip  Dinner: 5-6:00: >6 oz protein, was eating ~2 cups rice but now avoiding, starting with veggies 1/2 cup  Snack: prior was eating chips, cheetos but now skip    Beverages: water, tea w/lemon, was drinking 2% milk and juice but no longer, wine    Volume of beverage intake: 64-80 oz water, wine on the w/e    Weekends: Worse, eats more, higher carb  Cravings:  chips  Trouble area of day: before bed     Frequency of Eating out: 1x/wk  Food restrictions: ?lactose  Cooking: self   Food Shopping: self    Physical Activity Intake  Activity:Walking just started  Frequency:occasionally  Physical limitations/barriers to exercise: n/a    Estimated Needs  Energy  Bear Andrea Energy Needs: BMR : 2050   1-2# loss weekly sedentary:  3596-7870             1-2# loss weekly lightly active: 0495-9045  Maintenance calories for sedentary activity level: 2459  Protein: 71-89 gm      (1 2-1 5g/kg IBW)  Fluid: 69 oz     (35mL/kg IBW)    Nutrition Diagnosis  Yes;     Overweight/obesity  related to Excess energy intake as evidenced by  BMI more than normative standard for age and sex (obesity-grade III 36+)       Nutrition Intervention    Nutrition Prescription  Calories:6458-9582  Protein:  gm    Meal Plan ([place]/[employer])  Breakfast: 375-425, 20  Snack: skip  Lunch: 350-450, 21-28  Snack: 100-150, 5-10  Dinner: 500-600, 42-48  Snack: 150, 5-10    Nutrition Education:    Calorie controlled menu  Lean protein food choices  Healthy snack options  Food journaling tips      Nutrition Counseling:  Strategies: meal planning, portion sizes, healthy snack choices, hydration, fiber intake, protein intake, exercise, food journal      Monitoring and Evaluation:  Evaluation criteria:  Energy Intake  Meet protein needs  Maintain adequate hydration  Monitor weekly weight  Meal planning/preparation  Food journal   Decreased portions at mealtimes and snacks  Physical activity     Barriers to learning:none  Readiness to change: Action:  (Changing behavior)  Comprehension: good  Expected Compliance: very good

## 2021-05-24 ENCOUNTER — OFFICE VISIT (OUTPATIENT)
Dept: BARIATRICS | Facility: CLINIC | Age: 51
End: 2021-05-24

## 2021-05-24 VITALS — WEIGHT: 293 LBS | BODY MASS INDEX: 47.09 KG/M2 | HEIGHT: 66 IN

## 2021-05-24 DIAGNOSIS — R63.5 ABNORMAL WEIGHT GAIN: ICD-10-CM

## 2021-05-24 DIAGNOSIS — E66.01 MORBID OBESITY WITH BMI OF 40.0-44.9, ADULT (HCC): ICD-10-CM

## 2021-05-24 PROCEDURE — RECHECK

## 2021-05-24 PROCEDURE — WMDI30

## 2021-05-24 PROCEDURE — 3008F BODY MASS INDEX DOCD: CPT | Performed by: FAMILY MEDICINE

## 2021-07-20 DIAGNOSIS — G89.29 BILATERAL CHRONIC KNEE PAIN: ICD-10-CM

## 2021-07-20 DIAGNOSIS — M25.562 BILATERAL CHRONIC KNEE PAIN: ICD-10-CM

## 2021-07-20 DIAGNOSIS — M25.561 BILATERAL CHRONIC KNEE PAIN: ICD-10-CM

## 2021-07-20 RX ORDER — MELOXICAM 7.5 MG/1
TABLET ORAL
Qty: 30 TABLET | Refills: 1 | Status: SHIPPED | OUTPATIENT
Start: 2021-07-20 | End: 2021-09-09 | Stop reason: SDUPTHER

## 2021-09-09 ENCOUNTER — OFFICE VISIT (OUTPATIENT)
Dept: FAMILY MEDICINE CLINIC | Facility: CLINIC | Age: 51
End: 2021-09-09

## 2021-09-09 VITALS
DIASTOLIC BLOOD PRESSURE: 72 MMHG | HEIGHT: 65 IN | HEART RATE: 94 BPM | SYSTOLIC BLOOD PRESSURE: 128 MMHG | OXYGEN SATURATION: 96 % | BODY MASS INDEX: 48.82 KG/M2 | WEIGHT: 293 LBS | TEMPERATURE: 97.7 F | RESPIRATION RATE: 18 BRPM

## 2021-09-09 DIAGNOSIS — M17.0 PRIMARY OSTEOARTHRITIS OF BOTH KNEES: ICD-10-CM

## 2021-09-09 DIAGNOSIS — I10 HYPERTENSION, UNSPECIFIED TYPE: ICD-10-CM

## 2021-09-09 DIAGNOSIS — J30.1 ALLERGIC RHINITIS DUE TO POLLEN, UNSPECIFIED SEASONALITY: ICD-10-CM

## 2021-09-09 DIAGNOSIS — R73.03 PREDIABETES: ICD-10-CM

## 2021-09-09 DIAGNOSIS — Z11.4 SCREENING FOR HIV (HUMAN IMMUNODEFICIENCY VIRUS): ICD-10-CM

## 2021-09-09 DIAGNOSIS — M25.561 BILATERAL CHRONIC KNEE PAIN: ICD-10-CM

## 2021-09-09 DIAGNOSIS — M25.562 BILATERAL CHRONIC KNEE PAIN: ICD-10-CM

## 2021-09-09 DIAGNOSIS — I10 BENIGN HYPERTENSION: ICD-10-CM

## 2021-09-09 DIAGNOSIS — Z11.59 NEED FOR HEPATITIS C SCREENING TEST: ICD-10-CM

## 2021-09-09 DIAGNOSIS — Z00.00 ANNUAL PHYSICAL EXAM: Primary | ICD-10-CM

## 2021-09-09 DIAGNOSIS — G89.29 BILATERAL CHRONIC KNEE PAIN: ICD-10-CM

## 2021-09-09 DIAGNOSIS — R60.0 LOWER LEG EDEMA: ICD-10-CM

## 2021-09-09 DIAGNOSIS — K64.4 EXTERNAL HEMORRHOID: ICD-10-CM

## 2021-09-09 PROCEDURE — 99396 PREV VISIT EST AGE 40-64: CPT | Performed by: FAMILY MEDICINE

## 2021-09-09 PROCEDURE — 1036F TOBACCO NON-USER: CPT | Performed by: FAMILY MEDICINE

## 2021-09-09 PROCEDURE — 3725F SCREEN DEPRESSION PERFORMED: CPT | Performed by: FAMILY MEDICINE

## 2021-09-09 PROCEDURE — 3008F BODY MASS INDEX DOCD: CPT | Performed by: FAMILY MEDICINE

## 2021-09-09 RX ORDER — HYDROCORTISONE 25 MG/G
CREAM TOPICAL 2 TIMES DAILY
Qty: 28 G | Refills: 2 | Status: SHIPPED | OUTPATIENT
Start: 2021-09-09

## 2021-09-09 RX ORDER — MELOXICAM 7.5 MG/1
7.5 TABLET ORAL DAILY
Qty: 90 TABLET | Refills: 1 | Status: SHIPPED | OUTPATIENT
Start: 2021-09-09 | End: 2021-12-20

## 2021-09-09 RX ORDER — AMLODIPINE BESYLATE 5 MG/1
5 TABLET ORAL DAILY
Qty: 90 TABLET | Refills: 2 | Status: SHIPPED | OUTPATIENT
Start: 2021-09-09 | End: 2022-06-13

## 2021-09-09 RX ORDER — LORATADINE 10 MG/1
10 TABLET ORAL DAILY
Qty: 90 TABLET | Refills: 1 | Status: SHIPPED | OUTPATIENT
Start: 2021-09-09 | End: 2022-03-30

## 2021-09-09 RX ORDER — LISINOPRIL 10 MG/1
10 TABLET ORAL DAILY
Qty: 90 TABLET | Refills: 1 | Status: SHIPPED | OUTPATIENT
Start: 2021-09-09 | End: 2021-09-20

## 2021-09-09 NOTE — ASSESSMENT & PLAN NOTE
Secondary to lifestyle and weight   Did not respond well to steroid injection,  However reports relief with meloxicam and Voltaren gel  She has only been using Voltaren daily, educated she can do this to 4 times a day    Refills provided 73072 Exp Problem Focused - Mod. Complex

## 2021-09-09 NOTE — PROGRESS NOTES
[address] [place] [address]  Annual Well Adult Visit      Assessment/Plan     1  Well adult female   2  Patient Counseling:   · Nutrition: Stressed importance of a well balanced diet, moderation of sodium/saturated fat, caloric balance and sufficient intake of fiber  · Exercise: Stressed the importance of regular exercise with a goal of 150 minutes per week  · Dental Health: Discussed daily flossing and brushing and regular dental visits   · Sexuality: Discussed sexually transmitted infections, use of condoms and prevention of unintended pregnancy  · Alcohol Use:  Recommended moderation of alcohol intake  · Injury Prevention: Discussed Safety Belts, Safety Helmets, and Smoke Detectors  · Immunizations reviewed  Influenza indicated, however we do not have in the office at time  He  · Discussed benefits of screening for CRC, STD, Breast cancer and cervical cancer  · Discussed the patient's BMI with her  The BMI is above average; BMI management plan is completed   · BMI Counseling: Body mass index is 54 43 kg/m²  The BMI is above normal, patient is already following with weight management  · Depression Screening Follow-up Plan: Patient's depression screening was positive with a PHQ-2 score of 0  Clinically patient does not have depression  No treatment is required  3  Cancer Screening  For breast cancer is indicated, otherwise patient is up-to-date with cervical and colorectal cancer screening  4  Labs requested:   HIV, hepatitis-C, CMP, HbA1c, microalbumin to creatinine urine ratio, BNP  5  Lower leg edema- likely dependent - will assess liver kidney and heart as possible sources  Denies chest pain or shortness of breath     6   Problem List addressed:     Benign hypertension  Chronic and stable, at goal of < 140/90  Current medication includes: Amlodipine 5 mg lisinopril 10 mg  Weight loss will continue to aid in BP control  Continue current meds    Primary osteoarthritis of both knees  Secondary to lifestyle and weight   Did not respond well to steroid injection,  However reports relief with meloxicam and Voltaren gel  She has only been using Voltaren daily, educated she can do this to 4 times a day  Refills provided       7  Follow up in 3 months    Subjective     Grace Davis is a 46 y o   female and is here for routine health maintenance  The patient reports problems - bilateral knee pain, and right shoulder pain that is ongoing and chronic  We revisited the chronic nature and supportive mangement  History of Present Illness     This is a very pleasant 46 y o  female who presents to the clinic for her annual physical    She has a past medical history of hypertension, eczema, morbid obesity and prediabetes  She has been following with bariatric surgery for the past month and has had success, and she is motivated to continue this weight loss journey  Patient's medical conditions are stable unless noted otherwise above  Patient has not had any recent hospitalizations, or medical emergencies since last visit  Patient has no further complaints other than what is mentioned in the ROS  Well Adult Physical   Patient here for a comprehensive physical exam     Diet and Physical Activity  Diet: poor diet however is starting to slowly change her bad habits  Weight concerns: Patient has class 3 obesity (BMI >40)  Exercise: infrequently      Depression Screen  PHQ-9 Depression Screening    PHQ-9:   Frequency of the following problems over the past two weeks:      Little interest or pleasure in doing things: 0 - not at all  Feeling down, depressed, or hopeless: 0 - not at all  PHQ-2 Score: 0       General Health  Hearing: Normal:  bilateral  Vision: no vision problems  Dental: regular dental visits    Cancer Screening  Colononoscopy not indicated  Last completed in 2019  Previous results were normal  Repeat in 10 years from then in 2029  Mammogram  Indicated, and order placed of May 2021    Patient reminded to schedule  Last completed 2020  Previous results were   Normal  Pap  Not indicated Last completed  2020  Previous results were negative cytology and negative HPV  Next due   Abnormal pap? Yes - during pregnancy about 21 years ago  Since then was normal       Mom  of colon cancer at 61years old  The following portions of the patient's history were reviewed and updated as appropriate: allergies, current medications, past family history, past medical history, past social history, past surgical history and problem list     Review of Systems     Review of Systems   Constitutional: Negative for activity change, appetite change, chills, fatigue and fever  HENT: Negative for congestion, rhinorrhea, sinus pain and sore throat  Eyes: Negative for visual disturbance  Respiratory: Negative for cough, chest tightness, shortness of breath and wheezing  Cardiovascular: Negative for chest pain and palpitations  Gastrointestinal: Negative for abdominal pain, constipation, diarrhea, nausea and vomiting  Genitourinary: Negative for difficulty urinating  Musculoskeletal: Positive for arthralgias and gait problem  Negative for back pain, myalgias and neck pain  Skin: Negative for rash and wound  Neurological: Negative for dizziness, weakness, light-headedness, numbness and headaches  Psychiatric/Behavioral: Negative for agitation, behavioral problems, sleep disturbance and suicidal ideas  Past Medical History     Past Medical History:   Diagnosis Date    Arthritis     knees    PONV (postoperative nausea and vomiting)        Past Surgical History     Past Surgical History:   Procedure Laterality Date    APPENDECTOMY      laparoscopic    CHOLECYSTECTOMY      COLONOSCOPY      HERNIA REPAIR      [facility] COLONOSCOPY FLX DX W/COLLJ SPEC WHEN PFRMD Left 2019    Procedure: COLONOSCOPY;  Surgeon: Erik Lantigua MD;  [facility];   Service: Gastroenterology    [facility] LAP,APPENDECTOMY N/A 3/22/2019    Procedure: [place];  Surgeon: Mike Ng MD;  Location: [address] [place] [address] OR;  Service: [facility] N/A 8/28/2020    Procedure: [address];  Surgeon: Mike Ng MD;  Location: [address] [place] [address] OR;  Service: General       Social History     Social History     Socioeconomic History    Marital status: /Civil Union     Spouse name: None    Number of children: None    Years of education: None    Highest education level: None   Occupational History    None   Tobacco Use    Smoking status: Never Smoker    Smokeless tobacco: Never Used   Vaping Use    Vaping Use: Never used   Substance and Sexual Activity    Alcohol use: Yes    Drug use: Never    Sexual activity: Yes     Partners: Male     Birth control/protection: Condom, Post-menopausal   Other Topics Concern    None   Social History Narrative    ** Merged History Encounter **          Social Determinants of Health     Financial Resource Strain:     Difficulty of Paying Living Expenses:    Food Insecurity:     Worried About Running Out of Food in the Last Year:     [address] in the Last Year:    Transportation Needs:     Lack of Transportation (Medical):      Lack of Transportation (Non-Medical):    Physical Activity:     Days of Exercise per Week:     Minutes of Exercise per Session:    Stress:     Feeling of Stress :    Social Connections:     Frequency of Communication with Friends and Family:     Frequency of Social Gatherings with Friends and Family:     Attends [employer] Services:     Active Member of Clubs or Organizations:     Attends Club or Organization Meetings:     Marital Status:    Intimate Partner Violence:     Fear of Current or Ex-Partner:     Emotionally Abused:     Physically Abused:     Sexually Abused:        Family History     Family History   Problem Relation Age of Onset    Colon cancer Mother 61    Stroke Father  No Known Problems Sister     No Known Problems Daughter     No Known Problems Maternal Grandmother     No Known Problems Maternal Grandfather     No Known Problems Paternal Grandmother     No Known Problems Paternal Grandfather     No Known Problems Paternal Aunt     No Known Problems Paternal Aunt     Breast cancer Neg Hx        Current Medications       Current Outpatient Medications:     amLODIPine (NORVASC) 5 mg tablet, Take 1 tablet (5 mg total) by mouth daily, Disp: 90 tablet, Rfl: 2    Diclofenac Sodium (VOLTAREN) 1 %, Apply 2 g topically 4 (four) times a day, Disp: 350 g, Rfl: 2    hydrocortisone (ANUSOL-HC) 2 5 % rectal cream, Apply topically 2 (two) times a day, Disp: 28 g, Rfl: 2    loratadine (Claritin) 10 mg tablet, Take 1 tablet (10 mg total) by mouth daily, Disp: 90 tablet, Rfl: 1    meloxicam (MOBIC) 7 5 mg tablet, Take 1 tablet (7 5 mg total) by mouth daily, Disp: 90 tablet, Rfl: 1    docusate sodium (COLACE) 250 MG capsule, Take 1 capsule (250 mg total) by mouth daily, Disp: 30 capsule, Rfl: 1    lisinopril (ZESTRIL) 10 mg tablet, Take 1 tablet (10 mg total) by mouth daily, Disp: 90 tablet, Rfl: 1     Allergies     No Known Allergies    Objective     /72 (BP Location: Left arm, Patient Position: Sitting, Cuff Size: Standard)   Pulse 94   Temp 97 7 °F (36 5 °C) (Temporal)   Resp 18   Ht 5' 5" (1 651 m)   Wt (!) 148 kg (327 lb 1 6 oz)   LMP  (LMP Unknown)   SpO2 96%   BMI 54 43 kg/m²      Physical Exam  Vitals and nursing note reviewed  Constitutional:       General: She is not in acute distress  Appearance: Normal appearance  She is well-developed  She is obese  She is not ill-appearing  HENT:      Head: Normocephalic and atraumatic  Right Ear: Tympanic membrane, ear canal and external ear normal  There is no impacted cerumen  Left Ear: Tympanic membrane, ear canal and external ear normal  There is no impacted cerumen        Nose: Nose normal  Mouth/Throat:      Mouth: Mucous membranes are moist       Pharynx: Oropharynx is clear  No oropharyngeal exudate or posterior oropharyngeal erythema  Eyes:      Extraocular Movements: Extraocular movements intact  Conjunctiva/sclera: Conjunctivae normal       Pupils: Pupils are equal, round, and reactive to light  Cardiovascular:      Rate and Rhythm: Normal rate and regular rhythm  Pulses: Normal pulses  Heart sounds: Normal heart sounds  No murmur heard  Pulmonary:      Effort: Pulmonary effort is normal  No respiratory distress  Breath sounds: Normal breath sounds  No wheezing, rhonchi or rales  Chest:      Chest wall: No tenderness  Abdominal:      General: Bowel sounds are normal  There is no distension  Palpations: Abdomen is soft  Tenderness: There is no abdominal tenderness  Musculoskeletal:         General: No signs of injury  Normal range of motion  Cervical back: Normal range of motion and neck supple  Right lower leg: Edema present  Left lower leg: Edema present  Comments: Bilateral lower leg nonpitting edema to lower mid shin bilaterally  Left slightly worse than right   Skin:     General: Skin is warm and dry  Capillary Refill: Capillary refill takes less than 2 seconds  Findings: No erythema or rash  Neurological:      General: No focal deficit present  Mental Status: She is alert and oriented to person, place, and time     Psychiatric:         Mood and Affect: Mood normal          Behavior: Behavior normal            No exam data present    Health Maintenance     Health Maintenance   Topic Date Due    Hepatitis C Screening  Never done    HIV Screening  Never done    Breast Cancer Screening: Mammogram  01/22/2021    Influenza Vaccine (1) 09/01/2021    Depression Screening PHQ  09/09/2022    BMI: Followup Plan  09/09/2022    BMI: Adult  09/09/2022    Annual Physical  09/09/2022    Cervical Cancer Screening 08/10/2023    Colorectal Cancer Screening  05/02/2029    DTaP,Tdap,and Td Vaccines (2 - Td or Tdap) 07/31/2030    COVID-19 Vaccine  Completed    Pneumococcal Vaccine: Pediatrics (0 to 5 Years) and At-Risk Patients (6 to 59 Years)  Aged Out    HIB Vaccine  Aged Out    Hepatitis B Vaccine  Aged Out    IPV Vaccine  Aged Out    Hepatitis A Vaccine  Aged Out    Meningococcal ACWY Vaccine  Aged Out    HPV Vaccine  Aged [employer] History   Administered Date(s) Administered    Influenza, recombinant, quadrivalent,injectable, preservative free 11/01/2018, 11/21/2019    SARS-CoV-2 / COVID-19 mRNA IM (Kimi Mir) 01/07/2021, 02/04/2021    Tdap 07/31/2020     Aide Lopez MD

## 2021-09-09 NOTE — ASSESSMENT & PLAN NOTE
Chronic and stable, at goal of < 140/90  Current medication includes: Amlodipine 5 mg lisinopril 10 mg  Weight loss will continue to aid in BP control  Continue current meds

## 2021-09-09 NOTE — PATIENT INSTRUCTIONS
Central scheduling to make mammogram apt: [phone]    Knee Exercises   AMBULATORY CARE:   What you need to know about knee exercises:  Knee exercises help strengthen the muscles around your knee  Strong muscles can help reduce pain and decrease your risk of future injury  Knee exercises also help you heal after an injury or surgery  · Start slow  These are beginning exercises  Ask your healthcare provider if you need to see a physical therapist for more advanced exercises  As you get stronger, you may be able to do more sets of each exercise or add weights  · Stop if you feel pain  It is normal to feel some discomfort at first  Regular exercise will help decrease your discomfort over time  · Do the exercises on both legs  Do this so both knees remain strong  · Warm up before you do knee exercises  Walk or ride a stationary bike for 5 or 10 minutes to warm your muscles  How to perform knee stretches safely:  Always stretch before you do strengthening exercises  Do these stretching exercises again after you do the strengthening exercises  Do these stretches 4 or 5 days a week, or as directed  · Standing calf stretch: Face a wall and place both palms flat on the wall, or hold the back of a chair for balance  Keep a slight bend in your knees  Take a big step backward with one leg  Keep your other leg directly under you  Keep both heels flat and press your hips forward  Hold the stretch for 30 seconds, and then relax for 30 seconds  Switch legs  Repeat 2 or 3 times on each leg  · Standing quadriceps stretch:  Stand and place one hand against a wall or hold the back of a chair for balance  With your weight on one leg, bend your other leg and grab your ankle  Bring your heel toward your buttocks  Hold the stretch for 30 to 60 seconds  Switch legs  Repeat 2 or 3 times on each leg  · Sitting hamstring stretch:  Sit with both legs straight in front of you   Do not point or flex your toes  Place your palms on the floor and slide your hands forward until you feel the stretch  Do not round your back  Hold the stretch for 30 seconds  Repeat 2 or 3 times  How to perform knee strengthening exercises safely:  Do these exercises 4 or 5 days a week, or as directed  · Standing half squats:  Stand with your feet shoulder-width apart  Lean your back against a wall or hold the back of a chair for balance, if needed  Slowly sit down about 10 inches, as if you are going to sit in a chair  Your body weight should be mostly over your heels  Hold the squat for 5 seconds, then rise to a standing position  Do 3 sets of 10 squats to strengthen your buttocks and thighs  · Standing hamstring curls: Face a wall and place both palms flat on the wall, or hold the back of a chair for balance  With your weight on one leg, lift your other foot as close to your buttocks as you can  Hold for 5 seconds and then lower your leg  Do 2 sets of 10 curls on each leg  This exercise strengthens the muscles in the back of your thigh  · Standing calf raises:  Face a wall and place both palms flat on the wall, or hold the back of a chair for balance  Stand up straight, and do not lean  Place all your weight on one leg by lifting the other foot off the floor  Raise the heel of the foot that is on the floor as high as you can and then lower it  Do 2 sets of 10 calf raises on each leg to strengthen your calf muscles  · Straight leg lifts:  Lie on your stomach with straight legs  Fold your arms in front of you and rest your head in your arms  Tighten your leg muscles and raise one leg as high as you can  Hold for 5 seconds, then lower your leg  Do 2 sets of 10 lifts on each leg to strengthen your buttocks  · Sitting leg lifts:  Sit in a chair  Slowly straighten and raise one leg  Squeeze your thigh muscles and hold for 5 seconds  Relax and return your foot to the floor   Do 2 sets of 10 lifts on each leg  This helps strengthen the muscles in the front of your thigh  Contact your healthcare provider if:   · You have new pain or your pain becomes worse  · You have questions or concerns about your condition or care  © Copyright [employer] 2021 Information is for End User's use only and may not be sold, redistributed or otherwise used for commercial purposes  All illustrations and images included in CareNotes® are the copyrighted property of A D [employer] M , Inc  or [employer]  The above information is an  only  It is not intended as medical advice for individual conditions or treatments  Talk to your doctor, nurse or pharmacist before following any medical regimen to see if it is safe and effective for you

## 2021-09-11 ENCOUNTER — LAB (OUTPATIENT)
Dept: LAB | Facility: HOSPITAL | Age: 51
End: 2021-09-11
Payer: COMMERCIAL

## 2021-09-11 DIAGNOSIS — I10 BENIGN HYPERTENSION: ICD-10-CM

## 2021-09-11 DIAGNOSIS — Z11.4 SCREENING FOR HIV (HUMAN IMMUNODEFICIENCY VIRUS): ICD-10-CM

## 2021-09-11 DIAGNOSIS — R73.03 PREDIABETES: ICD-10-CM

## 2021-09-11 DIAGNOSIS — Z11.59 NEED FOR HEPATITIS C SCREENING TEST: ICD-10-CM

## 2021-09-11 DIAGNOSIS — R60.0 LOWER LEG EDEMA: ICD-10-CM

## 2021-09-11 LAB
ALBUMIN SERPL BCP-MCNC: 4 G/DL (ref 3–5.2)
ALP SERPL-CCNC: 56 U/L (ref 43–122)
ALT SERPL W P-5'-P-CCNC: 44 U/L
ANION GAP SERPL CALCULATED.3IONS-SCNC: 8 MMOL/L (ref 5–14)
AST SERPL W P-5'-P-CCNC: 26 U/L (ref 14–36)
BILIRUB SERPL-MCNC: 0.52 MG/DL
BUN SERPL-MCNC: 10 MG/DL (ref 5–25)
CALCIUM SERPL-MCNC: 8.6 MG/DL (ref 8.4–10.2)
CHLORIDE SERPL-SCNC: 106 MMOL/L (ref 97–108)
CO2 SERPL-SCNC: 26 MMOL/L (ref 22–30)
CREAT SERPL-MCNC: 0.64 MG/DL (ref 0.6–1.2)
CREAT UR-MCNC: 118 MG/DL
EST. AVERAGE GLUCOSE BLD GHB EST-MCNC: 123 MG/DL
GFR SERPL CREATININE-BSD FRML MDRD: 118 ML/MIN/1.73SQ M
GLUCOSE P FAST SERPL-MCNC: 104 MG/DL (ref 70–99)
HBA1C MFR BLD: 5.9 %
HCV AB SER QL: NORMAL
MICROALBUMIN UR-MCNC: 16 MG/L (ref 0–20)
MICROALBUMIN/CREAT 24H UR: 14 MG/G CREATININE (ref 0–30)
NT-PROBNP SERPL-MCNC: 27.6 PG/ML (ref 0–299)
POTASSIUM SERPL-SCNC: 4 MMOL/L (ref 3.6–5)
PROT SERPL-MCNC: 7 G/DL (ref 5.9–8.4)
SODIUM SERPL-SCNC: 140 MMOL/L (ref 137–147)

## 2021-09-11 PROCEDURE — 83880 ASSAY OF NATRIURETIC PEPTIDE: CPT

## 2021-09-11 PROCEDURE — 82043 UR ALBUMIN QUANTITATIVE: CPT | Performed by: FAMILY MEDICINE

## 2021-09-11 PROCEDURE — 86803 HEPATITIS C AB TEST: CPT

## 2021-09-11 PROCEDURE — 80053 COMPREHEN METABOLIC PANEL: CPT

## 2021-09-11 PROCEDURE — 83036 HEMOGLOBIN GLYCOSYLATED A1C: CPT

## 2021-09-11 PROCEDURE — 36415 COLL VENOUS BLD VENIPUNCTURE: CPT

## 2021-09-11 PROCEDURE — 82570 ASSAY OF URINE CREATININE: CPT | Performed by: FAMILY MEDICINE

## 2021-09-11 PROCEDURE — 87389 HIV-1 AG W/HIV-1&-2 AB AG IA: CPT

## 2021-09-13 LAB — HIV 1+2 AB+HIV1 P24 AG SERPL QL IA: NORMAL

## 2021-09-13 NOTE — RESULT ENCOUNTER NOTE
Please call patient and inform her of her recent blood work  She is negative for HIV and hepatitis-C  She does have prediabetes, which is consistent with her diagnosis 2 years ago  It is improved since then from 6 1 to 5 9, and normal is less than 5 7  Her continued weight loss journey will help to improve this level  Her heart, liver, kidney function appears within normal limits and her swelling in her legs is most likely due to being on her feet throughout the entire day  Please emphasize it is important to keep her legs elevated higher than her heart when she is at home resting  Also encouraged her to do ankle pumps while she is at work      Siva Montano MD  09/13/21  2:13 PM

## 2021-10-07 ENCOUNTER — ANNUAL EXAM (OUTPATIENT)
Dept: OBGYN CLINIC | Facility: CLINIC | Age: 51
End: 2021-10-07

## 2021-10-07 VITALS
HEART RATE: 90 BPM | HEIGHT: 65 IN | SYSTOLIC BLOOD PRESSURE: 145 MMHG | BODY MASS INDEX: 48.82 KG/M2 | WEIGHT: 293 LBS | DIASTOLIC BLOOD PRESSURE: 82 MMHG

## 2021-10-07 DIAGNOSIS — B36.9 FUNGAL SKIN DISEASE: ICD-10-CM

## 2021-10-07 DIAGNOSIS — Z12.39 ENCOUNTER FOR BREAST CANCER SCREENING USING NON-MAMMOGRAM MODALITY: ICD-10-CM

## 2021-10-07 DIAGNOSIS — Z01.419 ENCOUNTER FOR GYNECOLOGICAL EXAMINATION WITHOUT ABNORMAL FINDING: Primary | ICD-10-CM

## 2021-10-07 DIAGNOSIS — Z12.31 ENCOUNTER FOR SCREENING MAMMOGRAM FOR MALIGNANT NEOPLASM OF BREAST: ICD-10-CM

## 2021-10-07 DIAGNOSIS — Z12.4 SCREENING FOR CERVICAL CANCER: ICD-10-CM

## 2021-10-07 PROCEDURE — 3008F BODY MASS INDEX DOCD: CPT | Performed by: NURSE PRACTITIONER

## 2021-10-07 PROCEDURE — 99396 PREV VISIT EST AGE 40-64: CPT | Performed by: NURSE PRACTITIONER

## 2021-10-07 PROCEDURE — 1036F TOBACCO NON-USER: CPT | Performed by: NURSE PRACTITIONER

## 2021-10-07 RX ORDER — NYSTATIN 100000 [USP'U]/G
POWDER TOPICAL 2 TIMES DAILY
Qty: 15 G | Refills: 5 | Status: SHIPPED | OUTPATIENT
Start: 2021-10-07

## 2021-11-26 NOTE — TELEPHONE ENCOUNTER
Chief complaint:  Acute kidney injury and hyponatremia    Subjective:   She feels less sob  Less cough - hopes to go home     Reviewed:  Medications, notes, labs    Visit Vitals  BP (!) 152/80 (BP Location: RUE - Right upper extremity, Patient Position: Sitting)   Pulse 64   Temp 98.6 °F (37 °C) (Oral)   Resp 18   Ht 5' 4\" (1.626 m)   Wt 89.1 kg (196 lb 8 oz)   SpO2 93%   BMI 33.73 kg/m²         Intake/Output Summary (Last 24 hours) at 11/26/2021 0905  Last data filed at 11/26/2021 0641  Gross per 24 hour   Intake 660 ml   Output 2175 ml   Net -1515 ml       Physical Exam:  ENT:  No periorbital edema  Alert, in no distress  No JVD  Lungs:  Clear   CV RRR twan in mitral area softer   abd soft, NT, normal BS  Legs soft, no further pitting edema  Skin: no rash    Recent Labs   Lab 11/25/21  0719 11/24/21  0824 11/23/21  1055 11/22/21  1155 11/22/21  1155 11/22/21  0006 11/22/21  0006 11/21/21  0544 11/21/21  0544 11/20/21  1028 11/20/21  1018   SODIUM 141 143 141   < > 141   < > 139   < > 139   < > 134*   POTASSIUM 3.8 4.3 4.3   < > 4.6   < > 4.5   < > 4.5   < > 4.4   CHLORIDE 109* 111* 110*   < > 109*   < > 110*   < > 110*   < > 107   CO2 26 26 24   < > 24   < > 25   < > 23   < > 22   BUN 24* 32* 38*   < > 26*   < > 26*   < > 27*   < > 32*   CREATININE 1.20* 1.38* 1.32*   < > 1.03*   < > 0.96*   < > 0.87   < > 0.89   GLUCOSE 87 89 106*   < > 126*   < > 100*   < > 88   < > 90   ALBUMIN  --   --  2.6*  --  2.7*  --  2.4*   < > 2.6*   < > 3.0*   MG  --   --  2.3  --  2.3  --   --   --  2.6*  --   --    AST  --   --  12  --  13  --  16   < > 18   < > 30   GPT  --   --  24  --  30  --  30   < > 40   < > 55   ALKPT  --   --  63  --  76  --  72   < > 86   < > 96   BILIRUBIN  --   --  0.4  --  0.5  --  0.4   < > 0.5   < > 0.7   CPK  --   --   --   --   --   --   --   --   --   --  147   TSH  --   --   --   --   --   --   --   --   --   --  3.321    < > = values in this interval not displayed.     Recent Labs   Lab  Yes  11/26/21  0749 11/25/21  0719 11/24/21  0824 11/22/21  0006 11/20/21  1018   WBC 6.8 7.4 8.9   < > 9.4   HGB 9.8* 9.4* 9.2*   < > 9.6*   HCT 31.1* 29.6* 29.1*   < > 29.6*    366 373   < > 344   PST  --   --  8*  --   --    FERR  --   --  66  --  71    < > = values in this interval not displayed.     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  Both her C3 and C4 complements were low with blood and protein in the urine as well as dysmorphic red cells.  After treating her dental infection her acute kidney injury resolved. Her UA still has blood and protein (UPC ratio was 0.25 last week), but nonnephrotic.  The acute kidney injury had resolved, with her creatinine down to 0.87 by November 21st. Clinically improved with bid lasix and arb      Fluid overload - improved   Pleural effusions s/p thoracentesis      Pulmonary HTN - moderate on echo     Mitral regurgitation - worsened on current echo, no valve vegetations on  KRYSTLE- afterload reduction with arb     Blood cultures negative from November 21st and 22nd     HTN - on carvedilol     Cocaine abuse - positive screen on previous admission, negative Nov 20th    Plan:  Cozaar

## 2021-12-13 ENCOUNTER — HOSPITAL ENCOUNTER (OUTPATIENT)
Dept: MAMMOGRAPHY | Facility: CLINIC | Age: 51
Discharge: HOME | End: 2021-12-13

## 2021-12-13 DIAGNOSIS — N64.4 PAIN OF LEFT BREAST: ICD-10-CM

## 2021-12-13 DIAGNOSIS — Z12.31 ENCOUNTER FOR SCREENING MAMMOGRAM FOR MALIGNANT NEOPLASM OF BREAST: ICD-10-CM

## 2021-12-20 ENCOUNTER — OFFICE VISIT (OUTPATIENT)
Dept: FAMILY MEDICINE CLINIC | Facility: CLINIC | Age: 51
End: 2021-12-20

## 2021-12-20 VITALS
OXYGEN SATURATION: 98 % | SYSTOLIC BLOOD PRESSURE: 148 MMHG | WEIGHT: 293 LBS | RESPIRATION RATE: 18 BRPM | HEIGHT: 65 IN | TEMPERATURE: 97.8 F | HEART RATE: 112 BPM | DIASTOLIC BLOOD PRESSURE: 88 MMHG | BODY MASS INDEX: 48.82 KG/M2

## 2021-12-20 DIAGNOSIS — M17.0 PRIMARY OSTEOARTHRITIS OF BOTH KNEES: ICD-10-CM

## 2021-12-20 DIAGNOSIS — G89.29 BILATERAL CHRONIC KNEE PAIN: ICD-10-CM

## 2021-12-20 DIAGNOSIS — M25.562 BILATERAL CHRONIC KNEE PAIN: ICD-10-CM

## 2021-12-20 DIAGNOSIS — M25.561 BILATERAL CHRONIC KNEE PAIN: ICD-10-CM

## 2021-12-20 DIAGNOSIS — I10 BENIGN HYPERTENSION: Primary | ICD-10-CM

## 2021-12-20 PROCEDURE — 1036F TOBACCO NON-USER: CPT | Performed by: FAMILY MEDICINE

## 2021-12-20 PROCEDURE — 3008F BODY MASS INDEX DOCD: CPT | Performed by: FAMILY MEDICINE

## 2021-12-20 PROCEDURE — 99213 OFFICE O/P EST LOW 20 MIN: CPT | Performed by: FAMILY MEDICINE

## 2022-01-03 ENCOUNTER — CLINICAL SUPPORT (OUTPATIENT)
Dept: FAMILY MEDICINE CLINIC | Facility: CLINIC | Age: 52
End: 2022-01-03

## 2022-01-03 VITALS — HEART RATE: 90 BPM | SYSTOLIC BLOOD PRESSURE: 134 MMHG | DIASTOLIC BLOOD PRESSURE: 82 MMHG

## 2022-01-03 DIAGNOSIS — I10 BENIGN HYPERTENSION: Primary | ICD-10-CM

## 2022-01-11 ENCOUNTER — HOSPITAL ENCOUNTER (OUTPATIENT)
Dept: MAMMOGRAPHY | Facility: CLINIC | Age: 52
Discharge: HOME | End: 2022-01-11
Payer: COMMERCIAL

## 2022-01-11 DIAGNOSIS — N64.4 BREAST PAIN, LEFT: ICD-10-CM

## 2022-01-11 PROCEDURE — G0279 TOMOSYNTHESIS, MAMMO: HCPCS

## 2022-01-11 PROCEDURE — 77066 DX MAMMO INCL CAD BI: CPT

## 2022-01-11 PROCEDURE — 76642 ULTRASOUND BREAST LIMITED: CPT

## 2022-01-18 ENCOUNTER — HOSPITAL ENCOUNTER (OUTPATIENT)
Dept: RADIOLOGY | Facility: HOSPITAL | Age: 52
Discharge: HOME | End: 2022-01-18
Payer: COMMERCIAL

## 2022-01-18 ENCOUNTER — OFFICE VISIT (OUTPATIENT)
Dept: FAMILY MEDICINE CLINIC | Facility: CLINIC | Age: 52
End: 2022-01-18

## 2022-01-18 ENCOUNTER — APPOINTMENT (OUTPATIENT)
Dept: LAB | Facility: HOSPITAL | Age: 52
End: 2022-01-18
Payer: COMMERCIAL

## 2022-01-18 VITALS
SYSTOLIC BLOOD PRESSURE: 150 MMHG | HEART RATE: 95 BPM | HEIGHT: 65 IN | TEMPERATURE: 98 F | DIASTOLIC BLOOD PRESSURE: 100 MMHG | OXYGEN SATURATION: 96 % | BODY MASS INDEX: 48.82 KG/M2 | WEIGHT: 293 LBS | RESPIRATION RATE: 16 BRPM

## 2022-01-18 DIAGNOSIS — M75.51 SUBACROMIAL BURSITIS OF RIGHT SHOULDER JOINT: ICD-10-CM

## 2022-01-18 DIAGNOSIS — I10 BENIGN HYPERTENSION: ICD-10-CM

## 2022-01-18 DIAGNOSIS — M25.511 ACUTE PAIN OF RIGHT SHOULDER: ICD-10-CM

## 2022-01-18 DIAGNOSIS — M25.511 ACUTE PAIN OF RIGHT SHOULDER: Primary | ICD-10-CM

## 2022-01-18 LAB
ANION GAP SERPL CALCULATED.3IONS-SCNC: 9 MMOL/L (ref 5–14)
BUN SERPL-MCNC: 16 MG/DL (ref 5–25)
CALCIUM SERPL-MCNC: 8.6 MG/DL (ref 8.4–10.2)
CHLORIDE SERPL-SCNC: 105 MMOL/L (ref 97–108)
CO2 SERPL-SCNC: 27 MMOL/L (ref 22–30)
CREAT SERPL-MCNC: 0.58 MG/DL (ref 0.6–1.2)
GFR SERPL CREATININE-BSD FRML MDRD: 105 ML/MIN/1.73SQ M
GLUCOSE SERPL-MCNC: 96 MG/DL (ref 70–99)
POTASSIUM SERPL-SCNC: 3.9 MMOL/L (ref 3.6–5)
SODIUM SERPL-SCNC: 141 MMOL/L (ref 137–147)

## 2022-01-18 PROCEDURE — 80048 BASIC METABOLIC PNL TOTAL CA: CPT

## 2022-01-18 PROCEDURE — 73030 X-RAY EXAM OF SHOULDER: CPT

## 2022-01-18 PROCEDURE — 96372 THER/PROPH/DIAG INJ SC/IM: CPT | Performed by: PHYSICIAN ASSISTANT

## 2022-01-18 PROCEDURE — 36415 COLL VENOUS BLD VENIPUNCTURE: CPT

## 2022-01-18 PROCEDURE — 99213 OFFICE O/P EST LOW 20 MIN: CPT | Performed by: PHYSICIAN ASSISTANT

## 2022-01-18 RX ORDER — IBUPROFEN 800 MG/1
800 TABLET ORAL EVERY 8 HOURS PRN
Qty: 30 TABLET | Refills: 0 | Status: SHIPPED | OUTPATIENT
Start: 2022-01-18

## 2022-01-18 RX ORDER — KETOROLAC TROMETHAMINE 30 MG/ML
60 INJECTION, SOLUTION INTRAMUSCULAR; INTRAVENOUS ONCE
Status: COMPLETED | OUTPATIENT
Start: 2022-01-18 | End: 2022-01-18

## 2022-01-18 RX ORDER — METHYLPREDNISOLONE 4 MG/1
TABLET ORAL
Qty: 21 EACH | Refills: 0 | Status: SHIPPED | OUTPATIENT
Start: 2022-01-18

## 2022-01-18 RX ADMIN — KETOROLAC TROMETHAMINE 60 MG: 30 INJECTION, SOLUTION INTRAMUSCULAR; INTRAVENOUS at 15:30

## 2022-01-18 NOTE — PROGRESS NOTES
Assessment/Plan:    Acute pain of right shoulder  - Patient has history of subacromial bursitis of the right shoulder joint and previously received cortisone injection which provided relief  - Will obtain updated right shoulder x-ray to further evaluate  - Will prescribe Medrol Dosepak  - Toradol 60 mg IM injection administered today  - Will prescribe Motrin 800 mg, to be taken every 8 hours as needed for pain  Advised to alternate between taking Motrin and Tylenol   - Advised to apply ice/heating pad as needed to the affected area  Advised patient to continue with gentle range of motion exercises to prevent the shoulder from becoming stiff   - If pain persist, patient may benefit from additional cortisone injection of the right shoulder  Diagnoses and all orders for this visit:    Acute pain of right shoulder  -     XR shoulder 2+ vw right; Future  -     methylPREDNISolone 4 MG tablet therapy pack; Use as directed on package  -     ibuprofen (MOTRIN) 800 mg tablet; Take 1 tablet (800 mg total) by mouth every 8 (eight) hours as needed for mild pain  -     ketorolac (TORADOL) 60 mg/2 mL IM injection 60 mg    Subacromial bursitis of right shoulder joint  -     methylPREDNISolone 4 MG tablet therapy pack; Use as directed on package  -     ibuprofen (MOTRIN) 800 mg tablet; Take 1 tablet (800 mg total) by mouth every 8 (eight) hours as needed for mild pain  -     ketorolac (TORADOL) 60 mg/2 mL IM injection 60 mg          All of patients questions were answered  Patient understands and agrees with the above plan  Return if symptoms worsen or fail to improve, for Next scheduled follow up as scheduled on 1/20/22 with Dr Davia Gaucher Aggie Blacker, PA-C  01/18/22  [facility] [address]          Subjective:     Patient ID: Renetta Moctezuma  is a 46 y o  female with known PHM of hypertension, prediabetes, primary osteoarthritis of both knees who presents today in office for same-day visit for right shoulder pain       - Patient is a 46 y o  female who presents today for same-day visit for right shoulder pain  Patient notes since Friday, she has been experiencing pain throughout her right shoulder  Patient denies any recent injury or trauma to the area  However, patient notes on Friday just prior to the pain starting, she was lifting cases of water which were very heavy  Patient notes the pain does sometimes radiate down the right arm  Patient notes she did have pain similar to this a few years ago and she received an injection of her shoulder at that time which helps a lot  Patient notes she has been taking Tylenol, using icy Hot with little relief  The following portions of the patient's history were reviewed and updated as appropriate: allergies, current medications, past family history, past medical history, past social history, past surgical history and problem list         Review of Systems   Constitutional: Negative for chills and fever  HENT: Negative for congestion and sore throat  Respiratory: Negative for cough, chest tightness and shortness of breath  Cardiovascular: Negative for palpitations  Gastrointestinal: Negative for abdominal pain, nausea and vomiting  Genitourinary: Negative for difficulty urinating and dysuria  Musculoskeletal: Positive for arthralgias  Skin: Negative for rash  Neurological: Negative for dizziness and headaches  Objective:   Vitals:    01/18/22 1438   BP: 150/100   BP Location: Left arm   Patient Position: Sitting   Cuff Size: Large   Pulse: 95   Resp: 16   Temp: 98 °F (36 7 °C)   TempSrc: Temporal   SpO2: 96%   Weight: (!) 147 kg (325 lb)   Height: 5' 5" (1 651 m)         Physical Exam  Vitals and nursing note reviewed  Constitutional:       General: She is not in acute distress  Appearance: She is well-developed  HENT:      Head: Normocephalic and atraumatic        Right Ear: External ear normal       Left Ear: External ear normal  Nose: Nose normal    Eyes:      Conjunctiva/sclera: Conjunctivae normal    Cardiovascular:      Rate and Rhythm: Normal rate and regular rhythm  Pulses: Normal pulses  Heart sounds: Normal heart sounds  Pulmonary:      Effort: Pulmonary effort is normal  No respiratory distress  Breath sounds: Normal breath sounds  No wheezing  Musculoskeletal:      Right shoulder: Tenderness present  No swelling  Decreased range of motion  Cervical back: Normal range of motion and neck supple  Skin:     General: Skin is warm and dry  Neurological:      Mental Status: She is alert and oriented to person, place, and time     Psychiatric:         Behavior: Behavior normal

## 2022-01-18 NOTE — PATIENT INSTRUCTIONS
Exercises for Internal and External Shoulder Rotation   WHAT YOU NEED TO KNOW:   Exercises for internal shoulder rotation work the muscles in your chest and front of your shoulder  Exercises for external shoulder rotation work the muscles in the back of your shoulder and upper back  DISCHARGE INSTRUCTIONS:   Contact your healthcare provider if:   · You have sharp or worsening pain during exercise or at rest     · You have questions or concerns about your shoulder exercises  Before you exercise:  Warm up and stretch before you exercise  Walk or ride a stationary bike for 5 to 10 minutes to help you warm up  Stretching helps increase range of motion  It may also decrease muscle soreness and help prevent another injury  Your healthcare provider will tell you which of the following stretches to do:  · Crossover arm stretch:  Relax your shoulders  Hold your upper arm with the opposite hand  Pull your arm across your chest until you feel a stretch  Hold the stretch for 30 seconds  Return to the starting position  · Shoulder flexion stretch:  Stand facing a wall  Slowly walk your fingers up the wall until you feel a stretch  Hold the stretch for 30 seconds  Return to the starting position  · Sleeper stretch:  Lie on your injured side on a firm, flat surface  Bend the elbow of your injured arm 90° with your hand facing up  Use your arm that is not injured to slowly push your injured arm down  Stop when you feel a stretch at the back of your injured shoulder  Hold the stretch for 30 seconds  Slowly return to the starting position  How to exercise with a weight:  Your healthcare provider will tell you how much weight to exercise with  · Shoulder internal rotation:  Sit in a chair  Place a rolled up towel between your elbow and your side  Bend your elbow to 90°  Gently squeeze the towel with your elbow to prevent it from falling out  Hold the weight with your thumb pointing up   Slowly move the weight across your chest  Stop when your hand reaches your opposite arm  Hold this position for as many seconds as directed  Slowly return to the starting position  · Shoulder external rotation:  Lie on your side with your injured shoulder facing up  Bend your elbow 90°  Place a rolled up towel between your elbow and your side  Hold a weight in your hand  Gently squeeze the towel with your elbow to prevent it from falling out  Slowly rotate your arm outward, but keep your elbow bent  Stop when you feel a stretch  Hold this position for 30 seconds or as directed  Slowly return to the starting position  How to exercise with an exercise band:   · Shoulder internal rotation:  Tie one end of the exercise band to a heavy, secure object  Sit in a chair  Place a rolled up towel between your elbow and your side  Bend your elbow to 90°  Gently squeeze the towel with your elbow to prevent it from falling out  Slowly pull the band across your chest  Stop when your hand reaches your opposite arm  Hold this position for as many seconds as directed  Slowly return to the starting position  · Shoulder external rotation:  Hold one end of the exercise band on the side that is not injured  Place a rolled up towel between your elbow and your side  Bend your elbow 90°  Squeeze the towel with your elbow  Grab the end of the band and slowly turn your arm outward, but keep your elbow bent  Stop when you feel a stretch  Hold this position for 30 seconds or as directed  Slowly return to the starting position  Follow up with your physical therapist as directed:  Write down your questions so you remember to ask them at your visits  © Copyright [employer] 2021 Information is for End User's use only and may not be sold, redistributed or otherwise used for commercial purposes   All illustrations and images included in CareNotes® are the copyrighted property of [employer]  or [employer]  The above information is an  only  It is not intended as medical advice for individual conditions or treatments  Talk to your doctor, nurse or pharmacist before following any medical regimen to see if it is safe and effective for you  Shoulder Pain   WHAT YOU NEED TO KNOW:   Shoulder pain is a common problem that can affect your daily activities  Pain can be caused by a problem within your shoulder, such as soreness of a tendon or bursa  A tendon is a cord of tough tissue that connects your muscles to your bones  The bursa is a fluid-filled sac that acts as a cushion between a bone and a tendon  Shoulder pain may also be caused by pain that spreads to your shoulder from another part of your body  DISCHARGE INSTRUCTIONS:   Return to the emergency department if:   · You have severe pain  · You cannot move your arm or shoulder  · You have numbness or tingling in your shoulder or arm  Contact your healthcare provider if:   · Your pain gets worse or does not go away with treatment  · You have trouble moving your arm or shoulder  · You have questions or concerns about your condition or care  Medicines: You may need any of the following:  · Acetaminophen  decreases pain and fever  It is available without a doctor's order  Ask how much to take and how often to take it  Follow directions  Read the labels of all other medicines you are using to see if they also contain acetaminophen, or ask your doctor or pharmacist  Acetaminophen can cause liver damage if not taken correctly  Do not use more than 4 grams (4,000 milligrams) total of acetaminophen in one day  · NSAIDs , such as ibuprofen, help decrease swelling, pain, and fever  This medicine is available with or without a doctor's order  NSAIDs can cause stomach bleeding or kidney problems in certain people  If you take blood thinner medicine, always ask your healthcare provider if NSAIDs are safe for you   Always read the medicine label and follow directions  · Take your medicine as directed  Contact your healthcare provider if you think your medicine is not helping or if you have side effects  Tell him of her if you are allergic to any medicine  Keep a list of the medicines, vitamins, and herbs you take  Include the amounts, and when and why you take them  Bring the list or the pill bottles to follow-up visits  Carry your medicine list with you in case of an emergency  Manage your symptoms:   · Apply ice  on your shoulder for 20 to 30 minutes every 2 hours or as directed  Use an ice pack, or put crushed ice in a plastic bag  Cover it with a towel before you apply it to your shoulder  Ice helps prevent tissue damage and decreases swelling and pain  · Apply heat if ice does not help your symptoms  Apply heat on your shoulder for 20 to 30 minutes every 2 hours for as many days as directed  Heat helps decrease pain and muscle spasms  · Limit activities as directed  Try to avoid repeated overhead movements  · Go to physical or occupational therapy as directed  A physical therapist teaches you exercises to help improve movement and strength, and to decrease pain  An occupational therapist teaches you skills to help with your daily activities  Prevent shoulder pain:   · Maintain a good range of motion in your shoulder  Ask your healthcare provider which exercises you should do on a regular basis after you have healed  · Stretch and strengthen your shoulder  Use proper technique during exercises and sports  Follow up with your healthcare provider or orthopedist as directed:  Write down your questions so you remember to ask them during your visits  © [employer] 2021 Information is for End User's use only and may not be sold, redistributed or otherwise used for commercial purposes   All illustrations and images included in CareNotes® are the copyrighted property of [employer]  or [employer]  The above information is an  only  It is not intended as medical advice for individual conditions or treatments  Talk to your doctor, nurse or pharmacist before following any medical regimen to see if it is safe and effective for you

## 2022-01-20 ENCOUNTER — OFFICE VISIT (OUTPATIENT)
Dept: FAMILY MEDICINE CLINIC | Facility: CLINIC | Age: 52
End: 2022-01-20

## 2022-01-20 VITALS
HEIGHT: 65 IN | SYSTOLIC BLOOD PRESSURE: 160 MMHG | BODY MASS INDEX: 48.82 KG/M2 | DIASTOLIC BLOOD PRESSURE: 90 MMHG | OXYGEN SATURATION: 98 % | TEMPERATURE: 97.5 F | WEIGHT: 293 LBS | HEART RATE: 93 BPM | RESPIRATION RATE: 18 BRPM

## 2022-01-20 DIAGNOSIS — M75.81 TENDINITIS OF RIGHT ROTATOR CUFF: ICD-10-CM

## 2022-01-20 DIAGNOSIS — I10 BENIGN HYPERTENSION: Primary | ICD-10-CM

## 2022-01-20 PROCEDURE — 1036F TOBACCO NON-USER: CPT | Performed by: FAMILY MEDICINE

## 2022-01-20 PROCEDURE — 3008F BODY MASS INDEX DOCD: CPT | Performed by: FAMILY MEDICINE

## 2022-01-20 PROCEDURE — 99213 OFFICE O/P EST LOW 20 MIN: CPT | Performed by: FAMILY MEDICINE

## 2022-01-20 RX ORDER — LISINOPRIL 20 MG/1
20 TABLET ORAL DAILY
Qty: 90 TABLET | Refills: 1 | Status: SHIPPED | OUTPATIENT
Start: 2022-01-20 | End: 2022-06-13 | Stop reason: SDUPTHER

## 2022-01-20 RX ORDER — METHOCARBAMOL 500 MG/1
500 TABLET, FILM COATED ORAL
Qty: 20 TABLET | Refills: 0 | Status: SHIPPED | OUTPATIENT
Start: 2022-01-20

## 2022-01-20 RX ORDER — LISINOPRIL 20 MG/1
20 TABLET ORAL DAILY
Qty: 30 TABLET | Refills: 3 | Status: SHIPPED | OUTPATIENT
Start: 2022-01-20 | End: 2022-01-20

## 2022-01-20 NOTE — ASSESSMENT & PLAN NOTE
Recurrent, exacerbated from lifting water at [employer]  No bony abnormalities on recent xray  Complicated by calcifications and muscle spasm of right SCM  Discussed supportive measures with tylenol/nsaids/heat/ice and muscle relaxants  Reviewed home exercises/stretches  Recommend PT and referral to sport medicine

## 2022-01-20 NOTE — ASSESSMENT & PLAN NOTE
Chronic, not at goal of < 140/90  Current medication includes: Amlodipine 5 mg qhs & lisinopril 10 mg qam  Possible element of pain contributing to elevated BP,  However has been persistently elevated for past month and at home prior to this current acute pain injury  Increase lisinopril to 20 mg daily and continue amlodipine 5 mg qhs   Weight loss will continue to aid in BP control  Discussed diet and lifestyle modifications

## 2022-01-20 NOTE — LETTER
January 20, 2022     Patient: Jesu Ireland   YOB: 1970   Date of Visit: 1/20/2022       To Whom it May Concern:    Jesu Ireland is under my professional care  She was seen in my office on 1/20/2022  She may return to work on 1/24/2022  She is not allowed to lift/push/pull/carry more than 5 lbs in her right arm as well as no lifting over her head past 90 degrees  These restrictions are for the next 4 weeks until our next evaluation  If you have any questions or concerns, please don't hesitate to call           Sincerely,          Celia Buckley MD

## 2022-01-20 NOTE — PROGRESS NOTES
Assessment/Plan:    Benign hypertension  Chronic, not at goal of < 140/90  Current medication includes: Amlodipine 5 mg qhs & lisinopril 10 mg qam  Possible element of pain contributing to elevated BP,  However has been persistently elevated for past month and at home prior to this current acute pain injury  Increase lisinopril to 20 mg daily and continue amlodipine 5 mg qhs   Weight loss will continue to aid in BP control  Discussed diet and lifestyle modifications    Tendinitis of right rotator cuff  Recurrent, exacerbated from lifting water at [employer]  No bony abnormalities on recent xray  Complicated by calcifications and muscle spasm of right SCM  Discussed supportive measures with tylenol/nsaids/heat/ice and muscle relaxants  Reviewed home exercises/stretches  Recommend PT and referral to sport medicine      Diagnoses and all orders for this visit:    Benign hypertension  -     lisinopril (ZESTRIL) 20 mg tablet; Take 1 tablet (20 mg total) by mouth daily    Tendinitis of right rotator cuff  -     Ambulatory Referral to Sports Medicine; Future  -     Ambulatory Referral to Physical Therapy; Future  -     methocarbamol (ROBAXIN) 500 mg tablet; Take 1 tablet (500 mg total) by mouth daily at bedtime    Other orders  -     Discontinue: lisinopril (ZESTRIL) 20 mg tablet; Take 1 tablet (20 mg total) by mouth daily        Subjective:      Patient ID: Kehinde Carbone is a 46 y o  female  This is a very pleasant 46 y o  female who presents to the clinic for management of their chronic medical conditions  Patient's medical conditions are stable unless noted otherwise above  Patient has not had any recent hospitalizations, or medical emergencies since last visit  Patient has no further complaints other than what is mentioned in the ROS      The following portions of the patient's history were reviewed and updated as appropriate: allergies, current medications, past family history, past medical history, past social history, past surgical history and problem list     Review of Systems   Constitutional: Negative for activity change, appetite change, chills, fatigue and fever  HENT: Negative for congestion, rhinorrhea, sinus pain and sore throat  Eyes: Negative for visual disturbance  Respiratory: Negative for cough, chest tightness, shortness of breath and wheezing  Cardiovascular: Negative for chest pain and palpitations  Gastrointestinal: Negative for abdominal pain, constipation, diarrhea, nausea and vomiting  Genitourinary: Negative for difficulty urinating  Musculoskeletal: Positive for arthralgias and neck pain  Negative for back pain, gait problem and myalgias  Skin: Negative for rash and wound  Neurological: Negative for dizziness, weakness, light-headedness and headaches  Psychiatric/Behavioral: Negative for agitation, behavioral problems and sleep disturbance  Objective:    /90 (BP Location: Left arm, Patient Position: Sitting, Cuff Size: Extra-Large)   Pulse 93   Temp 97 5 °F (36 4 °C) (Temporal)   Resp 18   Ht 5' 5" (1 651 m)   Wt (!) 150 kg (330 lb)   LMP  (LMP Unknown)   SpO2 98%   Breastfeeding No   BMI 54 91 kg/m²      Physical Exam  Vitals and nursing note reviewed  Constitutional:       General: She is not in acute distress  Appearance: Normal appearance  She is well-developed  She is obese  She is not ill-appearing  HENT:      Head: Normocephalic and atraumatic  Right Ear: External ear normal       Left Ear: External ear normal    Eyes:      Extraocular Movements: Extraocular movements intact  Conjunctiva/sclera: Conjunctivae normal    Cardiovascular:      Rate and Rhythm: Normal rate and regular rhythm  Pulses: Normal pulses  Heart sounds: Normal heart sounds  No murmur heard  Pulmonary:      Effort: Pulmonary effort is normal  No respiratory distress  Breath sounds: Normal breath sounds  No wheezing, rhonchi or rales     Musculoskeletal: Right shoulder: Tenderness and bony tenderness (at [facility] joint) present  No swelling, deformity, effusion, laceration or crepitus  Decreased range of motion (active and passive)  Decreased strength  Normal pulse  Left shoulder: Normal       Right upper arm: Normal       Left upper arm: Normal       Right elbow: Normal       Left elbow: Normal       Cervical back: Normal range of motion and neck supple  Spasms (right paraspinal muscles) and tenderness present  No swelling, edema, deformity, erythema, signs of trauma, lacerations, rigidity, torticollis or bony tenderness  Normal range of motion  Right lower leg: No edema  Left lower leg: No edema  Skin:     General: Skin is warm and dry  Capillary Refill: Capillary refill takes less than 2 seconds  Findings: No erythema or rash  Neurological:      General: No focal deficit present  Mental Status: She is alert and oriented to person, place, and time     Psychiatric:         Mood and Affect: Mood normal          Behavior: Behavior normal            Rosa M Day MD  01/20/22  3:36 PM

## 2022-01-20 NOTE — PATIENT INSTRUCTIONS
[phone] call this central scheduling number to set up your sport medicine referral  Call the PT office that is closest to you to set up an apt next week     Muscle Spasm   WHAT YOU NEED TO KNOW:   A muscle spasm is a sudden contraction of any muscle or group of muscles  A muscle cramp is a painful muscle spasm  Muscle cramps commonly occur after intense exercise or during pregnancy  They may also be caused by certain medications, dehydration, low calcium or magnesium levels, or another medical condition  DISCHARGE INSTRUCTIONS:   Medicines: You may need the following:  · NSAIDs  help decrease swelling and pain or fever  This medicine is available with or without a doctor's order  NSAIDs can cause stomach bleeding or kidney problems in certain people  If you take blood thinner medicine, always ask your healthcare provider if NSAIDs are safe for you  Always read the medicine label and follow directions  · Take your medicine as directed  Contact your healthcare provider if you think your medicine is not helping or if you have side effects  Tell him of her if you are allergic to any medicine  Keep a list of the medicines, vitamins, and herbs you take  Include the amounts, and when and why you take them  Bring the list or the pill bottles to follow-up visits  Carry your medicine list with you in case of an emergency  Follow up with your healthcare provider as directed: You may need other tests or treatment  You may also be referred to a physical therapist or other specialist  Write down your questions so you remember to ask them during your visits  Self-care:   · Stretch  your muscle to help relieve the cramp  It may be helpful to keep your muscle in the stretched position until the cramp is gone  · Apply heat  to help decrease pain and muscle spasms  Apply heat on the area for 20 to 30 minutes every 2 hours for as many days as directed  · Apply ice  to help decrease swelling and pain   Ice may also help prevent tissue damage  Use an ice pack, or put crushed ice in a plastic bag  Cover it with a towel and place it on your muscle for 15 to 20 minutes every hour or as directed  · Drink more liquids  to help prevent muscle cramps caused by dehydration  Sports drinks may help replace electrolytes you lose through sweat during exercise  Ask your healthcare provider how much liquid to drink each day and which liquids are best for you  · Eat healthy foods , such as fruits, vegetables, whole grains, low-fat dairy products, and lean proteins (meat, beans, and fish)  If you are pregnant, ask your healthcare provider about foods that are high in magnesium and sodium  They may help to relieve cramps during pregnancy  · Massage your muscle  to help relieve the cramp  · Take frequent deep breaths  until the cramp feels better  Lie down while you take the deep breaths so you do not get dizzy or lightheaded  Contact your healthcare provider if:   · You have signs of dehydration, such as a headache, dark yellow urine, dry eyes or mouth, or a fast heartbeat  · You have questions or concerns about your condition or care  Return to the emergency department if:   · You have warmth, swelling, or redness in the cramping muscle  · You have frequent or unrelieved muscle cramps in several different muscles  · You have muscle cramps with numbness, tingling, and burning in your hands and feet  © Copyright [employer] 2021 Information is for End User's use only and may not be sold, redistributed or otherwise used for commercial purposes  All illustrations and images included in CareNotes® are the copyrighted property of [employer]  or Olu Mccann   The above information is an  only  It is not intended as medical advice for individual conditions or treatments   Talk to your doctor, nurse or pharmacist before following any medical regimen to see if it is safe and effective for you  Exercises for Shoulder Abduction and Adduction   WHAT YOU NEED TO KNOW:   Shoulder abduction and adduction exercises work the muscles at the back of your shoulder and your upper back  DISCHARGE INSTRUCTIONS:   Contact your healthcare provider if:   · You have sharp or worsening pain during exercise or at rest     · You have questions or concerns about your shoulder exercises  Before you exercise:  Warm up and stretch before you exercise  Walk or ride a stationary bike for 5 to 10 minutes to help you warm up  Stretching helps increase range of motion  It may also decrease muscle soreness and help prevent another injury  Your healthcare provider will tell you which of the following stretches to do:  · Crossover arm stretch:  Relax your shoulders  Hold your upper arm with the opposite hand  Pull your arm across your chest until you feel a stretch  Hold the stretch for 30 seconds  Return to the starting position  · Shoulder flexion stretch:  Stand facing a wall  Slowly walk your fingers up the wall until you feel a stretch  Hold the stretch for 30 seconds  Return to the starting position  · Sleeper stretch:  Lie on your injured side on a firm, flat surface  Bend the elbow of your injured arm 90° with your hand facing up  Use your arm that is not injured to slowly push your injured arm down  Stop when you feel a stretch at the back of your injured shoulder  Hold the stretch for 30 seconds  Slowly return to the starting position  How to exercise with a weight:  Your healthcare provider will tell you how much weight to use  · Shoulder abduction:  Stand and hold a weight in your hand with your palm facing your body  Slowly raise your arm to the side with your thumb pointing up  Then raise your arm over your head as far as you can without pain  Hold this position for as long as directed  Do not raise your arm over your head unless your healthcare provider says it is okay  · Shoulder adduction:  Lie on your back on a firm surface  Extend your arm out to a "T " Bend your elbow so your forearm in the air  Hold a weight in your hand  Slowly raise your arm toward the ceiling and straighten your elbow  Hold this position for as long as directed  Slowly return to the starting position  How to exercise with an exercise band:   · Shoulder abduction:  Wrap the exercise band around a heavy, stable object near your foot  Grab the band with the hand of your injured shoulder  Keep your arm straight  Slowly raise your arm to the side with your thumb pointing up  Then, slowly pull the band over your head as far as you can without pain  Do not raise your arm over your head unless your healthcare provider says it is okay  Do not let your shoulder shrug  Hold this position for as long as directed  Slowly return to the starting position  · Shoulder adduction:  Wrap the exercise band around a heavy, stable object  Stand and face away from where the band is anchored  Hold each end of the band in both hands with your elbows bent  Your elbows should not be behind your body  Keep your arms parallel to the floor and slowly straighten your elbows  Hold this position for as long as directed  Slowly return to the starting position  Follow up with your physical therapist as directed:  Write down your questions so you remember to ask them at your visits  © Copyright [employer] 2021 Information is for End User's use only and may not be sold, redistributed or otherwise used for commercial purposes  All illustrations and images included in CareNotes® are the copyrighted property of [employer]  or Olu Mccann   The above information is an  only  It is not intended as medical advice for individual conditions or treatments  Talk to your doctor, nurse or pharmacist before following any medical regimen to see if it is safe and effective for you      Exercises for Internal and External Shoulder Rotation   WHAT YOU NEED TO KNOW:   Exercises for internal shoulder rotation work the muscles in your chest and front of your shoulder  Exercises for external shoulder rotation work the muscles in the back of your shoulder and upper back  DISCHARGE INSTRUCTIONS:   Contact your healthcare provider if:   · You have sharp or worsening pain during exercise or at rest     · You have questions or concerns about your shoulder exercises  Before you exercise:  Warm up and stretch before you exercise  Walk or ride a stationary bike for 5 to 10 minutes to help you warm up  Stretching helps increase range of motion  It may also decrease muscle soreness and help prevent another injury  Your healthcare provider will tell you which of the following stretches to do:  · Crossover arm stretch:  Relax your shoulders  Hold your upper arm with the opposite hand  Pull your arm across your chest until you feel a stretch  Hold the stretch for 30 seconds  Return to the starting position  · Shoulder flexion stretch:  Stand facing a wall  Slowly walk your fingers up the wall until you feel a stretch  Hold the stretch for 30 seconds  Return to the starting position  · Sleeper stretch:  Lie on your injured side on a firm, flat surface  Bend the elbow of your injured arm 90° with your hand facing up  Use your arm that is not injured to slowly push your injured arm down  Stop when you feel a stretch at the back of your injured shoulder  Hold the stretch for 30 seconds  Slowly return to the starting position  How to exercise with a weight:  Your healthcare provider will tell you how much weight to exercise with  · Shoulder internal rotation:  Sit in a chair  Place a rolled up towel between your elbow and your side  Bend your elbow to 90°  Gently squeeze the towel with your elbow to prevent it from falling out  Hold the weight with your thumb pointing up   Slowly move the weight across your chest  Stop when your hand reaches your opposite arm  Hold this position for as many seconds as directed  Slowly return to the starting position  · Shoulder external rotation:  Lie on your side with your injured shoulder facing up  Bend your elbow 90°  Place a rolled up towel between your elbow and your side  Hold a weight in your hand  Gently squeeze the towel with your elbow to prevent it from falling out  Slowly rotate your arm outward, but keep your elbow bent  Stop when you feel a stretch  Hold this position for 30 seconds or as directed  Slowly return to the starting position  How to exercise with an exercise band:   · Shoulder internal rotation:  Tie one end of the exercise band to a heavy, secure object  Sit in a chair  Place a rolled up towel between your elbow and your side  Bend your elbow to 90°  Gently squeeze the towel with your elbow to prevent it from falling out  Slowly pull the band across your chest  Stop when your hand reaches your opposite arm  Hold this position for as many seconds as directed  Slowly return to the starting position  · Shoulder external rotation:  Hold one end of the exercise band on the side that is not injured  Place a rolled up towel between your elbow and your side  Bend your elbow 90°  Squeeze the towel with your elbow  Grab the end of the band and slowly turn your arm outward, but keep your elbow bent  Stop when you feel a stretch  Hold this position for 30 seconds or as directed  Slowly return to the starting position  Follow up with your physical therapist as directed:  Write down your questions so you remember to ask them at your visits  © Copyright [employer] 2021 Information is for End User's use only and may not be sold, redistributed or otherwise used for commercial purposes  All illustrations and images included in CareNotes® are the copyrighted property of [employer]  or Olu [employer]ouse  The above information is an  only  It is not intended as medical advice for individual conditions or treatments  Talk to your doctor, nurse or pharmacist before following any medical regimen to see if it is safe and effective for you

## 2022-02-04 ENCOUNTER — OFFICE VISIT (OUTPATIENT)
Dept: OBGYN CLINIC | Facility: MEDICAL CENTER | Age: 52
End: 2022-02-04
Payer: COMMERCIAL

## 2022-02-04 VITALS
SYSTOLIC BLOOD PRESSURE: 171 MMHG | DIASTOLIC BLOOD PRESSURE: 92 MMHG | BODY MASS INDEX: 44.41 KG/M2 | HEIGHT: 68 IN | HEART RATE: 98 BPM | TEMPERATURE: 96.8 F | WEIGHT: 293 LBS

## 2022-02-04 DIAGNOSIS — I10 BENIGN HYPERTENSION: ICD-10-CM

## 2022-02-04 DIAGNOSIS — M75.81 TENDINITIS OF RIGHT ROTATOR CUFF: ICD-10-CM

## 2022-02-04 DIAGNOSIS — M75.31 CALCIFIC TENDINITIS OF RIGHT SHOULDER: Primary | ICD-10-CM

## 2022-02-04 DIAGNOSIS — M25.511 ACUTE PAIN OF RIGHT SHOULDER: ICD-10-CM

## 2022-02-04 PROCEDURE — 20610 DRAIN/INJ JOINT/BURSA W/O US: CPT | Performed by: STUDENT IN AN ORGANIZED HEALTH CARE EDUCATION/TRAINING PROGRAM

## 2022-02-04 PROCEDURE — 99204 OFFICE O/P NEW MOD 45 MIN: CPT | Performed by: STUDENT IN AN ORGANIZED HEALTH CARE EDUCATION/TRAINING PROGRAM

## 2022-02-04 NOTE — PATIENT INSTRUCTIONS

## 2022-02-07 RX ORDER — METHYLPREDNISOLONE ACETATE 40 MG/ML
1 INJECTION, SUSPENSION INTRA-ARTICULAR; INTRALESIONAL; INTRAMUSCULAR; SOFT TISSUE
Status: COMPLETED | OUTPATIENT
Start: 2022-02-07 | End: 2022-02-07

## 2022-02-07 RX ORDER — LIDOCAINE HYDROCHLORIDE 20 MG/ML
4 INJECTION, SOLUTION INFILTRATION; PERINEURAL
Status: COMPLETED | OUTPATIENT
Start: 2022-02-07 | End: 2022-02-07

## 2022-02-07 RX ADMIN — METHYLPREDNISOLONE ACETATE 1 ML: 40 INJECTION, SUSPENSION INTRA-ARTICULAR; INTRALESIONAL; INTRAMUSCULAR; SOFT TISSUE at 09:56

## 2022-02-07 RX ADMIN — LIDOCAINE HYDROCHLORIDE 4 ML: 20 INJECTION, SOLUTION INFILTRATION; PERINEURAL at 09:56

## 2022-02-07 NOTE — PROGRESS NOTES
1  Calcific tendinitis of right shoulder  Ambulatory Referral to Physical Therapy    Large joint arthrocentesis: R subacromial bursa   2  Tendinitis of right rotator cuff  Ambulatory Referral to Sports Medicine    Ambulatory Referral to Physical Therapy    Large joint arthrocentesis: R subacromial bursa   3  Acute pain of right shoulder  Ambulatory Referral to Physical Therapy    Large joint arthrocentesis: R subacromial bursa   4  Benign hypertension  Large joint arthrocentesis: R subacromial bursa     Orders Placed This Encounter   Procedures    Large joint arthrocentesis: R subacromial bursa    Ambulatory Referral to Physical Therapy        Imaging Studies (I personally reviewed images in PACS):     X-ray right shoulder 01/18/2022:  Mild degenerative changes of the acromioclavicular as well as glenohumeral joints  There is small insertional calcifications of the rotator cuff at the superior aspect of the humeral head consistent with calcific tendinitis  No acute osseous abnormalities  IMPRESSION:   Acute right shoulder pain secondary to calcific tendinitis flare   Right glenohumeral/AC joint arthritis    No precipitating injury   Brief relief with prednisone pack/NSAIDs prescribed previously    Other factors:   BMI 51   Essential HTN - elevated today   Pre-diabetic    PLAN:     Clinical exam and radiographic imaging reviewed with patient today, with impression as per above  I have discussed with the patient the pathophysiology of this diagnosis and reviewed how the examination correlates with this diagnosis  Treatment options were discussed at length and after discussing these treatment options, the patient elected for a right-sided subacromial cortisone injection as per procedure note below  I counseled can repeat this injection every 3 months as needed in regards to pain control    I discussed that in regards to improving her shoulder stiffness/weakness, limited range of motion that attending formal physical therapy would help facilitate with this recovery  Patient agreeable to attend physical therapy and referral was placed  In addition, I have also provided home exercise in the interim   I recommended at this time for patient to hold off taking NSAIDs given her elevated blood pressure and to only use acetaminophen 500-1000 mg p o  Q 6 -8 hours p r n  Along with ice/heat therapy 20 minutes on/off  Patient denies any red flags of shortness of breath, chest pain, new headaches, sided weakness today  I did discuss these red flag signs the patient today and recommended she go to the ER if she were to experience these symptoms   Patient also wishes to address her bilateral knee pain but given the limited amount of time today, I recommended we follow up in approximately 2 weeks so that I can follow up response to cortisone injection of her shoulder at that and address her knees as well  Return in about 2 weeks (around 2/18/2022) for Follow up for b/l knees  Portions of the record may have been created with voice recognition software  Occasional wrong word or "sound a like" substitutions may have occurred due to the inherent limitations of voice recognition software  Read the chart carefully and recognize, using context, where substitutions have occurred  CHIEF COMPLAINT:  Chief Complaint   Patient presents with    Right Shoulder - Pain    Right Knee - Pain    Left Knee - Pain         HPI:  Pam Sahni is a 46 y o  female  who presents in regards to referral from her PCP Dr Zakia Sales, for       Visit 2/4/2022:  Initial evaluation of right shoulder pain:  Of note, patient has a history of pre diabetes, BMI 51, and states she has had similar pain of her shoulder in the past   She states a few weeks ago she was lifting heavy cases of water when she noticed a pain over the lateral aspect of her shoulder that progressively worsened over time  She denies any fall onto her right shoulder    She states she does not typically do with this amount of weight at baseline  Currently, she feels her pain is located over the anterior, lateral, dorsal aspects of her shoulder and describes as a sharp/aching pain of moderate to severe intensity  Pain is mainly aggravated with lifting her shoulder above shoulder height as well as lifting  She reports clicking/popping of her shoulder and difficulty lying on her right shoulder as this also exacerbates her pain  She states when she is resting the pain can be minimized/alleviated  She had seen her PCP for this issue prescribed her Medrol Dosepak, NSAIDs which she had completed and states she did notice relief while on these medications but after her prednisone was finished, her pain returned  Currently she states she is taking NSAIDs frequently throughout the day  She denies any shortness of breath, chest pain, numbness of her upper extremities  She states she had a shoulder injection several years ago with significant relief and is interested on whether she would be warranted for repeat injection today  She did obtain imaging already as noted above  Denies fevers/chills  Medical, Surgical, Family, and Social History    Past Medical History:   Diagnosis Date    Arthritis     knees    Hypertension      Past Surgical History:   Procedure Laterality Date    APPENDECTOMY  2019    laparoscopic    CHOLECYSTECTOMY  2006    OK COLONOSCOPY FLX DX W/COLLJ SPEC WHEN PFRMD Left 5/2/2019    Procedure: COLONOSCOPY;  Surgeon: Juan F Mercedes MD;  [facility];   Service: Gastroenterology    OK LAP,APPENDECTOMY N/A 3/22/2019    Procedure: Samy Franklin;  Surgeon: Marc Burnett MD;  Location: [address] [place] [address];  Service: General    [address] [place] [address] N/A 8/28/2020    Procedure: [address] ;  Surgeon: Marc Burnett MD;  Location: [address] [place] [address] OR;  Service: General     Social History   Social History     Substance and Sexual Activity   Alcohol Use Yes    Comment: couple times/month     Social History     Substance and Sexual Activity   Drug Use Never     Social History     Tobacco Use   Smoking Status Never Smoker   Smokeless Tobacco Never Used     Family History   Problem Relation Age of Onset    Colon cancer Mother 61    Stroke Father     No Known Problems Sister     No Known Problems Daughter     No Known Problems Maternal Grandmother     No Known Problems Maternal Grandfather     No Known Problems Paternal Grandmother     No Known Problems Paternal Grandfather     No Known Problems Paternal Aunt     No Known Problems Paternal Aunt     Breast cancer Neg Hx     Ovarian cancer Neg Hx      No Known Allergies       Physical Exam  BP (!) 171/92   Pulse 98   Temp (!) 96 8 °F (36 °C)   Ht 5' 8" (1 727 m)   Wt (!) 152 kg (335 lb)   LMP  (LMP Unknown)   BMI 50 94 kg/m²     Constitutional:  see vital signs  Gen: obese, normocephalic/atraumatic, well-groomed  Eyes: No inflammation or discharge of conjunctiva or lids; sclera clear   Pharynx: no inflammation, lesion, or mass of lips  Neck: supple, no masses, non-distended  MSK: no inflammation, lesion, mass, or clubbing of nails and digits except for other than mentioned below  SKIN: no visible rashes or skin lesions  Pulmonary/Chest: Effort normal  No respiratory distress       Ortho Exam  Shoulder exam:       RIGHT    Inspection Erythema None     Swelling None     Increased Warmth None    Rotator cuff ER 4/5     IR 5/5     Abduction 4/5    ROM      Abduction 140     ER0 60     IRb Lower lumbar (left arm reaches to lower thoracic)    TTP:  +insertion of RC on greater tuberosity, +anterior glenohumeral joint line    Special Tests: O'Briens  (FF 90, add 10, resist thumbs up-, resist thumbs down+) Negative slap    Cross body Adduction Negative     Speeds  Negative     Yergason's Negative     Drop arm Negative     Neer Positive     Padilla Positive        UE NV Exam: +2 Radial pulses   Sensation intact to light touch C5-T1 bilaterally  Ok sign (median nerve): intact  Froment sign (ulnar nerve): intact  Thumb extension (radial nerve): 5/5 and intact    Right elbow, wrist, and and forearm ROM full, painless with passive ROM, no ttp or crepitance throughout extremities below shoulder joint    Cervical ROM is full without pain, numbness or tingling  Large joint arthrocentesis: R subacromial bursa  Universal Protocol:  Consent: Verbal consent obtained  Risks and benefits: risks, benefits and alternatives were discussed  Consent given by: patient  Time out: Immediately prior to procedure a "time out" was called to verify the correct patient, procedure, equipment, support staff and site/side marked as required  Timeout called at: 2/4/2022 3:10 PM   Patient understanding: patient states understanding of the procedure being performed  Site marked: the operative site was marked  Radiology Images displayed and confirmed   If images not available, report reviewed: imaging studies available  Patient identity confirmed: verbally with patient    Supporting Documentation  Indications: pain   Procedure Details  Location: shoulder - R subacromial bursa  Preparation: Patient was prepped and draped in the usual sterile fashion  Needle size: 22 G  Ultrasound guidance: no  Approach: posterior  Medications administered: 4 mL lidocaine 2 %; 1 mL methylPREDNISolone acetate 40 mg/mL    Patient tolerance: patient tolerated the procedure well with no immediate complications  Dressing:  Sterile dressing applied

## 2022-02-10 ENCOUNTER — OFFICE VISIT (OUTPATIENT)
Dept: FAMILY MEDICINE CLINIC | Facility: CLINIC | Age: 52
End: 2022-02-10

## 2022-02-10 VITALS
SYSTOLIC BLOOD PRESSURE: 134 MMHG | RESPIRATION RATE: 18 BRPM | WEIGHT: 293 LBS | TEMPERATURE: 96.8 F | OXYGEN SATURATION: 98 % | HEIGHT: 68 IN | DIASTOLIC BLOOD PRESSURE: 86 MMHG | BODY MASS INDEX: 44.41 KG/M2 | HEART RATE: 84 BPM

## 2022-02-10 DIAGNOSIS — E66.01 MORBID OBESITY WITH BMI OF 40.0-44.9, ADULT (HCC): ICD-10-CM

## 2022-02-10 DIAGNOSIS — L98.9 SKIN LESION: ICD-10-CM

## 2022-02-10 DIAGNOSIS — I10 BENIGN HYPERTENSION: Primary | ICD-10-CM

## 2022-02-10 PROCEDURE — 99213 OFFICE O/P EST LOW 20 MIN: CPT | Performed by: FAMILY MEDICINE

## 2022-02-10 PROCEDURE — 1036F TOBACCO NON-USER: CPT | Performed by: FAMILY MEDICINE

## 2022-02-10 NOTE — ASSESSMENT & PLAN NOTE
Patient has recently started to make dietary changes and is motivated to lose weight, with a focus on portion control and healthy options  Eencouraged patient to make realistic changes to her diet that can be lifelong rather than the fad diet  Patient also educated on the importance of exercise  Encouraged to walk 30 min at least 5 times a week     Discussed benefits of losing weight, with a healthy goal of 1-2 lb weight loss per week  Continue to work with weight management

## 2022-02-10 NOTE — ASSESSMENT & PLAN NOTE
Chronic, goal of < 140/90, today is 134/86  Current medication includes: Amlodipine 5 mg qhs & lisinopril 20 mg qam (increased at last visit)  Renal function excellent   Continue current medications   Weight loss will continue to aid in BP control  Discussed diet and lifestyle modifications

## 2022-02-10 NOTE — PROGRESS NOTES
Assessment/Plan:    Benign hypertension  Chronic, goal of < 140/90, today is 134/86  Current medication includes: Amlodipine 5 mg qhs & lisinopril 20 mg qam (increased at last visit)  Renal function excellent   Continue current medications   Weight loss will continue to aid in BP control  Discussed diet and lifestyle modifications    Morbid obesity with BMI of 40 0-44 9, adult [facility])  Patient has recently started to make dietary changes and is motivated to lose weight, with a focus on portion control and healthy options  Eencouraged patient to make realistic changes to her diet that can be lifelong rather than the fad diet  Patient also educated on the importance of exercise  Encouraged to walk 30 min at least 5 times a week  Discussed benefits of losing weight, with a healthy goal of 1-2 lb weight loss per week  Continue to work with weight management      Diagnoses and all orders for this visit:    Benign hypertension    Morbid obesity with BMI of 40 0-44 9, adult ([facility] [address] )    Skin lesion  Comments:  of left lower leg, see media for image  Will have patient return in one month to remove if still present          Subjective:      Patient ID: Dianelys Gunn is a 46 y o  female  This is a very pleasant 46 y o  female who presents to the clinic for management of their chronic medical conditions  Patient's medical conditions are stable unless noted otherwise above  Patient has not had any recent hospitalizations, or medical emergencies since last visit  Patient has no further complaints other than what is mentioned in the ROS  The following portions of the patient's history were reviewed and updated as appropriate: allergies, current medications, past family history, past medical history, past social history, past surgical history and problem list     Review of Systems   Constitutional: Negative for activity change, appetite change, chills, fatigue and fever     HENT: Negative for congestion, rhinorrhea, sinus pain and sore throat  Eyes: Negative for visual disturbance  Respiratory: Negative for cough, chest tightness, shortness of breath and wheezing  Cardiovascular: Negative for chest pain and palpitations  Gastrointestinal: Negative for abdominal pain, constipation, diarrhea, nausea and vomiting  Genitourinary: Negative for difficulty urinating  Musculoskeletal: Positive for arthralgias and neck pain  Negative for back pain, gait problem and myalgias  Skin: Negative for rash and wound  Neurological: Negative for dizziness, weakness, light-headedness and headaches  Psychiatric/Behavioral: Negative for agitation, behavioral problems and sleep disturbance  Objective:    /86 (BP Location: Left arm, Patient Position: Sitting, Cuff Size: Extra-Large)   Pulse 84   Temp (!) 96 8 °F (36 °C) (Temporal)   Resp 18   Ht 5' 8" (1 727 m)   Wt (!) 150 kg (330 lb)   LMP  (LMP Unknown)   SpO2 98%   BMI 50 18 kg/m²      Physical Exam  Vitals and nursing note reviewed  Constitutional:       General: She is not in acute distress  Appearance: Normal appearance  She is well-developed  She is obese  She is not ill-appearing  HENT:      Head: Normocephalic and atraumatic  Right Ear: External ear normal       Left Ear: External ear normal    Eyes:      Extraocular Movements: Extraocular movements intact  Conjunctiva/sclera: Conjunctivae normal    Cardiovascular:      Rate and Rhythm: Normal rate and regular rhythm  Pulses: Normal pulses  Heart sounds: Normal heart sounds  No murmur heard  Pulmonary:      Effort: Pulmonary effort is normal  No respiratory distress  Breath sounds: Normal breath sounds  No wheezing, rhonchi or rales  Musculoskeletal:      Right shoulder: Tenderness and bony tenderness (at [facility] joint) present  No swelling, deformity, effusion, laceration or crepitus  Decreased range of motion (active and passive)  Decreased strength  Normal pulse        Left shoulder: Normal       Right upper arm: Normal       Left upper arm: Normal       Right elbow: Normal       Left elbow: Normal       Cervical back: Normal range of motion and neck supple  Spasms (right paraspinal muscles) and tenderness present  No swelling, edema, deformity, erythema, signs of trauma, lacerations, rigidity, torticollis or bony tenderness  Normal range of motion  Right lower leg: No edema  Left lower leg: No edema  Skin:     General: Skin is warm and dry  Capillary Refill: Capillary refill takes less than 2 seconds  Findings: No erythema or rash  Comments: Lesion on left lower leg, appears to be a scab, however patient reports present for 1 5 months  Refer to media for image   Neurological:      General: No focal deficit present  Mental Status: She is alert and oriented to person, place, and time     Psychiatric:         Mood and Affect: Mood normal          Behavior: Behavior normal            Jenny Longo MD  02/10/22  3:34 PM

## 2022-02-18 ENCOUNTER — OFFICE VISIT (OUTPATIENT)
Dept: OBGYN CLINIC | Facility: MEDICAL CENTER | Age: 52
End: 2022-02-18
Payer: COMMERCIAL

## 2022-02-18 VITALS
HEART RATE: 88 BPM | TEMPERATURE: 97.1 F | BODY MASS INDEX: 44.41 KG/M2 | HEIGHT: 68 IN | SYSTOLIC BLOOD PRESSURE: 126 MMHG | DIASTOLIC BLOOD PRESSURE: 81 MMHG | WEIGHT: 293 LBS

## 2022-02-18 DIAGNOSIS — M75.31 CALCIFIC TENDINITIS OF RIGHT SHOULDER: Primary | ICD-10-CM

## 2022-02-18 DIAGNOSIS — M25.562 BILATERAL CHRONIC KNEE PAIN: ICD-10-CM

## 2022-02-18 DIAGNOSIS — M25.561 BILATERAL CHRONIC KNEE PAIN: ICD-10-CM

## 2022-02-18 DIAGNOSIS — G89.29 BILATERAL CHRONIC KNEE PAIN: ICD-10-CM

## 2022-02-18 DIAGNOSIS — M17.0 BILATERAL PRIMARY OSTEOARTHRITIS OF KNEE: ICD-10-CM

## 2022-02-18 DIAGNOSIS — M25.511 ACUTE PAIN OF RIGHT SHOULDER: ICD-10-CM

## 2022-02-18 PROCEDURE — 3008F BODY MASS INDEX DOCD: CPT | Performed by: STUDENT IN AN ORGANIZED HEALTH CARE EDUCATION/TRAINING PROGRAM

## 2022-02-18 PROCEDURE — 99213 OFFICE O/P EST LOW 20 MIN: CPT | Performed by: STUDENT IN AN ORGANIZED HEALTH CARE EDUCATION/TRAINING PROGRAM

## 2022-02-18 PROCEDURE — 3008F BODY MASS INDEX DOCD: CPT | Performed by: FAMILY MEDICINE

## 2022-02-18 PROCEDURE — 1036F TOBACCO NON-USER: CPT | Performed by: STUDENT IN AN ORGANIZED HEALTH CARE EDUCATION/TRAINING PROGRAM

## 2022-02-18 NOTE — PROGRESS NOTES
1  Calcific tendinitis of right shoulder     2  Acute pain of right shoulder     3  Bilateral primary osteoarthritis of knee     4  Bilateral chronic knee pain       No orders of the defined types were placed in this encounter  Imaging Studies (I personally reviewed images in PACS and report):    · X-ray right shoulder 01/18/2022:  Mild degenerative changes of the acromioclavicular as well as glenohumeral joints  There is small insertional calcifications of the rotator cuff at the superior aspect of the humeral head consistent with calcific tendinitis  No acute osseous abnormalities   X-ray right knee 05/03/2021: Moderate tricompartmental osteoarthritis  Otherwise no acute osseous abnormalities   X-ray left knee 05/03/2021: Moderate tricompartmental osteoarthritis  Otherwise no acute osseous abnormalities    IMPRESSION:   Acute right shoulder pain secondary to calcific tendinitis flare versus right glenohumeral/AC joint arthritis-improved status post shoulder injection on 02/04/2022   Chronic bilateral knee pain with evidence of primary osteoarthritis-improved status post patient starting a tumeric supplement      Other factors:   BMI 51    PLAN:     Clinical exam and radiographic imaging reviewed with patient today, with impression as per above  I have discussed with the patient the pathophysiology of this diagnosis and reviewed how the examination correlates with this diagnosis  Treatment options were discussed at length and after discussing these treatment options, the patient elected to defer cortisone injections of her knees today given her improvement which is reasonable  Counseled importance of staying active and working on active range of motion movements of her knees to prevent reaggravation of her pain     In regards her right shoulder, I counseled that she is appropriately improving since subacromial cortisone injection that we can repeat this injection every 3 months as needed in regards to pain control  I have previously provided a formal physical therapy referral for this issue as well and provided home exercises  Patient states she decide whether she will want to attend formal PT going forward   BMI Counseling: Body mass index is 50 18 kg/m²  The BMI is above normal  Patient referred to PCP due to patient being severely obese  Return in about 3 months (around 5/18/2022)  Portions of the record may have been created with voice recognition software  Occasional wrong word or "sound a like" substitutions may have occurred due to the inherent limitations of voice recognition software  Read the chart carefully and recognize, using context, where substitutions have occurred  CHIEF COMPLAINT:  Chief Complaint   Patient presents with    Left Knee - Follow-up    Right Knee - Follow-up         HPI:  Darren Patterson is a 46 y o  female  who presents for       Visit 2/18/2022 : Follow up right shoulder pain s/p cortisone injection:  Improved  She states improvements include decreased intensity and frequency of pain of her right shoulder  She also feels that her range of motion has improved as well  She states she is not 100% back to baseline she still has some pain over the lateral aspect of her shoulder but has become more tolerable  She is able to complete activities of daily living and has less pain when lying on her right shoulder  She is satisfied with her progress and does not feel further intervention is warranted  She has not attended formal physical therapy yet but is planning on attending in the future  She has home exercises provided previously as well  She denies new injuries since last visit  She denies shoulder swelling, discoloration, numbness/tingling      In regards to her bilateral knees, she actually states that she had been considering getting cortisone injection of her knees as well but recently purchased a 2 ring based supplement and feels that her pain has significantly improved since starting this supplement  She reports improvements include decreased intensity and frequency of pain  She also feels that her knee range of motion has improved and is able to walk further than she did previously  Thus, at this time she wishes to defer cortisone injections      Medical, Surgical, Family, and Social History    Past Medical History:   Diagnosis Date    Arthritis     knees    Hypertension      Past Surgical History:   Procedure Laterality Date    APPENDECTOMY  2019    laparoscopic    CHOLECYSTECTOMY  2006    NE COLONOSCOPY FLX DX W/COLLJ SPEC WHEN PFRMD Left 5/2/2019    Procedure: COLONOSCOPY;  Surgeon: Fina Rojo MD;  [facility];   Service: Gastroenterology    NE LAP,APPENDECTOMY N/A 3/22/2019    Procedure: [place];  Surgeon: Cliff Lainez MD;  Location: [address] [place] [address];  Service: [address] [place] [address] N/A 8/28/2020    Procedure: [address] ;  Surgeon: Cliff Lainez MD;  Location: [address] [place] [address];  Service: General     Social History   Social History     Substance and Sexual Activity   Alcohol Use Yes    Comment: couple times/month     Social History     Substance and Sexual Activity   Drug Use Never     Social History     Tobacco Use   Smoking Status Never Smoker   Smokeless Tobacco Never Used     Family History   Problem Relation Age of Onset    Colon cancer Mother 61    Stroke Father     No Known Problems Sister     No Known Problems Daughter     No Known Problems Maternal Grandmother     No Known Problems Maternal Grandfather     No Known Problems Paternal Grandmother     No Known Problems Paternal Grandfather     No Known Problems Paternal Aunt     No Known Problems Paternal Aunt     Breast cancer Neg Hx     Ovarian cancer Neg Hx      No Known Allergies       Physical Exam  /81   Pulse 88   Temp (!) 97 1 °F (36 2 °C)   Ht 5' 8" (1 727 m)   Wt (!) 150 kg (330 lb)   LMP  (LMP Unknown)   BMI 50 18 kg/m²     Constitutional:  see vital signs  Gen: obese, normocephalic/atraumatic, well-groomed  Eyes: No inflammation or discharge of conjunctiva or lids; sclera clear   Pulmonary/Chest: Effort normal  No respiratory distress         Ortho Exam  Shoulder exam:         RIGHT     Inspection Erythema None       Swelling None       Increased Warmth None     Rotator cuff ER 5-/5       IR 5/5       Abduction 5-/5     ROM        Abduction 160       ER0 60       IRb Lower thoracic     TTP:   +minimal over lateral aspect of shoulder     Special Tests: O'Briens  (FF 90, add 10, resist thumbs up-, resist thumbs down+) Negative slap     Cross body Adduction Negative       Speeds  Negative       Yergason's Negative       Drop arm Negative       Neer Positive       Padilla Positive      Bilateral Knee Exam:  Erythema: no  Swelling: no (body habitus limits my ability to determine swelling)  Increased Warmth: no  Tenderness: denies  ROM: 0-130  Varus laxity: negative  Valgus laxity: negative      Procedures

## 2022-03-10 ENCOUNTER — PROCEDURE VISIT (OUTPATIENT)
Dept: FAMILY MEDICINE CLINIC | Facility: CLINIC | Age: 52
End: 2022-03-10

## 2022-03-10 VITALS
DIASTOLIC BLOOD PRESSURE: 86 MMHG | RESPIRATION RATE: 18 BRPM | WEIGHT: 293 LBS | HEIGHT: 68 IN | HEART RATE: 92 BPM | OXYGEN SATURATION: 97 % | BODY MASS INDEX: 44.41 KG/M2 | SYSTOLIC BLOOD PRESSURE: 132 MMHG | TEMPERATURE: 95.6 F

## 2022-03-10 DIAGNOSIS — D22.9 IRRITATED NEVUS: Primary | ICD-10-CM

## 2022-03-10 PROCEDURE — 99213 OFFICE O/P EST LOW 20 MIN: CPT | Performed by: FAMILY MEDICINE

## 2022-03-10 PROCEDURE — 11300 SHAVE SKIN LESION 0.5 CM/<: CPT | Performed by: FAMILY MEDICINE

## 2022-03-10 PROCEDURE — 88305 TISSUE EXAM BY PATHOLOGIST: CPT | Performed by: PATHOLOGY

## 2022-03-10 PROCEDURE — 3008F BODY MASS INDEX DOCD: CPT | Performed by: STUDENT IN AN ORGANIZED HEALTH CARE EDUCATION/TRAINING PROGRAM

## 2022-03-10 PROCEDURE — 3008F BODY MASS INDEX DOCD: CPT | Performed by: FAMILY MEDICINE

## 2022-03-10 PROCEDURE — 1036F TOBACCO NON-USER: CPT | Performed by: FAMILY MEDICINE

## 2022-03-10 NOTE — PROGRESS NOTES
Assessment/Plan:    Irritated nevus  Left lateral lower leg, scabbed and irritated  Suspect dermatofibroma, discussed possibility of recurrence   Tolerated skin biopsy well  Follow up results and discussed wound care      Diagnoses and all orders for this visit:    Irritated nevus  -     Tissue Exam; Future        Subjective:      Patient ID: Rocky Prather is a 45 y o  female  This is a very pleasant 45 y o  female who presents to the clinic for management of an irritated lesion on her left lower leg  Patient's medical conditions are stable unless noted otherwise above  Patient has not had any recent hospitalizations, or medical emergencies since last visit  Patient has no further complaints other than what is mentioned in the ROS  The following portions of the patient's history were reviewed and updated as appropriate: allergies, current medications, past family history, past medical history, past social history, past surgical history and problem list     Review of Systems   Constitutional: Negative for activity change, appetite change, chills, fatigue and fever  HENT: Negative for congestion, rhinorrhea, sinus pain and sore throat  Eyes: Negative for visual disturbance  Respiratory: Negative for cough, chest tightness, shortness of breath and wheezing  Cardiovascular: Negative for chest pain and palpitations  Gastrointestinal: Negative for abdominal pain, constipation, diarrhea, nausea and vomiting  Genitourinary: Negative for difficulty urinating  Musculoskeletal: Positive for arthralgias, back pain and neck pain  Negative for gait problem and myalgias  Skin: Positive for wound  Negative for rash  Neurological: Negative for dizziness, weakness, light-headedness and headaches  Psychiatric/Behavioral: Negative for agitation, behavioral problems and sleep disturbance           Objective:    /86 (BP Location: Left arm, Patient Position: Sitting, Cuff Size: Large)   Pulse 92   Temp Gagan Chiu ) 95 6 °F (35 3 °C) (Temporal)   Resp 18   Ht 5' 8" (1 727 m)   Wt (!) 154 kg (339 lb)   LMP  (LMP Unknown)   SpO2 97%   BMI 51 54 kg/m²        Physical Exam  Vitals and nursing note reviewed  Constitutional:       General: She is not in acute distress  Appearance: Normal appearance  She is well-developed  She is obese  She is not ill-appearing  HENT:      Head: Normocephalic and atraumatic  Right Ear: External ear normal       Left Ear: External ear normal    Cardiovascular:      Rate and Rhythm: Normal rate  Pulmonary:      Effort: Pulmonary effort is normal  No respiratory distress  Musculoskeletal:         General: No tenderness  Normal range of motion  Cervical back: Normal range of motion and neck supple  Right lower leg: No edema  Left lower leg: No edema  Skin:     General: Skin is warm and dry  Capillary Refill: Capillary refill takes less than 2 seconds  Comments: Lesion on left lower leg, scab on lateral border   Neurological:      Mental Status: She is alert and oriented to person, place, and time  Psychiatric:         Mood and Affect: Mood normal          Behavior: Behavior normal            Shave lesion    Date/Time: 3/10/2022 3:48 PM  Performed by: Bryn Leos MD  Authorized by: Anthony Villeda MD   Universal Protocol:  Procedure performed by: (Medical student Ousmane East)  Consent: Verbal consent obtained  Risks and benefits: risks, benefits and alternatives were discussed  Consent given by: patient  Time out: Immediately prior to procedure a "time out" was called to verify the correct patient, procedure, equipment, support staff and site/side marked as required    Patient understanding: patient states understanding of the procedure being performed  Patient consent: the patient's understanding of the procedure matches consent given  Procedure consent: procedure consent matches procedure scheduled  Relevant documents: relevant documents present and verified  Test results: test results available and properly labeled  Site marked: the operative site was marked  Patient identity confirmed: verbally with patient      Number of Lesions: 1  Lesion 1:     Body area: lower extremity    Lower extremity location: L lower leg    Malignancy: malignancy unknown      Destruction method: shave removal      Comments:   Well tolerated, covered with Darian Daniels MD  03/10/22  3:46 PM

## 2022-03-10 NOTE — PATIENT INSTRUCTIONS
Skin biopsy   WHAT YOU NEED TO KNOW:   A skin biopsy is a procedure used to remove a small piece of skin for testing  Part or all of a skin lesion (affected area of skin) may be removed  A punch biopsy allows the whole thickness of a very small piece skin to be taken  DISCHARGE INSTRUCTIONS:   Medicines:   · NSAIDs , such as ibuprofen, help decrease swelling, pain, and fever  This medicine is available with or without a doctor's order  NSAIDs can cause stomach bleeding or kidney problems in certain people  If you take blood thinner medicine, always ask if NSAIDs are safe for you  Always read the medicine label and follow directions  Do not give these medicines to children under 10months of age without direction from your child's healthcare provider  · Acetaminophen: This medicine decreases pain  Acetaminophen is available without a doctor's order  Ask how much to take and how often to take it  Follow directions  Acetaminophen can cause liver damage if not taken correctly  · Take your medicine as directed  Contact your healthcare provider if you think your medicine is not helping or if you have side effects  Tell him of her if you are allergic to any medicine  Keep a list of the medicines, vitamins, and herbs you take  Include the amounts, and when and why you take them  Bring the list or the pill bottles to follow-up visits  Carry your medicine list with you in case of an emergency  Follow up with your healthcare provider or dermatologist as directed: You may need to return to have your stitches removed  Write down your questions so you remember to ask them during your visits  Wound care:  Carefully wash the wound with soap and water  Dry the area and put on new, clean bandages as directed  Change your bandages when they get wet or dirty  Contact your healthcare provider if:   · You have a fever  · You have increased swelling, redness, or bleeding from your wound      · You have pain that does not go away, or is not helped by pain medicines  · You have yellow or green drainage coming out of your wound  · You have questions or concerns about your condition or care  Return to the emergency department if:   · You have red lines on your skin coming from your wound area  · Blood soaks through your bandage  © 2017 [employer] Information is for End User's use only and may not be sold, redistributed or otherwise used for commercial purposes  All illustrations and images included in CareNotes® are the copyrighted property of [employer]  or [employer]  The above information is an  only  It is not intended as medical advice for individual conditions or treatments  Talk to your doctor, nurse or pharmacist before following any medical regimen to see if it is safe and effective for you

## 2022-03-10 NOTE — ASSESSMENT & PLAN NOTE
Left lateral lower leg, scabbed and irritated  Suspect dermatofibroma, discussed possibility of recurrence   Tolerated skin biopsy well  Follow up results and discussed wound care

## 2022-03-30 DIAGNOSIS — J30.1 ALLERGIC RHINITIS DUE TO POLLEN, UNSPECIFIED SEASONALITY: ICD-10-CM

## 2022-03-30 RX ORDER — LORATADINE 10 MG/1
TABLET ORAL
Qty: 90 TABLET | Refills: 1 | Status: SHIPPED | OUTPATIENT
Start: 2022-03-30 | End: 2022-06-13 | Stop reason: SDUPTHER

## 2022-04-22 ENCOUNTER — HOSPITAL ENCOUNTER (EMERGENCY)
Facility: HOSPITAL | Age: 52
Discharge: HOME | End: 2022-04-22
Attending: EMERGENCY MEDICINE | Admitting: EMERGENCY MEDICINE
Payer: COMMERCIAL

## 2022-04-22 ENCOUNTER — APPOINTMENT (EMERGENCY)
Dept: NON INVASIVE DIAGNOSTICS | Facility: HOSPITAL | Age: 52
End: 2022-04-22
Payer: COMMERCIAL

## 2022-04-22 VITALS
BODY MASS INDEX: 50.28 KG/M2 | TEMPERATURE: 98.3 F | DIASTOLIC BLOOD PRESSURE: 82 MMHG | OXYGEN SATURATION: 98 % | RESPIRATION RATE: 20 BRPM | SYSTOLIC BLOOD PRESSURE: 162 MMHG | WEIGHT: 293 LBS | HEART RATE: 84 BPM

## 2022-04-22 DIAGNOSIS — M79.661 RIGHT CALF PAIN: Primary | ICD-10-CM

## 2022-04-22 LAB
ANION GAP SERPL CALCULATED.3IONS-SCNC: 5 MMOL/L (ref 5–14)
ATRIAL RATE: 86 BPM
BASOPHILS # BLD AUTO: 0.04 THOUSANDS/ΜL (ref 0–0.1)
BASOPHILS NFR BLD AUTO: 1 % (ref 0–1)
BUN SERPL-MCNC: 16 MG/DL (ref 5–25)
CALCIUM SERPL-MCNC: 8.5 MG/DL (ref 8.4–10.2)
CHLORIDE SERPL-SCNC: 106 MMOL/L (ref 97–108)
CO2 SERPL-SCNC: 29 MMOL/L (ref 22–30)
CREAT SERPL-MCNC: 0.83 MG/DL (ref 0.6–1.2)
D DIMER PPP FEU-MCNC: 0.4 UG/ML FEU
EOSINOPHIL # BLD AUTO: 0.31 THOUSAND/ΜL (ref 0–0.61)
EOSINOPHIL NFR BLD AUTO: 5 % (ref 0–6)
ERYTHROCYTE [DISTWIDTH] IN BLOOD BY AUTOMATED COUNT: 13 % (ref 11.6–15.1)
GFR SERPL CREATININE-BSD FRML MDRD: 80 ML/MIN/1.73SQ M
GLUCOSE SERPL-MCNC: 99 MG/DL (ref 70–99)
HCT VFR BLD AUTO: 44.7 % (ref 34.8–46.1)
HGB BLD-MCNC: 13.8 G/DL (ref 11.5–15.4)
IMM GRANULOCYTES # BLD AUTO: 0.03 THOUSAND/UL (ref 0–0.2)
IMM GRANULOCYTES NFR BLD AUTO: 1 % (ref 0–2)
LYMPHOCYTES # BLD AUTO: 1.57 THOUSANDS/ΜL (ref 0.6–4.47)
LYMPHOCYTES NFR BLD AUTO: 27 % (ref 14–44)
MCH RBC QN AUTO: 29.3 PG (ref 26.8–34.3)
MCHC RBC AUTO-ENTMCNC: 30.9 G/DL (ref 31.4–37.4)
MCV RBC AUTO: 95 FL (ref 82–98)
MONOCYTES # BLD AUTO: 0.69 THOUSAND/ΜL (ref 0.17–1.22)
MONOCYTES NFR BLD AUTO: 12 % (ref 4–12)
NEUTROPHILS # BLD AUTO: 3.29 THOUSANDS/ΜL (ref 1.85–7.62)
NEUTS SEG NFR BLD AUTO: 54 % (ref 43–75)
NRBC BLD AUTO-RTO: 0 /100 WBCS
P AXIS: 72 DEGREES
PLATELET # BLD AUTO: 171 THOUSANDS/UL (ref 149–390)
PMV BLD AUTO: 10.6 FL (ref 8.9–12.7)
POTASSIUM SERPL-SCNC: 3.9 MMOL/L (ref 3.6–5)
PR INTERVAL: 170 MS
QRS AXIS: 21 DEGREES
QRSD INTERVAL: 80 MS
QT INTERVAL: 364 MS
QTC INTERVAL: 435 MS
RBC # BLD AUTO: 4.71 MILLION/UL (ref 3.81–5.12)
SODIUM SERPL-SCNC: 140 MMOL/L (ref 137–147)
T WAVE AXIS: 10 DEGREES
VENTRICULAR RATE: 86 BPM
WBC # BLD AUTO: 5.93 THOUSAND/UL (ref 4.31–10.16)

## 2022-04-22 PROCEDURE — 85379 FIBRIN DEGRADATION QUANT: CPT

## 2022-04-22 PROCEDURE — 93005 ELECTROCARDIOGRAM TRACING: CPT

## 2022-04-22 PROCEDURE — 36415 COLL VENOUS BLD VENIPUNCTURE: CPT

## 2022-04-22 PROCEDURE — 93010 ELECTROCARDIOGRAM REPORT: CPT

## 2022-04-22 PROCEDURE — 99285 EMERGENCY DEPT VISIT HI MDM: CPT

## 2022-04-22 PROCEDURE — 80048 BASIC METABOLIC PNL TOTAL CA: CPT

## 2022-04-22 PROCEDURE — 99284 EMERGENCY DEPT VISIT MOD MDM: CPT

## 2022-04-22 PROCEDURE — 93970 EXTREMITY STUDY: CPT | Performed by: SURGERY

## 2022-04-22 PROCEDURE — 93971 EXTREMITY STUDY: CPT

## 2022-04-22 PROCEDURE — 85025 COMPLETE CBC W/AUTO DIFF WBC: CPT

## 2022-04-22 NOTE — ED PROVIDER NOTES
History  Chief Complaint   Patient presents with    Leg Pain     started yesterday with pain to right calf  no injury, no sob     Patient is a 57-year-old female who comes in for complaint of pain to the right calf has started yesterday  Patient states that she got her COVID booster yesterday, went home when the pain started  Patient does have swollen legs bilaterally, but she states that they have not gotten more swollen at this time  Patient denies any chest pain, shortness of breath, nausea, vomiting, abdominal pain, any other symptoms at this time  Patient does have a history of hypertension and prediabetes       History provided by:  Patient   used: No    Leg Pain  Location:  Knee and leg  Time since incident:  2 days  Leg location:  R lower leg  Knee location:  R knee  Associated symptoms: no decreased ROM, no fatigue, no fever, no itching, no numbness, no stiffness, no swelling and no tingling        Prior to Admission Medications   Prescriptions Last Dose Informant Patient Reported? Taking?    Diclofenac Sodium (VOLTAREN) 1 %   No No   Sig: Apply 2 g topically 4 (four) times a day   amLODIPine (NORVASC) 5 mg tablet   No No   Sig: Take 1 tablet (5 mg total) by mouth daily   hydrocortisone (ANUSOL-HC) 2 5 % rectal cream   No No   Sig: Apply topically 2 (two) times a day   ibuprofen (MOTRIN) 800 mg tablet   No No   Sig: Take 1 tablet (800 mg total) by mouth every 8 (eight) hours as needed for mild pain   lisinopril (ZESTRIL) 20 mg tablet   No No   Sig: Take 1 tablet (20 mg total) by mouth daily   loratadine (CLARITIN) 10 mg tablet   No No   Sig: TAKE 1 TABLET BY MOUTH DAILY   methocarbamol (ROBAXIN) 500 mg tablet   No No   Sig: Take 1 tablet (500 mg total) by mouth daily at bedtime   methylPREDNISolone 4 MG tablet therapy pack   No No   Sig: Use as directed on package   nystatin (MYCOSTATIN) powder   No No   Sig: Apply topically 2 (two) times a day      Facility-Administered Medications: None       Past Medical History:   Diagnosis Date    Arthritis     knees    Hypertension        Past Surgical History:   Procedure Laterality Date    APPENDECTOMY  2019    laparoscopic    CHOLECYSTECTOMY  2006    WA COLONOSCOPY FLX DX W/COLLJ SPEC WHEN PFRMD Left 5/2/2019    Procedure: COLONOSCOPY;  Surgeon: Jesica Fulton MD;  Location: [facility]; Service: Gastroenterology    WA LAP,APPENDECTOMY N/A 3/22/2019    Procedure: Chiquis Luz Mariaw;  Surgeon: Adenike Cantor MD;  Location: [address] [place] [address];  Service: General    [address] [place] [address] N/A 8/28/2020    Procedure: [address];  Surgeon: Adenike Cantor MD;  Location: [address] [place] [address];  Service: General       Family History   Problem Relation Age of Onset    Colon cancer Mother 61    Stroke Father     No Known Problems Sister     No Known Problems Daughter     No Known Problems Maternal Grandmother     No Known Problems Maternal Grandfather     No Known Problems Paternal Grandmother     No Known Problems Paternal Grandfather     No Known Problems Paternal Aunt     No Known Problems Paternal Aunt     Breast cancer Neg Hx     Ovarian cancer Neg Hx      I have reviewed and agree with the history as documented  E-Cigarette/Vaping    E-Cigarette Use Never User      E-Cigarette/Vaping Substances    Nicotine No      Social History     Tobacco Use    Smoking status: Never Smoker    Smokeless tobacco: Never Used   Vaping Use    Vaping Use: Never used   Substance Use Topics    Alcohol use: Yes     Comment: couple times/month    Drug use: Never       Review of Systems   Constitutional: Negative  Negative for activity change, appetite change, fatigue and fever  HENT: Negative  Negative for ear pain and trouble swallowing  Respiratory: Negative  Negative for chest tightness and shortness of breath  Cardiovascular: Positive for leg swelling  Negative for chest pain and palpitations  Gastrointestinal: Negative  Negative for abdominal pain, nausea and vomiting  Genitourinary: Negative  Musculoskeletal: Positive for arthralgias  Negative for stiffness  Skin: Negative  Negative for itching  Neurological: Negative  Psychiatric/Behavioral: Negative  Physical Exam  Physical Exam  Vitals reviewed  Constitutional:       Appearance: Normal appearance  She is obese  HENT:      Head: Normocephalic and atraumatic  Right Ear: External ear normal       Left Ear: External ear normal       Nose: Nose normal       Mouth/Throat:      Mouth: Mucous membranes are moist    Eyes:      Conjunctiva/sclera: Conjunctivae normal    Cardiovascular:      Rate and Rhythm: Normal rate  Pulses: Normal pulses  Dorsalis pedis pulses are 2+ on the right side and 2+ on the left side  Comments: Patient states that legs are not more swollen at this time, and point to patient's history list, she has had bilateral edema for some time  Pulmonary:      Effort: Pulmonary effort is normal    Abdominal:      Palpations: Abdomen is soft  Tenderness: There is no abdominal tenderness  There is no guarding  Musculoskeletal:         General: Tenderness present  Normal range of motion  Cervical back: Normal range of motion  Right knee: Tenderness present  Left knee: Normal       Right lower le+ Pitting Edema present  Left lower le+ Pitting Edema present  Comments: Slight tenderness behind the right knee   Skin:     General: Skin is warm and dry  Neurological:      Mental Status: She is alert           Vital Signs  ED Triage Vitals [22 1452]   Temperature Pulse Respirations Blood Pressure SpO2   98 3 °F (36 8 °C) (!) 118 20 162/82 94 %      Temp Source Heart Rate Source Patient Position - Orthostatic VS BP Location FiO2 (%)   Tympanic Monitor Sitting Left arm --      Pain Score       --           Vitals:    22 1452 22 1525   BP: 162/82 Pulse: (!) 118 84   Patient Position - Orthostatic VS: Sitting          Visual Acuity      ED Medications  Medications - No data to display    Diagnostic Studies  Results Reviewed     Procedure Component Value Units Date/Time    Basic metabolic panel [[id number]] Collected: 04/22/22 1557    Lab Status: Final result Specimen: Blood from Arm, Left Updated: 04/22/22 1619     Sodium 140 mmol/L      Potassium 3 9 mmol/L      Chloride 106 mmol/L      CO2 29 mmol/L      ANION GAP 5 mmol/L      BUN 16 mg/dL      Creatinine 0 83 mg/dL      Glucose 99 mg/dL      Calcium 8 5 mg/dL      eGFR 80 ml/min/1 73sq m     Narrative:      [facility] guidelines for Chronic Kidney Disease (CKD):     Stage 1 with normal or high GFR (GFR > 90 mL/min/1 73 square meters)    Stage 2 Mild CKD (GFR = 60-89 mL/min/1 73 square meters)    Stage 3A Moderate CKD (GFR = 45-59 mL/min/1 73 square meters)    Stage 3B Moderate CKD (GFR = 30-44 mL/min/1 73 square meters)    Stage 4 Severe CKD (GFR = 15-29 mL/min/1 73 square meters)    Stage 5 End Stage CKD (GFR <15 mL/min/1 73 square meters)  Note: GFR calculation is accurate only with a steady state creatinine    D-dimer, quantitative [[id number]]  (Normal) Collected: 04/22/22 1557    Lab Status: Final result Specimen: Blood from Arm, Left Updated: 04/22/22 1615     D-Dimer, Quant 0 40 ug/ml FEU     Narrative: In the evaluation for possible pulmonary embolism, in the appropriate (Well's Score of 4 or less) patient, the age adjusted d-dimer cutoff for this patient can be calculated as:    Age x 0 01 (in ug/mL) for Age-adjusted D-dimer exclusion threshold for a patient over 50 years      CBC and differential [[id number]]  (Abnormal) Collected: 04/22/22 1557    Lab Status: Final result Specimen: Blood from Arm, Left Updated: 04/22/22 1603     WBC 5 93 Thousand/uL      RBC 4 71 Million/uL      Hemoglobin 13 8 g/dL      Hematocrit 44 7 %      MCV 95 fL      MCH 29 3 pg MCHC 30 9 g/dL      RDW 13 0 %      MPV 10 6 fL      Platelets 093 Thousands/uL      nRBC 0 /100 WBCs      Neutrophils Relative 54 %      Immat GRANS % 1 %      Lymphocytes Relative 27 %      Monocytes Relative 12 %      Eosinophils Relative 5 %      Basophils Relative 1 %      Neutrophils Absolute 3 29 Thousands/µL      Immature Grans Absolute 0 03 Thousand/uL      Lymphocytes Absolute 1 57 Thousands/µL      Monocytes Absolute 0 69 Thousand/µL      Eosinophils Absolute 0 31 Thousand/µL      Basophils Absolute 0 04 Thousands/µL                  VAS lower limb venous duplex study, unilateral/limited    (Results Pending)              Procedures  Procedures         ED Course  ED Course as of 04/22/22 2018 Fri Apr 22, 2022   1552 Ventricular rate 86 beats per minute  WA interval 170 milliseconds  QRS duration 80 milliseconds  QT//435 milliseconds  PRT axes 72, 21, 10,    ECG interpretation-"normal sinus rhythm, left normal sinus rhythm, possible left atrial enlargement, nonspecific T-wave abnormality, abnormal ECG"     1615 D-Dimer, Quant: 0 40                                     Wells' Criteria for DVT      Most Recent Value   Wells' Criteria for DVT    Active cancer Treatment or palliation within 6 months 0 Filed at: 04/22/2022 1513   Bedridden recently >3 days or major surgery within 12 weeks 0 Filed at: 04/22/2022 1513   Calf swelling >3 cm compared to the other leg 0 Filed at: 04/22/2022 1513   Entire leg swollen 1 Filed at: 04/22/2022 1513   Collateral (nonvaricose) superficial veins present 0 Filed at: 04/22/2022 1513   Localized tenderness along the deep venous system 1 Filed at: 04/22/2022 1513   Pitting edema, confined to symptomatic leg 0 Filed at: 04/22/2022 1513   Paralysis, paresis, or recent plaster immobilization of the lower extremity 0 Filed at: 04/22/2022 1513   Previously documented DVT 0 Filed at: 04/22/2022 1513   Alternative diagnosis to DVT as likely or more likely 0 Filed at: 04/22/2022 [address] DVT Critera Score 2 Filed at: 04/22/2022 1513              [facility]  Number of Diagnoses or Management Options  Right calf pain: new and does not require workup  Diagnosis management comments: Patient is a 75-year-old female comes in for right calf pain that started yesterday  Patient patient had negative D-dimer, as well as a clear ultrasound duplex  Patient was stable at discharge    Counseling: I had a detailed discussion with the patient and/or guardian regarding: the historical points, exam findings, and any diagnostic results supporting the discharge diagnosis, lab results, radiology results, discharge instructions reviewed with patient and/or family/caregiver and understanding was verbalized  Instructions given to return to the emergency department if symptoms worsen or persist, or if there are any questions or concerns that arise at home       All labs reviewed and utilized in the medical decision making process     All radiology studies independently viewed by me and interpreted by the radiologist     Portions of the record may have been created with voice recognition software   Occasional wrong word or "sound a like" substitutions may have occurred due to the inherent limitations of voice recognition software   Read the chart carefully and recognize, using context, where substitutions have occurred           Amount and/or Complexity of Data Reviewed  Clinical lab tests: ordered and reviewed  Tests in the radiology section of CPT®: ordered and reviewed    Risk of Complications, Morbidity, and/or Mortality  Presenting problems: minimal  Diagnostic procedures: minimal  Management options: minimal    Patient Progress  Patient progress: stable      Disposition  Final diagnoses:   Right calf pain     Time reflects when diagnosis was documented in both MDM as applicable and the Disposition within this note     Time User Action Codes Description Comment    4/22/2022  5:51 PM Brianda Rizzo Add [W56 [id number]] Right calf pain       ED Disposition     ED Disposition Condition Date/Time Comment    Discharge Stable Fri Apr 22, 2022  5:51 PM Sophie Aase discharge to home/self care  Follow-up Information     Follow up With Specialties Details Why Contact Info Additional [address] [facility] Emergency Department Emergency Medicine  As needed, If symptoms worsen [id number] [facility] [id number] [address] [facility] Emergency Department          Discharge Medication List as of 4/22/2022  5:51 PM      CONTINUE these medications which have NOT CHANGED    Details   amLODIPine (NORVASC) 5 mg tablet Take 1 tablet (5 mg total) by mouth daily, Starting Thu 9/9/2021, Normal      Diclofenac Sodium (VOLTAREN) 1 % Apply 2 g topically 4 (four) times a day, Starting Thu 9/9/2021, Normal      hydrocortisone (ANUSOL-HC) 2 5 % rectal cream Apply topically 2 (two) times a day, Starting Thu 9/9/2021, Normal      ibuprofen (MOTRIN) 800 mg tablet Take 1 tablet (800 mg total) by mouth every 8 (eight) hours as needed for mild pain, Starting Tue 1/18/2022, Normal      lisinopril (ZESTRIL) 20 mg tablet Take 1 tablet (20 mg total) by mouth daily, Starting Thu 1/20/2022, Normal      loratadine (CLARITIN) 10 mg tablet TAKE 1 TABLET BY MOUTH DAILY, Normal      methocarbamol (ROBAXIN) 500 mg tablet Take 1 tablet (500 mg total) by mouth daily at bedtime, Starting Thu 1/20/2022, Normal      methylPREDNISolone 4 MG tablet therapy pack Use as directed on package, Normal      nystatin (MYCOSTATIN) powder Apply topically 2 (two) times a day, Starting Thu 10/7/2021, Normal             No discharge procedures on file      PDMP Review     None          ED Provider  Electronically Signed by           Tabitha Garcia PA-C  04/22/22 2018

## 2022-04-22 NOTE — Clinical Note
Teresa Sage was seen and treated in our emergency department on 4/22/2022  No restrictions            Diagnosis:     Pasha Osborne    She may return on this date: If you have any questions or concerns, please don't hesitate to call        Willy Vincent PA-C    ______________________________           _______________          _______________  Hospital Representative                              Date                                Time

## 2022-04-26 ENCOUNTER — TELEPHONE (OUTPATIENT)
Dept: FAMILY MEDICINE CLINIC | Facility: CLINIC | Age: 52
End: 2022-04-26

## 2022-06-12 DIAGNOSIS — I10 HYPERTENSION, UNSPECIFIED TYPE: ICD-10-CM

## 2022-06-13 ENCOUNTER — OFFICE VISIT (OUTPATIENT)
Dept: FAMILY MEDICINE CLINIC | Facility: CLINIC | Age: 52
End: 2022-06-13

## 2022-06-13 VITALS
TEMPERATURE: 98.4 F | HEART RATE: 90 BPM | SYSTOLIC BLOOD PRESSURE: 136 MMHG | RESPIRATION RATE: 18 BRPM | HEIGHT: 68 IN | DIASTOLIC BLOOD PRESSURE: 80 MMHG | WEIGHT: 293 LBS | BODY MASS INDEX: 44.41 KG/M2 | OXYGEN SATURATION: 97 %

## 2022-06-13 DIAGNOSIS — M21.42 ACQUIRED BILATERAL FLAT FEET: ICD-10-CM

## 2022-06-13 DIAGNOSIS — E66.01 MORBID OBESITY WITH BMI OF 40.0-44.9, ADULT (HCC): ICD-10-CM

## 2022-06-13 DIAGNOSIS — M21.41 ACQUIRED BILATERAL FLAT FEET: ICD-10-CM

## 2022-06-13 DIAGNOSIS — R22.9 LOCALIZED SKIN MASS, LUMP, OR SWELLING: ICD-10-CM

## 2022-06-13 DIAGNOSIS — J30.1 ALLERGIC RHINITIS DUE TO POLLEN, UNSPECIFIED SEASONALITY: ICD-10-CM

## 2022-06-13 DIAGNOSIS — I10 BENIGN HYPERTENSION: Primary | ICD-10-CM

## 2022-06-13 DIAGNOSIS — M79.672 PAIN OF LEFT HEEL: ICD-10-CM

## 2022-06-13 PROBLEM — M25.511 ACUTE PAIN OF RIGHT SHOULDER: Status: RESOLVED | Noted: 2019-08-19 | Resolved: 2022-06-13

## 2022-06-13 PROCEDURE — 99213 OFFICE O/P EST LOW 20 MIN: CPT | Performed by: FAMILY MEDICINE

## 2022-06-13 PROCEDURE — 3008F BODY MASS INDEX DOCD: CPT | Performed by: FAMILY MEDICINE

## 2022-06-13 PROCEDURE — 1036F TOBACCO NON-USER: CPT | Performed by: FAMILY MEDICINE

## 2022-06-13 RX ORDER — AMLODIPINE BESYLATE 5 MG/1
TABLET ORAL
Qty: 90 TABLET | Refills: 2 | Status: SHIPPED | OUTPATIENT
Start: 2022-06-13

## 2022-06-13 RX ORDER — FLUTICASONE PROPIONATE 50 MCG
1 SPRAY, SUSPENSION (ML) NASAL DAILY
Qty: 16 G | Refills: 1 | Status: SHIPPED | OUTPATIENT
Start: 2022-06-13

## 2022-06-13 RX ORDER — LORATADINE 10 MG/1
10 TABLET ORAL DAILY
Qty: 90 TABLET | Refills: 1 | Status: SHIPPED | OUTPATIENT
Start: 2022-06-13

## 2022-06-13 RX ORDER — LISINOPRIL 20 MG/1
20 TABLET ORAL DAILY
Qty: 90 TABLET | Refills: 1 | Status: SHIPPED | OUTPATIENT
Start: 2022-06-13

## 2022-06-13 NOTE — ASSESSMENT & PLAN NOTE
Palpable lump on edge of left shoulder overlying deltoid, roughly 2 in by 1 in large  Not visible, with no skin changes/signs of infection  Non-mobile, noted for a few months, has not changed in size but is now sore when she lays on that side  Will start with [facility] US to determine what it is and decide further management afterwards

## 2022-06-13 NOTE — ASSESSMENT & PLAN NOTE
Not interested in returning to weight management at this time and wants to increase physical activity this summer as well as improve her diet on her own   Is open to consider returning to weight mgt in the future is she is not able to make progress on her own

## 2022-06-13 NOTE — ASSESSMENT & PLAN NOTE
Claritin daily, symptoms of congestion and rhinorrhea recently worsened the past 2 months  Start flonase as well as encouraged humidifier in bedroom

## 2022-06-13 NOTE — PROGRESS NOTES
Assessment/Plan:    Benign hypertension  Chronic, today is 148/94, however repeat towards end of visit was 136/80  Current medication includes: Amlodipine 5 mg qhs & lisinopril 20 mg qam   Weight loss will continue to aid in BP control  Discussed diet and lifestyle modifications  Continue current medications    Morbid obesity with BMI of 40 0-44 9, adult ([facility])  Not interested in returning to weight management at this time and wants to increase physical activity this summer as well as improve her diet on her own  Is open to consider returning to weight mgt in the future is she is not able to make progress on her own    Allergic rhinitis due to pollen  Claritin daily, symptoms of congestion and rhinorrhea recently worsened the past 2 months  Start flonase as well as encouraged humidifier in bedroom    Localized skin mass, lump, or swelling  Palpable lump on edge of left shoulder overlying deltoid, roughly 2 in by 1 in large  Not visible, with no skin changes/signs of infection  Non-mobile, noted for a few months, has not changed in size but is now sore when she lays on that side  Will start with [facility]  to determine what it is and decide further management afterwards  Diagnoses and all orders for this visit:    Benign hypertension  -     lisinopril (ZESTRIL) 20 mg tablet; Take 1 tablet (20 mg total) by mouth daily    Allergic rhinitis due to pollen, unspecified seasonality  -     loratadine (CLARITIN) 10 mg tablet; Take 1 tablet (10 mg total) by mouth daily  -     fluticasone (FLONASE) 50 mcg/act nasal spray; 1 spray into each nostril daily    Acquired bilateral flat feet  -     Ambulatory Referral to Podiatry; Future    Pain of left heel  Comments:  Suspected 2/2 flat feet and morbid obesity  Referral to podiatry  Orders:  -     Ambulatory Referral to Podiatry; Future    Localized skin mass, lump, or swelling  -     US [facility] limited;  Future    Morbid obesity with BMI of 40 0-44 9, adult ([facility])        Subjective: Patient ID: Christianne Jonas is a 45 y o  female  This is a very pleasant 45 y o  female who presents to the clinic for management of their chronic medical conditions  She has a past medical history of hypertension, eczema, morbid obesity and prediabetes  Patient's medical conditions are stable unless noted otherwise above  She is no longer following with weight management  She just returned from vacation in [facility] visiting family  Reports she was not eating well and has gained weight  She is not interested in seeing weight mgt at this time  Noted shoulder lump after first covid vaccine but didn't bother her  Does not feel it is related to the vaccine, however now is sore when buffy on that arm  Also has concern for chronic left heel pain that seems to be increasing recently  Shoe on not supportive and patient has flat feet as well as morbid obesity as contributing factors to the pain  Denies any acute trauma or injury  Patient has no further complaints other than what is mentioned in the ROS  The following portions of the patient's history were reviewed and updated as appropriate: allergies, current medications, past family history, past medical history, past social history, past surgical history and problem list     Review of Systems   Constitutional: Negative for activity change, appetite change, chills, fatigue and fever  HENT: Positive for congestion and rhinorrhea  Negative for sinus pain and sore throat  Eyes: Negative for visual disturbance  Respiratory: Negative for cough, chest tightness, shortness of breath and wheezing  Cardiovascular: Negative for chest pain and palpitations  Gastrointestinal: Negative for abdominal pain, constipation, diarrhea, nausea and vomiting  Genitourinary: Negative for difficulty urinating  Musculoskeletal: Positive for arthralgias (heel pain) and back pain   Negative for gait problem, joint swelling (lump on lateral edge of left shoulder), myalgias and neck pain  Skin: Negative for rash and wound  Neurological: Negative for dizziness, weakness, light-headedness and headaches  Psychiatric/Behavioral: Negative for agitation, behavioral problems and sleep disturbance  Objective:    /80 (BP Location: Left arm, Patient Position: Sitting, Cuff Size: Large)   Pulse 90   Temp 98 4 °F (36 9 °C) (Temporal)   Resp 18   Ht 5' 8" (1 727 m)   Wt (!) 155 kg (341 lb)   LMP  (LMP Unknown)   SpO2 97%   BMI 51 85 kg/m²      Physical Exam  Vitals and nursing note reviewed  Constitutional:       General: She is not in acute distress  Appearance: Normal appearance  She is well-developed  She is obese  She is not ill-appearing  HENT:      Head: Normocephalic and atraumatic  Right Ear: External ear normal       Left Ear: External ear normal    Cardiovascular:      Rate and Rhythm: Normal rate and regular rhythm  Pulses: Normal pulses  Heart sounds: Normal heart sounds  No murmur heard  Pulmonary:      Effort: Pulmonary effort is normal  No respiratory distress  Musculoskeletal:         General: No tenderness  Normal range of motion  Arms:       Cervical back: Normal range of motion and neck supple  Right lower leg: Edema present  Left lower leg: Edema present  Right foot: Normal range of motion  Left foot: Normal range of motion  Comments: Chronic bilateral edema   Feet:      Right foot:      Skin integrity: Dry skin present  Left foot:      Skin integrity: Dry skin present  Comments: Pes planus bilaterally  Skin:     General: Skin is warm and dry  Capillary Refill: Capillary refill takes less than 2 seconds  Neurological:      Mental Status: She is alert and oriented to person, place, and time     Psychiatric:         Mood and Affect: Mood normal          Behavior: Behavior normal            Marcos Lamas MD  06/13/22  3:26 PM

## 2022-06-13 NOTE — PATIENT INSTRUCTIONS
Podiatry: Dr Pavan Salazar - [phone]: Call his office for an appointment to discuss your heel pain  Central scheduling number for the ultrasound is [phone]

## 2022-06-13 NOTE — ASSESSMENT & PLAN NOTE
Chronic, today is 148/94, however repeat towards end of visit was 136/80  Current medication includes: Amlodipine 5 mg qhs & lisinopril 20 mg qam   Weight loss will continue to aid in BP control  Discussed diet and lifestyle modifications  Continue current medications

## 2022-07-12 ENCOUNTER — HOSPITAL ENCOUNTER (OUTPATIENT)
Dept: MAMMOGRAPHY | Facility: CLINIC | Age: 52
Discharge: HOME | End: 2022-07-12
Payer: COMMERCIAL

## 2022-07-12 DIAGNOSIS — R92.8 ABNORMAL FINDING ON BREAST IMAGING: ICD-10-CM

## 2022-07-12 PROCEDURE — 76642 ULTRASOUND BREAST LIMITED: CPT

## 2022-08-24 ENCOUNTER — HOSPITAL ENCOUNTER (OUTPATIENT)
Dept: RADIOLOGY | Facility: HOSPITAL | Age: 52
Discharge: HOME | End: 2022-08-24
Attending: PODIATRIST
Payer: COMMERCIAL

## 2022-08-24 ENCOUNTER — OFFICE VISIT (OUTPATIENT)
Dept: PODIATRY | Facility: CLINIC | Age: 52
End: 2022-08-24
Payer: COMMERCIAL

## 2022-08-24 VITALS — BODY MASS INDEX: 44.41 KG/M2 | WEIGHT: 293 LBS | HEART RATE: 104 BPM | HEIGHT: 68 IN | OXYGEN SATURATION: 95 %

## 2022-08-24 DIAGNOSIS — M79.672 PAIN IN LEFT FOOT: ICD-10-CM

## 2022-08-24 DIAGNOSIS — M72.2 PLANTAR FASCIITIS: Primary | ICD-10-CM

## 2022-08-24 PROCEDURE — 99203 OFFICE O/P NEW LOW 30 MIN: CPT | Performed by: PODIATRIST

## 2022-08-24 PROCEDURE — 73650 X-RAY EXAM OF HEEL: CPT

## 2022-08-24 PROCEDURE — 20550 NJX 1 TENDON SHEATH/LIGAMENT: CPT | Performed by: PODIATRIST

## 2022-08-24 NOTE — PROGRESS NOTES
Assessment/Plan:    - I personally reviewed x-rays taken of her left heel today  There are no fractures or dislocations identified  Will follow up on official read  There are small plantar and retrocalcaneal spurs noted  - We have discussed the etiology treatment goals for plantar fasciitis  She would like to proceed with injection therapy due to the severe pain she has makes  - After prepping the skin on her heel with alcohol I proceeded to inject the point of maximum tenderness of the left heel with 1 mL of 1% xylocaine plain with 1 mL of 0 25% bupivacaine plain and 0 5 mL of Kenalog 40  The patient noted immediate relief due to the numbing effect of the local anesthetic  - She will follow-up being 3-4 weeks  We also discussed the benefits of some over-the-counter arch supports that would be semi-rigid in nature to support her arches bilaterally  Diagnoses and all orders for this visit:    Pain in left foot  -     XR heel / calcaneus 2+ vw left; Future          Subjective:      Patient ID: Lamonte Grant is a 45 y o  female  With a chief complaint of severe plantar left heel pain  She notes the pain started after she initially injured her left great toenail which cause her to walk more on her heel  Since that time the pain in her left great toenail has resolved but the heel pain has gotten worse  She denies any injury or trauma to the heel other than her altered gait  Treatment has Homans consisted of ibuprofen which has had little effect with her pain  The following portions of the patient's history were reviewed and updated as appropriate: allergies, current medications, past family history, past medical history, past social history, past surgical history and problem list     Review of Systems   Constitutional: Negative for chills and fever  HENT: Negative for ear pain and sore throat  Eyes: Negative for pain and visual disturbance     Respiratory: Negative for cough and shortness of breath  Cardiovascular: Negative for chest pain and palpitations  Gastrointestinal: Negative for abdominal pain and vomiting  Genitourinary: Negative for dysuria and hematuria  Musculoskeletal: Negative for arthralgias and back pain  Skin: Negative for color change and rash  Neurological: Negative for seizures and syncope  Psychiatric/Behavioral: Negative  All other systems reviewed and are negative  Objective:      Pulse 104   Ht 5' 8" (1 727 m)   Wt (!) 152 kg (335 lb 9 6 oz)   LMP  (LMP Unknown)   SpO2 95%   BMI 51 03 kg/m²          Physical Exam  Constitutional:       Appearance: Normal appearance  HENT:      Head: Normocephalic and atraumatic  Nose: Nose normal    Cardiovascular:      Pulses:           Dorsalis pedis pulses are 2+ on the left side  Posterior tibial pulses are 2+ on the left side  Pulmonary:      Effort: Pulmonary effort is normal    Feet:      Left foot:      Skin integrity: Skin integrity normal       Comments: There is tenderness to palpation to the plantar medial tubercle of the left os calcis and along the proximal medial band of the plantar fascia; there is moderate tenderness to the calcaneus on lateral squeeze; there is no erythema no ecchymosis no edema no calor; the left hallucal nail shows evidence of prior trauma the new nail growing in seems to be healthy and without infection  Skin:     General: Skin is warm  Capillary Refill: Capillary refill takes less than 2 seconds  Neurological:      General: No focal deficit present  Mental Status: She is alert and oriented to person, place, and time  Psychiatric:         Mood and Affect: Mood normal          Behavior: Behavior normal          Thought Content:  Thought content normal                  Foot injection     Date/Time 8/24/2022 2:30 PM     Performed by  Cameron Sutherland DPM     Authorized by Cameron Sutherland DPM      Universal Protocol   Consent: Verbal consent obtained  Risks and benefits: risks, benefits and alternatives were discussed  Consent given by: patient  Time out: Immediately prior to procedure a "time out" was called to verify the correct patient, procedure, equipment, support staff and site/side marked as required  Timeout called at: 8/24/2022 2:30 PM   Patient understanding: patient states understanding of the procedure being performed  Patient consent: the patient's understanding of the procedure matches consent given  Patient identity confirmed: verbally with patient and provided demographic data        Local anesthesia used: yes     Anesthesia   Local anesthesia used: yes  Local Anesthetic: lidocaine 1% without epinephrine and bupivacaine 0 25% without epinephrine  Anesthetic total: 2 mL     Sedation   Patient sedated: no        Specimen: no    Culture: no   Procedure Details   Procedure Notes: After prepping the skin on her heel with alcohol I proceeded to inject the point of maximum tenderness of the left heel with 1 mL of 1% xylocaine plain with 1 mL of 0 25% bupivacaine plain and 0 5 mL of Kenalog 40  It was tolerated well and the patient noted immediate relief due to the numbing effect of the local anesthetic      Patient tolerance: patient tolerated the procedure well with no immediate complications Detail Level: Detailed

## 2022-09-07 ENCOUNTER — OFFICE VISIT (OUTPATIENT)
Dept: FAMILY MEDICINE CLINIC | Facility: CLINIC | Age: 52
End: 2022-09-07

## 2022-09-07 VITALS
DIASTOLIC BLOOD PRESSURE: 80 MMHG | TEMPERATURE: 98.2 F | BODY MASS INDEX: 44.41 KG/M2 | HEART RATE: 108 BPM | HEIGHT: 68 IN | OXYGEN SATURATION: 96 % | SYSTOLIC BLOOD PRESSURE: 118 MMHG | RESPIRATION RATE: 18 BRPM | WEIGHT: 293 LBS

## 2022-09-07 DIAGNOSIS — I10 BENIGN HYPERTENSION: ICD-10-CM

## 2022-09-07 DIAGNOSIS — R60.0 BILATERAL LOWER EXTREMITY EDEMA: ICD-10-CM

## 2022-09-07 DIAGNOSIS — L03.90 CELLULITIS, UNSPECIFIED CELLULITIS SITE: Primary | ICD-10-CM

## 2022-09-07 DIAGNOSIS — Z00.00 HEALTHCARE MAINTENANCE: ICD-10-CM

## 2022-09-07 PROCEDURE — 3725F SCREEN DEPRESSION PERFORMED: CPT | Performed by: FAMILY MEDICINE

## 2022-09-07 PROCEDURE — 99214 OFFICE O/P EST MOD 30 MIN: CPT | Performed by: FAMILY MEDICINE

## 2022-09-07 RX ORDER — FUROSEMIDE 20 MG/1
20 TABLET ORAL EVERY OTHER DAY
Qty: 30 TABLET | Refills: 0 | Status: SHIPPED | OUTPATIENT
Start: 2022-09-07

## 2022-09-07 RX ORDER — IBUPROFEN 800 MG/1
800 TABLET ORAL EVERY 8 HOURS
Qty: 30 TABLET | Refills: 0 | Status: SHIPPED | OUTPATIENT
Start: 2022-09-07

## 2022-09-07 RX ORDER — CLINDAMYCIN HYDROCHLORIDE 300 MG/1
300 CAPSULE ORAL 3 TIMES DAILY
Qty: 21 CAPSULE | Refills: 0 | Status: SHIPPED | OUTPATIENT
Start: 2022-09-07 | End: 2022-09-14

## 2022-09-07 NOTE — PROGRESS NOTES
Assessment/Plan:    1  Cellulitis, unspecified cellulitis site  Assessment & Plan:  Left lower extremity extending for ankle to knee  circumferential   Red and tender to palpation  Pitting edema present though chronic  Amanda sign negative  On the differential includes superficial cellulitic infection  Less likely necrotizing skin infection as patient skin is intact  DVT should also be considered however Amanda sign is negative  Acute  abrupt onset is very concerning  Plan  - at this time will order stat left lower extremity duplex to rule out any clot  -will also order antibiotics clindamycin 300 mg t i d  For a period of 7 days  - Will bring patient back in a week to assess progression or resolution of symptoms   -ibuprofen 800 mg Q 8 for pain  -red flag symptoms or worsening symptoms educated to the patient  Patient is to return to the ED if symptoms become worse  Orders:  -     clindamycin (CLEOCIN) 300 MG capsule; Take 1 capsule (300 mg total) by mouth 3 (three) times a day for 7 days  -     furosemide (LASIX) 20 mg tablet; Take 1 tablet (20 mg total) by mouth every other day  -     VAS lower limb venous duplex study, unilateral/limited; Future; Expected date: 09/07/2022  -     ibuprofen (MOTRIN) 800 mg tablet; Take 1 tablet (800 mg total) by mouth every 8 (eight) hours    2  Benign hypertension  Assessment & Plan:  Chronic,  Blood Pressure: 118/80   Controlled  Current medication includes: Amlodipine 5 mg qhs & lisinopril 20 mg qam       Plan  -continue home regimen      3  Bilateral lower extremity edema  Assessment & Plan: Bothersome in the setting of this new cellulitic infection  Pitting edema +2  No signs of heart failure recent echo 2020 showed ef of 61% so less likely secondary to heart failure  Plan  -will prescribe Lasix 20 mg p o  For every other day  -will re-evaluate patient when she comes in next week for her cellulitis      4   Healthcare maintenance  Assessment & Plan:  Will address depression screening and BMI follow-up planned in subsequent visit  Will also address influenza vaccine at that time  Subjective:      Patient ID: Azeem Moore is a 45 y o  female  Past medical history notable for obesity, hypertension, is coming in with chief complaint of left lower extremity pain that is been present for a period of a day  Patient reports that started 5:00 a m  This morning  Patient reports that it has been hot and painful while walking  Patient denies any fevers at home  Patient denies any injury to the area  Patient denies any history of this ever happening in the past   Patient denies any history of clots  Patient denies any chest painor shortness of breath  The following portions of the patient's history were reviewed and updated as appropriate: allergies, current medications, past family history, past medical history, past social history, past surgical history, and problem list     Review of Systems   Constitutional: Negative for chills and fever  HENT: Negative for ear pain and sore throat  Eyes: Negative for pain and visual disturbance  Respiratory: Negative for cough and shortness of breath  Cardiovascular: Positive for leg swelling  Negative for chest pain and palpitations  Gastrointestinal: Negative for abdominal pain and vomiting  Genitourinary: Negative for dysuria and hematuria  Musculoskeletal: Negative for arthralgias and back pain  Skin: Positive for color change (Red erythematous)  Negative for rash  Circumferential skin changing in left lower extremity   Neurological: Negative for seizures and syncope  All other systems reviewed and are negative          Objective:      /80 (BP Location: Left arm, Patient Position: Sitting, Cuff Size: Standard)   Pulse (!) 108   Temp 98 2 °F (36 8 °C) (Temporal)   Resp 18   Ht 5' 8" (1 727 m)   Wt (!) 153 kg (337 lb 6 4 oz)   LMP  (LMP Unknown)   SpO2 96% Breastfeeding No   BMI 51 30 kg/m²          Physical Exam  Constitutional:       General: She is not in acute distress  Appearance: She is obese  HENT:      Nose: No congestion or rhinorrhea  Eyes:      Extraocular Movements: Extraocular movements intact  Pupils: Pupils are equal, round, and reactive to light  Cardiovascular:      Rate and Rhythm: Normal rate and regular rhythm  Pulses: Normal pulses  Heart sounds: Normal heart sounds  No murmur heard  No gallop  Pulmonary:      Effort: Pulmonary effort is normal       Breath sounds: Normal breath sounds  No wheezing, rhonchi or rales  Abdominal:      General: Bowel sounds are normal  There is no distension  Palpations: Abdomen is soft  There is no mass  Tenderness: There is no abdominal tenderness  Hernia: No hernia is present  Musculoskeletal:      Right lower leg: No tenderness  2+ Edema present  Left lower leg: Tenderness present  2+ Edema present  Skin:     General: Skin is warm  Coloration: Skin is not jaundiced  Findings: Lesion (Red erythematous area around the leg  Circumferential   Tender to palpation ) present  No bruising  Comments: No evidence of pus or drainage   Neurological:      General: No focal deficit present  Mental Status: She is alert and oriented to person, place, and time  Psychiatric:         Mood and Affect: Mood normal          Behavior: Behavior normal          Thought Content:  Thought content normal                    Raffaele Riggins DO   Family Medicine PGY-1   9/7/2022

## 2022-09-07 NOTE — ASSESSMENT & PLAN NOTE
Will address depression screening and BMI follow-up planned in subsequent visit  Will also address influenza vaccine at that time

## 2022-09-07 NOTE — ASSESSMENT & PLAN NOTE
Left lower extremity extending for ankle to knee  circumferential   Red and tender to palpation  Pitting edema present though chronic  Amanda sign negative  On the differential includes superficial cellulitic infection  Less likely necrotizing skin infection as patient skin is intact  DVT should also be considered however Amanda sign is negative  Acute  abrupt onset is very concerning  Plan  - at this time will order stat left lower extremity duplex to rule out any clot  -will also order antibiotics clindamycin 300 mg t i d  For a period of 7 days  - Will bring patient back in a week to assess progression or resolution of symptoms   -ibuprofen 800 mg Q 8 for pain  -red flag symptoms or worsening symptoms educated to the patient  Patient is to return to the ED if symptoms become worse

## 2022-09-07 NOTE — LETTER
September 7, 2022     Patient: Jesu Ireland  YOB: 1970  Date of Visit: 9/7/2022      To Whom it May Concern:    Jesu Ireland is under my professional care  [place] Cecile was seen in my office on 9/7/2022  [place] Huber may return to school on 8/12/2022  If you have any questions or concerns, please don't hesitate to call           Sincerely,          Saran Castro DO        CC: No Recipients

## 2022-09-07 NOTE — ASSESSMENT & PLAN NOTE
Chronic,  Blood Pressure: 118/80   Controlled    Current medication includes: Amlodipine 5 mg qhs & lisinopril 20 mg qam       Plan  -continue home regimen

## 2022-09-07 NOTE — ASSESSMENT & PLAN NOTE
Bothersome in the setting of this new cellulitic infection  Pitting edema +2  No signs of heart failure recent echo 2020 showed ef of 61% so less likely secondary to heart failure  Plan  -will prescribe Lasix 20 mg p o   For every other day  -will re-evaluate patient when she comes in next week for her cellulitis

## 2022-09-08 ENCOUNTER — HOSPITAL ENCOUNTER (OUTPATIENT)
Dept: NON INVASIVE DIAGNOSTICS | Facility: CLINIC | Age: 52
Discharge: HOME | End: 2022-09-08
Payer: COMMERCIAL

## 2022-09-08 DIAGNOSIS — L03.90 CELLULITIS, UNSPECIFIED CELLULITIS SITE: ICD-10-CM

## 2022-09-08 PROCEDURE — 93971 EXTREMITY STUDY: CPT | Performed by: SURGERY

## 2022-09-08 PROCEDURE — 93971 EXTREMITY STUDY: CPT

## 2022-09-14 ENCOUNTER — OFFICE VISIT (OUTPATIENT)
Dept: FAMILY MEDICINE CLINIC | Facility: CLINIC | Age: 52
End: 2022-09-14

## 2022-09-14 VITALS
OXYGEN SATURATION: 96 % | RESPIRATION RATE: 18 BRPM | SYSTOLIC BLOOD PRESSURE: 138 MMHG | TEMPERATURE: 96.9 F | HEIGHT: 68 IN | HEART RATE: 75 BPM | BODY MASS INDEX: 44.41 KG/M2 | WEIGHT: 293 LBS | DIASTOLIC BLOOD PRESSURE: 90 MMHG

## 2022-09-14 DIAGNOSIS — R60.9 PITTING EDEMA: ICD-10-CM

## 2022-09-14 DIAGNOSIS — Z00.00 HEALTH CARE MAINTENANCE: ICD-10-CM

## 2022-09-14 DIAGNOSIS — I10 BENIGN HYPERTENSION: Primary | ICD-10-CM

## 2022-09-14 PROCEDURE — 99213 OFFICE O/P EST LOW 20 MIN: CPT | Performed by: FAMILY MEDICINE

## 2022-09-14 RX ORDER — HYDROCHLOROTHIAZIDE 25 MG/1
25 TABLET ORAL DAILY
Qty: 90 TABLET | Refills: 5 | Status: SHIPPED | OUTPATIENT
Start: 2022-09-14

## 2022-09-14 NOTE — PROGRESS NOTES
Assessment/Plan:    1  Benign hypertension  Assessment & Plan:  Chronic,      BP Readings from Last 3 Encounters:   09/14/22 138/90   09/07/22 118/80   06/13/22 136/80       Controlled  Current medication includes: Amlodipine 5 mg qhs & lisinopril 20 mg qam   Will recommend patient switching to hydrochlorothiazide and discontinuing amlodipine  Orders:  -     hydrochlorothiazide (HYDRODIURIL) 25 mg tablet; Take 1 tablet (25 mg total) by mouth daily  -     Microalbumin / creatinine urine ratio  -     Comprehensive metabolic panel; Future    2  Pitting edema  Assessment & Plan:  Result recent cellulitis infection  Will administer hydrochlorothiazide for mild diuretic potential   Also advised patient to discontinue Norvasc  Will continue to reassess  3  BMI 50 0-59 9, adult [facility])  Assessment & Plan:  BMI Counseling: Body mass index is 50 18 kg/m²  The BMI is above normal  Nutrition recommendations include reducing portion sizes, decreasing overall calorie intake, 3-5 servings of fruits/vegetables daily and reducing fast food intake  Exercise recommendations include exercising 3-5 times per week  4  Health care maintenance  -     Lipid panel; Future  -     CBC and differential; Future  -     HEMOGLOBIN A1C W/ EAG ESTIMATION; Future  -     influenza vaccine, quadrivalent, recombinant, PF, 0 5 mL, for patients 18 yr+ (FLUBLOK)       Subjective:      Patient ID: Gabriel Thomas is a 45 y o  female  Past medical history notable for obesity, and hypertension is coming in for follow-up visit after she was found to have left leg cellulitis in her last patient encounter a week ago  Patient completed 7 day course of antibiotic clinda mycin  Patient denies any side effect of the medication  Patient also had a lower extremity duplex to rule out DVT which was negative  Patient leg is reported to look much better and is no longer red, hot, or inflamed    Patient reports to be doing well and has no acute complaints at this time  The following portions of the patient's history were reviewed and updated as appropriate: allergies, current medications, past family history, past medical history, past social history, past surgical history, and problem list     Review of Systems   Constitutional: Negative for chills and fever  HENT: Negative for ear pain and sore throat  Eyes: Negative for pain and visual disturbance  Respiratory: Negative for cough and shortness of breath  Cardiovascular: Positive for leg swelling  Negative for chest pain and palpitations  Gastrointestinal: Negative for abdominal pain and vomiting  Genitourinary: Negative for dysuria and hematuria  Musculoskeletal: Negative for arthralgias and back pain  Skin: Negative for color change and rash  Neurological: Negative for seizures and syncope  All other systems reviewed and are negative  Objective:      /90 (BP Location: Left arm, Patient Position: Sitting, Cuff Size: Large)   Pulse 75   Temp (!) 96 9 °F (36 1 °C) (Temporal)   Resp 18   Ht 5' 8" (1 727 m)   Wt (!) 150 kg (330 lb)   LMP  (LMP Unknown)   SpO2 96%   BMI 50 18 kg/m²          Physical Exam  Constitutional:       General: She is not in acute distress  Appearance: Normal appearance  HENT:      Nose: No congestion or rhinorrhea  Eyes:      Extraocular Movements: Extraocular movements intact  Pupils: Pupils are equal, round, and reactive to light  Cardiovascular:      Rate and Rhythm: Normal rate and regular rhythm  Pulses: Normal pulses  Heart sounds: Normal heart sounds  No murmur heard  No gallop  Pulmonary:      Effort: Pulmonary effort is normal       Breath sounds: Normal breath sounds  No wheezing, rhonchi or rales  Abdominal:      General: Bowel sounds are normal  There is no distension  Palpations: Abdomen is soft  There is no mass  Tenderness: There is no abdominal tenderness        Hernia: No hernia is present  Musculoskeletal:      Right lower leg: Edema (Plus one) present  Left lower leg: Edema (Plus one) present  Skin:     General: Skin is warm  Coloration: Skin is not jaundiced  Findings: No bruising  Neurological:      General: No focal deficit present  Mental Status: She is alert and oriented to person, place, and time  Psychiatric:         Mood and Affect: Mood normal          Behavior: Behavior normal          Thought Content:  Thought content normal            Manuel Foote DO   Family Medicine PGY-1   9/15/2022

## 2022-09-14 NOTE — ASSESSMENT & PLAN NOTE
Chronic,      BP Readings from Last 3 Encounters:   09/14/22 138/90   09/07/22 118/80   06/13/22 136/80       Controlled  Current medication includes: Amlodipine 5 mg qhs & lisinopril 20 mg qam   Will recommend patient switching to hydrochlorothiazide and discontinuing amlodipine

## 2022-09-15 NOTE — ASSESSMENT & PLAN NOTE
BMI Counseling: Body mass index is 50 18 kg/m²  The BMI is above normal  Nutrition recommendations include reducing portion sizes, decreasing overall calorie intake, 3-5 servings of fruits/vegetables daily and reducing fast food intake  Exercise recommendations include exercising 3-5 times per week

## 2022-09-15 NOTE — ASSESSMENT & PLAN NOTE
Result recent cellulitis infection  Will administer hydrochlorothiazide for mild diuretic potential   Also advised patient to discontinue Norvasc  Will continue to reassess

## 2022-10-04 ENCOUNTER — OFFICE VISIT (OUTPATIENT)
Dept: FAMILY MEDICINE CLINIC | Facility: CLINIC | Age: 52
End: 2022-10-04

## 2022-10-04 VITALS
HEIGHT: 68 IN | HEART RATE: 118 BPM | SYSTOLIC BLOOD PRESSURE: 136 MMHG | BODY MASS INDEX: 44.41 KG/M2 | WEIGHT: 293 LBS | RESPIRATION RATE: 18 BRPM | TEMPERATURE: 96 F | DIASTOLIC BLOOD PRESSURE: 94 MMHG | OXYGEN SATURATION: 94 %

## 2022-10-04 DIAGNOSIS — L03.116 CELLULITIS OF LEFT LOWER EXTREMITY: Primary | ICD-10-CM

## 2022-10-04 PROCEDURE — 99213 OFFICE O/P EST LOW 20 MIN: CPT | Performed by: PHYSICIAN ASSISTANT

## 2022-10-04 RX ORDER — CEPHALEXIN 500 MG/1
500 CAPSULE ORAL EVERY 8 HOURS SCHEDULED
Qty: 30 CAPSULE | Refills: 0 | Status: SHIPPED | OUTPATIENT
Start: 2022-10-04 | End: 2022-10-14

## 2022-10-04 NOTE — PROGRESS NOTES
Assessment/Plan:    Cellulitis of left lower extremity  - Symptoms originally improved somewhat after completing clindamycin, but have now worsened again  - Will prescribe Keflex 500 mg, 3 times daily times 10 days  Advised patient to take all 10 days of antibiotics even if feeling better beforehand     - Continue to alternate between ibuprofen and mg and Tylenol as needed for pain  - Patient had recent venous duplex left lower extremity which was negative for DVT  - Will follow-up in 1 week to assure symptoms are improving     - Advised patient to contact the office or report ED if symptoms persist, worsen, or new symptoms arise such as increased redness / warmth/pain and/or development of fevers  Diagnoses and all orders for this visit:    Cellulitis of left lower extremity  -     cephalexin (KEFLEX) 500 mg capsule; Take 1 capsule (500 mg total) by mouth every 8 (eight) hours for 10 days          All of patients questions were answered  Patient understands and agrees with the above plan  Return in about 1 week (around 10/11/2022) for Recheck cellulitis of left leg  Jesu Wolf  10/04/22  [address] [facility] [place]          Subjective:     Patient ID: River Xiao  is a 45 y o  female with known PMH of hypertension, prediabetes, primary osteoarthritis of both knees who presents today in office for   Same-day visit for left lower leg pain  - Patient is a 45 y o  female who presents today for  Same-day visit for left lower leg pain  Patient was originally diagnosed with left lower extremity cellulitis on 09/07/2022  Patient underwent left lower extremity duplex which was negative for DVT  Patient was prescribed clindamycin 300 mg, 3 times daily for 7 days  Today, patient notes symptoms did improve somewhat, but never fully completely healed  Today, patient notes for the past few days the pain, warmth, and swelling has returned of the left lower leg  Patient denies any new injury or trauma to the area  Patient denies any open wounds or discharge  Patient denies any associated fevers, chills, nausea, vomiting, diarrhea, constipation  The following portions of the patient's history were reviewed and updated as appropriate: allergies, current medications, past family history, past medical history, past social history, past surgical history and problem list         Review of Systems   Constitutional: Negative for chills and fever  HENT: Negative for ear pain and sore throat  Eyes: Negative for pain and visual disturbance  Respiratory: Negative for cough and shortness of breath  Cardiovascular: Positive for leg swelling  Negative for chest pain and palpitations  Gastrointestinal: Negative for abdominal pain, nausea and vomiting  Genitourinary: Negative for dysuria and hematuria  Musculoskeletal: Negative for arthralgias  Skin: Positive for color change and rash  Neurological: Negative for seizures and syncope  All other systems reviewed and are negative  Objective:   Vitals:    10/04/22 1442   BP: 136/94   BP Location: Left arm   Patient Position: Sitting   Cuff Size: Standard   Pulse: (!) 118   Resp: 18   Temp: (!) 96 °F (35 6 °C)   TempSrc: Temporal   SpO2: 94%   Weight: (!) 148 kg (326 lb 3 2 oz)   Height: 5' 8" (1 727 m)         Physical Exam  Vitals and nursing note reviewed  Constitutional:       General: She is not in acute distress  Appearance: She is well-developed  HENT:      Head: Normocephalic and atraumatic  Right Ear: External ear normal       Left Ear: External ear normal       Nose: Nose normal    Eyes:      Conjunctiva/sclera: Conjunctivae normal    Cardiovascular:      Rate and Rhythm: Regular rhythm  Tachycardia present  Pulses: Normal pulses  Heart sounds: Normal heart sounds  Pulmonary:      Effort: Pulmonary effort is normal  No respiratory distress  Breath sounds: Normal breath sounds  No wheezing     Musculoskeletal: Cervical back: Normal range of motion and neck supple  Comments: Erythema, warmth and edema noted of left lower leg to about mid shin area  +TTP  No open areas or discharge  Negative Hoffmans sign  Skin:     General: Skin is warm and dry  Neurological:      Mental Status: She is alert and oriented to person, place, and time  Psychiatric:         Behavior: Behavior normal              Media Information                  Document Information    Clinical Image - Mobile Device   Left lower extremity   10/04/2022 3:00 PM   Attached To: Office Visit on 10/4/22 with [facility]     Source Information    LYNSEY Cabrera       Media Information                  Document Information    Clinical Image - Mobile Device      10/04/2022 3:01 PM   Attached To:    Office Visit on 10/4/22 with Johnny N LYNSE[address]

## 2022-10-12 ENCOUNTER — ANNUAL EXAM (OUTPATIENT)
Dept: OBGYN CLINIC | Facility: CLINIC | Age: 52
End: 2022-10-12

## 2022-10-12 VITALS
HEIGHT: 68 IN | WEIGHT: 293 LBS | DIASTOLIC BLOOD PRESSURE: 75 MMHG | BODY MASS INDEX: 44.41 KG/M2 | SYSTOLIC BLOOD PRESSURE: 138 MMHG | HEART RATE: 98 BPM

## 2022-10-12 DIAGNOSIS — Z12.31 ENCOUNTER FOR SCREENING MAMMOGRAM FOR MALIGNANT NEOPLASM OF BREAST: ICD-10-CM

## 2022-10-12 DIAGNOSIS — Z01.419 ENCOUNTER FOR GYNECOLOGICAL EXAMINATION WITHOUT ABNORMAL FINDING: Primary | ICD-10-CM

## 2022-10-12 DIAGNOSIS — Z12.39 ENCOUNTER FOR BREAST CANCER SCREENING USING NON-MAMMOGRAM MODALITY: ICD-10-CM

## 2022-10-12 DIAGNOSIS — Z12.4 SCREENING FOR CERVICAL CANCER: ICD-10-CM

## 2022-10-12 PROCEDURE — 99396 PREV VISIT EST AGE 40-64: CPT | Performed by: NURSE PRACTITIONER

## 2022-10-12 NOTE — PROGRESS NOTES
Ronni Delgado is a 45 y o  female who presents today for annual GYN exam   Her last pap smear was performed 8/10/2020 and result was NILM with negative HPV  She reports no history of abnormal pap smears in her past  Her last mammogram was performed 2022 and noted L breast abnormality for which 6 month follow up ultrasound was performed 2022 and it was negative  She had colon cancer screening performed 2019  She reports menses as absent since   Her general medical history has been reviewed and she reports it as follows:    Past Medical History:   Diagnosis Date   • Arthritis     knees   • Hypertension      Past Surgical History:   Procedure Laterality Date   • APPENDECTOMY      laparoscopic   • CHOLECYSTECTOMY     • [place] COLONOSCOPY FLX DX W/COLLJ SPEC WHEN PFRMD Left 2019    Procedure: COLONOSCOPY;  Surgeon: Juan F Mercedes MD;  [facility]: [facility]; Service: Gastroenterology   • [place] LAP,APPENDECTOMY N/A 3/22/2019    Procedure: Samy Franklin;  Surgeon: Marc Burnett MD;  Location: [facility] MAIN OR;  Service: General   • [address] [place] [address] N/A 2020    Procedure: [address];  Surgeon: Marc Burnett MD;  Location: [facility] MAIN OR;  Service: General     OB History        4    Para   2    Term   2       0    AB   2    Living   2       SAB   2    IAB   0    Ectopic   0    Multiple   0    Live Births   2               Social History     Tobacco Use   • Smoking status: Never Smoker   • Smokeless tobacco: Never Used   Vaping Use   • Vaping Use: Never used   Substance Use Topics   • Alcohol use: Yes     Comment: couple times/month   • Drug use: Never     Social History     Substance and Sexual Activity   Sexual Activity Yes   • Partners: Male   • Birth control/protection: Post-menopausal     Cancer-related family history includes Colon cancer (age of onset: 61) in her mother   There is no history of Breast cancer or Ovarian cancer  Current Outpatient Medications   Medication Instructions   • cephalexin (KEFLEX) 500 mg, Oral, Every 8 hours scheduled   • Diclofenac Sodium (VOLTAREN) 2 g, Topical, 4 times daily   • fluticasone (FLONASE) 50 mcg/act nasal spray 1 spray, Nasal, Daily   • furosemide (LASIX) 20 mg, Oral, Every other day   • hydrochlorothiazide (HYDRODIURIL) 25 mg, Oral, Daily   • hydrocortisone (ANUSOL-HC) 2 5 % rectal cream Topical, 2 times daily   • ibuprofen (MOTRIN) 800 mg, Oral, Every 8 hours   • lisinopril (ZESTRIL) 20 mg, Oral, Daily   • loratadine (CLARITIN) 10 mg, Oral, Daily   • methocarbamol (ROBAXIN) 500 mg, Oral, Daily at bedtime   • methylPREDNISolone 4 MG tablet therapy pack Use as directed on package   • nystatin (MYCOSTATIN) powder Topical, 2 times daily       Review of Systems:  Review of Systems   Constitutional: Negative  Gastrointestinal: Negative  Genitourinary: Negative for difficulty urinating, menstrual problem, pelvic pain and vaginal discharge  Skin: Negative  Physical Exam:  /75   Pulse 98   Ht 5' 8" (1 727 m)   Wt (!) 146 kg (322 lb)   LMP  (LMP Unknown)   BMI 48 96 kg/m²   Physical Exam  Constitutional:       General: She is not in acute distress  Appearance: She is well-developed  Genitourinary:      Vulva normal       No lesions in the vagina  Right Adnexa: not tender and no mass present  Left Adnexa: not tender and no mass present  No cervical motion tenderness or lesion  Uterus is not tender  Breasts:      Right: No mass, nipple discharge, skin change or tenderness  Left: No mass, nipple discharge, skin change or tenderness  Neck:      Thyroid: No thyromegaly  Cardiovascular:      Rate and Rhythm: Normal rate and regular rhythm  Pulmonary:      Effort: Pulmonary effort is normal    Abdominal:      Palpations: Abdomen is soft  Tenderness: There is no abdominal tenderness     Musculoskeletal:      Cervical back: Neck supple  Neurological:      Mental Status: She is alert and oriented to person, place, and time  Skin:     General: Skin is warm and dry  Vitals reviewed  Assessment/Plan:   1  Normal well-woman GYN exam   2  Cervical cancer screening:  Normal cervical exam   Pap smear not indicated at this time  3  STD screening:  Patient declines  4  Breast cancer screening:  Normal breast exam   Order placed for bilateral screening mammogram   Reviewed breast self-awareness  5  Colon cancer screening:  Up to date  6  Depression Screening: Patient's depression screening was assessed with a PHQ-2 score of 0  Their PHQ-9 score was 0  Clinically patient does not have depression  No treatment is required  7  BMI Counseling: Body mass index is 48 96 kg/m²  Discussed the patient's BMI with her  The BMI is above normal  Patient referred to PCP due to patient being obese     8  Return to office in 1 year for annual GYN exam

## 2022-10-12 NOTE — PATIENT INSTRUCTIONS
Thank you for your confidence in our team    We appreciate you and welcome your feedback  If you receive a survey from us, please take a few moments to let us know how we are doing  Sincerely,  Jared Pleasure       OBESITY     Obesity is defined as a body mass index (BMI) which is greater than 30  Your Body mass index is 48 96 kg/m²       The risks of obesity include  many health problems, such as injuries or physical disability  You may need tests to check for the following:  Diabetes     High blood pressure or high cholesterol     Heart disease     Gallbladder or liver disease     Cancer of the colon, breast, prostate, liver, or kidney     Sleep apnea     Arthritis or gout    Seek care immediately if:   You have a severe headache, confusion, or difficulty speaking  You have weakness on one side of your body  You have chest pain, sweating, or shortness of breath  Contact your healthcare provider if:   You have symptoms of gallbladder or liver disease, such as pain in your upper abdomen  You have knee or hip pain and discomfort while walking  You have symptoms of diabetes, such as intense hunger and thirst, and frequent urination  You have symptoms of sleep apnea, such as snoring or daytime sleepiness  You have questions or concerns about your condition or care  Treatment for obesity  focuses on helping you lose weight to improve your health  Even a small decrease in BMI can reduce the risk for many health problems  Your healthcare provider will help you set a weight-loss goal   Lifestyle changes  are the first step in treating obesity  These include making healthy food choices and getting regular physical activity  Your healthcare provider may suggest a weight-loss program that involves coaching, education, and therapy  Medicine  may help you lose weight when it is used with a healthy diet and physical activity       Surgery  can help you lose weight if you are very obese and have other health problems  There are several types of weight-loss surgery  Ask your healthcare provider for more information  Be successful losing weight:   Set small, realistic goals  An example of a small goal is to walk for 20 minutes 5 days a week  Renee goal is to lose 5% of your body weight  Tell friends, family members, and coworkers about your goals  and ask for their support  Ask a friend to lose weight with you, or join a weight-loss support group  Identify foods or triggers that may cause you to overeat , and find ways to avoid them  Remove tempting high-calorie foods from your home and workplace  Place a bowl of fresh fruit on your kitchen counter  If stress causes you to eat, then find other ways to cope with stress  Keep a diary to track what you eat and drink  Also write down how many minutes of physical activity you do each day  Weigh yourself once a week and record it in your diary  Eating changes: You will need to eat 500 to 1,000 fewer calories each day than you currently eat to lose 1 to 2 pounds a week  The following changes will help you cut calories:  Eat smaller portions  Use small plates, no larger than 9 inches in diameter  Fill your plate half full of fruits and vegetables  Measure your food using measuring cups until you know what a serving size looks like  Eat 3 meals and 1 or 2 snacks each day  Plan your meals in advance  Demi Chaney and eat at home most of the time  Eat slowly  Eat fruits and vegetables at every meal   They are low in calories and high in fiber, which makes you feel full  Do not add butter, margarine, or cream sauce to vegetables  Use herbs to season steamed vegetables  Eat less fat and fewer fried foods  Eat more baked or grilled chicken and fish  These protein sources are lower in calories and fat than red meat  Limit fast food  Dress your salads with olive oil and vinegar instead of bottled dressing  Limit the amount of sugar you eat    Do not drink sugary beverages  Limit alcohol  Activity changes:  Physical activity is good for your body in many ways  It helps you burn calories and build strong muscles  It decreases stress and depression, and improves your mood  It can also help you sleep better  Talk to your healthcare provider before you begin an exercise program   Exercise for at least 30 minutes 5 days a week  Start slowly  Set aside time each day for physical activity that you enjoy and that is convenient for you  It is best to do both weight training and an activity that increases your heart rate, such as walking, bicycling, or swimming  Find ways to be more active  Do yard work and housecleaning  Walk up the stairs instead of using elevators  Spend your leisure time going to events that require walking, such as outdoor festivals or fairs  This extra physical activity can help you lose weight and keep it off  Follow up with your primary healthcare provider as directed  You may need to meet with a dietitian  Write down your questions so you remember to ask them during your visits

## 2022-10-13 ENCOUNTER — OFFICE VISIT (OUTPATIENT)
Dept: FAMILY MEDICINE CLINIC | Facility: CLINIC | Age: 52
End: 2022-10-13

## 2022-10-13 VITALS
HEART RATE: 87 BPM | HEIGHT: 68 IN | TEMPERATURE: 96.9 F | SYSTOLIC BLOOD PRESSURE: 114 MMHG | RESPIRATION RATE: 18 BRPM | WEIGHT: 293 LBS | DIASTOLIC BLOOD PRESSURE: 76 MMHG | BODY MASS INDEX: 44.41 KG/M2 | OXYGEN SATURATION: 98 %

## 2022-10-13 DIAGNOSIS — Z00.00 HEALTHCARE MAINTENANCE: ICD-10-CM

## 2022-10-13 DIAGNOSIS — I10 BENIGN HYPERTENSION: ICD-10-CM

## 2022-10-13 DIAGNOSIS — R60.0 BILATERAL LOWER EXTREMITY EDEMA: ICD-10-CM

## 2022-10-13 DIAGNOSIS — Z23 ENCOUNTER FOR IMMUNIZATION: ICD-10-CM

## 2022-10-13 DIAGNOSIS — L03.116 CELLULITIS OF LEFT LOWER EXTREMITY: Primary | ICD-10-CM

## 2022-10-13 PROBLEM — R06.83 SNORES: Status: RESOLVED | Noted: 2019-11-21 | Resolved: 2022-10-13

## 2022-10-13 PROCEDURE — 90471 IMMUNIZATION ADMIN: CPT | Performed by: FAMILY MEDICINE

## 2022-10-13 PROCEDURE — 90682 RIV4 VACC RECOMBINANT DNA IM: CPT | Performed by: FAMILY MEDICINE

## 2022-10-13 PROCEDURE — 99213 OFFICE O/P EST LOW 20 MIN: CPT | Performed by: FAMILY MEDICINE

## 2022-10-13 NOTE — ASSESSMENT & PLAN NOTE
· Resolved  · Much improved erythema, swelling compared to previous clinical images  · Has gone through 2 rounds of antibiotics   Firstly; Clindamycin 300 mg TID x 7 days and then most recently Keflex 500 mg TID x 10 days    · 9/8 Doppler US: No evidence of acute or chronic DVT  · Complete remaining doses of Keflex   · Will f/u in 1 month

## 2022-10-13 NOTE — PROGRESS NOTES
[place] [id number] [facility] [address], [address] [facility]  Phone#  [phone]  Fax#  [phone]    ASSESSMENT and PLAN  Cellulitis  · Resolved  · Much improved erythema, swelling compared to previous clinical images  · Has gone through 2 rounds of antibiotics  Firstly; Clindamycin 300 mg TID x 7 days and then most recently Keflex 500 mg TID x 10 days    · 9/8 Doppler US: No evidence of acute or chronic DVT  · Complete remaining doses of Keflex   · Will f/u in 1 month    Bilateral lower extremity edema  · Lower extremity edema without evidence of pitting  · Echo 2/2020: Normal left ventricular systolic function, EF 38%  Normal left ventricular cavity size  Normal left ventricular wall thickness  Normal left ventricular wall motion without regional wall motion abnormalities  Normal left ventricular diastolic function  Normal left atrial pressures  · Was placed on Lasix 20 mg PO daily q every other day for swelling but stopped taking  · Advised that after she completes her course of antibiotics she can resume this medication until she is seen in 1 month to see if this assists in her swelling   · Recommended compression stockings and to keep legs elevated above the level of her heart when lying down  · Recent medication changes from Amlodipine to HCTZ secondary to potential side effect of edema from Amlodipine   This was d/c 2-3 weeks prior    Healthcare maintenance  · Flu vaccine administered today    Benign hypertension  · BP today 114/76  · Continue home HCTZ 25 mg daily, Lisinopril 20 mg   · Might consider decreasing HCTZ to 12 5 mg if blood pressures continue to be very well controlled at next visits    Diagnoses and all orders for this visit:    Cellulitis of left lower extremity    Encounter for immunization  -     influenza vaccine, quadrivalent, recombinant, PF, 0 5 mL, for patients 18 yr+ (FLUBLOK)    Bilateral lower extremity edema    Healthcare maintenance    Benign hypertension        HISTORY OF PRESENT ILLNESS  Daphne Hogan is a 45 y o  female with a significant PMHx of HTN, Prediabetes, Bilateral lower extremity edema who presents to the clinic for  F/u of left lower extremity cellulitis  Patient's medical conditions are stable unless noted otherwise above  Patient has not had any recent hospitalizations, or medical emergencies since last visit  Patient has no further complaints other than what is mentioned in the ROS  REVIEW OF SYSTEMS  Review of Systems   Constitutional: Negative for chills, fatigue and fever  HENT: Negative for sore throat  Respiratory: Negative for cough, chest tightness and shortness of breath  Cardiovascular: Negative for chest pain and leg swelling  Gastrointestinal: Negative for constipation, diarrhea, nausea and vomiting  Endocrine: Negative for polydipsia, polyphagia and polyuria  Genitourinary: Negative for dysuria  Musculoskeletal: Negative for arthralgias  Skin: Negative for rash  Neurological: Negative for dizziness, light-headedness and headaches  Psychiatric/Behavioral: Negative for agitation and behavioral problems  PAST MEDICAL HISTORY   Past Medical History:   Diagnosis Date   • Arthritis     knees   • Hypertension    • Snores 11/21/2019     Past Surgical History:   Procedure Laterality Date   • APPENDECTOMY  2019    laparoscopic   • CHOLECYSTECTOMY  2006   • [place] COLONOSCOPY FLX DX W/COLLJ SPEC WHEN PFRMD Left 5/2/2019    Procedure: COLONOSCOPY;  Surgeon: Bijan Rosas MD;  Location: [facility];   Service: Gastroenterology   • [place] LAP,APPENDECTOMY N/A 3/22/2019    Procedure: Maria Fernanda Hamilton;  Surgeon: Betsey Calzada MD;  Location: [address] [place] [address];  Service: General   • [address] [place] [address] N/A 8/28/2020    Procedure: [address] [facility];  Surgeon: Betsey Calzada MD;  Location: [address] [place] [address];  Service: General     Social History     Socioeconomic History   • Marital status: /Civil Union     Spouse name: Not on file   • Number of children: Not on file   • Years of education: Not on file   • Highest education level: Not on file   Occupational History   • Not on file   Tobacco Use   • Smoking status: Never Smoker   • Smokeless tobacco: Never Used   Vaping Use   • Vaping Use: Never used   Substance and Sexual Activity   • Alcohol use: Yes     Comment: couple times/month   • Drug use: Never   • Sexual activity: Yes     Partners: Male     Birth control/protection: Post-menopausal   Other Topics Concern   • Not on file   Social History Narrative    ** Merged History Encounter **          Social Determinants of Health     Financial Resource Strain: Not on file   Food Insecurity: Not on file   Transportation Needs: Not on file   Physical Activity: Not on file   Stress: Not on file   Social Connections: Not on file   Intimate Partner Violence: Not on file   Housing Stability: Not on file     Family History   Problem Relation Age of Onset   • Colon cancer Mother 61   • Stroke Father    • No Known Problems Sister    • No Known Problems Daughter    • No Known Problems Maternal Grandmother    • No Known Problems Maternal Grandfather    • No Known Problems Paternal Grandmother    • No Known Problems Paternal Grandfather    • No Known Problems Paternal Aunt    • No Known Problems Paternal Aunt    • Breast cancer Neg Hx    • Ovarian cancer Neg Hx        MEDICATIONS    Current Outpatient Medications:   •  cephalexin (KEFLEX) 500 mg capsule, Take 1 capsule (500 mg total) by mouth every 8 (eight) hours for 10 days, Disp: 30 capsule, Rfl: 0  •  Diclofenac Sodium (VOLTAREN) 1 %, Apply 2 g topically 4 (four) times a day (Patient not taking: Reported on 10/12/2022), Disp: 350 g, Rfl: 2  •  fluticasone (FLONASE) 50 mcg/act nasal spray, 1 spray into each nostril daily, Disp: 16 g, Rfl: 1  •  furosemide (LASIX) 20 mg tablet, Take 1 tablet (20 mg total) by mouth every other day, Disp: 30 tablet, Rfl: 0  • hydrochlorothiazide (HYDRODIURIL) 25 mg tablet, Take 1 tablet (25 mg total) by mouth daily, Disp: 90 tablet, Rfl: 5  •  ibuprofen (MOTRIN) 800 mg tablet, Take 1 tablet (800 mg total) by mouth every 8 (eight) hours (Patient not taking: Reported on 10/12/2022), Disp: 30 tablet, Rfl: 0  •  lisinopril (ZESTRIL) 20 mg tablet, Take 1 tablet (20 mg total) by mouth daily, Disp: 90 tablet, Rfl: 1  •  loratadine (CLARITIN) 10 mg tablet, Take 1 tablet (10 mg total) by mouth daily, Disp: 90 tablet, Rfl: 1  •  nystatin (MYCOSTATIN) powder, Apply topically 2 (two) times a day (Patient not taking: Reported on 10/12/2022), Disp: 15 g, Rfl: 5    PHYSICAL EXAM  Vitals:    10/13/22 1443   BP: 114/76   BP Location: Left arm   Patient Position: Sitting   Cuff Size: Large   Pulse: 87   Resp: 18   Temp: (!) 96 9 °F (36 1 °C)   TempSrc: Temporal   SpO2: 98%   Weight: (!) 146 kg (322 lb)   Height: 5' 8" (1 727 m)     Wt Readings from Last 3 Encounters:   10/13/22 (!) 146 kg (322 lb)   10/12/22 (!) 146 kg (322 lb)   10/04/22 (!) 148 kg (326 lb 3 2 oz)    , Body mass index is 48 96 kg/m²  Physical Exam  Constitutional:       Appearance: She is well-developed  HENT:      Head: Normocephalic and atraumatic  Eyes:      Pupils: Pupils are equal, round, and reactive to light  Cardiovascular:      Rate and Rhythm: Normal rate and regular rhythm  Heart sounds: Normal heart sounds  Pulmonary:      Effort: Pulmonary effort is normal       Breath sounds: Normal breath sounds  Abdominal:      General: Bowel sounds are normal       Palpations: Abdomen is soft  Musculoskeletal:         General: Normal range of motion  Cervical back: Normal range of motion and neck supple  Right lower leg: Edema present  Left lower leg: Edema (non-pitting) present  Skin:     General: Skin is warm  Findings: No erythema or rash  Neurological:      Mental Status: She is alert and oriented to person, place, and time     Psychiatric: Behavior: Behavior normal              LABS/IMAGING  Hemoglobin A1C   Date Value Ref Range Status   09/11/2021 5 9 (H) Normal 3 8-5 6%; PreDiabetic 5 7-6 4%; Diabetic >=6 5%; Glycemic control for adults with diabetes <7 0% % Final     HDL, Direct   Date Value Ref Range Status   01/18/2020 46 >=40 mg/dL Final     Comment:       HDL Cholesterol:       Low     <41 mg/dL  Specimen collection should occur prior to Metamizole administration due to the potential for falsley depressed results  Triglycerides   Date Value Ref Range Status   01/18/2020 68 <150 mg/dL Final     Comment:       Triglyceride:     Normal          <150 mg/dl     Borderline High 150-199 mg/dl     High            200-499 mg/dl        Very High       >499 mg/dl    Specimen collection should occur prior to N-Acetylcysteine or Metamizole administration due to the potential for falsely depressed results        WBC   Date Value Ref Range Status   04/22/2022 5 93 4 31 - 10 16 Thousand/uL Final   01/18/2020 6 00 4 50 - 11 00 Thousand/uL Final   03/21/2019 8 60 4 50 - 11 00 Thousand/uL Final     Hemoglobin   Date Value Ref Range Status   04/22/2022 13 8 11 5 - 15 4 g/dL Final   01/18/2020 13 6 12 0 - 16 0 g/dL Final   03/21/2019 13 8 12 0 - 16 0 g/dL Final     Platelets   Date Value Ref Range Status   04/22/2022 171 149 - 390 Thousands/uL Final   01/18/2020 176 150 - 450 Thousands/uL Final   03/21/2019 202 150 - 450 Thousands/uL Final     Potassium   Date Value Ref Range Status   04/22/2022 3 9 3 6 - 5 0 mmol/L Final   01/18/2022 3 9 3 6 - 5 0 mmol/L Final   09/11/2021 4 0 3 6 - 5 0 mmol/L Final     Chloride   Date Value Ref Range Status   04/22/2022 106 97 - 108 mmol/L Final   01/18/2022 105 97 - 108 mmol/L Final   09/11/2021 106 97 - 108 mmol/L Final     CO2   Date Value Ref Range Status   04/22/2022 29 22 - 30 mmol/L Final   01/18/2022 27 22 - 30 mmol/L Final   09/11/2021 26 22 - 30 mmol/L Final     CO2, i-STAT   Date Value Ref Range Status 08/13/2018 26 21 - 32 mmol/L Final     BUN   Date Value Ref Range Status   04/22/2022 16 5 - 25 mg/dL Final   01/18/2022 16 5 - 25 mg/dL Final   09/11/2021 10 5 - 25 mg/dL Final     Creatinine   Date Value Ref Range Status   04/22/2022 0 83 0 60 - 1 20 mg/dL Final     Comment:     Standardized to IDMS reference method   01/18/2022 0 58 (L) 0 60 - 1 20 mg/dL Final     Comment:     Standardized to IDMS reference method   09/11/2021 0 64 0 60 - 1 20 mg/dL Final     Comment:     Standardized to IDMS reference method     eGFR   Date Value Ref Range Status   04/22/2022 80 ml/min/1 73sq m Final   01/18/2022 105 ml/min/1 73sq m Final   09/11/2021 118 >60 ml/min/1 73sq m Final   08/13/2018 117 ml/min/1 73sq m Final     Glucose, i-STAT   Date Value Ref Range Status   08/13/2018 100 65 - 140 mg/dl Final     Calcium   Date Value Ref Range Status   04/22/2022 8 5 8 4 - 10 2 mg/dL Final   01/18/2022 8 6 8 4 - 10 2 mg/dL Final   09/11/2021 8 6 8 4 - 10 2 mg/dL Final     AST   Date Value Ref Range Status   09/11/2021 26 14 - 36 U/L Final     Comment:     Specimen collection should occur prior to Sulfasalazine administration due to the potential for falsely depressed results  01/18/2020 22 14 - 36 U/L Final     Comment:       Specimen collection should occur prior to Sulfasalazine administration due to the potential for falsely depressed results  03/21/2019 32 14 - 36 U/L Final     Comment:       Specimen collection should occur prior to Sulfasalazine administration due to the potential for falsely depressed results  ALT   Date Value Ref Range Status   09/11/2021 44 (H) <35 U/L Final     Comment:     Specimen collection should occur prior to Sulfasalazine administration due to the potential for falsely depressed results  01/18/2020 46 9 - 52 U/L Final     Comment:       Specimen collection should occur prior to Sulfasalazine administration due to the potential for falsely depressed results      03/21/2019 49 9 - 52 U/L Final     Comment:       Specimen collection should occur prior to Sulfasalazine administration due to the potential for falsely depressed results  Alkaline Phosphatase   Date Value Ref Range Status   09/11/2021 56 43 - 122 U/L Final   01/18/2020 45 43 - 122 U/L Final   03/21/2019 52 43 - 122 U/L Final     Magnesium   Date Value Ref Range Status   10/11/2018 1 9 1 6 - 2 3 mg/dL Final     No results found for: TSH    This note has been dictated using [employer] software  It may contain errors, including improperly dictated words  Please contact physician directly for any questions       Ginette Ingram   PGY-3

## 2022-10-13 NOTE — ASSESSMENT & PLAN NOTE
· BP today 114/76  · Continue home HCTZ 25 mg daily, Lisinopril 20 mg   · Might consider decreasing HCTZ to 12 5 mg if blood pressures continue to be very well controlled at next visits

## 2022-10-13 NOTE — ASSESSMENT & PLAN NOTE
· Lower extremity edema without evidence of pitting  · Echo 2/2020: Normal left ventricular systolic function, EF 89%  Normal left ventricular cavity size  Normal left ventricular wall thickness  Normal left ventricular wall motion without regional wall motion abnormalities  Normal left ventricular diastolic function  Normal left atrial pressures  · Was placed on Lasix 20 mg PO daily q every other day for swelling but stopped taking  · Advised that after she completes her course of antibiotics she can resume this medication until she is seen in 1 month to see if this assists in her swelling   · Recommended compression stockings and to keep legs elevated above the level of her heart when lying down  · Recent medication changes from Amlodipine to HCTZ secondary to potential side effect of edema from Amlodipine   This was d/c 2-3 weeks prior

## 2022-11-03 DIAGNOSIS — L03.90 CELLULITIS, UNSPECIFIED CELLULITIS SITE: ICD-10-CM

## 2022-11-03 RX ORDER — FUROSEMIDE 20 MG/1
TABLET ORAL
Qty: 30 TABLET | Refills: 0 | Status: SHIPPED | OUTPATIENT
Start: 2022-11-03

## 2022-11-06 PROBLEM — Z00.00 HEALTHCARE MAINTENANCE: Status: RESOLVED | Noted: 2019-01-31 | Resolved: 2022-11-06

## 2022-11-15 ENCOUNTER — APPOINTMENT (OUTPATIENT)
Dept: LAB | Facility: HOSPITAL | Age: 52
End: 2022-11-15

## 2022-11-15 DIAGNOSIS — I10 BENIGN HYPERTENSION: ICD-10-CM

## 2022-11-15 DIAGNOSIS — Z00.00 HEALTH CARE MAINTENANCE: ICD-10-CM

## 2022-11-15 LAB
ALBUMIN SERPL BCP-MCNC: 4.1 G/DL (ref 3.5–5)
ALP SERPL-CCNC: 51 U/L (ref 43–122)
ALT SERPL W P-5'-P-CCNC: 36 U/L
ANION GAP SERPL CALCULATED.3IONS-SCNC: 4 MMOL/L (ref 5–14)
AST SERPL W P-5'-P-CCNC: 24 U/L (ref 14–36)
BASOPHILS # BLD AUTO: 0.05 THOUSANDS/ÂΜL (ref 0–0.1)
BASOPHILS NFR BLD AUTO: 1 % (ref 0–1)
BILIRUB SERPL-MCNC: 0.54 MG/DL (ref 0.2–1)
BUN SERPL-MCNC: 23 MG/DL (ref 5–25)
CALCIUM SERPL-MCNC: 8.7 MG/DL (ref 8.4–10.2)
CHLORIDE SERPL-SCNC: 102 MMOL/L (ref 96–108)
CHOLEST SERPL-MCNC: 200 MG/DL
CO2 SERPL-SCNC: 31 MMOL/L (ref 21–32)
CREAT SERPL-MCNC: 0.96 MG/DL (ref 0.6–1.2)
EOSINOPHIL # BLD AUTO: 0.4 THOUSAND/ÂΜL (ref 0–0.61)
EOSINOPHIL NFR BLD AUTO: 6 % (ref 0–6)
ERYTHROCYTE [DISTWIDTH] IN BLOOD BY AUTOMATED COUNT: 13.3 % (ref 11.6–15.1)
EST. AVERAGE GLUCOSE BLD GHB EST-MCNC: 126 MG/DL
GFR SERPL CREATININE-BSD FRML MDRD: 67 ML/MIN/1.73SQ M
GLUCOSE P FAST SERPL-MCNC: 112 MG/DL (ref 70–99)
HBA1C MFR BLD: 6 %
HCT VFR BLD AUTO: 42.2 % (ref 34.8–46.1)
HDLC SERPL-MCNC: 45 MG/DL
HGB BLD-MCNC: 13.1 G/DL (ref 11.5–15.4)
IMM GRANULOCYTES # BLD AUTO: 0.04 THOUSAND/UL (ref 0–0.2)
IMM GRANULOCYTES NFR BLD AUTO: 1 % (ref 0–2)
LDLC SERPL CALC-MCNC: 132 MG/DL
LYMPHOCYTES # BLD AUTO: 2.74 THOUSANDS/ÂΜL (ref 0.6–4.47)
LYMPHOCYTES NFR BLD AUTO: 39 % (ref 14–44)
MCH RBC QN AUTO: 29.2 PG (ref 26.8–34.3)
MCHC RBC AUTO-ENTMCNC: 31 G/DL (ref 31.4–37.4)
MCV RBC AUTO: 94 FL (ref 82–98)
MONOCYTES # BLD AUTO: 0.75 THOUSAND/ÂΜL (ref 0.17–1.22)
MONOCYTES NFR BLD AUTO: 11 % (ref 4–12)
NEUTROPHILS # BLD AUTO: 3.05 THOUSANDS/ÂΜL (ref 1.85–7.62)
NEUTS SEG NFR BLD AUTO: 42 % (ref 43–75)
NONHDLC SERPL-MCNC: 155 MG/DL
NRBC BLD AUTO-RTO: 0 /100 WBCS
PLATELET # BLD AUTO: 179 THOUSANDS/UL (ref 149–390)
PMV BLD AUTO: 11 FL (ref 8.9–12.7)
POTASSIUM SERPL-SCNC: 4.4 MMOL/L (ref 3.5–5.3)
PROT SERPL-MCNC: 7.6 G/DL (ref 6.4–8.4)
RBC # BLD AUTO: 4.49 MILLION/UL (ref 3.81–5.12)
SODIUM SERPL-SCNC: 137 MMOL/L (ref 135–147)
TRIGL SERPL-MCNC: 113 MG/DL
WBC # BLD AUTO: 7.03 THOUSAND/UL (ref 4.31–10.16)

## 2022-11-16 ENCOUNTER — OFFICE VISIT (OUTPATIENT)
Dept: FAMILY MEDICINE CLINIC | Facility: CLINIC | Age: 52
End: 2022-11-16

## 2022-11-16 VITALS
SYSTOLIC BLOOD PRESSURE: 118 MMHG | WEIGHT: 293 LBS | DIASTOLIC BLOOD PRESSURE: 84 MMHG | TEMPERATURE: 97.1 F | OXYGEN SATURATION: 99 % | HEART RATE: 94 BPM | RESPIRATION RATE: 18 BRPM | BODY MASS INDEX: 49.42 KG/M2

## 2022-11-16 DIAGNOSIS — L03.90 CELLULITIS, UNSPECIFIED CELLULITIS SITE: ICD-10-CM

## 2022-11-16 DIAGNOSIS — I10 BENIGN HYPERTENSION: Primary | ICD-10-CM

## 2022-11-16 DIAGNOSIS — R60.9 PITTING EDEMA: ICD-10-CM

## 2022-11-16 RX ORDER — DIAPER,BRIEF,INFANT-TODD,DISP
EACH MISCELLANEOUS 4 TIMES DAILY PRN
Qty: 30 G | Refills: 0 | Status: SHIPPED | OUTPATIENT
Start: 2022-11-16

## 2022-11-16 RX ORDER — IBUPROFEN 800 MG/1
800 TABLET ORAL EVERY 8 HOURS
Qty: 30 TABLET | Refills: 3 | Status: SHIPPED | OUTPATIENT
Start: 2022-11-16

## 2022-11-16 NOTE — ASSESSMENT & PLAN NOTE
Chronic,  Blood Pressure: 118/84   BP Readings from Last 3 Encounters:   11/16/22 118/84   10/13/22 114/76   10/12/22 138/75       Controlled  Current medication includes:  Hydrochlorothiazide and lisinopril  Currently controlled  Continue home medication regimen

## 2022-11-16 NOTE — PROGRESS NOTES
Assessment/Plan:    1  Benign hypertension  Assessment & Plan:  Chronic,  Blood Pressure: 118/84   BP Readings from Last 3 Encounters:   11/16/22 118/84   10/13/22 114/76   10/12/22 138/75       Controlled  Current medication includes:  Hydrochlorothiazide and lisinopril  Currently controlled  Continue home medication regimen  2  Pitting edema  Assessment & Plan:  Left greater than right  Had recent duplex that was otherwise negative  Patient tried compression stockings however she states they are too tight  Associated with itchiness  Plan  -will try compression stocking that are less tight for period of 3 weeks every single day  -hydrocortisone cream in place for itchiness  -will re-evaluate in 3 months    Orders:  -     Compression Stocking  -     hydrocortisone 1 % cream; Apply topically 4 (four) times a day as needed for irritation      4  BMI 50 0-59 9, adult [facility])  Assessment & Plan:  Wt Readings from Last 3 Encounters:   11/16/22 (!) 147 kg (325 lb)   10/13/22 (!) 146 kg (322 lb)   10/12/22 (!) 146 kg (322 lb)     BMI Counseling: Body mass index is 49 42 kg/m²  The BMI is above normal  Nutrition recommendations include reducing portion sizes, decreasing overall calorie intake, 3-5 servings of fruits/vegetables daily and reducing fast food intake  Orders:  -     Ambulatory Referral to Nutrition Services; Future       Subjective:      Patient ID: Burnis Paget is a 45 y o  female  who presents to the clinic for management of their chronic medical conditions   Patient's medical conditions are stable unless noted otherwise above   Patient has not had any recent hospitalizations, or medical emergencies since last visit  Bucyrus Fulling has no further complaints other than what is mentioned in the ROS          The following portions of the patient's history were reviewed and updated as appropriate: allergies, current medications, past family history, past medical history, past social history, past surgical history, and problem list     Review of Systems   Constitutional: Negative for chills and fever  HENT: Negative for ear pain and sore throat  Eyes: Negative for pain and visual disturbance  Respiratory: Negative for cough and shortness of breath  Cardiovascular: Positive for leg swelling  Negative for chest pain and palpitations  Gastrointestinal: Negative for abdominal pain and vomiting  Genitourinary: Negative for dysuria and hematuria  Musculoskeletal: Negative for arthralgias and back pain  Skin: Negative for color change and rash  Neurological: Negative for seizures and syncope  All other systems reviewed and are negative  Objective: Wt Readings from Last 3 Encounters:   11/16/22 (!) 147 kg (325 lb)   10/13/22 (!) 146 kg (322 lb)   10/12/22 (!) 146 kg (322 lb)          Physical Exam  Constitutional:       Appearance: She is well-developed  She is obese  HENT:      Head: Normocephalic and atraumatic  Eyes:      Pupils: Pupils are equal, round, and reactive to light  Cardiovascular:      Rate and Rhythm: Normal rate and regular rhythm  Heart sounds: Normal heart sounds  Pulmonary:      Effort: Pulmonary effort is normal       Breath sounds: Normal breath sounds  Abdominal:      General: Bowel sounds are normal       Palpations: Abdomen is soft  Musculoskeletal:         General: Normal range of motion  Cervical back: Normal range of motion and neck supple  Right lower leg: Edema present  Left lower leg: Edema (non-pitting) present  Skin:     General: Skin is warm  Findings: No erythema or rash  Neurological:      Mental Status: She is alert and oriented to person, place, and time     Psychiatric:         Behavior: Behavior normal            Charo Matson DO   Family Medicine PGY-2  11/17/2022

## 2022-11-17 NOTE — ASSESSMENT & PLAN NOTE
Left greater than right  Had recent duplex that was otherwise negative  Patient tried compression stockings however she states they are too tight  Associated with itchiness        Plan  -will try compression stocking that are less tight for period of 3 weeks every single day  -hydrocortisone cream in place for itchiness  -will re-evaluate in 3 months

## 2022-11-17 NOTE — ASSESSMENT & PLAN NOTE
Wt Readings from Last 3 Encounters:   11/16/22 (!) 147 kg (325 lb)   10/13/22 (!) 146 kg (322 lb)   10/12/22 (!) 146 kg (322 lb)     BMI Counseling: Body mass index is 49 42 kg/m²  The BMI is above normal  Nutrition recommendations include reducing portion sizes, decreasing overall calorie intake, 3-5 servings of fruits/vegetables daily and reducing fast food intake

## 2022-12-13 DIAGNOSIS — I10 BENIGN HYPERTENSION: ICD-10-CM

## 2022-12-14 RX ORDER — LISINOPRIL 20 MG/1
TABLET ORAL
Qty: 90 TABLET | Refills: 1 | Status: SHIPPED | OUTPATIENT
Start: 2022-12-14

## 2022-12-26 DIAGNOSIS — J30.1 ALLERGIC RHINITIS DUE TO POLLEN, UNSPECIFIED SEASONALITY: ICD-10-CM

## 2022-12-27 DIAGNOSIS — J30.1 ALLERGIC RHINITIS DUE TO POLLEN, UNSPECIFIED SEASONALITY: ICD-10-CM

## 2022-12-27 RX ORDER — LORATADINE 10 MG/1
TABLET ORAL
Qty: 90 TABLET | Refills: 1 | Status: SHIPPED | OUTPATIENT
Start: 2022-12-27

## 2022-12-27 RX ORDER — LORATADINE 10 MG/1
10 TABLET ORAL DAILY
Qty: 90 TABLET | Refills: 1 | OUTPATIENT
Start: 2022-12-27

## 2023-03-31 DIAGNOSIS — L03.90 CELLULITIS, UNSPECIFIED CELLULITIS SITE: ICD-10-CM

## 2023-03-31 RX ORDER — IBUPROFEN 800 MG/1
800 TABLET ORAL EVERY 8 HOURS
Qty: 30 TABLET | Refills: 3 | OUTPATIENT
Start: 2023-03-31

## 2023-04-04 DIAGNOSIS — L03.90 CELLULITIS, UNSPECIFIED CELLULITIS SITE: ICD-10-CM

## 2023-04-04 DIAGNOSIS — Z13.9 ENCOUNTER FOR SCREENING: Primary | ICD-10-CM

## 2023-04-04 RX ORDER — IBUPROFEN 800 MG/1
800 TABLET ORAL EVERY 8 HOURS
Qty: 30 TABLET | Refills: 3 | Status: SHIPPED | OUTPATIENT
Start: 2023-04-04 | End: 2023-04-10 | Stop reason: SDUPTHER

## 2023-04-04 NOTE — TELEPHONE ENCOUNTER
Hi good morning  My name is marcelle  My date of birth is [phone]  I'm calling because I need a referral for a mammogram and I also need a refill of prescription ibuprofen 800 milligram tablets  My phone number is [phone]  Thank you

## 2023-04-10 RX ORDER — IBUPROFEN 800 MG/1
800 TABLET ORAL EVERY 8 HOURS
Qty: 30 TABLET | Refills: 3 | Status: SHIPPED | OUTPATIENT
Start: 2023-04-10 | End: 2023-05-25 | Stop reason: SDUPTHER

## 2023-04-17 ENCOUNTER — TELEPHONE (OUTPATIENT)
Dept: FAMILY MEDICINE CLINIC | Facility: CLINIC | Age: 53
End: 2023-04-17

## 2023-04-17 DIAGNOSIS — U07.1 COVID-19: Primary | ICD-10-CM

## 2023-04-17 RX ORDER — NIRMATRELVIR AND RITONAVIR 300-100 MG
3 KIT ORAL 2 TIMES DAILY
Qty: 30 TABLET | Refills: 0 | Status: SHIPPED | OUTPATIENT
Start: 2023-04-17 | End: 2023-04-22

## 2023-04-17 NOTE — TELEPHONE ENCOUNTER
My name is shweta Nelson's date of birth is [phone]  I'm calling because I've recently came back yesterday from a cruise and I have COVID and I would like to know if I could get, if I could get the back flow pills  My phone number is [phone]  Thank you, [phone]

## 2023-05-25 ENCOUNTER — OFFICE VISIT (OUTPATIENT)
Dept: FAMILY MEDICINE CLINIC | Facility: CLINIC | Age: 53
End: 2023-05-25

## 2023-05-25 VITALS
DIASTOLIC BLOOD PRESSURE: 80 MMHG | BODY MASS INDEX: 44.41 KG/M2 | OXYGEN SATURATION: 95 % | HEIGHT: 68 IN | WEIGHT: 293 LBS | TEMPERATURE: 96.1 F | SYSTOLIC BLOOD PRESSURE: 132 MMHG | RESPIRATION RATE: 18 BRPM | HEART RATE: 97 BPM

## 2023-05-25 DIAGNOSIS — Z13.9 ENCOUNTER FOR SCREENING: ICD-10-CM

## 2023-05-25 DIAGNOSIS — I10 BENIGN HYPERTENSION: ICD-10-CM

## 2023-05-25 DIAGNOSIS — J30.1 ALLERGIC RHINITIS DUE TO POLLEN, UNSPECIFIED SEASONALITY: ICD-10-CM

## 2023-05-25 DIAGNOSIS — I87.8 VENOUS STASIS OF LOWER EXTREMITY: Primary | ICD-10-CM

## 2023-05-25 DIAGNOSIS — L03.90 CELLULITIS, UNSPECIFIED CELLULITIS SITE: ICD-10-CM

## 2023-05-25 RX ORDER — FUROSEMIDE 20 MG/1
20 TABLET ORAL DAILY
Qty: 45 TABLET | Refills: 3 | Status: SHIPPED | OUTPATIENT
Start: 2023-05-25

## 2023-05-25 RX ORDER — IBUPROFEN 800 MG/1
800 TABLET ORAL EVERY 8 HOURS
Qty: 30 TABLET | Refills: 3 | Status: SHIPPED | OUTPATIENT
Start: 2023-05-25

## 2023-05-25 RX ORDER — HYDROCHLOROTHIAZIDE 25 MG/1
25 TABLET ORAL DAILY
Qty: 90 TABLET | Refills: 5 | Status: SHIPPED | OUTPATIENT
Start: 2023-05-25

## 2023-05-25 RX ORDER — LISINOPRIL 20 MG/1
20 TABLET ORAL DAILY
Qty: 90 TABLET | Refills: 1 | Status: SHIPPED | OUTPATIENT
Start: 2023-05-25

## 2023-05-25 RX ORDER — LORATADINE 10 MG/1
10 TABLET ORAL DAILY
Qty: 90 TABLET | Refills: 1 | Status: SHIPPED | OUTPATIENT
Start: 2023-05-25

## 2023-05-26 NOTE — PROGRESS NOTES
Assessment/Plan:    1  Venous stasis of lower extremity  Comments:  Could be secondary to lymphedema  Failed conservative measures which included Lasix as needed as well as compression stockings  Will refer to vascular surgery  Orders:  -     Ambulatory Referral to Vascular Surgery; Future    2  Benign hypertension  Comments:  Controlled  Continue with current regimen  Orders:  -     hydrochlorothiazide (HYDRODIURIL) 25 mg tablet; Take 1 tablet (25 mg total) by mouth daily  -     lisinopril (ZESTRIL) 20 mg tablet; Take 1 tablet (20 mg total) by mouth daily    3  Allergic rhinitis due to pollen, unspecified seasonality  -     loratadine (CLARITIN) 10 mg tablet; Take 1 tablet (10 mg total) by mouth daily    4  Encounter for screening  -     Mammo screening bilateral w 3d & cad; Future; Expected date: 05/25/2023  -     furosemide (LASIX) 20 mg tablet; Take 1 tablet (20 mg total) by mouth daily  -     ibuprofen (MOTRIN) 800 mg tablet; Take 1 tablet (800 mg total) by mouth every 8 (eight) hours         Subjective:      Patient ID: Perry Guerra is a 52 y o  female  Patient coming in for follow-up for her leg pain  Patient reports that she continues to have lower extremity swelling  She has had a trial of Lasix every other day, and she wears compression stockings daily  She states that she continues to have lower extremity swelling with pain  Patient reports that the pain is sometimes relieved by ibuprofen however would like further treatment for her lower extremity swelling  The following portions of the patient's history were reviewed and updated as appropriate: allergies, current medications, past family history, past medical history, past social history, past surgical history, and problem list     Review of Systems   Constitutional: Negative for chills and fever  HENT: Negative for ear pain and sore throat  Eyes: Negative for pain and visual disturbance     Respiratory: Negative for cough and "shortness of breath  Cardiovascular: Positive for leg swelling  Negative for chest pain and palpitations  Gastrointestinal: Negative for abdominal pain and vomiting  Genitourinary: Negative for dysuria and hematuria  Musculoskeletal: Negative for arthralgias and back pain  Skin: Negative for color change and rash  Neurological: Negative for seizures and syncope  All other systems reviewed and are negative  Objective:      /80 (BP Location: Left arm, Patient Position: Sitting, Cuff Size: Extra-Large)   Pulse 97   Temp (!) 96 1 °F (35 6 °C) (Temporal)   Resp 18   Ht 5' 8\" (1 727 m)   Wt (!) 147 kg (324 lb)   LMP  (LMP Unknown)   SpO2 95%   BMI 49 26 kg/m²          Physical Exam  Constitutional:       Appearance: She is well-developed  She is obese  HENT:      Head: Normocephalic and atraumatic  Eyes:      Pupils: Pupils are equal, round, and reactive to light  Cardiovascular:      Rate and Rhythm: Normal rate and regular rhythm  Heart sounds: Normal heart sounds  Pulmonary:      Effort: Pulmonary effort is normal       Breath sounds: Normal breath sounds  Abdominal:      General: Bowel sounds are normal       Palpations: Abdomen is soft  Musculoskeletal:         General: Normal range of motion  Cervical back: Normal range of motion and neck supple  Right lower leg: Edema present  Left lower leg: Edema (non-pitting) present  Skin:     General: Skin is warm  Findings: No erythema or rash  Neurological:      Mental Status: She is alert and oriented to person, place, and time     Psychiatric:         Behavior: Behavior normal            Markus Jo DO   Family Medicine PGY-2  5/25/2023       "

## 2023-06-26 ENCOUNTER — HOSPITAL ENCOUNTER (OUTPATIENT)
Dept: MAMMOGRAPHY | Facility: MEDICAL CENTER | Age: 53
Discharge: HOME | End: 2023-06-26
Payer: COMMERCIAL

## 2023-06-26 VITALS — WEIGHT: 293 LBS | BODY MASS INDEX: 44.41 KG/M2 | HEIGHT: 68 IN

## 2023-06-26 DIAGNOSIS — Z13.9 ENCOUNTER FOR SCREENING: ICD-10-CM

## 2023-06-26 PROCEDURE — 77063 BREAST TOMOSYNTHESIS BI: CPT

## 2023-06-26 PROCEDURE — 77067 SCR MAMMO BI INCL CAD: CPT

## 2023-07-19 ENCOUNTER — CONSULT (OUTPATIENT)
Dept: VASCULAR SURGERY | Facility: CLINIC | Age: 53
End: 2023-07-19
Payer: COMMERCIAL

## 2023-07-19 VITALS
HEART RATE: 90 BPM | BODY MASS INDEX: 44.41 KG/M2 | WEIGHT: 293 LBS | DIASTOLIC BLOOD PRESSURE: 90 MMHG | OXYGEN SATURATION: 100 % | HEIGHT: 68 IN | SYSTOLIC BLOOD PRESSURE: 132 MMHG

## 2023-07-19 DIAGNOSIS — I83.93 VARICOSE VEINS OF BOTH LOWER EXTREMITIES, UNSPECIFIED WHETHER COMPLICATED: ICD-10-CM

## 2023-07-19 DIAGNOSIS — I87.2 VENOUS INSUFFICIENCY: Primary | ICD-10-CM

## 2023-07-19 DIAGNOSIS — I87.8 VENOUS STASIS OF LOWER EXTREMITY: ICD-10-CM

## 2023-07-19 PROCEDURE — 99203 OFFICE O/P NEW LOW 30 MIN: CPT

## 2023-07-19 NOTE — PROGRESS NOTES
Assessment/Plan:    Venous insufficiency  Persistent LLE edema and pain since September 2022 after skin lesion biopsy in March 2022. Biopsy results showed ateriovenous hemangioma and venous stasis changes. She developed cellulitis of LLE in September 2022 and has been symptomatic since then. Patient has had venous duplex of both legs in the past related to swelling and pain, all of which have been negative for DVT. Swelling occurs from ankle to knee, sparing her left foot. Patient reports that she has had 2 episodes of cellulitis since skin lesion biopsy, as well as episodes of weeping from LLE. She reports that pain is worse at the end of the day and is accompanied by feelings of fatigue. Patient has been wearing compression stockings that she purchased online for several months with little to no improvement. She has also been taking furosemide every other day without any notable improvement. She is denying any numbness, tingling, tissue loss, claudication or weeping at this time. Symptoms improve with leg elevation and sometimes with ibuprofen. Plan:  -We discussed the pathophysiology of venous insufficiency as well as contributing lifestyle factors.  -We discussed initiating conservative measures including compression stockings, elevating legs, increasing physical activity and weight loss. -Patient agreeable to compression stockings. Order placed for compression stockings, apply in the morning and remove before bed. -Return to office in 3 months to reassess symptoms and assess effectiveness of conservative measures. Varicose veins of both lower extremities  Several areas of scattered bulging reticular veins, however patient denies any symptoms related to varicose veins. BMI 50.0-59.9, adult [facility])  Patient is currently attempting weight loss, started [employer] weight loss program without any weight loss yet. -Offered referral to weight management however patient declined.   -Provided printed information regarding weight management.  -Low fat diet with plenty of fresh fruits and vegetables, as well as lean protein. Diagnoses and all orders for this visit:    Venous insufficiency  -     Compression Stocking    Venous stasis of lower extremity  Comments:  Could be secondary to lymphedema. Failed conservative measures which included Lasix as needed as well as compression stockings. Will refer to vascular surgery  Orders:  -     Ambulatory Referral to Vascular Surgery    Varicose veins of both lower extremities, unspecified whether complicated    BMI 72.9-10.7, adult ([facility])        Subjective:      Patient ID: Jovana Junior is a 52 y.o. female. Patient is a 52year old female, nonsmoker, with PMH morbid obesity, HTN, and prediabetes. Patient is presenting to vascular surgery office upon referral by PCP for evaluation of possible venous insufficiency. Patient is reporting persistent LLE edema and pain. She reports that in March 2022 she had a lesion on her LLE biopsied due to concerns for skin cancer. Biopsy results showed ateriovenous hemangioma and venous stasis changes. She notes that immediately after biopsy she did not have any issues, however she developed cellulitis of LLE in September 2022 and has been symptomatic since then. Patient has had venous duplex of both legs in the past related to swelling and pain, all of which have been negative for DVT. Swelling occurs from ankle to knee, sparing her left foot. Patient reports that she has had 2 episodes of cellulitis since skin lesion biopsy, as well as episodes of weeping from LLE. She reports that pain is worse at the end of the day and is accompanied by feelings of fatigue. Patient has been wearing compression stockings that she purchased online for several months with little to no improvement. She has also been taking furosemide every other day without any notable improvement.  She is denying any numbness, tingling, tissue loss, claudication or weeping at this time. Symptoms improve with leg elevation and sometimes with ibuprofen. Family history varicose veins, phlebitis in sister. 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 Patient reports that she is attempting weight loss, started [address] program recently. Physically she has more energy but has not lost any weight yet. The following portions of the patient's history were reviewed and updated as appropriate: allergies, current medications, past family history, past medical history, past social history, past surgical history and problem list.    Review of Systems   Constitutional: Negative. Negative for chills and fever. HENT: Negative. Eyes: Negative. Respiratory: Negative. Cardiovascular: Positive for leg swelling (left > R). Gastrointestinal: Negative. Endocrine: Negative. Genitourinary: Negative. Musculoskeletal:        Left leg pain   Skin: Positive for color change (left foot appears darker than right footl). Negative for pallor and wound. Allergic/Immunologic: Negative. Neurological: Negative. Hematological: Negative. Psychiatric/Behavioral: Negative. Objective:  /90 (BP Location: Left arm, Patient Position: Sitting, Cuff Size: Large)   Pulse 90   Ht 5' 8" (1.727 m)   Wt (!) 149 kg (328 lb)   LMP  (LMP Unknown)   SpO2 100%   BMI 49.87 kg/m²        Physical Exam  Vitals reviewed. Constitutional:       General: She is not in acute distress. Appearance: Normal appearance. She is obese. HENT:      Head: Normocephalic and atraumatic. Neck:      Vascular: No carotid bruit. Cardiovascular:      Rate and Rhythm: Normal rate and regular rhythm. Pulses:           Dorsalis pedis pulses are 2+ on the right side and 2+ on the left side. Comments: Trace nonpitting edema bilateral ankles  Pulmonary:      Effort: Pulmonary effort is normal. No respiratory distress.    Musculoskeletal: General: Swelling (bilateral lower legs, L > R) present. No tenderness. Normal range of motion. Cervical back: No tenderness. Feet:      Right foot:      Skin integrity: Skin integrity normal.      Toenail Condition: Right toenails are normal.      Left foot:      Skin integrity: Skin integrity normal.      Toenail Condition: Left toenails are normal.   Skin:     General: Skin is warm and dry. Capillary Refill: Capillary refill takes less than 2 seconds. Neurological:      General: No focal deficit present. Mental Status: She is alert and oriented to person, place, and time. I have reviewed and made appropriate changes to the review of systems input by the medical assistant. Vitals:    07/19/23 1516   BP: 132/90   BP Location: Left arm   Patient Position: Sitting   Cuff Size: Large   Pulse: 90   SpO2: 100%   Weight: (!) 149 kg (328 lb)   Height: 5' 8" (1.727 m)       Patient Active Problem List   Diagnosis   • Benign hypertension   • Prediabetes   • Tendinitis of right rotator cuff   • Subacromial bursitis of right shoulder joint   • Bilateral lower extremity edema   • Pitting edema   • Eczema   • Abdominal hernia   • BMI 50.0-59.9, adult ([facility])   • Primary osteoarthritis of both knees   • Irritated nevus   • Allergic rhinitis due to pollen   • Localized skin mass, lump, or swelling   • Cellulitis   • Venous insufficiency   • Varicose veins of both lower extremities       Past Surgical History:   Procedure Laterality Date   • APPENDECTOMY  2019    laparoscopic   • CHOLECYSTECTOMY  2006   • [place] COLONOSCOPY FLX DX W/COLLJ SPEC WHEN PFRMD Left 5/2/2019    Procedure: COLONOSCOPY;  Surgeon: Tariq Valadez MD;  Location: [facility];   Service: Gastroenterology   • [place] LAPAROSCOPIC APPENDECTOMY N/A 3/22/2019    Procedure: Veto Burn;  Surgeon: Kostas Guajardo MD;  Location: [address] [place] [address] OR;  Service: General   • [place] REPAIR FIRST ABDOMINAL WALL HERNIA N/A 8/28/2020    Procedure: REPAIR INCISIONAL HERNIA;  Surgeon: Jose R Chauhan MD;  Location: [address] [place] [address] OR;  Service: General       Family History   Problem Relation Age of Onset   • Colon cancer Mother 61   • Stroke Father    • No Known Problems Sister    • No Known Problems Daughter    • No Known Problems Maternal Grandmother    • No Known Problems Maternal Grandfather    • No Known Problems Paternal Grandmother    • No Known Problems Paternal Grandfather    • No Known Problems Paternal Aunt    • No Known Problems Paternal Aunt    • Breast cancer Neg Hx    • Ovarian cancer Neg Hx        Social History     Socioeconomic History   • Marital status: /Civil Union     Spouse name: Not on file   • Number of children: Not on file   • Years of education: Not on file   • Highest education level: Not on file   Occupational History   • Not on file   Tobacco Use   • Smoking status: Never   • Smokeless tobacco: Never   Vaping Use   • Vaping Use: Never used   Substance and Sexual Activity   • Alcohol use: Yes     Comment: couple times/month   • Drug use: Never   • Sexual activity: Yes     Partners: Male     Birth control/protection: Post-menopausal   Other Topics Concern   • Not on file   Social History Narrative    ** Merged History Encounter **          Social Determinants of Health     Financial Resource Strain: Low Risk  (10/7/2021)    Overall Financial Resource Strain (CARDIA)    • Difficulty of Paying Living Expenses: Not hard at all   Food Insecurity: No Food Insecurity (10/7/2021)    Hunger Vital Sign    • Worried About Running Out of Food in the Last Year: Never true    • Ran Out of Food in the Last Year: Never true   Transportation Needs: No Transportation Needs (10/7/2021)    PRAPARE - Transportation    • Lack of Transportation (Medical): No    • Lack of Transportation (Non-Medical):  No   Physical Activity: Not on file   Stress: No Stress Concern Present (10/7/2021)    [address] [place] [address] • Feeling of Stress : Not at all   Social Connections: Not on file   Intimate Partner Violence: Not on file   Housing Stability: Low Risk  (10/7/2021)    Housing Stability Vital Sign    • Unable to Pay for Housing in the Last Year: No    • Number of Places Lived in the Last Year: 1    • Unstable Housing in the Last Year: No       No Known Allergies      Current Outpatient Medications:   •  fluticasone (FLONASE) 50 mcg/act nasal spray, 1 spray into each nostril daily, Disp: 16 g, Rfl: 1  •  furosemide (LASIX) 20 mg tablet, Take 1 tablet (20 mg total) by mouth daily, Disp: 45 tablet, Rfl: 3  •  hydrochlorothiazide (HYDRODIURIL) 25 mg tablet, Take 1 tablet (25 mg total) by mouth daily, Disp: 90 tablet, Rfl: 5  •  hydrocortisone 1 % cream, Apply topically 4 (four) times a day as needed for irritation, Disp: 30 g, Rfl: 0  •  ibuprofen (MOTRIN) 800 mg tablet, Take 1 tablet (800 mg total) by mouth every 8 (eight) hours, Disp: 30 tablet, Rfl: 3  •  lisinopril (ZESTRIL) 20 mg tablet, Take 1 tablet (20 mg total) by mouth daily, Disp: 90 tablet, Rfl: 1  •  loratadine (CLARITIN) 10 mg tablet, Take 1 tablet (10 mg total) by mouth daily, Disp: 90 tablet, Rfl: 1  •  NON FORMULARY, Take 3 capsules by mouth 2 (two) times a day Heal-n-soothe : systemic Enzymes, Disp: , Rfl:   •  Diclofenac Sodium (VOLTAREN) 1 %, Apply 2 g topically 4 (four) times a day (Patient not taking: Reported on 10/12/2022), Disp: 350 g, Rfl: 2  •  nystatin (MYCOSTATIN) powder, Apply topically 2 (two) times a day (Patient not taking: Reported on 10/12/2022), Disp: 15 g, Rfl: 5    I have spent a total time of 30 minutes on 07/19/23 in caring for this patient including Diagnostic results, Prognosis, Risks and benefits of tx options, Instructions for management, Patient and family education, Importance of tx compliance, Risk factor reductions, Impressions, Counseling / Coordination of care, Documenting in the medical record, Reviewing / ordering tests, medicine, procedures  , Obtaining or reviewing history   and Communicating with other healthcare professionals .

## 2023-07-19 NOTE — ASSESSMENT & PLAN NOTE
Persistent LLE edema and pain since September 2022 after skin lesion biopsy in March 2022. Biopsy results showed ateriovenous hemangioma and venous stasis changes. She developed cellulitis of LLE in September 2022 and has been symptomatic since then. Patient has had venous duplex of both legs in the past related to swelling and pain, all of which have been negative for DVT. Swelling occurs from ankle to knee, sparing her left foot. Patient reports that she has had 2 episodes of cellulitis since skin lesion biopsy, as well as episodes of weeping from LLE. She reports that pain is worse at the end of the day and is accompanied by feelings of fatigue. Patient has been wearing compression stockings that she purchased online for several months with little to no improvement. She has also been taking furosemide every other day without any notable improvement. She is denying any numbness, tingling, tissue loss, claudication or weeping at this time. Symptoms improve with leg elevation and sometimes with ibuprofen. Plan:  -We discussed the pathophysiology of venous insufficiency as well as contributing lifestyle factors.  -We discussed initiating conservative measures including compression stockings, elevating legs, increasing physical activity and weight loss. -Patient agreeable to compression stockings. Order placed for compression stockings, apply in the morning and remove before bed. -Return to office in 3 months to reassess symptoms and assess effectiveness of conservative measures.

## 2023-07-19 NOTE — ASSESSMENT & PLAN NOTE
Several areas of scattered bulging reticular veins, however patient denies any symptoms related to varicose veins.

## 2023-07-19 NOTE — PROGRESS NOTES
Assessment/Plan:    Venous insufficiency  Patient coming in for follow-up for her leg pain. Patient reports that she continues to have lower extremity swelling. She has had a trial of Lasix every other day, and she wears compression stockings daily. She states that she continues to have lower extremity swelling with pain. Patient reports that the pain is sometimes relieved by ibuprofen however would like further treatment for her lower extremity swelling. Patient has had venous duplex of both legs in the past related to swelling and pain, all of which have been negative for DVT. -We discussed the pathophysiology of venous insufficiency as well as contributing lifestyle factors.  -We discussed initiating conservative measures including compression stockings, elevating legs, increasing physical activity and weight loss  -Patient agreeable to compression stockings. Order placed for compression stockings, apply in the morning and remove before bed. -Return to office in 3 months with surgeon to discuss further management options. {Assess/PlanSmartLinks:65176}      Subjective:      Patient ID: Jovana Junior is a 52 y.o. female.     HPI    {Common ambulatory SmartLinks:35413}    Review of Systems      Objective:      LMP  (LMP Unknown)          Physical Exam

## 2023-07-19 NOTE — ASSESSMENT & PLAN NOTE
Patient is currently attempting weight loss, started [employer] weight loss program without any weight loss yet. -Offered referral to weight management however patient declined. -Provided printed information regarding weight management.  -Low fat diet with plenty of fresh fruits and vegetables, as well as lean protein.

## 2023-08-08 ENCOUNTER — HOSPITAL ENCOUNTER (OUTPATIENT)
Dept: MAMMOGRAPHY | Facility: CLINIC | Age: 53
Discharge: HOME | End: 2023-08-08
Payer: COMMERCIAL

## 2023-08-08 VITALS — BODY MASS INDEX: 44.41 KG/M2 | HEIGHT: 68 IN | WEIGHT: 293 LBS

## 2023-08-08 DIAGNOSIS — R92.8 ABNORMAL SCREENING MAMMOGRAM: ICD-10-CM

## 2023-08-08 PROCEDURE — 77065 DX MAMMO INCL CAD UNI: CPT

## 2023-08-08 PROCEDURE — 76642 ULTRASOUND BREAST LIMITED: CPT

## 2023-08-08 PROCEDURE — G0279 TOMOSYNTHESIS, MAMMO: HCPCS

## 2023-08-28 ENCOUNTER — OFFICE VISIT (OUTPATIENT)
Dept: FAMILY MEDICINE CLINIC | Facility: CLINIC | Age: 53
End: 2023-08-28

## 2023-08-28 VITALS
WEIGHT: 293 LBS | HEIGHT: 68 IN | OXYGEN SATURATION: 99 % | BODY MASS INDEX: 44.41 KG/M2 | RESPIRATION RATE: 18 BRPM | SYSTOLIC BLOOD PRESSURE: 128 MMHG | TEMPERATURE: 97.6 F | HEART RATE: 83 BPM | DIASTOLIC BLOOD PRESSURE: 86 MMHG

## 2023-08-28 DIAGNOSIS — H66.91 RIGHT OTITIS MEDIA, UNSPECIFIED OTITIS MEDIA TYPE: Primary | ICD-10-CM

## 2023-08-28 DIAGNOSIS — R73.03 PREDIABETES: ICD-10-CM

## 2023-08-28 DIAGNOSIS — E78.5 HYPERLIPIDEMIA, UNSPECIFIED HYPERLIPIDEMIA TYPE: ICD-10-CM

## 2023-08-28 DIAGNOSIS — Z13.9 ENCOUNTER FOR HEALTH-RELATED SCREENING: ICD-10-CM

## 2023-08-28 DIAGNOSIS — E66.01 MORBID OBESITY (HCC): ICD-10-CM

## 2023-08-28 PROCEDURE — 99213 OFFICE O/P EST LOW 20 MIN: CPT | Performed by: FAMILY MEDICINE

## 2023-08-28 RX ORDER — AMOXICILLIN AND CLAVULANATE POTASSIUM 875; 125 MG/1; MG/1
1 TABLET, FILM COATED ORAL EVERY 12 HOURS SCHEDULED
Qty: 14 TABLET | Refills: 0 | Status: SHIPPED | OUTPATIENT
Start: 2023-08-28 | End: 2023-09-04

## 2023-08-28 NOTE — PROGRESS NOTES
Assessment/Plan:    1. Right otitis media, unspecified otitis media type  Comments:  Consistent with otitis media. Would benefit from treatment  Orders:  -     amoxicillin-clavulanate (AUGMENTIN) 875-125 mg per tablet; Take 1 tablet by mouth every 12 (twelve) hours for 7 days  -     semaglutide, 0.25 or 0.5 mg/dose, (Ozempic, 0.25 or 0.5 MG/DOSE,) 2 mg/3 mL injection pen; Inject 0.75 mL (0.5 mg total) under the skin every 7 days    2. Prediabetes  -     semaglutide, 0.25 or 0.5 mg/dose, (Ozempic, 0.25 or 0.5 MG/DOSE,) 2 mg/3 mL injection pen; Inject 0.75 mL (0.5 mg total) under the skin every 7 days    3. Hyperlipidemia, unspecified hyperlipidemia type  -     semaglutide, 0.25 or 0.5 mg/dose, (Ozempic, 0.25 or 0.5 MG/DOSE,) 2 mg/3 mL injection pen; Inject 0.75 mL (0.5 mg total) under the skin every 7 days    4. Morbid obesity ([address])  -     semaglutide, 0.25 or 0.5 mg/dose, (Ozempic, 0.25 or 0.5 MG/DOSE,) 2 mg/3 mL injection pen; Inject 0.75 mL (0.5 mg total) under the skin every 7 days    5. BMI 50.0-59.9, adult [facility])  Assessment & Plan:  BMI Counseling: Body mass index is 49.66 kg/m². The BMI is above normal. Pharmacotherapy was ordered for patient to aid in weight loss. Would benefit from ozempic. Will have patinet follow up up in 6 weeks for weight check     Orders:  -     semaglutide, 0.25 or 0.5 mg/dose, (Ozempic, 0.25 or 0.5 MG/DOSE,) 2 mg/3 mL injection pen; Inject 0.75 mL (0.5 mg total) under the skin every 7 days    6. Encounter for health-related screening  Comments:  Patient has upcoming Gyn appointment for cervical pap smear. Patient is up-to-date on her mammogram screening       Subjective:      Patient ID: Ajay Dunaway is a 52 y.o. female.     who presents to the clinic for management of their chronic medical conditions.  Patient's medical conditions are stable unless noted otherwise above.  Patient has not had any recent hospitalizations, or medical emergencies since last visit.      Patient does complain of right ear pain. She also complains of sore throat and Sinus pressure. Patient denies any fevers chills. 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 The following portions of the patient's history were reviewed and updated as appropriate: allergies, current medications, past family history, past medical history, past social history, past surgical history, and problem list.    Review of Systems   Constitutional: Negative for chills and fever. HENT: Positive for sinus pressure and tinnitus. Negative for ear pain and sore throat. Eyes: Negative for pain and visual disturbance. Respiratory: Negative for cough and shortness of breath. Cardiovascular: Negative for chest pain and palpitations. Gastrointestinal: Negative for abdominal pain and vomiting. Genitourinary: Negative for dysuria and hematuria. Musculoskeletal: Negative for arthralgias and back pain. Skin: Negative for color change and rash. Neurological: Negative for seizures and syncope. All other systems reviewed and are negative. Objective:      /86 (BP Location: Right arm, Patient Position: Sitting, Cuff Size: Large)   Pulse 83   Temp 97.6 °F (36.4 °C) (Temporal)   Resp 18   Ht 5' 8" (1.727 m)   Wt (!) 148 kg (326 lb 9.6 oz)   LMP  (LMP Unknown)   SpO2 99%   BMI 49.66 kg/m²          Physical Exam  Constitutional:       General: She is not in acute distress. Appearance: Normal appearance. HENT:      Right Ear: No tenderness. A middle ear effusion is present. Left Ear: No tenderness. No middle ear effusion. Nose: No congestion or rhinorrhea. Mouth/Throat:      Pharynx: Posterior oropharyngeal erythema present. Eyes:      Extraocular Movements: Extraocular movements intact. Pupils: Pupils are equal, round, and reactive to light. Cardiovascular:      Rate and Rhythm: Normal rate and regular rhythm. Pulses: Normal pulses. Heart sounds: Normal heart sounds.  No murmur heard.     No gallop. Pulmonary:      Effort: Pulmonary effort is normal.      Breath sounds: Normal breath sounds. No wheezing, rhonchi or rales. Abdominal:      General: Bowel sounds are normal. There is no distension. Palpations: Abdomen is soft. There is no mass. Tenderness: There is no abdominal tenderness. Hernia: No hernia is present. Skin:     General: Skin is warm. Coloration: Skin is not jaundiced. Findings: No bruising. Neurological:      General: No focal deficit present. Mental Status: She is alert and oriented to person, place, and time. Psychiatric:         Mood and Affect: Mood normal.         Behavior: Behavior normal.         Thought Content:  Thought content normal.           Nolberto Marie,    Family Medicine PGY-2  8/28/2023

## 2023-08-28 NOTE — ASSESSMENT & PLAN NOTE
BMI Counseling: Body mass index is 49.66 kg/m². The BMI is above normal. Pharmacotherapy was ordered for patient to aid in weight loss. Would benefit from ozempic.  Will have patinet follow up up in 6 weeks for weight check

## 2023-09-01 ENCOUNTER — TELEPHONE (OUTPATIENT)
Dept: FAMILY MEDICINE CLINIC | Facility: CLINIC | Age: 53
End: 2023-09-01

## 2023-09-01 NOTE — TELEPHONE ENCOUNTER
PCP SIGNATURE NEEDED FOR [facility] FORM RECEIVED VIA FAX AND PLACED IN PCP FOLDER TO BE DELIVERED AT ASSIGNED TIMES.       Prior auth Ozempic 2mg

## 2023-10-18 ENCOUNTER — ANNUAL EXAM (OUTPATIENT)
Dept: OBGYN CLINIC | Facility: CLINIC | Age: 53
End: 2023-10-18

## 2023-10-18 VITALS
HEART RATE: 100 BPM | SYSTOLIC BLOOD PRESSURE: 140 MMHG | DIASTOLIC BLOOD PRESSURE: 72 MMHG | BODY MASS INDEX: 48.72 KG/M2 | WEIGHT: 293 LBS

## 2023-10-18 DIAGNOSIS — Z12.39 ENCOUNTER FOR BREAST CANCER SCREENING USING NON-MAMMOGRAM MODALITY: ICD-10-CM

## 2023-10-18 DIAGNOSIS — Z12.4 SCREENING FOR CERVICAL CANCER: ICD-10-CM

## 2023-10-18 DIAGNOSIS — Z11.51 SCREENING FOR HPV (HUMAN PAPILLOMAVIRUS): ICD-10-CM

## 2023-10-18 DIAGNOSIS — Z01.419 ENCOUNTER FOR GYNECOLOGICAL EXAMINATION WITHOUT ABNORMAL FINDING: Primary | ICD-10-CM

## 2023-10-18 DIAGNOSIS — Z12.31 ENCOUNTER FOR SCREENING MAMMOGRAM FOR MALIGNANT NEOPLASM OF BREAST: ICD-10-CM

## 2023-10-18 PROBLEM — L30.9 ECZEMA: Status: RESOLVED | Noted: 2020-02-03 | Resolved: 2023-10-18

## 2023-10-18 PROBLEM — R60.9 PITTING EDEMA: Status: RESOLVED | Noted: 2019-11-22 | Resolved: 2023-10-18

## 2023-10-18 PROBLEM — L03.90 CELLULITIS: Status: RESOLVED | Noted: 2022-09-07 | Resolved: 2023-10-18

## 2023-10-18 PROBLEM — D22.9 IRRITATED NEVUS: Status: RESOLVED | Noted: 2022-03-10 | Resolved: 2023-10-18

## 2023-10-18 PROBLEM — R22.9 LOCALIZED SKIN MASS, LUMP, OR SWELLING: Status: RESOLVED | Noted: 2022-06-13 | Resolved: 2023-10-18

## 2023-10-18 PROBLEM — J30.1 ALLERGIC RHINITIS DUE TO POLLEN: Status: RESOLVED | Noted: 2022-06-13 | Resolved: 2023-10-18

## 2023-10-18 PROCEDURE — G0145 SCR C/V CYTO,THINLAYER,RESCR: HCPCS | Performed by: NURSE PRACTITIONER

## 2023-10-18 PROCEDURE — G0476 HPV COMBO ASSAY CA SCREEN: HCPCS | Performed by: NURSE PRACTITIONER

## 2023-10-18 NOTE — PROGRESS NOTES
[address] [place] [address]    Dulce Maria Wright is a 52 y.o. female who presents today for annual GYN exam.  Her last pap smear was performed 8/10/2020 and result was NILM with negative HPV. She reports no history of abnormal pap smears in her past.  Her last mammogram was performed 2023 and noted R breast benign cluster of microcysts. She had colon cancer screening performed 2019. She reports menses as absent since . Her general medical history has been reviewed and she reports it as follows:    Past Medical History:   Diagnosis Date    Arthritis     knees    Hyperlipidemia     Hypertension     Prediabetes     Venous insufficiency of lower extremity      Past Surgical History:   Procedure Laterality Date    CHOLECYSTECTOMY      NV COLONOSCOPY FLX DX W/COLLJ SPEC WHEN PFRMD Left 2019    Procedure: COLONOSCOPY;  Surgeon: Tangela Turner MD;  Location: [facility]; Service: Gastroenterology    NV LAPAROSCOPIC APPENDECTOMY N/A 2019    Procedure: Elio Chan;  Surgeon: Erin Conway MD;  Location: [facility] MAIN OR;  Service: General    NV REPAIR FIRST ABDOMINAL WALL HERNIA N/A 2020    Procedure: REPAIR INCISIONAL HERNIA;  Surgeon: Erin Conway MD;  Location: [facility] MAIN OR;  Service: General     OB History          4    Para   2    Term   2       0    AB   2    Living   2         SAB   2    IAB   0    Ectopic   0    Multiple   0    Live Births   2               Social History     Tobacco Use    Smoking status: Never    Smokeless tobacco: Never   Vaping Use    Vaping Use: Never used   Substance Use Topics    Alcohol use: Yes     Comment: couple times/month    Drug use: Never     Social History     Substance and Sexual Activity   Sexual Activity Yes    Partners: Male    Birth control/protection: Post-menopausal     Cancer-related family history includes Colon cancer (age of onset: 61) in her mother. There is no history of Breast cancer or Ovarian cancer.     Current Outpatient Medications   Medication Instructions    fluticasone (FLONASE) 50 mcg/act nasal spray 1 spray, Nasal, Daily    furosemide (LASIX) 20 mg, Oral, Daily    hydrochlorothiazide (HYDRODIURIL) 25 mg, Oral, Daily    hydrocortisone 1 % cream Topical, 4 times daily PRN    ibuprofen (MOTRIN) 800 mg, Oral, Every 8 hours    lisinopril (ZESTRIL) 20 mg, Oral, Daily    loratadine (CLARITIN) 10 mg, Oral, Daily       Review of Systems:  Review of Systems   Constitutional: Negative. Gastrointestinal: Negative. Genitourinary:  Negative for difficulty urinating, pelvic pain, vaginal bleeding and vaginal discharge. Skin: Negative. Physical Exam:  /72   Pulse 100   Wt (!) 145 kg (320 lb 6.4 oz)   LMP  (LMP Unknown)   BMI 48.72 kg/m²   Physical Exam  Constitutional:       General: She is not in acute distress. Appearance: She is well-developed. Genitourinary:      Vulva normal.      No lesions in the vagina. Right Adnexa: not tender and no mass present. Left Adnexa: not tender and no mass present. No cervical motion tenderness or lesion. Uterus is not tender. Breasts:     Right: No mass, nipple discharge, skin change or tenderness. Left: No mass, nipple discharge, skin change or tenderness. Neck:      Thyroid: No thyromegaly. Cardiovascular:      Rate and Rhythm: Normal rate and regular rhythm. Pulmonary:      Effort: Pulmonary effort is normal.   Abdominal:      Palpations: Abdomen is soft. Tenderness: There is no abdominal tenderness. Musculoskeletal:      Cervical back: Neck supple. Neurological:      Mental Status: She is alert and oriented to person, place, and time. Skin:     General: Skin is warm and dry. Vitals reviewed. Assessment/Plan:   1. Normal well-woman GYN exam.  2. Cervical cancer screening:  Normal cervical exam.  Pap smear done with HPV co-testing.   Has not received HPV vaccine in the past.   3. STD screening:  Patient declines. 4. Breast cancer screening:  Normal breast exam.  Repeat bilateral screening mammogram next year. Reviewed breast self-awareness. 5. Colon cancer screening:  Up to date. 6. Depression Screening: Patient's depression screening was assessed with a PHQ-2 score of 0. Their PHQ-9 score was 0. Clinically patient does not have depression. No treatment is required. 7. BMI Counseling: Body mass index is 48.72 kg/m². Discussed the patient's BMI with her. The BMI is above normal. Nutrition recommendations include reducing portion sizes and decreasing overall calorie intake.    8. Return to office in 1 year for annual GYN exam.

## 2023-10-18 NOTE — PATIENT INSTRUCTIONS
Thank you for your confidence in our team.   We appreciate you and welcome your feedback. If you receive a survey from us, please take a few moments to let us know how we are doing. Sincerely,  DAVID Delgado       OBESITY     Obesity is defined as a body mass index (BMI) which is greater than 30. Your Body mass index is 48.72 kg/m². .    The risks of obesity include  many health problems, such as injuries or physical disability. You may need tests to check for the following:  Diabetes     High blood pressure or high cholesterol     Heart disease     Gallbladder or liver disease     Cancer of the colon, breast, prostate, liver, or kidney     Sleep apnea     Arthritis or gout    Seek care immediately if:   You have a severe headache, confusion, or difficulty speaking. You have weakness on one side of your body. You have chest pain, sweating, or shortness of breath. Contact your healthcare provider if:   You have symptoms of gallbladder or liver disease, such as pain in your upper abdomen. You have knee or hip pain and discomfort while walking. You have symptoms of diabetes, such as intense hunger and thirst, and frequent urination. You have symptoms of sleep apnea, such as snoring or daytime sleepiness. You have questions or concerns about your condition or care. Treatment for obesity  focuses on helping you lose weight to improve your health. Even a small decrease in BMI can reduce the risk for many health problems. Your healthcare provider will help you set a weight-loss goal.  Lifestyle changes  are the first step in treating obesity. These include making healthy food choices and getting regular physical activity. Your healthcare provider may suggest a weight-loss program that involves coaching, education, and therapy. Medicine  may help you lose weight when it is used with a healthy diet and physical activity.      Surgery  can help you lose weight if you are very obese and have other health problems. There are several types of weight-loss surgery. Ask your healthcare provider for more information. Be successful losing weight:   Set small, realistic goals. An example of a small goal is to walk for 20 minutes 5 days a week. Anther goal is to lose 5% of your body weight. Tell friends, family members, and coworkers about your goals  and ask for their support. Ask a friend to lose weight with you, or join a weight-loss support group. Identify foods or triggers that may cause you to overeat , and find ways to avoid them. Remove tempting high-calorie foods from your home and workplace. Place a bowl of fresh fruit on your kitchen counter. If stress causes you to eat, then find other ways to cope with stress. Keep a diary to track what you eat and drink. Also write down how many minutes of physical activity you do each day. Weigh yourself once a week and record it in your diary. Eating changes: You will need to eat 500 to 1,000 fewer calories each day than you currently eat to lose 1 to 2 pounds a week. The following changes will help you cut calories:  Eat smaller portions. Use small plates, no larger than 9 inches in diameter. Fill your plate half full of fruits and vegetables. Measure your food using measuring cups until you know what a serving size looks like. Eat 3 meals and 1 or 2 snacks each day. Plan your meals in advance. [employer] and eat at home most of the time. Eat slowly. Eat fruits and vegetables at every meal.  They are low in calories and high in fiber, which makes you feel full. Do not add butter, margarine, or cream sauce to vegetables. Use herbs to season steamed vegetables. Eat less fat and fewer fried foods. Eat more baked or grilled chicken and fish. These protein sources are lower in calories and fat than red meat. Limit fast food. Dress your salads with olive oil and vinegar instead of bottled dressing.      Limit the amount of sugar you eat. Do not drink sugary beverages. Limit alcohol. Activity changes:  Physical activity is good for your body in many ways. It helps you burn calories and build strong muscles. It decreases stress and depression, and improves your mood. It can also help you sleep better. Talk to your healthcare provider before you begin an exercise program.  Exercise for at least 30 minutes 5 days a week. Start slowly. Set aside time each day for physical activity that you enjoy and that is convenient for you. It is best to do both weight training and an activity that increases your heart rate, such as walking, bicycling, or swimming. Find ways to be more active. Do yard work and housecleaning. Walk up the stairs instead of using elevators. Spend your leisure time going to events that require walking, such as outdoor festivals or fairs. This extra physical activity can help you lose weight and keep it off. Follow up with your primary healthcare provider as directed. You may need to meet with a dietitian. Write down your questions so you remember to ask them during your visits.

## 2023-10-18 NOTE — LETTER
10/25/2023    To Rei Enriquez  : 1970      This letter is to advise you that your recent PAP SMEAR results were reviewed by me and are NORMAL.   We will see you in 1 year for your annual exam.    Heidi Levy

## 2023-10-23 ENCOUNTER — OFFICE VISIT (OUTPATIENT)
Dept: FAMILY MEDICINE CLINIC | Facility: CLINIC | Age: 53
End: 2023-10-23

## 2023-10-23 VITALS
HEIGHT: 68 IN | HEART RATE: 110 BPM | SYSTOLIC BLOOD PRESSURE: 130 MMHG | OXYGEN SATURATION: 98 % | DIASTOLIC BLOOD PRESSURE: 84 MMHG | WEIGHT: 293 LBS | TEMPERATURE: 98.5 F | RESPIRATION RATE: 16 BRPM | BODY MASS INDEX: 44.41 KG/M2

## 2023-10-23 DIAGNOSIS — E66.01 MORBID OBESITY (HCC): ICD-10-CM

## 2023-10-23 DIAGNOSIS — L03.90 CELLULITIS, UNSPECIFIED CELLULITIS SITE: ICD-10-CM

## 2023-10-23 DIAGNOSIS — I10 BENIGN HYPERTENSION: ICD-10-CM

## 2023-10-23 DIAGNOSIS — R73.03 PREDIABETES: ICD-10-CM

## 2023-10-23 DIAGNOSIS — E78.5 HYPERLIPIDEMIA, UNSPECIFIED HYPERLIPIDEMIA TYPE: ICD-10-CM

## 2023-10-23 DIAGNOSIS — J30.1 ALLERGIC RHINITIS DUE TO POLLEN, UNSPECIFIED SEASONALITY: ICD-10-CM

## 2023-10-23 PROCEDURE — 99213 OFFICE O/P EST LOW 20 MIN: CPT | Performed by: FAMILY MEDICINE

## 2023-10-23 RX ORDER — LISINOPRIL 20 MG/1
20 TABLET ORAL DAILY
Qty: 90 TABLET | Refills: 1 | Status: SHIPPED | OUTPATIENT
Start: 2023-10-23

## 2023-10-23 RX ORDER — LORATADINE 10 MG/1
10 TABLET ORAL DAILY
Qty: 90 TABLET | Refills: 1 | Status: SHIPPED | OUTPATIENT
Start: 2023-10-23

## 2023-10-23 RX ORDER — HYDROCHLOROTHIAZIDE 25 MG/1
25 TABLET ORAL DAILY
Qty: 90 TABLET | Refills: 5 | Status: SHIPPED | OUTPATIENT
Start: 2023-10-23

## 2023-10-23 RX ORDER — DULAGLUTIDE 0.75 MG/.5ML
0.75 INJECTION, SOLUTION SUBCUTANEOUS
Qty: 6 ML | Refills: 0 | Status: SHIPPED | OUTPATIENT
Start: 2023-10-23 | End: 2024-01-21

## 2023-10-23 RX ORDER — IBUPROFEN 800 MG/1
800 TABLET ORAL EVERY 8 HOURS
Qty: 30 TABLET | Refills: 3 | Status: SHIPPED | OUTPATIENT
Start: 2023-10-23

## 2023-10-23 RX ORDER — FUROSEMIDE 20 MG/1
20 TABLET ORAL DAILY
Qty: 45 TABLET | Refills: 3 | Status: SHIPPED | OUTPATIENT
Start: 2023-10-23

## 2023-10-24 ENCOUNTER — OFFICE VISIT (OUTPATIENT)
Dept: VASCULAR SURGERY | Facility: CLINIC | Age: 53
End: 2023-10-24
Payer: COMMERCIAL

## 2023-10-24 VITALS
BODY MASS INDEX: 44.41 KG/M2 | OXYGEN SATURATION: 97 % | HEIGHT: 68 IN | DIASTOLIC BLOOD PRESSURE: 84 MMHG | WEIGHT: 293 LBS | HEART RATE: 85 BPM | SYSTOLIC BLOOD PRESSURE: 132 MMHG

## 2023-10-24 DIAGNOSIS — I89.0 LYMPHEDEMA: Primary | ICD-10-CM

## 2023-10-24 PROCEDURE — 99213 OFFICE O/P EST LOW 20 MIN: CPT

## 2023-10-24 NOTE — PROGRESS NOTES
Assessment/Plan:    Lymphedema  Patient was recommended conservative measures at previous office visit, however reports that she was unable to obtain full compression stockings after going to 4 different pharmacies due to her size being unavailable. She has been consistently wearing knee-high compression stockings for the last 3 months with no improvement in her symptoms. Patient continues to report pain to bilateral lower extremities which worsens throughout the day, especially if she has been on her feet all day. She continues to elevate her legs periodically throughout the day. She denies any recurrent episodes of left lower extremity cellulitis since her last office visit. Continues to deny any numbness, tingling, tissue loss, claudication or weeping at this time. Plan:  - As patient's symptoms are unchanged after 3 months of consistent conservative measures, I believe patient would benefit from the use of pneumatic compression pumps. Measurements taken today.  -Patient will return to the office in 4 weeks for second set of lymphedema pump measurements.  -Continue conservative measures. -Recommend weight loss and increased exercise. Diagnoses and all orders for this visit:    Lymphedema  -     Pneumatic compression pumps          Subjective:      Patient ID: Carlos Valderrama is a 52 y.o. female. Patient is a 52year old female, nonsmoker, with PMH morbid obesity, HTN, and prediabetes. Patient is presenting to vascular surgery office for 3-month follow-up regarding bilateral lower extremity swelling and pain. Patient was recommended conservative measures at previous office visit, however reports that she was unable to obtain full compression stockings after going to 4 different pharmacies due to her size being unavailable. She has been consistently wearing knee-high compression stockings for the last 3 months with no improvement in her symptoms.   Patient continues to report pain to bilateral lower extremities which worsens throughout the day, especially if she has been on her feet all day. She continues to elevate her legs periodically throughout the day. She denies any recurrent episodes of left lower extremity cellulitis since her last office visit. Continues to deny any numbness, tingling, tissue loss, claudication or weeping at this time. Patient is currently awaiting approval to start Trulicity in hopes of losing weight. Her BMI today is 49.7. [address]   Phone: [phone]  Fax: [phone]   E-mail: [email or web address]    Ordering Provider:  Erum Breaux (NPI: [id number])  [facility]  [address] ([place]), [address] [place] [address]  Phone: [phone]  Fax: [phone]      Patient Information  MRN: [id number]   Ajay Dunaway  1970  [address] [place] [address]  [phone]     Insurance Information  Payor: BLUE CROSS / Plan: [employer]ERSON[employer] CHOICE / Product Type: Blue PPO /      [id number] - ()     Patient Height and Weight 5' 8" (1.727 m)    Wt Readings from Last 1 Encounters:   10/24/23 (!) 148 kg (326 lb 12.8 oz)       Compression Lymphedema Pump Prescription Form      [x] Patient utilized Compression Garment ?  30 mmHg distally OR Gradient Wrap System    Appliance:     Legs:    [x] Right    [x] Left   Arms:    [] Right    [] Left     []Chest garment    []Abdominal garment     Length of need: 99 months    Protocol:  [x] Std: 40 mmHg, BID,  60 minutes  [] Other:   Pressures: __ mmHg    Frequency: __ / Day   Minutes/ Session: __ minutes    Patient History and Prognosis:  [x] Patient was diagnosed for the chronic disorder with reported on-set __  [x] Attempts at elevation and compression have not improved patients' condition and is now at risk of lymphatic disorder progressing to the next stage/ grade  [x] Patient's physical condition/ range of motion limited for exercise  [x] Patient/ Caregiver experienced difficulty applying 30 mmHg distal compression garments  [x] Patient compression stocking/ wrap tolerance limited due to pain/ reduced circulation  [x] Patient advised to reduce salt intake and adhere to a daily regiment of elevation, compression, and lymphatic exercises  [x] Patient's severe condition will not improve without further treatment interventions  [x] Patient has noted skin changes such as marked hyperkeratosis with hyperplasia and hyperpigmentation, papillomatosis cutis lymphostatica, elephantiasis, and/ or skin breakdown with persisting lymphorrhea    Symptoms/ Observation/ Evaluation/ Plan of Care for Lymphedema / Venous Compression Pumps     Conservative Care ? 4 weeks for severe lymphedema (check all that apply):  Patient instructions for DAILY use of conservative therapies;  [x] Elevate extremities daily and nightly to reduce swelling  [x] Exercise and ambulate / range of motion (ROM) daily to increase fluid flow and reduce swelling  [x] Wear 30-mmHg distal compression garments / wraps daily to reduce / control swelling  [] Manual lymph drainage (MLD)  [] On-set date of lymphedema / ulcers:  March 2022      Initial Measurements      Body Part Right Left   Ankle / Forearm 25.3 cm 27 cm   Calf / Elbow  43.5 cm 47 cm   Knee / Bicep  55.2 cm 54 cm   Mid-Thigh / Axilla  80.1 cm 79.5 cm       The following portions of the patient's history were reviewed and updated as appropriate: allergies, current medications, past family history, past medical history, past social history, past surgical history, and problem list.    Review of Systems   Constitutional: Negative. HENT: Negative. Eyes: Negative. Respiratory: Negative. Negative for shortness of breath. Cardiovascular:  Positive for leg swelling. Negative for chest pain. Gastrointestinal: Negative. Endocrine: Negative. Genitourinary: Negative. Musculoskeletal: Negative. Skin: Negative. Negative for color change, pallor and wound. Allergic/Immunologic: Negative. Neurological: Negative. Negative for numbness. Hematological: Negative. Psychiatric/Behavioral: Negative. Objective:    /84 (BP Location: Left arm, Patient Position: Sitting, Cuff Size: Large)   Pulse 85   Ht 5' 8" (1.727 m)   Wt (!) 148 kg (326 lb 12.8 oz)   LMP  (LMP Unknown)   SpO2 97%   BMI 49.69 kg/m²      Physical Exam  Constitutional:       General: She is not in acute distress. Appearance: Normal appearance. She is obese. HENT:      Head: Normocephalic and atraumatic. Cardiovascular:      Rate and Rhythm: Normal rate and regular rhythm. Pulses:           Dorsalis pedis pulses are 2+ on the right side and 2+ on the left side. Heart sounds: Normal heart sounds. No murmur heard. Pulmonary:      Effort: Pulmonary effort is normal. No respiratory distress. Breath sounds: Normal breath sounds. Musculoskeletal:         General: No swelling or tenderness. Right lower le+ Edema (nonpitting) present. Left lower le+ Edema (nonpitting) present. Skin:     General: Skin is warm and dry. Capillary Refill: Capillary refill takes less than 2 seconds. Neurological:      General: No focal deficit present. Mental Status: She is alert and oriented to person, place, and time. Psychiatric:         Mood and Affect: Mood normal.         Behavior: Behavior normal.     I have reviewed and made appropriate changes to the review of systems input by the medical assistant.     Vitals:    10/24/23 1436   BP: 132/84   BP Location: Left arm   Patient Position: Sitting   Cuff Size: Large   Pulse: 85   SpO2: 97%   Weight: (!) 148 kg (326 lb 12.8 oz)   Height: 5' 8" (1.727 m)       Patient Active Problem List   Diagnosis    Benign hypertension    Prediabetes    Tendinitis of right rotator cuff    Subacromial bursitis of right shoulder joint    Bilateral lower extremity edema Abdominal hernia    Primary osteoarthritis of both knees    Venous insufficiency    Varicose veins of both lower extremities    Lymphedema       Past Surgical History:   Procedure Laterality Date    CHOLECYSTECTOMY  2006    ND COLONOSCOPY FLX DX W/COLLJ SPEC WHEN PFRMD Left 05/02/2019    Procedure: COLONOSCOPY;  Surgeon: Radha Salas MD;  Location: [facility];   Service: Gastroenterology    ND LAPAROSCOPIC APPENDECTOMY N/A 03/22/2019    Procedure: Giovana Milo;  Surgeon: Angelia Philippe MD;  Location: [address] [place] [address];  Service: General    ND REPAIR FIRST ABDOMINAL WALL HERNIA N/A 08/28/2020    Procedure: [address];  Surgeon: Angelia Philippe MD;  Location: [address] [place] [address];  Service: General       Family History   Problem Relation Age of Onset    Colon cancer Mother 61    Stroke Father     No Known Problems Sister     No Known Problems Daughter     No Known Problems Maternal Grandmother     No Known Problems Maternal Grandfather     No Known Problems Paternal Grandmother     No Known Problems Paternal Grandfather     No Known Problems Paternal Aunt     No Known Problems Paternal Aunt     Breast cancer Neg Hx     Ovarian cancer Neg Hx        Social History     Socioeconomic History    Marital status: /Civil Union     Spouse name: Not on file    Number of children: Not on file    Years of education: Not on file    Highest education level: Not on file   Occupational History    Not on file   Tobacco Use    Smoking status: Never    Smokeless tobacco: Never   Vaping Use    Vaping Use: Never used   Substance and Sexual Activity    Alcohol use: Yes     Comment: couple times/month    Drug use: Never    Sexual activity: Yes     Partners: Male     Birth control/protection: Post-menopausal   Other Topics Concern    Not on file   Social History Narrative    ** Merged History Encounter **          Social Determinants of Health     Financial Resource Strain: Medium Risk (10/18/2023)    Overall Financial Resource Strain (CARDIA)     Difficulty of Paying Living Expenses: Somewhat hard   Food Insecurity: No Food Insecurity (10/18/2023)    Hunger Vital Sign     Worried About Running Out of Food in the Last Year: Never true     Ran Out of Food in the Last Year: Never true   Transportation Needs: No Transportation Needs (10/18/2023)    PRAPARE - Transportation     Lack of Transportation (Medical): No     Lack of Transportation (Non-Medical):  No   Physical Activity: Not on file   Stress: No Stress Concern Present (10/7/2021)    [facility]     Feeling of Stress : Not at all   Social Connections: Not on file   Intimate Partner Violence: Not on file   Housing Stability: Low Risk  (10/7/2021)    Housing Stability Vital Sign     Unable to Pay for Housing in the Last Year: No     Number of Places Lived in the Last Year: 1     Unstable Housing in the Last Year: No       No Known Allergies      Current Outpatient Medications:     dulaglutide (Trulicity) 8.64 MW/1.4CN injection, Inject 0.5 mL (0.75 mg total) under the skin every 7 days, Disp: 6 mL, Rfl: 0    furosemide (LASIX) 20 mg tablet, Take 1 tablet (20 mg total) by mouth daily, Disp: 45 tablet, Rfl: 3    hydrochlorothiazide (HYDRODIURIL) 25 mg tablet, Take 1 tablet (25 mg total) by mouth daily, Disp: 90 tablet, Rfl: 5    hydrocortisone 1 % cream, Apply topically 4 (four) times a day as needed for irritation, Disp: 30 g, Rfl: 0    ibuprofen (MOTRIN) 800 mg tablet, Take 1 tablet (800 mg total) by mouth every 8 (eight) hours, Disp: 30 tablet, Rfl: 3    lisinopril (ZESTRIL) 20 mg tablet, Take 1 tablet (20 mg total) by mouth daily, Disp: 90 tablet, Rfl: 1    loratadine (CLARITIN) 10 mg tablet, Take 1 tablet (10 mg total) by mouth daily, Disp: 90 tablet, Rfl: 1    I have spent a total time of 30 minutes on 10/24/23 in caring for this patient including Prognosis, Risks and benefits of tx options, Instructions for management, Patient and family education, Importance of tx compliance, Risk factor reductions, Impressions, Counseling / Coordination of care, Documenting in the medical record, Reviewing / ordering tests, medicine, procedures  , and Obtaining or reviewing history  .

## 2023-10-24 NOTE — ASSESSMENT & PLAN NOTE
Patient was recommended conservative measures at previous office visit, however reports that she was unable to obtain full compression stockings after going to 4 different pharmacies due to her size being unavailable. She has been consistently wearing knee-high compression stockings for the last 3 months with no improvement in her symptoms. Patient continues to report pain to bilateral lower extremities which worsens throughout the day, especially if she has been on her feet all day. She continues to elevate her legs periodically throughout the day. She denies any recurrent episodes of left lower extremity cellulitis since her last office visit. Continues to deny any numbness, tingling, tissue loss, claudication or weeping at this time. Plan:  - As patient's symptoms are unchanged after 3 months of consistent conservative measures, I believe patient would benefit from the use of pneumatic compression pumps. Measurements taken today.  -Patient will return to the office in 4 weeks for second set of lymphedema pump measurements.  -Continue conservative measures. -Recommend weight loss and increased exercise.

## 2023-10-25 PROBLEM — E66.01 MORBID OBESITY (HCC): Status: ACTIVE | Noted: 2018-11-01

## 2023-10-25 LAB
LAB AP GYN PRIMARY INTERPRETATION: NORMAL
Lab: NORMAL

## 2023-10-25 NOTE — PROGRESS NOTES
Assessment/Plan:    1. BMI 50.0-59.9, adult (HCC)  -     dulaglutide (Trulicity) 9.21 MD/0.6QU injection; Inject 0.5 mL (0.75 mg total) under the skin every 7 days    2. Morbid obesity [facility])  Assessment & Plan:  Wt Readings from Last 3 Encounters:   10/24/23 (!) 148 kg (326 lb 12.8 oz)   10/23/23 (!) 147 kg (325 lb)   10/18/23 (!) 145 kg (320 lb 6.4 oz)       Patient continues to gain weight. Did try ordering Wegovy and Ozempic for the patient however patient pharmacy does not cover that medication. We will try patient on Trulicity for off label weight management. Orders:  -     dulaglutide (Trulicity) 3.99 AU/4.5OU injection; Inject 0.5 mL (0.75 mg total) under the skin every 7 days    3. Benign hypertension  Comments:  Controlled. Continue with current regimen. Assessment & Plan:  BP Readings from Last 3 Encounters:   10/24/23 132/84   10/23/23 130/84   10/18/23 140/72     Controlled with hydrochlorothiazide 25 mg daily. Continue current regiment. Orders:  -     dulaglutide (Trulicity) 6.55 ES/2.1UO injection; Inject 0.5 mL (0.75 mg total) under the skin every 7 days  -     hydrochlorothiazide (HYDRODIURIL) 25 mg tablet; Take 1 tablet (25 mg total) by mouth daily  -     lisinopril (ZESTRIL) 20 mg tablet; Take 1 tablet (20 mg total) by mouth daily    4. Benign hypertension  Assessment & Plan:  BP Readings from Last 3 Encounters:   10/24/23 132/84   10/23/23 130/84   10/18/23 140/72     Controlled with hydrochlorothiazide 25 mg daily and lisinopril 20mg daily. Continue current regiment. Orders:  -     hydrochlorothiazide (HYDRODIURIL) 25 mg tablet; Take 1 tablet (25 mg total) by mouth daily  -     lisinopril (ZESTRIL) 20 mg tablet; Take 1 tablet (20 mg total) by mouth daily           Subjective:      Patient ID: Keith Lepe is a 52 y.o. female. Past medical history notable for morbid obesity, hypertension is coming in for follow-up visit.   In her last visit we tried initiating patient on      The following portions of the patient's history were reviewed and updated as appropriate: allergies, current medications, past family history, past medical history, past social history, past surgical history, and problem list.    Review of Systems   Constitutional:  Negative for chills and fever. HENT:  Negative for ear pain and sore throat. Eyes:  Negative for pain and visual disturbance. Respiratory:  Negative for cough and shortness of breath. Cardiovascular:  Negative for chest pain and palpitations. Gastrointestinal:  Negative for abdominal pain and vomiting. Genitourinary:  Negative for dysuria and hematuria. Musculoskeletal:  Negative for arthralgias and back pain. Skin:  Negative for color change and rash. Neurological:  Negative for seizures and syncope. All other systems reviewed and are negative. Objective:      /84 (BP Location: Right arm, Patient Position: Sitting, Cuff Size: Large)   Pulse (!) 110   Temp 98.5 °F (36.9 °C) (Temporal)   Resp 16   Ht 5' 8" (1.727 m)   Wt (!) 147 kg (325 lb)   LMP  (LMP Unknown)   SpO2 98%   BMI 49.42 kg/m²          Physical Exam  Constitutional:       General: She is not in acute distress. Appearance: Normal appearance. She is obese. HENT:      Nose: No congestion or rhinorrhea. Eyes:      Extraocular Movements: Extraocular movements intact. Pupils: Pupils are equal, round, and reactive to light. Cardiovascular:      Rate and Rhythm: Normal rate and regular rhythm. Pulses: Normal pulses. Heart sounds: Normal heart sounds. No murmur heard. No gallop. Pulmonary:      Effort: Pulmonary effort is normal.      Breath sounds: Normal breath sounds. No wheezing, rhonchi or rales. Abdominal:      General: Bowel sounds are normal. There is no distension. Palpations: Abdomen is soft. There is no mass. Tenderness: There is no abdominal tenderness. Hernia: No hernia is present.    Skin:     General: Skin is warm. Coloration: Skin is not jaundiced. Findings: No bruising. Neurological:      General: No focal deficit present. Mental Status: She is alert and oriented to person, place, and time. Psychiatric:         Mood and Affect: Mood normal.         Behavior: Behavior normal.         Thought Content:  Thought content normal.         Ayan Blood DO   Family Medicine PGY-3  10/25/2023

## 2023-10-25 NOTE — ASSESSMENT & PLAN NOTE
Wt Readings from Last 3 Encounters:   10/24/23 (!) 148 kg (326 lb 12.8 oz)   10/23/23 (!) 147 kg (325 lb)   10/18/23 (!) 145 kg (320 lb 6.4 oz)       Patient continues to gain weight. Did try ordering Wegovy and Ozempic for the patient however patient pharmacy does not cover that medication. We will try patient on Trulicity for off label weight management.

## 2023-10-25 NOTE — ASSESSMENT & PLAN NOTE
BP Readings from Last 3 Encounters:   10/24/23 132/84   10/23/23 130/84   10/18/23 140/72     Controlled with hydrochlorothiazide 25 mg daily. Continue current regiment.

## 2023-11-18 LAB — HEMOCCULT STL QL IA: NEGATIVE

## 2023-11-21 ENCOUNTER — OFFICE VISIT (OUTPATIENT)
Dept: VASCULAR SURGERY | Facility: CLINIC | Age: 53
End: 2023-11-21
Payer: COMMERCIAL

## 2023-11-21 VITALS
DIASTOLIC BLOOD PRESSURE: 70 MMHG | WEIGHT: 293 LBS | BODY MASS INDEX: 44.41 KG/M2 | HEIGHT: 68 IN | HEART RATE: 86 BPM | SYSTOLIC BLOOD PRESSURE: 132 MMHG | OXYGEN SATURATION: 97 %

## 2023-11-21 DIAGNOSIS — I87.2 VENOUS INSUFFICIENCY: Primary | ICD-10-CM

## 2023-11-21 PROCEDURE — 99213 OFFICE O/P EST LOW 20 MIN: CPT

## 2023-11-21 NOTE — PROGRESS NOTES
Assessment/Plan:    Venous insufficiency  Patient continues to consistently wearing knee-high compression stockings with no improvement in her symptoms. Patient continues to report pain to bilateral lower extremities which worsens throughout the day, especially if she has been on her feet all day. She is elevating her legs periodically throughout the day and continues to deny any numbness, tingling, tissue loss, claudication or weeping at this time. Patient unfortunately did not receive approval for Trulicity for weight loss and is planning to switch insurance plans in the new year. Plan:  - Patient would benefit from the use of pneumatic compression pumps. 2nd set of measurements taken today.  -Patient will return to the office in 3 months to reevaluate symptoms after initiating compression pumps.   -Continue conservative measures. -Recommend weight loss and increased exercise. Diagnoses and all orders for this visit:    Venous insufficiency          Subjective:      Patient ID: Yanelis Rodriguez is a 52 y.o. female. Patient is a 52year old female, nonsmoker, with PMH morbid obesity, HTN, and prediabetes. Patient is presenting to vascular surgery office for 2nd set of lymphedema pump measurements. Patient continues to consistently wearing knee-high compression stockings with no improvement in her symptoms. Patient continues to report pain to bilateral lower extremities which worsens throughout the day, especially if she has been on her feet all day. She is elevating her legs periodically throughout the day and continues to deny any numbness, tingling, tissue loss, claudication or weeping at this time. Patient unfortunately did not receive approval for Trulicity for weight loss and is planning to switch insurance plans in the new year.       The following portions of the patient's history were reviewed and updated as appropriate: allergies, current medications, past family history, past medical history, past social history, past surgical history, and problem list.    Review of Systems   Constitutional: Negative. HENT: Negative. Eyes: Negative. Respiratory: Negative. Cardiovascular:  Positive for leg swelling. Endocrine: Negative. Musculoskeletal:  Positive for arthralgias and myalgias. B/L knee pain  B/L shoulder pain   Skin: Negative. Negative for color change, pallor and wound. Allergic/Immunologic: Negative. Neurological: Negative. Negative for numbness. Hematological: Negative. Psychiatric/Behavioral: Negative. Objective:    /70 (BP Location: Left arm, Patient Position: Sitting, Cuff Size: Large)   Pulse 86   Ht 5' 8" (1.727 m)   Wt (!) 148 kg (326 lb)   LMP  (LMP Unknown)   SpO2 97%   BMI 49.57 kg/m²        Physical Exam  Constitutional:       General: She is not in acute distress. Appearance: Normal appearance. She is obese. HENT:      Head: Normocephalic and atraumatic. Cardiovascular:      Rate and Rhythm: Normal rate and regular rhythm. Pulses:           Dorsalis pedis pulses are 2+ on the right side and 2+ on the left side. Pulmonary:      Effort: Pulmonary effort is normal.   Musculoskeletal:         General: No swelling or tenderness. Right lower le+ Pitting Edema present. Left lower le+ Pitting Edema present. Skin:     General: Skin is warm and dry. Capillary Refill: Capillary refill takes less than 2 seconds. Neurological:      General: No focal deficit present. Mental Status: She is alert and oriented to person, place, and time. Psychiatric:         Mood and Affect: Mood normal.         Behavior: Behavior normal.             [address]   Phone: [phone]  Fax: [phone]   E-mail: [email or web address]    Ordering Provider:  Jaspreet Todd (NPI: [id number])  [facility]  [address] [place] [address], [place] [address]  Phone: [phone]  Fax: [phone]      Patient Information  MRN: [id number]   Emmanuelle Gilman  1970  [place]  [phone]     Insurance Information  Payor: BLUE CROSS / Plan: [employer] PERSONAL CHOICE / Product Type: Blue PPO /      [id number] - (Blues)     Patient Height and Weight 5' 8" (1.727 m)    Wt Readings from Last 1 Encounters:   11/21/23 (!) 148 kg (326 lb)         Post 4-week Measurements      Body Part Right Left   Ankle / Forearm 27 cm ankle 29.5 cm ankle   Calf / Elbow  47.5 cm calf 52 cm calf   Knee / Bicep  56.5 cm knee 63 cm knee   Mid-Thigh / Axilla  81 cm thigh 79 cm thigh     Patient outcome after 4-weeks of conservative therapy:   [x] Patient's condition has NOT improved    I have reviewed and made appropriate changes to the review of systems input by the medical assistant. Vitals:    11/21/23 1525   BP: 132/70   BP Location: Left arm   Patient Position: Sitting   Cuff Size: Large   Pulse: 86   SpO2: 97%   Weight: (!) 148 kg (326 lb)   Height: 5' 8" (1.727 m)       Patient Active Problem List   Diagnosis    Benign hypertension    Prediabetes    Morbid obesity (HCC)    Tendinitis of right rotator cuff    Subacromial bursitis of right shoulder joint    Bilateral lower extremity edema    Abdominal hernia    Primary osteoarthritis of both knees    Venous insufficiency    Varicose veins of both lower extremities    Lymphedema       Past Surgical History:   Procedure Laterality Date    CHOLECYSTECTOMY  2006    AK COLONOSCOPY FLX DX W/COLLJ SPEC WHEN PFRMD Left 05/02/2019    Procedure: COLONOSCOPY;  Surgeon: Reina Beckman MD;  Location: [facility];   Service: Gastroenterology    AK LAPAROSCOPIC APPENDECTOMY N/A 03/22/2019    Procedure: Silver Lash;  Surgeon: Rommel Lee MD;  Location: [address] [place] [address] OR;  Service: General    AK REPAIR FIRST ABDOMINAL WALL HERNIA N/A 08/28/2020    Procedure: REPAIR INCISIONAL HERNIA;  Surgeon: Philipp Raymundo Paulina Mondragon MD;  Location: [address] [place] [address] MAIN OR;  Service: General       Family History   Problem Relation Age of Onset    Colon cancer Mother 61    Stroke Father     No Known Problems Sister     No Known Problems Daughter     No Known Problems Maternal Grandmother     No Known Problems Maternal Grandfather     No Known Problems Paternal Grandmother     No Known Problems Paternal Grandfather     No Known Problems Paternal Aunt     No Known Problems Paternal Aunt     Breast cancer Neg Hx     Ovarian cancer Neg Hx        Social History     Socioeconomic History    Marital status: /Civil Union     Spouse name: Not on file    Number of children: Not on file    Years of education: Not on file    Highest education level: Not on file   Occupational History    Not on file   Tobacco Use    Smoking status: Never    Smokeless tobacco: Never   Vaping Use    Vaping Use: Never used   Substance and Sexual Activity    Alcohol use: Yes     Comment: couple times/month    Drug use: Never    Sexual activity: Yes     Partners: Male     Birth control/protection: Post-menopausal   Other Topics Concern    Not on file   Social History Narrative    ** Merged History Encounter **          Social Determinants of Health     Financial Resource Strain: Medium Risk (10/18/2023)    Overall Financial Resource Strain (CARDIA)     Difficulty of Paying Living Expenses: Somewhat hard   Food Insecurity: No Food Insecurity (10/18/2023)    Hunger Vital Sign     Worried About Running Out of Food in the Last Year: Never true     Ran Out of Food in the Last Year: Never true   Transportation Needs: No Transportation Needs (10/18/2023)    PRAPARE - Transportation     Lack of Transportation (Medical): No     Lack of Transportation (Non-Medical):  No   Physical Activity: Not on file   Stress: No Stress Concern Present (10/7/2021)    [facility]     Feeling of Stress : Not at all   Social Connections: Not on file   Intimate Partner Violence: Not on file   Housing Stability: Low Risk  (10/7/2021)    Housing Stability Vital Sign     Unable to Pay for Housing in the Last Year: No     Number of Places Lived in the Last Year: 1     Unstable Housing in the Last Year: No       No Known Allergies      Current Outpatient Medications:     dulaglutide (Trulicity) 9.67 NA/5.9BL injection, Inject 0.5 mL (0.75 mg total) under the skin every 7 days, Disp: 6 mL, Rfl: 0    furosemide (LASIX) 20 mg tablet, Take 1 tablet (20 mg total) by mouth daily, Disp: 45 tablet, Rfl: 3    hydrochlorothiazide (HYDRODIURIL) 25 mg tablet, Take 1 tablet (25 mg total) by mouth daily, Disp: 90 tablet, Rfl: 5    hydrocortisone 1 % cream, Apply topically 4 (four) times a day as needed for irritation, Disp: 30 g, Rfl: 0    ibuprofen (MOTRIN) 800 mg tablet, Take 1 tablet (800 mg total) by mouth every 8 (eight) hours, Disp: 30 tablet, Rfl: 3    lisinopril (ZESTRIL) 20 mg tablet, Take 1 tablet (20 mg total) by mouth daily, Disp: 90 tablet, Rfl: 1    loratadine (CLARITIN) 10 mg tablet, Take 1 tablet (10 mg total) by mouth daily, Disp: 90 tablet, Rfl: 1    I have spent a total time of 25 minutes on 11/21/23 in caring for this patient including Prognosis, Risks and benefits of tx options, Instructions for management, Patient and family education, Importance of tx compliance, Risk factor reductions, Impressions, Counseling / Coordination of care, Documenting in the medical record, Reviewing / ordering tests, medicine, procedures  , and Obtaining or reviewing history  .

## 2023-11-21 NOTE — ASSESSMENT & PLAN NOTE
Patient continues to consistently wearing knee-high compression stockings with no improvement in her symptoms. Patient continues to report pain to bilateral lower extremities which worsens throughout the day, especially if she has been on her feet all day. She is elevating her legs periodically throughout the day and continues to deny any numbness, tingling, tissue loss, claudication or weeping at this time. Patient unfortunately did not receive approval for Trulicity for weight loss and is planning to switch insurance plans in the new year. Plan:  - Patient would benefit from the use of pneumatic compression pumps. 2nd set of measurements taken today.  -Patient will return to the office in 3 months to reevaluate symptoms after initiating compression pumps.   -Continue conservative measures. -Recommend weight loss and increased exercise.

## 2023-12-19 ENCOUNTER — TELEPHONE (OUTPATIENT)
Dept: FAMILY MEDICINE CLINIC | Facility: CLINIC | Age: 53
End: 2023-12-19

## 2024-01-22 ENCOUNTER — OFFICE VISIT (OUTPATIENT)
Dept: FAMILY MEDICINE CLINIC | Facility: CLINIC | Age: 54
End: 2024-01-22

## 2024-01-22 VITALS
RESPIRATION RATE: 18 BRPM | OXYGEN SATURATION: 98 % | BODY MASS INDEX: 44.41 KG/M2 | HEART RATE: 88 BPM | WEIGHT: 293 LBS | SYSTOLIC BLOOD PRESSURE: 126 MMHG | HEIGHT: 68 IN | TEMPERATURE: 98 F | DIASTOLIC BLOOD PRESSURE: 80 MMHG

## 2024-01-22 DIAGNOSIS — H60.501 ACUTE OTITIS EXTERNA OF RIGHT EAR, UNSPECIFIED TYPE: Primary | ICD-10-CM

## 2024-01-22 DIAGNOSIS — R73.03 PREDIABETES: ICD-10-CM

## 2024-01-22 DIAGNOSIS — H66.91 RIGHT OTITIS MEDIA, UNSPECIFIED OTITIS MEDIA TYPE: ICD-10-CM

## 2024-01-22 DIAGNOSIS — M25.512 LEFT SHOULDER PAIN, UNSPECIFIED CHRONICITY: ICD-10-CM

## 2024-01-22 DIAGNOSIS — L03.90 CELLULITIS, UNSPECIFIED CELLULITIS SITE: ICD-10-CM

## 2024-01-22 DIAGNOSIS — E66.01 MORBID OBESITY (HCC): ICD-10-CM

## 2024-01-22 LAB — SL AMB POCT HEMOGLOBIN AIC: 6.1 (ref ?–6.5)

## 2024-01-22 PROCEDURE — 83036 HEMOGLOBIN GLYCOSYLATED A1C: CPT | Performed by: FAMILY MEDICINE

## 2024-01-22 PROCEDURE — 99213 OFFICE O/P EST LOW 20 MIN: CPT | Performed by: FAMILY MEDICINE

## 2024-01-22 RX ORDER — CIPROFLOXACIN AND DEXAMETHASONE 3; 1 MG/ML; MG/ML
4 SUSPENSION/ DROPS AURICULAR (OTIC) 2 TIMES DAILY
Qty: 7.5 ML | Refills: 0 | Status: SHIPPED | OUTPATIENT
Start: 2024-01-22

## 2024-01-22 RX ORDER — AMOXICILLIN AND CLAVULANATE POTASSIUM 875; 125 MG/1; MG/1
1 TABLET, FILM COATED ORAL EVERY 12 HOURS SCHEDULED
Qty: 14 TABLET | Refills: 0 | Status: SHIPPED | OUTPATIENT
Start: 2024-01-22 | End: 2024-01-29

## 2024-01-23 PROBLEM — H60.501 ACUTE OTITIS EXTERNA OF RIGHT EAR: Status: ACTIVE | Noted: 2024-01-23

## 2024-01-23 PROBLEM — H66.91 RIGHT OTITIS MEDIA: Status: ACTIVE | Noted: 2024-01-23

## 2024-01-23 RX ORDER — IBUPROFEN 800 MG/1
800 TABLET ORAL EVERY 8 HOURS
Qty: 30 TABLET | Refills: 3 | Status: SHIPPED | OUTPATIENT
Start: 2024-01-23

## 2024-01-24 NOTE — ASSESSMENT & PLAN NOTE
Continue with lifestyle modification.  Will do a trial of Wegovy in addition to help bring down her A1c.

## 2024-01-24 NOTE — PROGRESS NOTES
Assessment/Plan:    1. Acute otitis externa of right ear, unspecified type  Assessment & Plan:  It appears that patient might have a component to otitis external.  Will treat with Ciprodex.    Orders:  -     amoxicillin-clavulanate (AUGMENTIN) 875-125 mg per tablet; Take 1 tablet by mouth every 12 (twelve) hours for 7 days  -     ciprofloxacin-dexamethasone (CIPRODEX) otic suspension; Administer 4 drops into the left ear 2 (two) times a day    2. Morbid obesity (HCC)  Assessment & Plan:  Will do a trial of Wegovy 0.5 mg weekly.  Will have patient back in 8 weeks to assess for weight loss and titrate up the medication.    Orders:  -     Semaglutide-Weight Management (WEGOVY) 0.5 MG/0.5ML; Inject 0.5 mL (0.5 mg total) under the skin once a week    3. Right otitis media, unspecified otitis media type  Assessment & Plan:  History of multiple otitis media infection.  Appropriate for Augmentin antibiotics treatment for 7 days.    Orders:  -     amoxicillin-clavulanate (AUGMENTIN) 875-125 mg per tablet; Take 1 tablet by mouth every 12 (twelve) hours for 7 days  -     ciprofloxacin-dexamethasone (CIPRODEX) otic suspension; Administer 4 drops into the left ear 2 (two) times a day    4. BMI 50.0-59.9, adult (HCC)  -     Semaglutide-Weight Management (WEGOVY) 0.5 MG/0.5ML; Inject 0.5 mL (0.5 mg total) under the skin once a week    5. Prediabetes  Assessment & Plan:  Continue with lifestyle modification.  Will do a trial of Wegovy in addition to help bring down her A1c.    Orders:  -     Semaglutide-Weight Management (WEGOVY) 0.5 MG/0.5ML; Inject 0.5 mL (0.5 mg total) under the skin once a week  -     POCT hemoglobin A1c    6. Left shoulder pain, unspecified chronicity  -     Diclofenac Sodium (VOLTAREN) 1 %; Apply 2 g topically 4 (four) times a day    7. Cellulitis, unspecified cellulitis site  -     ibuprofen (MOTRIN) 800 mg tablet; Take 1 tablet (800 mg total) by mouth every 8 (eight) hours         Subjective:      Patient  "ID: Elizabeth Nelson is a 53 y.o. female.    Past medical history notable for morbid obesity, this came in with chief complaint of right ear pain.  Patient reports that she has had rhinorrhea and congestion last few weeks and now has had right ear pain and decreased hearing.    Note, patient has also been trying to lose weight.  In our last visit we tried to get patient Wegovy however insurance did not allow to get it at that time.  Patient has an insurance company had would like some weight loss medication.  Patient denies any history of pancreatitis, nausea, vomiting.        The following portions of the patient's history were reviewed and updated as appropriate: allergies, current medications, past family history, past medical history, past social history, past surgical history, and problem list.    Review of Systems   Constitutional:  Negative for chills and fever.   HENT:  Positive for ear pain and tinnitus. Negative for sore throat.    Eyes:  Negative for pain and visual disturbance.   Respiratory:  Negative for cough and shortness of breath.    Cardiovascular:  Negative for chest pain and palpitations.   Gastrointestinal:  Negative for abdominal pain and vomiting.   Genitourinary:  Negative for dysuria and hematuria.   Musculoskeletal:  Negative for arthralgias and back pain.   Skin:  Negative for color change and rash.   Neurological:  Negative for seizures and syncope.   All other systems reviewed and are negative.        Objective:      /80 (BP Location: Left arm, Patient Position: Sitting, Cuff Size: Large)   Pulse 88   Temp 98 °F (36.7 °C) (Temporal)   Resp 18   Ht 5' 8\" (1.727 m)   Wt (!) 150 kg (331 lb)   LMP  (LMP Unknown)   SpO2 98%   BMI 50.33 kg/m²          Physical Exam  Constitutional:       General: She is not in acute distress.     Appearance: Normal appearance. She is obese.   HENT:      Ears:      Comments: Erythematous ear canal with fluid levels visualized in the right tympanic " membrane.     Nose: No congestion or rhinorrhea.   Eyes:      Extraocular Movements: Extraocular movements intact.      Pupils: Pupils are equal, round, and reactive to light.   Cardiovascular:      Rate and Rhythm: Normal rate and regular rhythm.      Pulses: Normal pulses.      Heart sounds: Normal heart sounds. No murmur heard.     No gallop.   Pulmonary:      Effort: Pulmonary effort is normal.      Breath sounds: Normal breath sounds. No wheezing, rhonchi or rales.   Abdominal:      General: Bowel sounds are normal. There is no distension.      Palpations: Abdomen is soft. There is no mass.      Tenderness: There is no abdominal tenderness.      Hernia: No hernia is present.   Skin:     General: Skin is warm.      Coloration: Skin is not jaundiced.      Findings: No bruising.   Neurological:      General: No focal deficit present.      Mental Status: She is alert and oriented to person, place, and time.   Psychiatric:         Mood and Affect: Mood normal.         Behavior: Behavior normal.         Thought Content: Thought content normal.           Edd Degroot DO   Family Medicine PGY-3  1/23/2024

## 2024-01-24 NOTE — ASSESSMENT & PLAN NOTE
History of multiple otitis media infection.  Appropriate for Augmentin antibiotics treatment for 7 days.

## 2024-01-29 ENCOUNTER — TELEPHONE (OUTPATIENT)
Dept: FAMILY MEDICINE CLINIC | Facility: CLINIC | Age: 54
End: 2024-01-29

## 2024-01-29 NOTE — TELEPHONE ENCOUNTER
01/29/24    PCP SIGNATURE NEEDED FOR   [facility] / [facility] PRIOR AUTH     FORM RECEIVED VIA FAX AND PLACED IN PCP FOLDER TO BE DELIVERED AT ASSIGNED TIMES.      MED: Wegovy

## 2024-02-07 NOTE — TELEPHONE ENCOUNTER
FAXED ON 2/1/24 TO [facility] / [facility] PRIOR AUTH  at [phone]. FAX CONFIRMATION RECEIVED.    Wegovy 0.5MG/0.5ML Auto Injectors

## 2024-02-17 ENCOUNTER — TELEPHONE (OUTPATIENT)
Dept: FAMILY MEDICINE CLINIC | Facility: CLINIC | Age: 54
End: 2024-02-17

## 2024-02-17 NOTE — TELEPHONE ENCOUNTER
[employer] sent a fax stating that prior authorization for patient's Wegovy was denied and to send an appeal. Please advise.

## 2024-02-21 PROBLEM — H60.501 ACUTE OTITIS EXTERNA OF RIGHT EAR: Status: RESOLVED | Noted: 2024-01-23 | Resolved: 2024-02-21

## 2024-03-06 ENCOUNTER — OFFICE VISIT (OUTPATIENT)
Dept: FAMILY MEDICINE CLINIC | Facility: CLINIC | Age: 54
End: 2024-03-06

## 2024-03-06 VITALS
HEIGHT: 68 IN | WEIGHT: 293 LBS | DIASTOLIC BLOOD PRESSURE: 70 MMHG | OXYGEN SATURATION: 95 % | SYSTOLIC BLOOD PRESSURE: 124 MMHG | HEART RATE: 106 BPM | BODY MASS INDEX: 44.41 KG/M2 | TEMPERATURE: 98.7 F | RESPIRATION RATE: 16 BRPM

## 2024-03-06 DIAGNOSIS — J06.9 UPPER RESPIRATORY TRACT INFECTION, UNSPECIFIED TYPE: Primary | ICD-10-CM

## 2024-03-06 DIAGNOSIS — E66.01 MORBID OBESITY (HCC): ICD-10-CM

## 2024-03-06 DIAGNOSIS — R73.03 PREDIABETES: ICD-10-CM

## 2024-03-06 PROCEDURE — 99213 OFFICE O/P EST LOW 20 MIN: CPT | Performed by: FAMILY MEDICINE

## 2024-03-06 RX ORDER — BENZONATATE 200 MG/1
200 CAPSULE ORAL 3 TIMES DAILY PRN
Qty: 20 CAPSULE | Refills: 0 | Status: SHIPPED | OUTPATIENT
Start: 2024-03-06 | End: 2024-03-06

## 2024-03-06 RX ORDER — PSEUDOEPHEDRINE HYDROCHLORIDE 60 MG/1
60 TABLET, FILM COATED ORAL EVERY 6 HOURS PRN
Qty: 10 TABLET | Refills: 1 | Status: SHIPPED | OUTPATIENT
Start: 2024-03-06 | End: 2024-03-06

## 2024-03-06 RX ORDER — BENZONATATE 200 MG/1
200 CAPSULE ORAL 3 TIMES DAILY PRN
Qty: 20 CAPSULE | Refills: 0 | Status: SHIPPED | OUTPATIENT
Start: 2024-03-06

## 2024-03-06 RX ORDER — OXYMETAZOLINE HYDROCHLORIDE 0.05 G/100ML
2 SPRAY NASAL 2 TIMES DAILY
Qty: 15 ML | Refills: 0 | Status: SHIPPED | OUTPATIENT
Start: 2024-03-06 | End: 2024-03-06

## 2024-03-06 RX ORDER — OXYMETAZOLINE HYDROCHLORIDE 0.05 G/100ML
2 SPRAY NASAL 2 TIMES DAILY
Qty: 15 ML | Refills: 0 | Status: SHIPPED | OUTPATIENT
Start: 2024-03-06

## 2024-03-06 RX ORDER — ONDANSETRON 4 MG/1
4 TABLET, FILM COATED ORAL EVERY 8 HOURS PRN
Qty: 20 TABLET | Refills: 0 | Status: SHIPPED | OUTPATIENT
Start: 2024-03-06

## 2024-03-06 RX ORDER — PSEUDOEPHEDRINE HYDROCHLORIDE 60 MG/1
60 TABLET, FILM COATED ORAL EVERY 6 HOURS PRN
Qty: 10 TABLET | Refills: 1 | Status: SHIPPED | OUTPATIENT
Start: 2024-03-06 | End: 2024-03-11

## 2024-03-06 NOTE — ASSESSMENT & PLAN NOTE
Wt Readings from Last 3 Encounters:   03/06/24 (!) 150 kg (330 lb)   01/22/24 (!) 150 kg (331 lb)   11/21/23 (!) 148 kg (326 lb)     Conservative measures including lifestyle modification has not been helping.  Patient continues to have increased BMI in spite of decreasing food consumption.  At this point, appropriate to initiate therapy with   .5mg  Wegovy subcutaneously weekly.

## 2024-03-06 NOTE — ASSESSMENT & PLAN NOTE
Lab Results   Component Value Date    HGBA1C 6.1 01/22/2024       Continue on conservative management.

## 2024-03-06 NOTE — ASSESSMENT & PLAN NOTE
Likely viral.  Will do symptomatic management with pseudoephedrine, Afrin nasal spray and Tessalon perles.

## 2024-03-06 NOTE — PROGRESS NOTES
Assessment/Plan:    1. Upper respiratory tract infection, unspecified type  Assessment & Plan:  Likely viral.  Will do symptomatic management with pseudoephedrine, Afrin nasal spray and Tessalon perles.    Orders:  -     pseudoephedrine (SUDAFED) 60 mg tablet; Take 1 tablet (60 mg total) by mouth every 6 (six) hours as needed for congestion for up to 5 days  -     oxymetazoline (AFRIN) 0.05 % nasal spray; 2 sprays by Each Nare route 2 (two) times a day  -     benzonatate (TESSALON) 200 MG capsule; Take 1 capsule (200 mg total) by mouth 3 (three) times a day as needed for cough    2. Morbid obesity (HCC)  Assessment & Plan:  Plan per above BMI 50-59.9    Orders:  -     Semaglutide-Weight Management (WEGOVY) 0.5 MG/0.5ML; Inject 0.5 mL (0.5 mg total) under the skin once a week    3. BMI 50.0-59.9, adult (HCC)  Assessment & Plan:  Wt Readings from Last 3 Encounters:   03/06/24 (!) 150 kg (330 lb)   01/22/24 (!) 150 kg (331 lb)   11/21/23 (!) 148 kg (326 lb)     Conservative measures including lifestyle modification has not been helping.  Patient continues to have increased BMI in spite of decreasing food consumption.  At this point, appropriate to initiate therapy with   .5mg  Wegovy subcutaneously weekly.    Orders:  -     Semaglutide-Weight Management (WEGOVY) 0.5 MG/0.5ML; Inject 0.5 mL (0.5 mg total) under the skin once a week  -     ondansetron (ZOFRAN) 4 mg tablet; Take 1 tablet (4 mg total) by mouth every 8 (eight) hours as needed for nausea or vomiting    4. Prediabetes  Assessment & Plan:  Lab Results   Component Value Date    HGBA1C 6.1 01/22/2024       Continue on conservative management.    Orders:  -     Semaglutide-Weight Management (WEGOVY) 0.5 MG/0.5ML; Inject 0.5 mL (0.5 mg total) under the skin once a week         Subjective:      Patient ID: Elizabeth Nelson is a 53 y.o. female.    Past medical history notable for morbid obesity, is coming in with symptomatic URI symptoms.  Patient reports this lasted a  "week.  Review of system is positive for congestion, cough, and rhinorrhea.  Patient denies any sick contact though patient recently traveled from [place].    Of note, patient was supposed to be on Wegovy for her weight loss medication.  She has since changed insurance plan and would require additional prior authorization for new plan.        The following portions of the patient's history were reviewed and updated as appropriate: allergies, current medications, past family history, past medical history, past social history, past surgical history, and problem list.    Review of Systems   Constitutional:  Positive for unexpected weight change (Weight gain). Negative for chills and fever.   HENT:  Positive for congestion and rhinorrhea. Negative for ear pain and sore throat.    Eyes:  Negative for pain and visual disturbance.   Respiratory:  Positive for cough. Negative for shortness of breath.    Cardiovascular:  Negative for chest pain and palpitations.   Gastrointestinal:  Negative for abdominal pain and vomiting.   Genitourinary:  Negative for dysuria and hematuria.   Musculoskeletal:  Negative for arthralgias and back pain.   Skin:  Negative for color change and rash.   Neurological:  Negative for seizures and syncope.   All other systems reviewed and are negative.        Objective:      /70 (BP Location: Right arm, Patient Position: Sitting, Cuff Size: Large)   Pulse (!) 106   Temp 98.7 °F (37.1 °C) (Temporal)   Resp 16   Ht 5' 8\" (1.727 m)   Wt (!) 150 kg (330 lb)   LMP  (LMP Unknown)   SpO2 95%   BMI 50.18 kg/m²          Physical Exam  Constitutional:       General: She is not in acute distress.     Appearance: Normal appearance.   HENT:      Nose: Congestion and rhinorrhea present.      Mouth/Throat:      Mouth: Mucous membranes are moist.   Eyes:      Extraocular Movements: Extraocular movements intact.      Pupils: Pupils are equal, round, and reactive to light.   Cardiovascular:      Rate and " Rhythm: Normal rate and regular rhythm.      Pulses: Normal pulses.      Heart sounds: Normal heart sounds. No murmur heard.     No gallop.   Pulmonary:      Effort: Pulmonary effort is normal.      Breath sounds: Normal breath sounds. No wheezing, rhonchi or rales.   Abdominal:      General: Bowel sounds are normal. There is no distension.      Palpations: Abdomen is soft. There is no mass.      Tenderness: There is no abdominal tenderness.      Hernia: No hernia is present.   Skin:     General: Skin is warm.      Coloration: Skin is not jaundiced.      Findings: No bruising.   Neurological:      General: No focal deficit present.      Mental Status: She is alert and oriented to person, place, and time.   Psychiatric:         Mood and Affect: Mood normal.         Behavior: Behavior normal.         Thought Content: Thought content normal.           Edd Degroot DO   Family Medicine PGY-3  3/6/2024

## 2024-03-06 NOTE — ASSESSMENT & PLAN NOTE
BP Readings from Last 3 Encounters:   03/06/24 124/70   01/22/24 126/80   11/21/23 132/70     Controlled with hydrochlorothiazide 25 mg daily.  Continue current regiment.

## 2024-03-08 ENCOUNTER — TELEPHONE (OUTPATIENT)
Dept: FAMILY MEDICINE CLINIC | Facility: CLINIC | Age: 54
End: 2024-03-08

## 2024-03-08 NOTE — TELEPHONE ENCOUNTER
PCP SIGNATURE NEEDED FOR [facility] RECEIVED VIA FAX AND PLACED IN PCP FOLDER TO BE DELIVERED AT ASSIGNED TIMES.      Prior auth packet

## 2024-03-11 ENCOUNTER — TELEPHONE (OUTPATIENT)
Dept: FAMILY MEDICINE CLINIC | Facility: CLINIC | Age: 54
End: 2024-03-11

## 2024-03-11 NOTE — TELEPHONE ENCOUNTER
PCP SIGNATURE NEEDED FOR [facility] RECEIVED VIA FAX AND PLACED IN PCP FOLDER TO BE DELIVERED AT ASSIGNED TIMES.      WEGOVY 0.5MG

## 2024-03-13 ENCOUNTER — OFFICE VISIT (OUTPATIENT)
Dept: VASCULAR SURGERY | Facility: CLINIC | Age: 54
End: 2024-03-13
Payer: COMMERCIAL

## 2024-03-13 ENCOUNTER — TELEPHONE (OUTPATIENT)
Dept: VASCULAR SURGERY | Facility: CLINIC | Age: 54
End: 2024-03-13

## 2024-03-13 VITALS
BODY MASS INDEX: 44.41 KG/M2 | SYSTOLIC BLOOD PRESSURE: 120 MMHG | OXYGEN SATURATION: 96 % | DIASTOLIC BLOOD PRESSURE: 78 MMHG | HEART RATE: 88 BPM | WEIGHT: 293 LBS | HEIGHT: 68 IN

## 2024-03-13 DIAGNOSIS — I89.0 LYMPHEDEMA: Primary | ICD-10-CM

## 2024-03-13 PROCEDURE — 99213 OFFICE O/P EST LOW 20 MIN: CPT

## 2024-03-13 NOTE — PROGRESS NOTES
Assessment/Plan:    Lymphedema  Patient continues to consistently wearing knee-high compression stockings with only mild improvement in her symptoms.  She does report that the swelling to her medial knees has improved and she continues to deny any numbness, tingling, tissue loss, claudication or weeping at this time. Patient is currently undergoing approval for Wegovy for weight loss. She reports that she did not receive a phone call regarding pneumatic compression pumps and has therefore not received them.    Plan:  -Phone call placed to [facility] at this time to inquire as to why patient never received a phone call for compression pumps.  [facility] reports that they never received patient's information.  Patient's information faxed to [facility] at this time by nursing staff.  - Patient would benefit from the use of pneumatic compression pumps.  Instructed patient to notify the office should she not receive a phone call from [facility] within the next 2 weeks.  -Patient will return to the office in 3 months to reevaluate symptoms after initiating compression pumps.   -Continue conservative measures.  -Recommend weight loss and increased exercise.       Diagnoses and all orders for this visit:    Lymphedema          Subjective:      Patient ID: Elizabeth Nelson is a 54 y.o. female.      Patient is a 54-year-old female, nonsmoker, with PMH morbid obesity, HTN, and prediabetes. Patient is presenting to vascular surgery office for follow up for bilateral lower extremity lymphedema.      Patient continues to consistently wearing knee-high compression stockings with only mild improvement in her symptoms.  She does report that the swelling to her medial knees has improved and she continues to deny any numbness, tingling, tissue loss, claudication or weeping at this time. Patient is currently undergoing approval for Wegovy for weight loss. She reports that she did not receive a phone call regarding pneumatic compression pumps  "and has therefore not received them.          The following portions of the patient's history were reviewed and updated as appropriate: allergies, current medications, past family history, past medical history, past social history, past surgical history, and problem list.    Review of Systems   Constitutional: Negative.    HENT: Negative.     Eyes: Negative.    Respiratory: Negative.     Cardiovascular:  Positive for leg swelling.   Gastrointestinal: Negative.    Endocrine: Negative.    Genitourinary: Negative.    Musculoskeletal: Negative.    Skin: Negative.    Allergic/Immunologic: Negative.    Neurological: Negative.    Hematological: Negative.    Psychiatric/Behavioral: Negative.           Objective:      /78 (BP Location: Left arm, Patient Position: Sitting, Cuff Size: Large)   Pulse 88   Ht 5' 8\" (1.727 m)   Wt (!) 149 kg (328 lb)   LMP  (LMP Unknown)   SpO2 96%   BMI 49.87 kg/m²          Physical Exam  Constitutional:       General: She is not in acute distress.     Appearance: Normal appearance.   HENT:      Head: Normocephalic and atraumatic.   Cardiovascular:      Rate and Rhythm: Normal rate and regular rhythm.      Pulses:           Dorsalis pedis pulses are 2+ on the right side and 2+ on the left side.   Pulmonary:      Effort: Pulmonary effort is normal. No respiratory distress.   Musculoskeletal:         General: No swelling or tenderness.      Right lower leg: Edema present.      Left lower leg: Edema present.   Skin:     General: Skin is warm and dry.      Capillary Refill: Capillary refill takes less than 2 seconds.   Neurological:      General: No focal deficit present.      Mental Status: She is alert and oriented to person, place, and time.   Psychiatric:         Mood and Affect: Mood normal.         Behavior: Behavior normal.     I have reviewed and made appropriate changes to the review of systems input by the medical assistant.    Vitals:    03/13/24 1100   BP: 120/78   BP " "Location: Left arm   Patient Position: Sitting   Cuff Size: Large   Pulse: 88   SpO2: 96%   Weight: (!) 149 kg (328 lb)   Height: 5' 8\" (1.727 m)       Patient Active Problem List   Diagnosis    Benign hypertension    Upper respiratory tract infection    Prediabetes    Morbid obesity (HCC)    Tendinitis of right rotator cuff    Subacromial bursitis of right shoulder joint    Bilateral lower extremity edema    Abdominal hernia    BMI 50.0-59.9, adult (HCC)    Primary osteoarthritis of both knees    Venous insufficiency    Varicose veins of both lower extremities    Lymphedema    Right otitis media       Past Surgical History:   Procedure Laterality Date    CHOLECYSTECTOMY  2006    NM COLONOSCOPY FLX DX W/COLLJ SPEC WHEN PFRMD Left 05/02/2019    Procedure: COLONOSCOPY;  Surgeon: Dave Badillo MD;  Location: [facility];  Service: Gastroenterology    NM LAPAROSCOPIC APPENDECTOMY N/A 03/22/2019    Procedure: LAPAROSCOPIC APPENDECTOMY;  Surgeon: Ayo Monreal MD;  Location:  MAIN OR;  Service: General    NM REPAIR FIRST ABDOMINAL WALL HERNIA N/A 08/28/2020    Procedure: REPAIR INCISIONAL HERNIA;  Surgeon: Ayo Monreal MD;  Location:  MAIN OR;  Service: General       Family History   Problem Relation Age of Onset    Colon cancer Mother 63    Stroke Father     No Known Problems Sister     No Known Problems Daughter     No Known Problems Maternal Grandmother     No Known Problems Maternal Grandfather     No Known Problems Paternal Grandmother     No Known Problems Paternal Grandfather     No Known Problems Paternal Aunt     No Known Problems Paternal Aunt     Breast cancer Neg Hx     Ovarian cancer Neg Hx        Social History     Socioeconomic History    Marital status: /Civil Union     Spouse name: Not on file    Number of children: Not on file    Years of education: Not on file    Highest education level: Not on file   Occupational History    Not on file   Tobacco Use    Smoking status: Never    Smokeless " tobacco: Never   Vaping Use    Vaping status: Never Used   Substance and Sexual Activity    Alcohol use: Yes     Comment: couple times/month    Drug use: Never    Sexual activity: Yes     Partners: Male     Birth control/protection: Post-menopausal   Other Topics Concern    Not on file   Social History Narrative    ** Merged History Encounter **          Social Determinants of Health     Financial Resource Strain: Medium Risk (10/18/2023)    Overall Financial Resource Strain (CARDIA)     Difficulty of Paying Living Expenses: Somewhat hard   Food Insecurity: No Food Insecurity (10/18/2023)    Hunger Vital Sign     Worried About Running Out of Food in the Last Year: Never true     Ran Out of Food in the Last Year: Never true   Transportation Needs: No Transportation Needs (10/18/2023)    PRAPARE - Transportation     Lack of Transportation (Medical): No     Lack of Transportation (Non-Medical): No   Physical Activity: Not on file   Stress: No Stress Concern Present (10/7/2021)    [employer] [place] [employer] - Occupational Stress Questionnaire     Feeling of Stress : Not at all   Social Connections: Not on file   Intimate Partner Violence: Not on file   Housing Stability: Low Risk  (10/7/2021)    Housing Stability Vital Sign     Unable to Pay for Housing in the Last Year: No     Number of Places Lived in the Last Year: 1     Unstable Housing in the Last Year: No       No Known Allergies      Current Outpatient Medications:     benzonatate (TESSALON) 200 MG capsule, Take 1 capsule (200 mg total) by mouth 3 (three) times a day as needed for cough, Disp: 20 capsule, Rfl: 0    hydrochlorothiazide (HYDRODIURIL) 25 mg tablet, Take 1 tablet (25 mg total) by mouth daily, Disp: 90 tablet, Rfl: 5    hydrocortisone 1 % cream, Apply topically 4 (four) times a day as needed for irritation, Disp: 30 g, Rfl: 0    ibuprofen (MOTRIN) 800 mg tablet, Take 1 tablet (800 mg total) by mouth every 8 (eight) hours, Disp: 30  tablet, Rfl: 3    lisinopril (ZESTRIL) 20 mg tablet, Take 1 tablet (20 mg total) by mouth daily, Disp: 90 tablet, Rfl: 1    loratadine (CLARITIN) 10 mg tablet, Take 1 tablet (10 mg total) by mouth daily, Disp: 90 tablet, Rfl: 1    oxymetazoline (AFRIN) 0.05 % nasal spray, 2 sprays by Each Nare route 2 (two) times a day, Disp: 15 mL, Rfl: 0    ciprofloxacin-dexamethasone (CIPRODEX) otic suspension, Administer 4 drops into the left ear 2 (two) times a day (Patient not taking: Reported on 3/13/2024), Disp: 7.5 mL, Rfl: 0    Diclofenac Sodium (VOLTAREN) 1 %, Apply 2 g topically 4 (four) times a day (Patient not taking: Reported on 3/13/2024), Disp: 50 g, Rfl: 3    furosemide (LASIX) 20 mg tablet, Take 1 tablet (20 mg total) by mouth daily (Patient not taking: Reported on 3/13/2024), Disp: 45 tablet, Rfl: 3    ondansetron (ZOFRAN) 4 mg tablet, Take 1 tablet (4 mg total) by mouth every 8 (eight) hours as needed for nausea or vomiting (Patient not taking: Reported on 3/13/2024), Disp: 20 tablet, Rfl: 0    pseudoephedrine (SUDAFED) 60 mg tablet, Take 1 tablet (60 mg total) by mouth every 6 (six) hours as needed for congestion for up to 5 days, Disp: 10 tablet, Rfl: 1    Semaglutide-Weight Management (WEGOVY) 0.5 MG/0.5ML, Inject 0.5 mL (0.5 mg total) under the skin once a week (Patient not taking: Reported on 3/13/2024), Disp: 2 mL, Rfl: 5    I have spent a total time of 20 minutes on 03/13/24 in caring for this patient including Instructions for management, Patient and family education, Importance of tx compliance, Risk factor reductions, Impressions, Counseling / Coordination of care, and Obtaining or reviewing history  .

## 2024-03-13 NOTE — TELEPHONE ENCOUNTER
Received call from DAVID Sepulveda asking for me to call [employer] to get an update on pt's lymphedema pumps. Called [facility] and s/w Don who stated they never received any info on her. Faxed over the info and they stated they will reach out to pt.

## 2024-03-13 NOTE — ASSESSMENT & PLAN NOTE
Patient continues to consistently wearing knee-high compression stockings with only mild improvement in her symptoms.  She does report that the swelling to her medial knees has improved and she continues to deny any numbness, tingling, tissue loss, claudication or weeping at this time. Patient is currently undergoing approval for Wegovy for weight loss. She reports that she did not receive a phone call regarding pneumatic compression pumps and has therefore not received them.    Plan:  -Phone call placed to [facility] at this time to inquire as to why patient never received a phone call for compression pumps.  [facility] reports that they never received patient's information.  Patient's information faxed to [facility] at this time by nursing staff.  - Patient would benefit from the use of pneumatic compression pumps.  Instructed patient to notify the office should she not receive a phone call from [facility] within the next 2 weeks.  -Patient will return to the office in 3 months to reevaluate symptoms after initiating compression pumps.   -Continue conservative measures.  -Recommend weight loss and increased exercise.

## 2024-03-18 ENCOUNTER — TELEPHONE (OUTPATIENT)
Dept: FAMILY MEDICINE CLINIC | Facility: CLINIC | Age: 54
End: 2024-03-18

## 2024-03-18 NOTE — TELEPHONE ENCOUNTER
Pt called the nurse line stating that the approval for the wegovy got denied due to a code not being on the form, when I read the letter of denial it stated it was denied do to her BMI. I believe the BMI was not correct on the prior auth.

## 2024-03-18 NOTE — TELEPHONE ENCOUNTER
FAXED ON 03/18/24 TO [facility] at [phone]. FAX CONFIRMATION RECEIVED.

## 2024-04-03 ENCOUNTER — TELEPHONE (OUTPATIENT)
Dept: FAMILY MEDICINE CLINIC | Facility: CLINIC | Age: 54
End: 2024-04-03

## 2024-04-03 NOTE — TELEPHONE ENCOUNTER
Pt called and requested call back.    Spoke with alicia and pt stated she would like to cancel her appt due to not having the medication that was needed for to take. Pt stated she would call and reschedule appt once she receives the medication.

## 2024-06-11 DIAGNOSIS — I10 BENIGN HYPERTENSION: ICD-10-CM

## 2024-06-11 RX ORDER — LISINOPRIL 20 MG/1
20 TABLET ORAL DAILY
Qty: 90 TABLET | Refills: 1 | Status: SHIPPED | OUTPATIENT
Start: 2024-06-11

## 2024-06-23 NOTE — TELEPHONE ENCOUNTER
The patient is absolutely correct  I apologize it wasn't sent out earlier, please let her know that I just sent it to the pharmacy  Yes

## 2024-06-28 ENCOUNTER — HOSPITAL ENCOUNTER (OUTPATIENT)
Dept: MAMMOGRAPHY | Facility: CLINIC | Age: 54
End: 2024-06-28
Payer: COMMERCIAL

## 2024-06-28 VITALS — BODY MASS INDEX: 44.41 KG/M2 | HEIGHT: 68 IN | WEIGHT: 293 LBS

## 2024-06-28 DIAGNOSIS — Z12.31 ENCOUNTER FOR SCREENING MAMMOGRAM FOR MALIGNANT NEOPLASM OF BREAST: ICD-10-CM

## 2024-06-28 PROCEDURE — 77067 SCR MAMMO BI INCL CAD: CPT

## 2024-06-28 PROCEDURE — 77063 BREAST TOMOSYNTHESIS BI: CPT

## 2024-07-03 ENCOUNTER — APPOINTMENT (OUTPATIENT)
Dept: LAB | Facility: HOSPITAL | Age: 54
End: 2024-07-03
Payer: COMMERCIAL

## 2024-07-03 ENCOUNTER — OFFICE VISIT (OUTPATIENT)
Dept: FAMILY MEDICINE CLINIC | Facility: CLINIC | Age: 54
End: 2024-07-03

## 2024-07-03 VITALS
HEIGHT: 68 IN | RESPIRATION RATE: 22 BRPM | TEMPERATURE: 97.5 F | SYSTOLIC BLOOD PRESSURE: 114 MMHG | OXYGEN SATURATION: 96 % | DIASTOLIC BLOOD PRESSURE: 79 MMHG | WEIGHT: 293 LBS | BODY MASS INDEX: 44.41 KG/M2 | HEART RATE: 82 BPM

## 2024-07-03 DIAGNOSIS — I10 BENIGN HYPERTENSION: ICD-10-CM

## 2024-07-03 DIAGNOSIS — L03.90 CELLULITIS, UNSPECIFIED CELLULITIS SITE: ICD-10-CM

## 2024-07-03 DIAGNOSIS — M25.562 PAIN IN BOTH KNEES, UNSPECIFIED CHRONICITY: Primary | ICD-10-CM

## 2024-07-03 DIAGNOSIS — R60.0 BILATERAL LOWER EXTREMITY EDEMA: ICD-10-CM

## 2024-07-03 DIAGNOSIS — D17.22 LIPOMA OF LEFT UPPER EXTREMITY: ICD-10-CM

## 2024-07-03 DIAGNOSIS — M25.561 PAIN IN BOTH KNEES, UNSPECIFIED CHRONICITY: Primary | ICD-10-CM

## 2024-07-03 DIAGNOSIS — R73.03 PREDIABETES: ICD-10-CM

## 2024-07-03 DIAGNOSIS — E66.01 MORBID OBESITY (HCC): ICD-10-CM

## 2024-07-03 DIAGNOSIS — J30.1 ALLERGIC RHINITIS DUE TO POLLEN, UNSPECIFIED SEASONALITY: ICD-10-CM

## 2024-07-03 LAB
ALBUMIN SERPL BCG-MCNC: 4.2 G/DL (ref 3.5–5)
ALP SERPL-CCNC: 36 U/L (ref 34–104)
ALT SERPL W P-5'-P-CCNC: 32 U/L (ref 7–52)
ANION GAP SERPL CALCULATED.3IONS-SCNC: 10 MMOL/L (ref 4–13)
AST SERPL W P-5'-P-CCNC: 17 U/L (ref 13–39)
BASOPHILS # BLD MANUAL: 0.07 THOUSAND/UL (ref 0–0.1)
BASOPHILS NFR MAR MANUAL: 1 % (ref 0–1)
BILIRUB SERPL-MCNC: 0.57 MG/DL (ref 0.2–1)
BUN SERPL-MCNC: 19 MG/DL (ref 5–25)
CALCIUM SERPL-MCNC: 9.3 MG/DL (ref 8.4–10.2)
CHLORIDE SERPL-SCNC: 102 MMOL/L (ref 96–108)
CO2 SERPL-SCNC: 28 MMOL/L (ref 21–32)
CREAT SERPL-MCNC: 0.79 MG/DL (ref 0.6–1.3)
CRP SERPL QL: 1 MG/L
EOSINOPHIL # BLD MANUAL: 0.26 THOUSAND/UL (ref 0–0.4)
EOSINOPHIL NFR BLD MANUAL: 4 % (ref 0–6)
ERYTHROCYTE [DISTWIDTH] IN BLOOD BY AUTOMATED COUNT: 13 % (ref 11.6–15.1)
GFR SERPL CREATININE-BSD FRML MDRD: 83 ML/MIN/1.73SQ M
GLUCOSE P FAST SERPL-MCNC: 107 MG/DL (ref 65–99)
HCT VFR BLD AUTO: 38.8 % (ref 34.8–46.1)
HGB BLD-MCNC: 13.4 G/DL (ref 11.5–15.4)
LYMPHOCYTES # BLD AUTO: 2.47 THOUSAND/UL (ref 0.6–4.47)
LYMPHOCYTES # BLD AUTO: 35 % (ref 14–44)
MCH RBC QN AUTO: 30.7 PG (ref 26.8–34.3)
MCHC RBC AUTO-ENTMCNC: 34.5 G/DL (ref 31.4–37.4)
MCV RBC AUTO: 89 FL (ref 82–98)
MONOCYTES # BLD AUTO: 0.52 THOUSAND/UL (ref 0–1.22)
MONOCYTES NFR BLD: 8 % (ref 4–12)
NEUTROPHILS # BLD MANUAL: 3.19 THOUSAND/UL (ref 1.85–7.62)
NEUTS SEG NFR BLD AUTO: 49 % (ref 43–75)
PLATELET # BLD AUTO: 185 THOUSANDS/UL (ref 149–390)
PLATELET BLD QL SMEAR: ADEQUATE
PMV BLD AUTO: 11.1 FL (ref 8.9–12.7)
POTASSIUM SERPL-SCNC: 3.4 MMOL/L (ref 3.5–5.3)
PROT SERPL-MCNC: 7.2 G/DL (ref 6.4–8.4)
RBC # BLD AUTO: 4.37 MILLION/UL (ref 3.81–5.12)
RBC MORPH BLD: NORMAL
SODIUM SERPL-SCNC: 140 MMOL/L (ref 135–147)
VARIANT LYMPHS # BLD AUTO: 3 %
WBC # BLD AUTO: 6.5 THOUSAND/UL (ref 4.31–10.16)

## 2024-07-03 PROCEDURE — 36415 COLL VENOUS BLD VENIPUNCTURE: CPT

## 2024-07-03 PROCEDURE — 85027 COMPLETE CBC AUTOMATED: CPT

## 2024-07-03 PROCEDURE — 80053 COMPREHEN METABOLIC PANEL: CPT

## 2024-07-03 PROCEDURE — 86140 C-REACTIVE PROTEIN: CPT

## 2024-07-03 PROCEDURE — 86200 CCP ANTIBODY: CPT

## 2024-07-03 PROCEDURE — 85007 BL SMEAR W/DIFF WBC COUNT: CPT

## 2024-07-03 RX ORDER — HYDROCHLOROTHIAZIDE 25 MG/1
25 TABLET ORAL DAILY
Qty: 90 TABLET | Refills: 5 | Status: SHIPPED | OUTPATIENT
Start: 2024-07-03

## 2024-07-03 RX ORDER — LORATADINE 10 MG/1
10 TABLET ORAL DAILY
Qty: 90 TABLET | Refills: 1 | Status: SHIPPED | OUTPATIENT
Start: 2024-07-03

## 2024-07-03 RX ORDER — LISINOPRIL 20 MG/1
20 TABLET ORAL DAILY
Qty: 90 TABLET | Refills: 1 | Status: SHIPPED | OUTPATIENT
Start: 2024-07-03

## 2024-07-03 RX ORDER — IBUPROFEN 800 MG/1
800 TABLET ORAL EVERY 8 HOURS
Qty: 30 TABLET | Refills: 3 | Status: SHIPPED | OUTPATIENT
Start: 2024-07-03

## 2024-07-03 NOTE — PROGRESS NOTES
Assessment/Plan:    1. Pain in both knees, unspecified chronicity  Assessment & Plan:  Unknown etiology, can consider rheumatologic workup, though, this is likely due to her weight.  Orders:  -     Diclofenac Sodium (VOLTAREN) 1 %; Apply 2 g topically 4 (four) times a day  -     CBC and differential; Future  -     DONALDO w/Reflex if Positive; Future  -     C-reactive protein; Future  -     Comprehensive metabolic panel; Future  -     Cyclic citrul peptide antibody, IgG; Future  2. BMI 50.0-59.9, adult (HCC)  -     Semaglutide-Weight Management (WEGOVY) 0.5 MG/0.5ML; Inject 0.5 mL (0.5 mg total) under the skin once a week  3. Morbid obesity (HCC)  Assessment & Plan:  Wt Readings from Last 3 Encounters:   07/03/24 (!) 152 kg (334 lb)   06/28/24 (!) 149 kg (328 lb)   03/13/24 (!) 149 kg (328 lb)     Patient continues to gain weight.  Will reroute patient's medications to start pharmacy, to work out a deal for Wegovy coupon.  Will have patient follow-up in 2 months for weight check.  Orders:  -     Semaglutide-Weight Management (WEGOVY) 0.5 MG/0.5ML; Inject 0.5 mL (0.5 mg total) under the skin once a week  4. Prediabetes  -     Semaglutide-Weight Management (WEGOVY) 0.5 MG/0.5ML; Inject 0.5 mL (0.5 mg total) under the skin once a week  5. Lipoma of left upper extremity  Comments:  Will have evaluation by general surgery for potential removal.  Orders:  -     Ambulatory Referral to General Surgery; Future  6. Allergic rhinitis due to pollen, unspecified seasonality  -     loratadine (CLARITIN) 10 mg tablet; Take 1 tablet (10 mg total) by mouth daily  7. Benign hypertension  Comments:  Controlled.  Continue with current regimen.  Assessment & Plan:  BP Readings from Last 3 Encounters:   07/03/24 114/79   03/13/24 120/78   03/06/24 124/70     Currently on hydrochlorothiazide 25 mg, lisinopril 20 mg.  Can consider discontinuing lisinopril, the last time, blood pressure was found to be elevated just on 1 medication.  Perhaps,  "will consider this once patient starts losing weight.  Orders:  -     lisinopril (ZESTRIL) 20 mg tablet; Take 1 tablet (20 mg total) by mouth daily  -     hydroCHLOROthiazide 25 mg tablet; Take 1 tablet (25 mg total) by mouth daily         Subjective:      Patient ID: Elizabeth Nelson is a 54 y.o. female.    Past medical history notable for morbid obesity, prediabetes is coming in for follow-up appointment.  In our last visit, we initiated therapy with Wegovy.  Patient insurance has high out of pocket deductible, and therefore, she was unable to start the medication.  Patient continues to have weight gain.  She currently has sedentary lifestyle to where she is not working at this time.  Review of system is positive for leg pain, and lump on her shoulder.  Review of systems negative for chest pain or shortness of breath.        The following portions of the patient's history were reviewed and updated as appropriate: allergies, current medications, past family history, past medical history, past social history, past surgical history, and problem list.    Review of Systems   Constitutional:  Negative for chills and fever.   HENT:  Negative for ear pain and sore throat.    Eyes:  Negative for pain and visual disturbance.   Respiratory:  Negative for cough and shortness of breath.    Cardiovascular:  Negative for chest pain and palpitations.   Gastrointestinal:  Negative for abdominal pain and vomiting.   Genitourinary:  Negative for dysuria and hematuria.   Musculoskeletal:  Positive for arthralgias. Negative for back pain.   Skin:  Negative for color change and rash.   Neurological:  Negative for seizures and syncope.   All other systems reviewed and are negative.        Objective:      /79 (BP Location: Right arm, Patient Position: Sitting, Cuff Size: Large)   Pulse 82   Temp 97.5 °F (36.4 °C) (Temporal)   Resp 22   Ht 5' 8\" (1.727 m)   Wt (!) 152 kg (334 lb)   LMP  (LMP Unknown)   SpO2 96%   BMI 50.78 kg/m² "          Physical Exam  Constitutional:       General: She is not in acute distress.     Appearance: Normal appearance.   HENT:      Nose: No congestion or rhinorrhea.   Eyes:      Extraocular Movements: Extraocular movements intact.      Pupils: Pupils are equal, round, and reactive to light.   Cardiovascular:      Rate and Rhythm: Normal rate and regular rhythm.      Pulses: Normal pulses.      Heart sounds: Normal heart sounds. No murmur heard.     No gallop.   Pulmonary:      Effort: Pulmonary effort is normal.      Breath sounds: Normal breath sounds. No wheezing, rhonchi or rales.   Abdominal:      General: Bowel sounds are normal. There is no distension.      Palpations: Abdomen is soft. There is no mass.      Tenderness: There is no abdominal tenderness.      Hernia: No hernia is present.   Musculoskeletal:         General: Deformity (Lipoma on left shoulder) present.   Skin:     General: Skin is warm.      Coloration: Skin is not jaundiced.      Findings: No bruising.   Neurological:      General: No focal deficit present.      Mental Status: She is alert and oriented to person, place, and time.   Psychiatric:         Mood and Affect: Mood normal.         Behavior: Behavior normal.         Thought Content: Thought content normal.           Edd Degroot DO   Family Medicine PGY-3  7/4/2024

## 2024-07-04 PROBLEM — M25.562 PAIN IN BOTH KNEES: Status: ACTIVE | Noted: 2024-07-04

## 2024-07-04 PROBLEM — M25.561 PAIN IN BOTH KNEES: Status: ACTIVE | Noted: 2024-07-04

## 2024-07-04 NOTE — ASSESSMENT & PLAN NOTE
Bilateral lower extremity edema and leg pain.  Encourage leg elevation and compression stockings.  Seen by vascular surgeon where they encouraged conservative management.

## 2024-07-04 NOTE — ASSESSMENT & PLAN NOTE
Wt Readings from Last 3 Encounters:   07/03/24 (!) 152 kg (334 lb)   06/28/24 (!) 149 kg (328 lb)   03/13/24 (!) 149 kg (328 lb)     Patient continues to gain weight.  Will reroute patient's medications to start pharmacy, to work out a deal for Wegovy coupon.  Will have patient follow-up in 2 months for weight check.

## 2024-07-04 NOTE — ASSESSMENT & PLAN NOTE
BP Readings from Last 3 Encounters:   07/03/24 114/79   03/13/24 120/78   03/06/24 124/70     Currently on hydrochlorothiazide 25 mg, lisinopril 20 mg.  Can consider discontinuing lisinopril, the last time, blood pressure was found to be elevated just on 1 medication.  Perhaps, will consider this once patient starts losing weight.

## 2024-07-05 LAB — CCP AB SER IA-ACNC: 1.2

## 2024-07-24 ENCOUNTER — OFFICE VISIT (OUTPATIENT)
Dept: SURGERY | Facility: CLINIC | Age: 54
End: 2024-07-24
Payer: COMMERCIAL

## 2024-07-24 VITALS
HEART RATE: 87 BPM | DIASTOLIC BLOOD PRESSURE: 86 MMHG | BODY MASS INDEX: 44.41 KG/M2 | WEIGHT: 293 LBS | SYSTOLIC BLOOD PRESSURE: 130 MMHG | OXYGEN SATURATION: 97 % | TEMPERATURE: 97.9 F | HEIGHT: 68 IN

## 2024-07-24 DIAGNOSIS — D17.22 LIPOMA OF LEFT UPPER EXTREMITY: ICD-10-CM

## 2024-07-24 PROCEDURE — 99244 OFF/OP CNSLTJ NEW/EST MOD 40: CPT | Performed by: SPECIALIST

## 2024-07-24 NOTE — LETTER
"July 26, 2024     Edd Degroot, DO  [address]  [address]  [facility] [address]    Patient: Elizabeth Nelson   YOB: 1970   Date of Visit: 7/24/2024       Dear \"chief\",    Thank you for referring Elizabeth Nelson to me for evaluation. Below are my notes for this consultation.    If you have questions, please do not hesitate to call me. I look forward to following your patient along with you.         Sincerely,    King Ayo Monreal MD        CC: No Recipients    Ayo Monreal MD  7/26/2024 12:13 PM  Sign when Signing Visit  Chief Complaint: Mass left upper extremity      History of Present Illness: The patient is a 54-year-old female originally from [place] who is a previous patient of ours on whom we performed a laparoscopic appendectomy in 2019.  She did well from that surgery but subsequently developed a port site hernia.  She was seen in the office scheduled for repair and underwent repair of port site hernia with mesh.  She is done well since that time.    She presents the office today with a palpable mass of the left upper extremity.  She says it happens to be near where she had her COVID injection.  She denies any pain in the area.  She says is not getting larger.  There is no drainage.  It is essentially asymptomatic and she is here today to discuss what to do with this.      Past Medical History:   Past Medical History:   Diagnosis Date   • Arthritis     knees   • Hyperlipidemia    • Hypertension    • Prediabetes    • Venous insufficiency of lower extremity          Past Surgical History:    Past Surgical History:   Procedure Laterality Date   • CHOLECYSTECTOMY  2006   • ME COLONOSCOPY FLX DX W/COLLJ SPEC WHEN PFRMD Left 05/02/2019    Procedure: COLONOSCOPY;  Surgeon: Dave Badillo MD;  Location: [facility];  Service: Gastroenterology   • ME LAPAROSCOPIC APPENDECTOMY N/A 03/22/2019    Procedure: LAPAROSCOPIC APPENDECTOMY;  Surgeon: Ayo Monreal MD;  Location:  MAIN OR;  Service: General "   • NM REPAIR FIRST ABDOMINAL WALL HERNIA N/A 08/28/2020    Procedure: REPAIR INCISIONAL HERNIA;  Surgeon: Ayo Monreal MD;  Location:  MAIN OR;  Service: General         Allergies:  No Known Allergies      Medications:    Current Outpatient Medications:   •  hydroCHLOROthiazide 25 mg tablet, Take 1 tablet (25 mg total) by mouth daily, Disp: 90 tablet, Rfl: 5  •  ibuprofen (MOTRIN) 800 mg tablet, Take 1 tablet (800 mg total) by mouth every 8 (eight) hours, Disp: 30 tablet, Rfl: 3  •  lisinopril (ZESTRIL) 20 mg tablet, Take 1 tablet (20 mg total) by mouth daily, Disp: 90 tablet, Rfl: 1  •  loratadine (CLARITIN) 10 mg tablet, Take 1 tablet (10 mg total) by mouth daily, Disp: 90 tablet, Rfl: 1  •  oxymetazoline (AFRIN) 0.05 % nasal spray, 2 sprays by Each Nare route 2 (two) times a day, Disp: 15 mL, Rfl: 0  •  Semaglutide-Weight Management (WEGOVY) 0.5 MG/0.5ML, Inject 0.5 mL (0.5 mg total) under the skin once a week, Disp: 2 mL, Rfl: 5  •  benzonatate (TESSALON) 200 MG capsule, Take 1 capsule (200 mg total) by mouth 3 (three) times a day as needed for cough (Patient not taking: Reported on 7/24/2024), Disp: 20 capsule, Rfl: 0  •  Diclofenac Sodium (VOLTAREN) 1 %, Apply 2 g topically 4 (four) times a day (Patient not taking: Reported on 7/24/2024), Disp: 100 g, Rfl: 0  •  furosemide (LASIX) 20 mg tablet, Take 1 tablet (20 mg total) by mouth daily (Patient not taking: Reported on 3/13/2024), Disp: 45 tablet, Rfl: 3  •  hydrocortisone 1 % cream, Apply topically 4 (four) times a day as needed for irritation (Patient not taking: Reported on 7/24/2024), Disp: 30 g, Rfl: 0      Social History:  Social History    Social History     Substance and Sexual Activity   Alcohol Use Yes    Comment: couple times/month     Social History     Substance and Sexual Activity   Drug Use Never     Social History     Tobacco Use   Smoking Status Never   Smokeless Tobacco Never         Family History:    Family History   Problem Relation  Age of Onset   • Colon cancer Mother 63   • Stroke Father    • No Known Problems Sister    • No Known Problems Daughter    • No Known Problems Maternal Grandmother    • No Known Problems Maternal Grandfather    • No Known Problems Paternal Grandmother    • No Known Problems Paternal Grandfather    • No Known Problems Paternal Aunt    • No Known Problems Paternal Aunt    • Breast cancer Neg Hx    • Ovarian cancer Neg Hx          Review of Systems:    Negative for 12 systems reviewed.    Vitals:  Vitals:    07/24/24 1058   BP: 130/86   Pulse: 87   Temp: 97.9 °F (36.6 °C)   SpO2: 97%       Physical Exam:  The patient is a middle-aged black female obese was awake alert no distress.    Vital signs as above    Left upper extremity: There is a palpable mass on the lateral aspect of the left upper arm that is freely movable and nontender.  This is consistent with lipoma.      Lab Results: I have personally reviewed pertinent reports. See below.  Imaging: I have personally reviewed pertinent reports.   EKG, Pathology, and Other Studies: I have personally reviewed pertinent reports.     No visits with results within 1 Day(s) from this visit.   Latest known visit with results is:   Appointment on 07/03/2024   Component Date Value   • WBC 07/03/2024 6.50    • RBC 07/03/2024 4.37    • Hemoglobin 07/03/2024 13.4    • Hematocrit 07/03/2024 38.8    • MCV 07/03/2024 89    • MCH 07/03/2024 30.7    • MCHC 07/03/2024 34.5    • RDW 07/03/2024 13.0    • MPV 07/03/2024 11.1    • Platelets 07/03/2024 185    • CRP 07/03/2024 1.0    • Sodium 07/03/2024 140    • Potassium 07/03/2024 3.4 (L)    • Chloride 07/03/2024 102    • CO2 07/03/2024 28    • ANION GAP 07/03/2024 10    • BUN 07/03/2024 19    • Creatinine 07/03/2024 0.79    • Glucose, Fasting 07/03/2024 107 (H)    • Calcium 07/03/2024 9.3    • AST 07/03/2024 17    • ALT 07/03/2024 32    • Alkaline Phosphatase 07/03/2024 36    • Total Protein 07/03/2024 7.2    • Albumin 07/03/2024 4.2    •  "Total Bilirubin 07/03/2024 0.57    • eGFR 07/03/2024 83    • Cyclic Citrullinated Pep* 07/03/2024 1.2    • Segmented % 07/03/2024 49    • Lymphocytes % 07/03/2024 35    • Monocytes % 07/03/2024 8    • Eosinophils % 07/03/2024 4    • Basophils % 07/03/2024 1    • Atypical Lymphocytes % 07/03/2024 3 (H)    • Absolute Neutrophils 07/03/2024 3.19    • Absolute Lymphocytes 07/03/2024 2.47    • Absolute Monocytes 07/03/2024 0.52    • Absolute Eosinophils 07/03/2024 0.26    • Absolute Basophils 07/03/2024 0.07    • RBC Morphology 07/03/2024 Normal    • Platelet Estimate 07/03/2024 Adequate          Impression:  Mass left operative extremity essentially asymptomatic.  The patient would like to avoid surgery if at all possible.  This mass is essentially asymptomatic and appears to be a lipoma.  No surgery is mandatory for this.    Plan:  At this point we will \"observe\" the lesion.  If it gets larger or starts to become symptomatic she should notify the office and she would be reevaluated.  But and lieu of this no surgery is indicated or planned and she is quite happy about this.    It was nice to see her again.  She is an old friend.      "

## 2024-07-26 NOTE — PROGRESS NOTES
Chief Complaint: Mass left upper extremity      History of Present Illness: The patient is a 54-year-old female originally from [place] who is a previous patient of ours on whom we performed a laparoscopic appendectomy in 2019.  She did well from that surgery but subsequently developed a port site hernia.  She was seen in the office scheduled for repair and underwent repair of port site hernia with mesh.  She is done well since that time.    She presents the office today with a palpable mass of the left upper extremity.  She says it happens to be near where she had her COVID injection.  She denies any pain in the area.  She says is not getting larger.  There is no drainage.  It is essentially asymptomatic and she is here today to discuss what to do with this.      Past Medical History:   Past Medical History:   Diagnosis Date    Arthritis     knees    Hyperlipidemia     Hypertension     Prediabetes     Venous insufficiency of lower extremity          Past Surgical History:    Past Surgical History:   Procedure Laterality Date    CHOLECYSTECTOMY  2006    [place] COLONOSCOPY FLX DX W/COLLJ SPEC WHEN PFRMD Left 05/02/2019    Procedure: COLONOSCOPY;  Surgeon: Dave Badillo MD;  Location: [facility];  Service: Gastroenterology    NC LAPAROSCOPIC APPENDECTOMY N/A 03/22/2019    Procedure: LAPAROSCOPIC APPENDECTOMY;  Surgeon: Ayo Monreal MD;  Location:  MAIN OR;  Service: General    NC REPAIR FIRST ABDOMINAL WALL HERNIA N/A 08/28/2020    Procedure: REPAIR INCISIONAL HERNIA;  Surgeon: Ayo Monreal MD;  Location:  MAIN OR;  Service: General         Allergies:  No Known Allergies      Medications:    Current Outpatient Medications:     hydroCHLOROthiazide 25 mg tablet, Take 1 tablet (25 mg total) by mouth daily, Disp: 90 tablet, Rfl: 5    ibuprofen (MOTRIN) 800 mg tablet, Take 1 tablet (800 mg total) by mouth every 8 (eight) hours, Disp: 30 tablet, Rfl: 3    lisinopril (ZESTRIL) 20 mg tablet, Take 1 tablet (20 mg total) by  mouth daily, Disp: 90 tablet, Rfl: 1    loratadine (CLARITIN) 10 mg tablet, Take 1 tablet (10 mg total) by mouth daily, Disp: 90 tablet, Rfl: 1    oxymetazoline (AFRIN) 0.05 % nasal spray, 2 sprays by Each Nare route 2 (two) times a day, Disp: 15 mL, Rfl: 0    Semaglutide-Weight Management (WEGOVY) 0.5 MG/0.5ML, Inject 0.5 mL (0.5 mg total) under the skin once a week, Disp: 2 mL, Rfl: 5    benzonatate (TESSALON) 200 MG capsule, Take 1 capsule (200 mg total) by mouth 3 (three) times a day as needed for cough (Patient not taking: Reported on 7/24/2024), Disp: 20 capsule, Rfl: 0    Diclofenac Sodium (VOLTAREN) 1 %, Apply 2 g topically 4 (four) times a day (Patient not taking: Reported on 7/24/2024), Disp: 100 g, Rfl: 0    furosemide (LASIX) 20 mg tablet, Take 1 tablet (20 mg total) by mouth daily (Patient not taking: Reported on 3/13/2024), Disp: 45 tablet, Rfl: 3    hydrocortisone 1 % cream, Apply topically 4 (four) times a day as needed for irritation (Patient not taking: Reported on 7/24/2024), Disp: 30 g, Rfl: 0      Social History:  Social History     Social History     Substance and Sexual Activity   Alcohol Use Yes    Comment: couple times/month     Social History     Substance and Sexual Activity   Drug Use Never     Social History     Tobacco Use   Smoking Status Never   Smokeless Tobacco Never         Family History:    Family History   Problem Relation Age of Onset    Colon cancer Mother 63    Stroke Father     No Known Problems Sister     No Known Problems Daughter     No Known Problems Maternal Grandmother     No Known Problems Maternal Grandfather     No Known Problems Paternal Grandmother     No Known Problems Paternal Grandfather     No Known Problems Paternal Aunt     No Known Problems Paternal Aunt     Breast cancer Neg Hx     Ovarian cancer Neg Hx          Review of Systems:    Negative for 12 systems reviewed.    Vitals:  Vitals:    07/24/24 1058   BP: 130/86   Pulse: 87   Temp: 97.9 °F (36.6 °C)    SpO2: 97%       Physical Exam:  The patient is a middle-aged black female obese was awake alert no distress.    Vital signs as above    Left upper extremity: There is a palpable mass on the lateral aspect of the left upper arm that is freely movable and nontender.  This is consistent with lipoma.      Lab Results: I have personally reviewed pertinent reports. See below.  Imaging: I have personally reviewed pertinent reports.   EKG, Pathology, and Other Studies: I have personally reviewed pertinent reports.     No visits with results within 1 Day(s) from this visit.   Latest known visit with results is:   Appointment on 07/03/2024   Component Date Value    WBC 07/03/2024 6.50     RBC 07/03/2024 4.37     Hemoglobin 07/03/2024 13.4     Hematocrit 07/03/2024 38.8     MCV 07/03/2024 89     MCH 07/03/2024 30.7     MCHC 07/03/2024 34.5     RDW 07/03/2024 13.0     MPV 07/03/2024 11.1     Platelets 07/03/2024 185     CRP 07/03/2024 1.0     Sodium 07/03/2024 140     Potassium 07/03/2024 3.4 (L)     Chloride 07/03/2024 102     CO2 07/03/2024 28     ANION GAP 07/03/2024 10     BUN 07/03/2024 19     Creatinine 07/03/2024 0.79     Glucose, Fasting 07/03/2024 107 (H)     Calcium 07/03/2024 9.3     AST 07/03/2024 17     ALT 07/03/2024 32     Alkaline Phosphatase 07/03/2024 36     Total Protein 07/03/2024 7.2     Albumin 07/03/2024 4.2     Total Bilirubin 07/03/2024 0.57     eGFR 07/03/2024 83     Cyclic Citrullinated Pep* 07/03/2024 1.2     Segmented % 07/03/2024 49     Lymphocytes % 07/03/2024 35     Monocytes % 07/03/2024 8     Eosinophils % 07/03/2024 4     Basophils % 07/03/2024 1     Atypical Lymphocytes % 07/03/2024 3 (H)     Absolute Neutrophils 07/03/2024 3.19     Absolute Lymphocytes 07/03/2024 2.47     Absolute Monocytes 07/03/2024 0.52     Absolute Eosinophils 07/03/2024 0.26     Absolute Basophils 07/03/2024 0.07     RBC Morphology 07/03/2024 Normal     Platelet Estimate 07/03/2024 Adequate          Impression:  Mass  "left operative extremity essentially asymptomatic.  The patient would like to avoid surgery if at all possible.  This mass is essentially asymptomatic and appears to be a lipoma.  No surgery is mandatory for this.    Plan:  At this point we will \"observe\" the lesion.  If it gets larger or starts to become symptomatic she should notify the office and she would be reevaluated.  But and lieu of this no surgery is indicated or planned and she is quite happy about this.    It was nice to see her again.  She is an old friend.      "

## 2024-08-01 ENCOUNTER — APPOINTMENT (OUTPATIENT)
Dept: LAB | Facility: HOSPITAL | Age: 54
End: 2024-08-01
Payer: COMMERCIAL

## 2024-08-01 ENCOUNTER — OFFICE VISIT (OUTPATIENT)
Dept: FAMILY MEDICINE CLINIC | Facility: CLINIC | Age: 54
End: 2024-08-01

## 2024-08-01 VITALS
DIASTOLIC BLOOD PRESSURE: 80 MMHG | RESPIRATION RATE: 16 BRPM | BODY MASS INDEX: 44.41 KG/M2 | HEIGHT: 68 IN | WEIGHT: 293 LBS | OXYGEN SATURATION: 94 % | TEMPERATURE: 97.9 F | SYSTOLIC BLOOD PRESSURE: 123 MMHG | HEART RATE: 112 BPM

## 2024-08-01 DIAGNOSIS — M25.561 CHRONIC PAIN OF BOTH KNEES: ICD-10-CM

## 2024-08-01 DIAGNOSIS — G89.29 CHRONIC PAIN OF BOTH KNEES: ICD-10-CM

## 2024-08-01 DIAGNOSIS — L03.116 CELLULITIS OF LEFT LOWER EXTREMITY: ICD-10-CM

## 2024-08-01 DIAGNOSIS — I10 BENIGN HYPERTENSION: ICD-10-CM

## 2024-08-01 DIAGNOSIS — J30.1 ALLERGIC RHINITIS DUE TO POLLEN, UNSPECIFIED SEASONALITY: ICD-10-CM

## 2024-08-01 DIAGNOSIS — I87.2 CHRONIC VENOUS INSUFFICIENCY: ICD-10-CM

## 2024-08-01 DIAGNOSIS — M25.562 CHRONIC PAIN OF BOTH KNEES: ICD-10-CM

## 2024-08-01 DIAGNOSIS — R73.03 PREDIABETES: ICD-10-CM

## 2024-08-01 LAB
BASOPHILS # BLD AUTO: 0.04 THOUSANDS/ÂΜL (ref 0–0.1)
BASOPHILS NFR BLD AUTO: 0 % (ref 0–1)
EOSINOPHIL # BLD AUTO: 0.04 THOUSAND/ÂΜL (ref 0–0.61)
EOSINOPHIL NFR BLD AUTO: 0 % (ref 0–6)
ERYTHROCYTE [DISTWIDTH] IN BLOOD BY AUTOMATED COUNT: 12.7 % (ref 11.6–15.1)
EST. AVERAGE GLUCOSE BLD GHB EST-MCNC: 128 MG/DL
HBA1C MFR BLD: 6.1 %
HCT VFR BLD AUTO: 39.3 % (ref 34.8–46.1)
HGB BLD-MCNC: 13 G/DL (ref 11.5–15.4)
IMM GRANULOCYTES # BLD AUTO: 0.1 THOUSAND/UL (ref 0–0.2)
IMM GRANULOCYTES NFR BLD AUTO: 1 % (ref 0–2)
LYMPHOCYTES # BLD AUTO: 1.52 THOUSANDS/ÂΜL (ref 0.6–4.47)
LYMPHOCYTES NFR BLD AUTO: 14 % (ref 14–44)
MCH RBC QN AUTO: 30.2 PG (ref 26.8–34.3)
MCHC RBC AUTO-ENTMCNC: 33.1 G/DL (ref 31.4–37.4)
MCV RBC AUTO: 91 FL (ref 82–98)
MONOCYTES # BLD AUTO: 0.77 THOUSAND/ÂΜL (ref 0.17–1.22)
MONOCYTES NFR BLD AUTO: 7 % (ref 4–12)
NEUTROPHILS # BLD AUTO: 8.34 THOUSANDS/ÂΜL (ref 1.85–7.62)
NEUTS SEG NFR BLD AUTO: 78 % (ref 43–75)
NRBC BLD AUTO-RTO: 0 /100 WBCS
PLATELET # BLD AUTO: 144 THOUSANDS/UL (ref 149–390)
PMV BLD AUTO: 11.4 FL (ref 8.9–12.7)
RBC # BLD AUTO: 4.31 MILLION/UL (ref 3.81–5.12)
WBC # BLD AUTO: 10.81 THOUSAND/UL (ref 4.31–10.16)

## 2024-08-01 PROCEDURE — 99214 OFFICE O/P EST MOD 30 MIN: CPT | Performed by: INTERNAL MEDICINE

## 2024-08-01 PROCEDURE — 85025 COMPLETE CBC W/AUTO DIFF WBC: CPT

## 2024-08-01 PROCEDURE — 83036 HEMOGLOBIN GLYCOSYLATED A1C: CPT

## 2024-08-01 PROCEDURE — 36415 COLL VENOUS BLD VENIPUNCTURE: CPT

## 2024-08-01 RX ORDER — CETIRIZINE HYDROCHLORIDE 10 MG/1
10 TABLET ORAL DAILY
Qty: 60 TABLET | Refills: 2 | Status: SHIPPED | OUTPATIENT
Start: 2024-08-01

## 2024-08-01 RX ORDER — CEPHALEXIN 500 MG/1
500 CAPSULE ORAL EVERY 6 HOURS SCHEDULED
Qty: 28 CAPSULE | Refills: 0 | Status: SHIPPED | OUTPATIENT
Start: 2024-08-01 | End: 2024-08-08

## 2024-08-01 RX ORDER — FLUTICASONE PROPIONATE 50 MCG
1 SPRAY, SUSPENSION (ML) NASAL DAILY
Qty: 60 G | Refills: 2 | Status: SHIPPED | OUTPATIENT
Start: 2024-08-01

## 2024-08-01 NOTE — ASSESSMENT & PLAN NOTE
Lab Results   Component Value Date    HGBA1C 6.1 01/22/2024     -will recheck hemoglobin A1C to see if still holds the diagnosis  -was 5.9 in 2021    PLAN  -ordered HGBA1C today

## 2024-08-01 NOTE — ASSESSMENT & PLAN NOTE
>>ASSESSMENT AND PLAN FOR PAIN IN BOTH KNEES WRITTEN ON 8/5/2024  5:37 AM BY REBECCA FAY KAB-PERLMAN, MD    -rheumatologic work-up including CRP, CCP negative. DONALDO was ordered but unclear why not completed  -in the setting of morbid obesity and negative above labs likely OA would benefit from bilateral knee x-rays and once confirmed OA can perform knee injections; hyaluronic acid vs. steroids    PLAN  -to be addressed at f/u visit as today's focus was on cellulitis

## 2024-08-01 NOTE — ASSESSMENT & PLAN NOTE
Wt Readings from Last 3 Encounters:   07/24/24 (!) 152 kg (334 lb)   07/03/24 (!) 152 kg (334 lb)   06/28/24 (!) 149 kg (328 lb)     -on wegovy 0.5mg now for about 2 weeks   -side effects include headaches that got better by week 2 however then worsened concerned worsened due to cellulitis and suspect not from the wegovy    PLAN  -encouragement and continue with wegovy  -would explore with patient what she thinks is the reason for her obesity (ask about trauma) and review her day to day lifestyle to see where we can incorporate behavioural modifications

## 2024-08-01 NOTE — ASSESSMENT & PLAN NOTE
-rheumatologic work-up including CRP, CCP negative. DONALDO was ordered but unclear why not completed  -in the setting of morbid obesity and negative above labs likely OA would benefit from bilateral knee x-rays and once confirmed OA can perform knee injections; hyaluronic acid vs. steroids    PLAN  -to be addressed at f/u visit as today's focus was on cellulitis

## 2024-08-01 NOTE — ASSESSMENT & PLAN NOTE
-home med HCTZ 25mg in the morning and lisinopril 25mg at night  -bp well controlled (123/80 today) and at goal <140/90    PLAN  -continue above medications  -counseling/education including salt intake, fluid intake, and dietary/lifestyle modifications

## 2024-08-01 NOTE — PROGRESS NOTES
Ambulatory Visit  Name: Elizabeth Nelson      : 1970      MRN: [id number]  Encounter Provider: Rebecca Fay Kab-Perlman, MD  Encounter Date: 2024   Encounter department: [facility]    Assessment & Plan   1. BMI 50.0-59.9, adult (HCC)  Assessment & Plan:  Wt Readings from Last 3 Encounters:   24 (!) 152 kg (334 lb)   24 (!) 152 kg (334 lb)   24 (!) 149 kg (328 lb)     -on wegovy 0.5mg now for about 2 weeks   -side effects include headaches that got better by week 2 however then worsened concerned worsened due to cellulitis and suspect not from the wegovy    PLAN  -encouragement and continue with wegovy  -would explore with patient what she thinks is the reason for her obesity (ask about trauma) and review her day to day lifestyle to see where we can incorporate behavioural modifications   2. Prediabetes  Assessment & Plan:  Lab Results   Component Value Date    HGBA1C 6.1 2024     -will recheck hemoglobin A1C to see if still holds the diagnosis  -was 5.9 in     PLAN  -ordered HGBA1C today   Orders:  -     Hemoglobin A1C; Future  3. Chronic pain of both knees  Assessment & Plan:  -rheumatologic work-up including CRP, CCP negative. DONALDO was ordered but unclear why not completed  -in the setting of morbid obesity and negative above labs likely OA would benefit from bilateral knee x-rays and once confirmed OA can perform knee injections; hyaluronic acid vs. steroids    PLAN  -to be addressed at f/u visit as today's focus was on cellulitis  4. Benign hypertension  Assessment & Plan:  -home med HCTZ 25mg in the morning and lisinopril 25mg at night  -bp well controlled (123/80 today) and at goal <140/90    PLAN  -continue above medications  -counseling/education including salt intake, fluid intake, and dietary/lifestyle modifications   5. Allergic rhinitis due to pollen, unspecified seasonality  Comments:  -zyrtec, flonase, and ocean spray provided  today  Orders:  -     fluticasone (FLONASE) 50 mcg/act nasal spray; 1 spray into each nostril daily  -     sodium chloride (OCEAN) 0.65 % nasal spray; 1 spray into each nostril as needed for rhinitis  -     cetirizine (ZyrTEC) 10 mg tablet; Take 1 tablet (10 mg total) by mouth daily  6. Cellulitis of left lower extremity  Comments:  -keflex for 7 days   -ED precautions  -f/u one week  -evaluated by attending physician  -ordered CBC today to look for WBC's  Orders:  -     CBC and differential; Future; Expected date: 08/01/2024  -     cephalexin (KEFLEX) 500 mg capsule; Take 1 capsule (500 mg total) by mouth every 6 (six) hours for 7 days  7. Chronic venous insufficiency  Assessment & Plan:  -attempts made previously in 11/2023 by prior physician to obtain pneumatic compression pumps not sure if received and using    PLAN  -will need to monitor; today's focus became cellulitis as the more pressing issue  -referral to vascular placed today   Orders:  -     Ambulatory Referral to Vascular Surgery; Future       History of Present Illness       Elizabeth Nelson is a 53 yo female patient presenting today to discuss her headache believed to be part of a medication reaction. This is her second week taking wegovy. The headache is located at the sinuses, top of head, and back of head. Last night bp 160/90. Not sure if sinuses or blood pressures. She has been with headache for 3 days now and can't stand it.     Wasn't taking her loratadine because insurance didn't provide it. Took the loratadine yesterday thinking it might help and it didn't. Loratidine helps a little bit with the stuffiness in one nostril.     Yesterday she was feeling warm, took cold shower, took tylenol and feels better. Took advil 800mg this AM and doesn't take away headache. Had subjective fever this AM and advil helped    First week on wegovy had some diarrhea and headaches, but this second week whole body is aching.     For two days has burning while urinates.      Additionally complains of a pain of her left lower leg just above the knee for the past week or so and it's progressed.     Calf  46cm right  48cm left     Ankle  24.5cm right   28cm left  Review of Systems   Constitutional:  Positive for fatigue. Negative for chills and fever.   HENT:  Positive for congestion and sinus pain. Negative for ear pain, rhinorrhea, sinus pressure, sneezing and sore throat.    Respiratory:  Negative for shortness of breath.    Cardiovascular:  Negative for chest pain and palpitations.   Skin:  Positive for color change. Negative for rash.   Neurological:  Positive for headaches.     Pertinent Medical History   Chronic venous insufficiency and lymphedema    Medical History Reviewed by provider this encounter:  Allergies  Meds       Past Medical History   Past Medical History:   Diagnosis Date    Arthritis     knees    Hyperlipidemia     Hypertension     Prediabetes     Venous insufficiency of lower extremity      Past Surgical History:   Procedure Laterality Date    CHOLECYSTECTOMY  2006    RI COLONOSCOPY FLX DX W/COLLJ SPEC WHEN PFRMD Left 05/02/2019    Procedure: COLONOSCOPY;  Surgeon: Dave Badillo MD;  Location: [facility];  Service: Gastroenterology    RI LAPAROSCOPIC APPENDECTOMY N/A 03/22/2019    Procedure: LAPAROSCOPIC APPENDECTOMY;  Surgeon: Ayo Monreal MD;  Location: [facility] OR;  Service: General    RI REPAIR FIRST ABDOMINAL WALL HERNIA N/A 08/28/2020    Procedure: REPAIR INCISIONAL HERNIA;  Surgeon: Ayo Monreal MD;  Location:  MAIN OR;  Service: General     Family History   Problem Relation Age of Onset    Colon cancer Mother 63    Stroke Father     No Known Problems Sister     No Known Problems Daughter     No Known Problems Maternal Grandmother     No Known Problems Maternal Grandfather     No Known Problems Paternal Grandmother     No Known Problems Paternal Grandfather     No Known Problems Paternal Aunt     No Known Problems Paternal Aunt     Breast cancer Neg  Hx     Ovarian cancer Neg Hx      Current Outpatient Medications on File Prior to Visit   Medication Sig Dispense Refill    Diclofenac Sodium (VOLTAREN) 1 % Apply 2 g topically 4 (four) times a day 100 g 0    hydroCHLOROthiazide 25 mg tablet Take 1 tablet (25 mg total) by mouth daily 90 tablet 5    ibuprofen (MOTRIN) 800 mg tablet Take 1 tablet (800 mg total) by mouth every 8 (eight) hours 30 tablet 3    lisinopril (ZESTRIL) 20 mg tablet Take 1 tablet (20 mg total) by mouth daily 90 tablet 1    Semaglutide-Weight Management (WEGOVY) 0.5 MG/0.5ML Inject 0.5 mL (0.5 mg total) under the skin once a week 2 mL 5    oxymetazoline (AFRIN) 0.05 % nasal spray 2 sprays by Each Nare route 2 (two) times a day 15 mL 0     No current facility-administered medications on file prior to visit.   No Known Allergies   Current Outpatient Medications on File Prior to Visit   Medication Sig Dispense Refill    Diclofenac Sodium (VOLTAREN) 1 % Apply 2 g topically 4 (four) times a day 100 g 0    hydroCHLOROthiazide 25 mg tablet Take 1 tablet (25 mg total) by mouth daily 90 tablet 5    ibuprofen (MOTRIN) 800 mg tablet Take 1 tablet (800 mg total) by mouth every 8 (eight) hours 30 tablet 3    lisinopril (ZESTRIL) 20 mg tablet Take 1 tablet (20 mg total) by mouth daily 90 tablet 1    Semaglutide-Weight Management (WEGOVY) 0.5 MG/0.5ML Inject 0.5 mL (0.5 mg total) under the skin once a week 2 mL 5    oxymetazoline (AFRIN) 0.05 % nasal spray 2 sprays by Each Nare route 2 (two) times a day 15 mL 0     No current facility-administered medications on file prior to visit.      Social History     Tobacco Use    Smoking status: Never    Smokeless tobacco: Never   Vaping Use    Vaping status: Never Used   Substance and Sexual Activity    Alcohol use: Yes     Comment: couple times/month    Drug use: Never    Sexual activity: Yes     Partners: Male     Birth control/protection: Post-menopausal     Objective     /80 (BP Location: Left arm, Patient  "Position: Sitting, Cuff Size: Large)   Pulse (!) 112   Temp 97.9 °F (36.6 °C) (Temporal)   Resp 16   Ht 5' 8\" (1.727 m)   Wt (!) 150 kg (329 lb 12.8 oz)   LMP  (LMP Unknown)   SpO2 94%   BMI 50.15 kg/m²     Physical Exam  Constitutional:       Appearance: Normal appearance. She is obese.   HENT:      Head: Normocephalic and atraumatic.      Comments: No pain to palpation of sinuses     Right Ear: Tympanic membrane, ear canal and external ear normal.      Left Ear: Tympanic membrane, ear canal and external ear normal.      Nose: Congestion present.      Mouth/Throat:      Mouth: Mucous membranes are moist.      Comments: -some mild erythema  Eyes:      Extraocular Movements: Extraocular movements intact.      Conjunctiva/sclera: Conjunctivae normal.   Cardiovascular:      Rate and Rhythm: Normal rate and regular rhythm.      Pulses: Normal pulses.      Heart sounds: Normal heart sounds. No murmur heard.     No friction rub. No gallop.   Pulmonary:      Effort: Pulmonary effort is normal.      Breath sounds: Normal breath sounds. No wheezing, rhonchi or rales.   Abdominal:      General: Bowel sounds are normal. There is no distension.      Palpations: Abdomen is soft.      Tenderness: There is no abdominal tenderness.   Musculoskeletal:         General: Normal range of motion.      Cervical back: Normal range of motion and neck supple. No tenderness.      Right lower leg: Edema (mild) present.      Left lower leg: Edema (mild) present.   Lymphadenopathy:      Cervical: No cervical adenopathy.   Skin:     General: Skin is warm.      Capillary Refill: Capillary refill takes less than 2 seconds.      Comments: Left inner thigh above knee with erythema and hotter as compared to right side with tenderness to touch; skin is still soft and erythema not well demarcated. Lower extremity warm as well and no wounds from toes up discoverable.     Measurements:  Calf  46cm right  48cm left     Ankle  24.5cm right   28cm " left   Neurological:      Mental Status: She is alert and oriented to person, place, and time.   Psychiatric:         Mood and Affect: Mood normal.       Administrative Statements

## 2024-08-05 PROBLEM — E66.01 CLASS 3 SEVERE OBESITY WITHOUT SERIOUS COMORBIDITY WITH BODY MASS INDEX (BMI) OF 50.0 TO 59.9 IN ADULT (HCC): Status: ACTIVE | Noted: 2018-11-01

## 2024-08-05 PROBLEM — E66.813 CLASS 3 SEVERE OBESITY WITHOUT SERIOUS COMORBIDITY WITH BODY MASS INDEX (BMI) OF 50.0 TO 59.9 IN ADULT (HCC): Status: ACTIVE | Noted: 2018-11-01

## 2024-08-05 PROBLEM — E66.813 CLASS 3 SEVERE OBESITY WITHOUT SERIOUS COMORBIDITY WITH BODY MASS INDEX (BMI) OF 50.0 TO 59.9 IN ADULT (HCC): Status: ACTIVE | Noted: 2020-08-27

## 2024-08-05 NOTE — ASSESSMENT & PLAN NOTE
-attempts made previously in 11/2023 by prior physician to obtain pneumatic compression pumps not sure if received and using    PLAN  -will need to monitor; today's focus became cellulitis as the more pressing issue  -referral to vascular placed today

## 2024-08-16 ENCOUNTER — OFFICE VISIT (OUTPATIENT)
Dept: FAMILY MEDICINE CLINIC | Facility: CLINIC | Age: 54
End: 2024-08-16

## 2024-08-16 VITALS
BODY MASS INDEX: 44.41 KG/M2 | WEIGHT: 293 LBS | HEIGHT: 68 IN | TEMPERATURE: 97.7 F | HEART RATE: 83 BPM | DIASTOLIC BLOOD PRESSURE: 87 MMHG | SYSTOLIC BLOOD PRESSURE: 139 MMHG | RESPIRATION RATE: 16 BRPM | OXYGEN SATURATION: 95 %

## 2024-08-16 DIAGNOSIS — L85.3 DRY SKIN: ICD-10-CM

## 2024-08-16 DIAGNOSIS — M25.511 ACUTE PAIN OF RIGHT SHOULDER: Primary | ICD-10-CM

## 2024-08-16 PROCEDURE — 96372 THER/PROPH/DIAG INJ SC/IM: CPT | Performed by: NURSE PRACTITIONER

## 2024-08-16 PROCEDURE — 99214 OFFICE O/P EST MOD 30 MIN: CPT | Performed by: NURSE PRACTITIONER

## 2024-08-16 RX ORDER — METHOCARBAMOL 500 MG/1
500 TABLET, FILM COATED ORAL 4 TIMES DAILY
Qty: 40 TABLET | Refills: 0 | Status: SHIPPED | OUTPATIENT
Start: 2024-08-16

## 2024-08-16 RX ORDER — METHYLPREDNISOLONE 4 MG
TABLET, DOSE PACK ORAL
Qty: 21 EACH | Refills: 0 | Status: SHIPPED | OUTPATIENT
Start: 2024-08-16

## 2024-08-16 RX ORDER — IBUPROFEN 800 MG/1
800 TABLET, FILM COATED ORAL EVERY 8 HOURS
Qty: 30 TABLET | Refills: 0 | Status: SHIPPED | OUTPATIENT
Start: 2024-08-16

## 2024-08-16 RX ORDER — KETOROLAC TROMETHAMINE 30 MG/ML
30 INJECTION, SOLUTION INTRAMUSCULAR; INTRAVENOUS ONCE
Status: COMPLETED | OUTPATIENT
Start: 2024-08-16 | End: 2024-08-16

## 2024-08-16 RX ORDER — AMMONIUM LACTATE 12 G/100G
CREAM TOPICAL AS NEEDED
Qty: 385 G | Refills: 1 | Status: SHIPPED | OUTPATIENT
Start: 2024-08-16

## 2024-08-16 RX ADMIN — KETOROLAC TROMETHAMINE 30 MG: 30 INJECTION, SOLUTION INTRAMUSCULAR; INTRAVENOUS at 09:33

## 2024-08-16 NOTE — PROGRESS NOTES
Ambulatory Visit  Name: Elizabeth Nelson      : 1970      MRN: [id number]  Encounter Provider: DAVID Little  Encounter Date: 2024   Encounter department: [facility]    Assessment & Plan   1. Acute pain of right shoulder  -     methylPREDNISolone 4 MG tablet therapy pack; Use as directed on package  -     ketorolac (TORADOL) injection 30 mg  -     Ambulatory Referral to Orthopedic Surgery; Future  -     methocarbamol (ROBAXIN) 500 mg tablet; Take 1 tablet (500 mg total) by mouth 4 (four) times a day  -     ibuprofen (MOTRIN) 800 mg tablet; Take 1 tablet (800 mg total) by mouth every 8 (eight) hours  2. Dry skin  -     ammonium lactate (LAC-HYDRIN) 12 % cream; Apply topically as needed for dry skin         History of Present Illness     Patient is a 53 YO female who  has a past medical history of Arthritis, Hyperlipidemia, Hypertension, Prediabetes, and Venous insufficiency of lower extremity.    She is here today due to acute right shoulder pain. She also has dry skin on the legs.     Shoulder Pain   The pain is present in the right shoulder. This is a new problem. The current episode started in the past 7 days. There has been no history of extremity trauma. The problem occurs constantly. The problem has been gradually worsening. The pain is at a severity of 7/10. The pain is moderate. Associated symptoms include a limited range of motion and stiffness. Pertinent negatives include no fever, inability to bear weight, itching, joint locking, joint swelling, numbness or tingling. The symptoms are aggravated by activity. She has tried NSAIDS for the symptoms. The treatment provided no relief. There is no history of diabetes, gout or rheumatoid arthritis.     Review of Systems   Constitutional:  Negative for chills and fever.   HENT:  Negative for ear pain and sore throat.    Eyes:  Negative for pain and visual disturbance.   Respiratory:  Negative for cough and  shortness of breath.    Cardiovascular:  Negative for chest pain and palpitations.   Gastrointestinal:  Negative for abdominal pain and vomiting.   Genitourinary:  Negative for dysuria and hematuria.   Musculoskeletal:  Positive for stiffness. Negative for arthralgias, back pain and gout.   Skin:  Negative for color change, itching and rash.   Neurological:  Negative for tingling, seizures, syncope and numbness.   All other systems reviewed and are negative.    Past Medical History:   Diagnosis Date    Arthritis     knees    Hyperlipidemia     Hypertension     Prediabetes     Venous insufficiency of lower extremity      Past Surgical History:   Procedure Laterality Date    CHOLECYSTECTOMY  2006    ID COLONOSCOPY FLX DX W/COLLJ SPEC WHEN PFRMD Left 05/02/2019    Procedure: COLONOSCOPY;  Surgeon: Dave Badillo MD;  Location: [facility];  Service: Gastroenterology    ID LAPAROSCOPIC APPENDECTOMY N/A 03/22/2019    Procedure: LAPAROSCOPIC APPENDECTOMY;  Surgeon: Ayo Monreal MD;  Location:  [facility] OR;  Service: General    ID REPAIR FIRST ABDOMINAL WALL HERNIA N/A 08/28/2020    Procedure: REPAIR INCISIONAL HERNIA;  Surgeon: Ayo Monreal MD;  Location:  [facility] OR;  Service: General     Family History   Problem Relation Age of Onset    Colon cancer Mother 63    Stroke Father     No Known Problems Sister     No Known Problems Daughter     No Known Problems Maternal Grandmother     No Known Problems Maternal Grandfather     No Known Problems Paternal Grandmother     No Known Problems Paternal Grandfather     No Known Problems Paternal Aunt     No Known Problems Paternal Aunt     Breast cancer Neg Hx     Ovarian cancer Neg Hx      Social History     Tobacco Use    Smoking status: Never    Smokeless tobacco: Never   Vaping Use    Vaping status: Never Used   Substance and Sexual Activity    Alcohol use: Yes     Comment: couple times/month    Drug use: Never    Sexual activity: Yes     Partners: Male     Birth  "control/protection: Post-menopausal     Current Outpatient Medications on File Prior to Visit   Medication Sig    cetirizine (ZyrTEC) 10 mg tablet Take 1 tablet (10 mg total) by mouth daily    fluticasone (FLONASE) 50 mcg/act nasal spray 1 spray into each nostril daily    hydroCHLOROthiazide 25 mg tablet Take 1 tablet (25 mg total) by mouth daily    lisinopril (ZESTRIL) 20 mg tablet Take 1 tablet (20 mg total) by mouth daily    oxymetazoline (AFRIN) 0.05 % nasal spray 2 sprays by Each Nare route 2 (two) times a day    Semaglutide-Weight Management (WEGOVY) 0.5 MG/0.5ML Inject 0.5 mL (0.5 mg total) under the skin once a week    sodium chloride (OCEAN) 0.65 % nasal spray 1 spray into each nostril as needed for rhinitis    [DISCONTINUED] Diclofenac Sodium (VOLTAREN) 1 % Apply 2 g topically 4 (four) times a day    [DISCONTINUED] ibuprofen (MOTRIN) 800 mg tablet Take 1 tablet (800 mg total) by mouth every 8 (eight) hours     No Known Allergies  Immunization History   Administered Date(s) Administered    COVID-19 MODERNA VACC 0.5 ML IM 01/07/2021, 02/04/2021, 12/15/2021, 04/21/2022    Influenza, recombinant, quadrivalent,injectable, preservative free 11/01/2018, 11/21/2019, 10/13/2022    Tdap 07/31/2020     Objective     /87 (BP Location: Left arm, Patient Position: Sitting, Cuff Size: Large)   Pulse 83   Temp 97.7 °F (36.5 °C) (Temporal)   Resp 16   Ht 5' 8\" (1.727 m)   Wt (!) 146 kg (321 lb)   LMP  (LMP Unknown)   SpO2 95%   BMI 48.81 kg/m²     Physical Exam  Vitals and nursing note reviewed.   Constitutional:       General: She is not in acute distress.     Appearance: She is well-developed.   HENT:      Head: Normocephalic and atraumatic.      Right Ear: External ear normal.      Left Ear: External ear normal.   Eyes:      General:         Right eye: No discharge.         Left eye: No discharge.      Conjunctiva/sclera: Conjunctivae normal.   Cardiovascular:      Rate and Rhythm: Normal rate and regular " rhythm.   Pulmonary:      Effort: Pulmonary effort is normal. No respiratory distress.      Breath sounds: Normal breath sounds.   Musculoskeletal:      Right shoulder: Tenderness and bony tenderness present. Decreased range of motion. Decreased strength.      Cervical back: Neck supple.      Comments: Right trapezius and cervical paraspinal TTP   Right AC joint TTP/ movement    Skin:     General: Skin is warm and dry.      Comments: Dry, flaky skin LLE    Neurological:      Mental Status: She is alert and oriented to person, place, and time.   Psychiatric:         Mood and Affect: Mood normal.

## 2024-08-19 ENCOUNTER — OFFICE VISIT (OUTPATIENT)
Dept: FAMILY MEDICINE CLINIC | Facility: CLINIC | Age: 54
End: 2024-08-19

## 2024-08-19 VITALS
TEMPERATURE: 96.9 F | HEART RATE: 78 BPM | OXYGEN SATURATION: 98 % | BODY MASS INDEX: 44.41 KG/M2 | WEIGHT: 293 LBS | DIASTOLIC BLOOD PRESSURE: 80 MMHG | HEIGHT: 68 IN | SYSTOLIC BLOOD PRESSURE: 127 MMHG | RESPIRATION RATE: 18 BRPM

## 2024-08-19 DIAGNOSIS — I89.0 LYMPHEDEMA: ICD-10-CM

## 2024-08-19 DIAGNOSIS — E66.01 CLASS 3 SEVERE OBESITY WITHOUT SERIOUS COMORBIDITY WITH BODY MASS INDEX (BMI) OF 50.0 TO 59.9 IN ADULT, UNSPECIFIED OBESITY TYPE (HCC): ICD-10-CM

## 2024-08-19 DIAGNOSIS — R73.03 PREDIABETES: ICD-10-CM

## 2024-08-19 DIAGNOSIS — I87.2 CHRONIC VENOUS INSUFFICIENCY: ICD-10-CM

## 2024-08-19 DIAGNOSIS — M17.0 PRIMARY OSTEOARTHRITIS OF BOTH KNEES: Primary | ICD-10-CM

## 2024-08-19 PROCEDURE — 99213 OFFICE O/P EST LOW 20 MIN: CPT | Performed by: FAMILY MEDICINE

## 2024-08-19 NOTE — PROGRESS NOTES
Ambulatory Visit  Name: Elizabeth Nelson      : 1970      MRN: [id number]  Encounter Provider: Rebecca Fay Kab-Perlman, MD  Encounter Date: 2024   Encounter department: [facility]    Assessment & Plan   1. Primary osteoarthritis of both knees  Assessment & Plan:  -Bilateral knee x-rays in 2021 revealing moderate tricompartmental osteoarthritis; medial joint space is more narrowed than lateral joint space likely contributed by walking dynamics  -Patient stated had steroid injection in the past one time and was ineffective  -Was previously referred to physical therapy and sports medicine today states that it was helpful  -Largely contributed by obesity with BMI above 45 kg/m²    PLAN  -Continue with Wegovy for weight loss  -has ortho appointment scheduled  for right shoulder pain on , recommended bring up knees and they can possibly take updated knee xrays at that if deemed appropriate since her OA may have progressed since   -discussed hyaluronic acid injections today and explained something ortho can do   -would benefit from PT can consider at f/u in September with PCP if not already referred by ortho  2. Prediabetes  Assessment & Plan:  -Hemoglobin A1c performed today and stable at 6.1 since 2024    PLAN  -Counseling/education  -Continued encouragement with emphasis on dietary lifestyle changes and to continue Wegovy for weight loss  3. Chronic venous insufficiency  Assessment & Plan:  -attempts made previously in 2023 by prior physician to obtain pneumatic compression pumps; patient today states insurance never covered it but she bought from [employer] and it didn't work well     PLAN  -referral to vascular surgery placed on prior office visit with me, appointment scheduled for   -compressions stockings (see lymphadema) and continued weight loss   4. Lymphedema  Assessment & Plan:  -discussed compression stockings today; patient states difficult to  find her size    PLAN  -recommended leggings as a substitute until loses more weight   -also recommended jobst brand but not sure if they carry Elizabeth's size   5. Class 3 severe obesity without serious comorbidity with body mass index (BMI) of 50.0 to 59.9 in adult, unspecified obesity type (HCC)  Assessment & Plan:  -currently on 0.5 wegovy weekly   -has lost about 8.5 lbs in the last two weeks  -initial symptoms of nausea have now resolved    PLAN  -congratulations and encouragement  -cna consider increasing wegovy dose at upcoming appointment in September; opted not to increase today because per weight loss appears to be very effective       History of Present Illness       Elizabeth Nelson is a 54-year-old female patient presenting today to follow-up regarding cellulitis.  On her prior office visit she had cellulitis of her left lower extremity and was provided with Keflex for 7 days.  She then had a follow-up with another provider with acute pain of her right shoulder where she was given a Dosepak of prednisone, an injection of ketorolac in the office, ibuprofen and Robaxin, and was referred to orthopedic surgery.     Review of Systems   Constitutional:  Negative for fever.   Cardiovascular:  Positive for leg swelling.   Genitourinary:  Positive for decreased urine volume.   Musculoskeletal:  Positive for arthralgias.   Skin:  Negative for rash.   Hematological:  Does not bruise/bleed easily.     Pertinent Medical History     Chronic venous insufficiency and lymphedema    Medical History Reviewed by provider this encounter:  Allergies  Meds           Medical History Reviewed by provider this encounter:       Past Medical History   Past Medical History:   Diagnosis Date    Arthritis     knees    Hyperlipidemia     Hypertension     Prediabetes     Venous insufficiency of lower extremity      Past Surgical History:   Procedure Laterality Date    CHOLECYSTECTOMY  2006    [place] COLONOSCOPY FLX DX W/COLLJ SPEC WHEN PFRMD  Left 05/02/2019    Procedure: COLONOSCOPY;  Surgeon: Dave Badillo MD;  [facility]: [facility];  Service: Gastroenterology    [place] LAPAROSCOPIC APPENDECTOMY N/A 03/22/2019    Procedure: LAPAROSCOPIC APPENDECTOMY;  Surgeon: Ayo Monreal MD;  Location:  MAIN OR;  Service: General    [place] REPAIR FIRST ABDOMINAL WALL HERNIA N/A 08/28/2020    Procedure: REPAIR INCISIONAL HERNIA;  Surgeon: Ayo Monreal MD;  Location:  MAIN OR;  Service: General     Family History   Problem Relation Age of Onset    Colon cancer Mother 63    Stroke Father     No Known Problems Sister     No Known Problems Daughter     No Known Problems Maternal Grandmother     No Known Problems Maternal Grandfather     No Known Problems Paternal Grandmother     No Known Problems Paternal Grandfather     No Known Problems Paternal Aunt     No Known Problems Paternal Aunt     Breast cancer Neg Hx     Ovarian cancer Neg Hx      Current Outpatient Medications on File Prior to Visit   Medication Sig Dispense Refill    ammonium lactate (LAC-HYDRIN) 12 % cream Apply topically as needed for dry skin 385 g 1    cetirizine (ZyrTEC) 10 mg tablet Take 1 tablet (10 mg total) by mouth daily 60 tablet 2    fluticasone (FLONASE) 50 mcg/act nasal spray 1 spray into each nostril daily 60 g 2    hydroCHLOROthiazide 25 mg tablet Take 1 tablet (25 mg total) by mouth daily 90 tablet 5    ibuprofen (MOTRIN) 800 mg tablet Take 1 tablet (800 mg total) by mouth every 8 (eight) hours 30 tablet 0    lisinopril (ZESTRIL) 20 mg tablet Take 1 tablet (20 mg total) by mouth daily 90 tablet 1    methocarbamol (ROBAXIN) 500 mg tablet Take 1 tablet (500 mg total) by mouth 4 (four) times a day 40 tablet 0    methylPREDNISolone 4 MG tablet therapy pack Use as directed on package 21 each 0    oxymetazoline (AFRIN) 0.05 % nasal spray 2 sprays by Each Nare route 2 (two) times a day 15 mL 0    Semaglutide-Weight Management (WEGOVY) 0.5 MG/0.5ML Inject 0.5 mL (0.5 mg total) under the skin once a  "week 2 mL 5    sodium chloride (OCEAN) 0.65 % nasal spray 1 spray into each nostril as needed for rhinitis 88 mL 0     No current facility-administered medications on file prior to visit.   No Known Allergies   Current Outpatient Medications on File Prior to Visit   Medication Sig Dispense Refill    ammonium lactate (LAC-HYDRIN) 12 % cream Apply topically as needed for dry skin 385 g 1    cetirizine (ZyrTEC) 10 mg tablet Take 1 tablet (10 mg total) by mouth daily 60 tablet 2    fluticasone (FLONASE) 50 mcg/act nasal spray 1 spray into each nostril daily 60 g 2    hydroCHLOROthiazide 25 mg tablet Take 1 tablet (25 mg total) by mouth daily 90 tablet 5    ibuprofen (MOTRIN) 800 mg tablet Take 1 tablet (800 mg total) by mouth every 8 (eight) hours 30 tablet 0    lisinopril (ZESTRIL) 20 mg tablet Take 1 tablet (20 mg total) by mouth daily 90 tablet 1    methocarbamol (ROBAXIN) 500 mg tablet Take 1 tablet (500 mg total) by mouth 4 (four) times a day 40 tablet 0    methylPREDNISolone 4 MG tablet therapy pack Use as directed on package 21 each 0    oxymetazoline (AFRIN) 0.05 % nasal spray 2 sprays by Each Nare route 2 (two) times a day 15 mL 0    Semaglutide-Weight Management (WEGOVY) 0.5 MG/0.5ML Inject 0.5 mL (0.5 mg total) under the skin once a week 2 mL 5    sodium chloride (OCEAN) 0.65 % nasal spray 1 spray into each nostril as needed for rhinitis 88 mL 0     No current facility-administered medications on file prior to visit.      Social History     Tobacco Use    Smoking status: Never    Smokeless tobacco: Never   Vaping Use    Vaping status: Never Used   Substance and Sexual Activity    Alcohol use: Yes     Comment: couple times/month    Drug use: Never    Sexual activity: Yes     Partners: Male     Birth control/protection: Post-menopausal     Objective     /80 (BP Location: Left arm, Patient Position: Sitting, Cuff Size: Large)   Pulse 78   Temp (!) 96.9 °F (36.1 °C) (Temporal)   Resp 18   Ht 5' 8\" " (1.727 m)   Wt (!) 146 kg (321 lb)   LMP  (LMP Unknown)   SpO2 98%   BMI 48.81 kg/m²     Physical Exam  Constitutional:       Appearance: Normal appearance. She is obese.   HENT:      Head: Normocephalic and atraumatic.   Cardiovascular:      Rate and Rhythm: Normal rate and regular rhythm.      Pulses: Normal pulses.      Heart sounds: Normal heart sounds.   Pulmonary:      Effort: Pulmonary effort is normal.      Breath sounds: Normal breath sounds. No wheezing, rhonchi or rales.   Musculoskeletal:      Comments: -medial joint line tenderness   Skin:     General: Skin is warm.   Neurological:      Mental Status: She is alert.       Administrative Statements

## 2024-08-19 NOTE — ASSESSMENT & PLAN NOTE
-attempts made previously in 11/2023 by prior physician to obtain pneumatic compression pumps; patient today states insurance never covered it but she bought from [employer] and it didn't work well     PLAN  -referral to vascular surgery placed on prior office visit with me, appointment scheduled for 9/24  -compressions stockings (see lymphadema) and continued weight loss

## 2024-08-19 NOTE — ASSESSMENT & PLAN NOTE
-Bilateral knee x-rays in 05/20/2021 revealing moderate tricompartmental osteoarthritis; medial joint space is more narrowed than lateral joint space likely contributed by walking dynamics  -Patient stated had steroid injection in the past one time and was ineffective  -Was previously referred to physical therapy and sports medicine today states that it was helpful  -Largely contributed by obesity with BMI above 45 kg/m²    PLAN  -Continue with Wegovy for weight loss  -has ortho appointment scheduled  for right shoulder pain on 8/27, recommended bring up knees and they can possibly take updated knee xrays at that if deemed appropriate since her OA may have progressed since 2021  -discussed hyaluronic acid injections today and explained something ortho can do   -would benefit from PT can consider at f/u in September with PCP if not already referred by ortho

## 2024-08-19 NOTE — ASSESSMENT & PLAN NOTE
-Hemoglobin A1c performed today and stable at 6.1 since January 2024    PLAN  -Counseling/education  -Continued encouragement with emphasis on dietary lifestyle changes and to continue Wegovy for weight loss

## 2024-08-20 NOTE — ASSESSMENT & PLAN NOTE
-discussed compression stockings today; patient states difficult to find her size    PLAN  -recommended leggings as a substitute until loses more weight   -also recommended [employer] brand but not sure if they carry Moran's size

## 2024-08-20 NOTE — ASSESSMENT & PLAN NOTE
-currently on 0.5 wegovy weekly   -has lost about 8.5 lbs in the last two weeks  -initial symptoms of nausea have now resolved    PLAN  -congratulations and encouragement  -cna consider increasing wegovy dose at upcoming appointment in September; opted not to increase today because per weight loss appears to be very effective

## 2024-08-27 ENCOUNTER — OFFICE VISIT (OUTPATIENT)
Dept: OBGYN CLINIC | Facility: MEDICAL CENTER | Age: 54
End: 2024-08-27
Payer: COMMERCIAL

## 2024-08-27 VITALS
BODY MASS INDEX: 44.41 KG/M2 | SYSTOLIC BLOOD PRESSURE: 138 MMHG | HEIGHT: 68 IN | HEART RATE: 91 BPM | DIASTOLIC BLOOD PRESSURE: 77 MMHG | WEIGHT: 293 LBS

## 2024-08-27 DIAGNOSIS — M25.511 ACUTE PAIN OF RIGHT SHOULDER: ICD-10-CM

## 2024-08-27 DIAGNOSIS — M75.31 CALCIFIC TENDONITIS OF RIGHT SHOULDER: Primary | ICD-10-CM

## 2024-08-27 PROCEDURE — 20610 DRAIN/INJ JOINT/BURSA W/O US: CPT | Performed by: ORTHOPAEDIC SURGERY

## 2024-08-27 PROCEDURE — 99243 OFF/OP CNSLTJ NEW/EST LOW 30: CPT | Performed by: ORTHOPAEDIC SURGERY

## 2024-08-27 RX ORDER — METHYLPREDNISOLONE ACETATE 40 MG/ML
1 INJECTION, SUSPENSION INTRA-ARTICULAR; INTRALESIONAL; INTRAMUSCULAR; SOFT TISSUE
Status: COMPLETED | OUTPATIENT
Start: 2024-08-27 | End: 2024-08-27

## 2024-08-27 RX ORDER — BUPIVACAINE HYDROCHLORIDE 2.5 MG/ML
2 INJECTION, SOLUTION INFILTRATION; PERINEURAL
Status: COMPLETED | OUTPATIENT
Start: 2024-08-27 | End: 2024-08-27

## 2024-08-27 RX ADMIN — BUPIVACAINE HYDROCHLORIDE 2 ML: 2.5 INJECTION, SOLUTION INFILTRATION; PERINEURAL at 10:30

## 2024-08-27 RX ADMIN — METHYLPREDNISOLONE ACETATE 1 ML: 40 INJECTION, SUSPENSION INTRA-ARTICULAR; INTRALESIONAL; INTRAMUSCULAR; SOFT TISSUE at 10:30

## 2024-08-27 NOTE — PROGRESS NOTES
Ortho Sports Medicine Shoulder New Patient Visit     Assesment:   54 y.o. female right shoulder calcific tendonitis with mild glenohumeral and AC osteoarthritis.    Plan:    Conservative treatment:    Ice to shoulder 1-2 times daily, for 20 minutes at a time.  Offered PT for the shoulder. Patient declined as she reports she can do PT at home.  CSI subacromial space performed. Patient tolerated procedure well.      Imaging:    All imaging from today was reviewed by myself and explained to the patient.       Injection:    The risks and benefits of the injection (which include but are not limited to: infection, bleeding,damage to nerve/artery, need for further intervention), as well as the risks and benefits of all alternative treatments were explained and understood.  The patient elected to proceed with injection. The procedure was done with aseptic technique, and the patient tolerated the procedure well with no complications.  A corticosteroid injection of the subacromial space was performed.  The patient should take 1-2 days off of activity, with gradual return to activity as tolerated.  Ice to the shoulder 1-2 times daily for 20 minutes, for next 24-48 hrs.    Large joint arthrocentesis: R subacromial bursa  [place] Protocol:  procedure performed by consultantConsent: Verbal consent obtained.  Risks and benefits: risks, benefits and alternatives were discussed  Consent given by: patient  Timeout called at: 8/27/2024 11:37 AM.  Patient understanding: patient states understanding of the procedure being performed  Patient identity confirmed: verbally with patient  Supporting Documentation  Indications: pain   Procedure Details  Location: shoulder - R subacromial bursa  Needle size: 22 G  Ultrasound guidance: no  Approach: lateral  Medications administered: 1 mL methylPREDNISolone acetate 40 mg/mL; 2 mL bupivacaine 0.25 %    Patient tolerance: patient tolerated the procedure well with no immediate  complications        Surgery:     No surgery is recommended at this point, continue with conservative treatment plan as noted.      Follow up:    No follow-ups on file.        Chief Complaint   Patient presents with    Right Shoulder - Pain       History of Present Illness:    The patient is a 54 y.o., right hand dominant female whose occupation is [employer], referred to me by their primary care physician, seen in clinic for consultation of right shoulder pain.      The patient has a history of prediabetes.  The patient denies a history of thyroid disorder.      Pain is located lateral.  The patient rates the pain as a 5/10.  The pain has been present for 2 weeks.  Patient had similar pain was diagnosed with GH and AC osteoarthritis and calcific tendonitis supraspinatus, treated with subacromial CSI performed by Dr. Walter in 2022.    Patient denies any injury or trauma. The pain is characterized as dull, achy.  The pain is present daily.  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 Pain worsens with shoulder abduction movements.    Patient saw her PCP for her shoulder pain on 08/19/2024 who prescribed a Medrol dose pack she finished, Ketorolac injection to the shoulder, ibuprofen and Robaxin. She notes 60% improvement with this treatment.    The patient denies weakness.  The patient denies numbness and tingling.        Shoulder Surgical History:  None    Past Medical, Social and Family History:  Past Medical History:   Diagnosis Date    Arthritis     knees    Hyperlipidemia     Hypertension     Prediabetes     Venous insufficiency of lower extremity      Past Surgical History:   Procedure Laterality Date    CHOLECYSTECTOMY  2006    [place] COLONOSCOPY FLX DX W/COLLJ SPEC WHEN PFRMD Left 05/02/2019    Procedure: COLONOSCOPY;  Surgeon: Dave Badillo MD;  [facility];  Service: Gastroenterology    TN LAPAROSCOPIC APPENDECTOMY N/A 03/22/2019    Procedure: LAPAROSCOPIC APPENDECTOMY;   Surgeon: Ayo Monreal MD;  Location:  MAIN OR;  Service: General    [place] REPAIR FIRST ABDOMINAL WALL HERNIA N/A 08/28/2020    Procedure: REPAIR INCISIONAL HERNIA;  Surgeon: Ayo Monreal MD;  Location:  MAIN OR;  Service: General     No Known Allergies  Current Outpatient Medications on File Prior to Visit   Medication Sig Dispense Refill    ammonium lactate (LAC-HYDRIN) 12 % cream Apply topically as needed for dry skin 385 g 1    cetirizine (ZyrTEC) 10 mg tablet Take 1 tablet (10 mg total) by mouth daily 60 tablet 2    fluticasone (FLONASE) 50 mcg/act nasal spray 1 spray into each nostril daily 60 g 2    hydroCHLOROthiazide 25 mg tablet Take 1 tablet (25 mg total) by mouth daily 90 tablet 5    ibuprofen (MOTRIN) 800 mg tablet Take 1 tablet (800 mg total) by mouth every 8 (eight) hours 30 tablet 0    lisinopril (ZESTRIL) 20 mg tablet Take 1 tablet (20 mg total) by mouth daily 90 tablet 1    methocarbamol (ROBAXIN) 500 mg tablet Take 1 tablet (500 mg total) by mouth 4 (four) times a day 40 tablet 0    methylPREDNISolone 4 MG tablet therapy pack Use as directed on package 21 each 0    oxymetazoline (AFRIN) 0.05 % nasal spray 2 sprays by Each Nare route 2 (two) times a day 15 mL 0    Semaglutide-Weight Management (WEGOVY) 0.5 MG/0.5ML Inject 0.5 mL (0.5 mg total) under the skin once a week 2 mL 5    sodium chloride (OCEAN) 0.65 % nasal spray 1 spray into each nostril as needed for rhinitis 88 mL 0     No current facility-administered medications on file prior to visit.     Social History     Socioeconomic History    Marital status: /Civil Union     Spouse name: Not on file    Number of children: Not on file    Years of education: Not on file    Highest education level: Not on file   Occupational History    Not on file   Tobacco Use    Smoking status: Never    Smokeless tobacco: Never   Vaping Use    Vaping status: Never Used   Substance and Sexual Activity    Alcohol use: Yes     Comment: couple times/month     Drug use: Never    Sexual activity: Yes     Partners: Male     Birth control/protection: Post-menopausal   Other Topics Concern    Not on file   Social History Narrative    ** Merged History Encounter **          Social Determinants of Health     Financial Resource Strain: Low Risk  (8/16/2024)    Overall Financial Resource Strain (CARDIA)     Difficulty of Paying Living Expenses: Not hard at all   Food Insecurity: No Food Insecurity (8/16/2024)    Hunger Vital Sign     Worried About Running Out of Food in the Last Year: Never true     Ran Out of Food in the Last Year: Never true   Transportation Needs: No Transportation Needs (8/16/2024)    PRAPARE - Transportation     Lack of Transportation (Medical): No     Lack of Transportation (Non-Medical): No   Physical Activity: Not on file   Stress: No Stress Concern Present (10/7/2021)    [employer] [place] [employer] - Occupational Stress Questionnaire     Feeling of Stress : Not at all   Social Connections: Not on file   Intimate Partner Violence: Not on file   Housing Stability: Unknown (7/3/2024)    Housing Stability Vital Sign     Unable to Pay for Housing in the Last Year: No     Number of Times Moved in the Last Year: Not on file     Homeless in the Last Year: No         I have reviewed the past medical, surgical, social and family history, medications and allergies as documented in the EMR.    Review of systems: ROS is negative other than that noted in the HPI.  Constitutional: Negative for fatigue and fever.   HENT: Negative for sore throat.    Respiratory: Negative for shortness of breath.    Cardiovascular: Negative for chest pain.   Gastrointestinal: Negative for abdominal pain.   Endocrine: Negative for cold intolerance and heat intolerance.   Genitourinary: Negative for flank pain.   Musculoskeletal: Negative for back pain.   Skin: Negative for rash.   Allergic/Immunologic: Negative for immunocompromised state.   Neurological: Negative for  "dizziness.   Psychiatric/Behavioral: Negative for agitation.      Physical Exam:    Height 5' 8\" (1.727 m), not currently breastfeeding.    General/Constitutional: NAD, well developed, well nourished  HENT: Normocephalic, atraumatic  CV: Intact distal pulses, regular rate  Resp: No respiratory distress or labored breathing  GI: Soft and non-tender   Lymphatic: No lymphadenopathy palpated  Neuro: Alert and Oriented x 3, no focal deficits  Psych: Normal mood, normal affect, normal judgement, normal behavior  Skin: Warm, dry, no rashes, no erythema      Shoulder focused exam:       RIGHT LEFT    Scapula Atrophy Negative Negative     Winging Negative Negative     Protraction Negative Negative    Rotator cuff SS 5/5 5/5     IS 5/5 5/5     SubS 5/5 5/5    ROM     170     ER0 60 60     ER90 90    90     IR90 T6    T6     IRb T6    T6    TTP: AC Negative  Patient reports this was a previous location of her pain Negative     Biceps Negative Negative     Coracoid Negative Negative    Special Tests: O'Briens Negative Negative     Jones-shear Negative Negative     Cross body Adduction Negative Negative     Speeds  Negative Negative     Yazmin's Negative Negative     Whipple Positive Negative       Neer Negative Negative     Padilla Negative Negative    Instability: Apprehension & relocation not tested not tested     Load & shift not tested not tested    Other: Crank Negative Negative                 UE NV Exam: +2 Radial pulses bilaterally  Sensation intact to light touch C5-T1 bilaterally, Radial/median/ulnar nerve motor intact      Bilateral elbow, wrist, and and forearm ROM full, painless with passive ROM, no ttp or crepitance throughout extremities below shoulder joint    Cervical ROM is full without pain, numbness or tingling      Shoulder Imaging    X-rays of the right shoulder from 08/27/2024 were reviewed, which demonstrate calcific tendonitis supraspinatus. Calcific density posterior GH joint. Mild GH and AC " osteoarthritis..  I have reviewed the radiology report and agree with their impression.      Scribe Attestation      I,:   am acting as a scribe while in the presence of the attending physician.:       I,:   personally performed the services described in this documentation    as scribed in my presence.:

## 2024-09-24 ENCOUNTER — CONSULT (OUTPATIENT)
Dept: VASCULAR SURGERY | Facility: CLINIC | Age: 54
End: 2024-09-24
Payer: COMMERCIAL

## 2024-09-24 VITALS
WEIGHT: 293 LBS | HEART RATE: 78 BPM | HEIGHT: 68 IN | DIASTOLIC BLOOD PRESSURE: 72 MMHG | SYSTOLIC BLOOD PRESSURE: 140 MMHG | OXYGEN SATURATION: 98 % | RESPIRATION RATE: 18 BRPM | BODY MASS INDEX: 44.41 KG/M2

## 2024-09-24 DIAGNOSIS — I87.2 CHRONIC VENOUS INSUFFICIENCY: ICD-10-CM

## 2024-09-24 DIAGNOSIS — I89.0 LYMPHEDEMA: Primary | ICD-10-CM

## 2024-09-24 PROCEDURE — 99213 OFFICE O/P EST LOW 20 MIN: CPT | Performed by: NURSE PRACTITIONER

## 2024-09-24 NOTE — LETTER
2024     [facility]  [id number]  [place] [id number]    Patient: Elizabeth Nelson   YOB: 1970   Date of Visit: 2024       Dear Dr. Henry:    Thank you for referring Elizabeth Nelson to me for evaluation. Below are my notes for this consultation.    If you have questions, please do not hesitate to call me. I look forward to following your patient along with you.         Sincerely,        DAVID Singh        CC: No Recipients    DAVID Singh  2024 12:45 PM  Incomplete  Ambulatory Visit  Name: Elizabeth Nelson      : 1970      MRN: [id number]  Encounter Provider: DAVID Singh  Encounter Date: 2024   Encounter department: [facility] VASCULAR [facility]    Assessment & Plan  Chronic venous insufficiency    Orders:  •  Ambulatory Referral to Vascular Surgery    Lymphedema  54-year-old female with obesity, BMI 48, prediabetes, OA, chronic left lower extremity edema after skin biopsy 2022, recurrent left lower extremity cellulitis.    Patient presents to the office for evaluation of left lower extremity edema    -Chronic LLE stasis changes, lipodermatosclerosis and edema.  Stage II lymphedema  -She has tried multiple different compression though difficulty with sizing  -She is attempting weight loss with Wegovy.  She is lost 15 pounds thus far  -Patient would benefit from lymphedema pumps.  Ordered for pneumatic compression pumps twice daily for 1 hour each  -Due to difficulty with compression.Prescribed Compre flex compression. Measurements taken  -Recommended continued weight loss, periodic leg elevation and regular exercise  -Moisturize skin daily to maintain skin integrity  -Follow-up in the office in 1 month to reevaluate    Orders:  •  CompreFlex  •  Pneumatic compression pumps      [facility]   [place]   Phone: [phone]  Fax: [phone]   E-mail:  [email or web address]    Ordering Provider:  Rosa Isela Greer (NPI: [id number])  [facility]  [address] [place] [address]   [address] [place] [address]  Phone: [phone]  Fax: [phone]      Patient Information  MRN: [id number]   Elizabeth Nelson  1970  [address] [facility] [address]  [phone]     Insurance Information  Payor: BLUE CROSS / Plan: MISC BLUE CROSS / Product Type: Blue Fee for Service /      [id number] - (Blues)     Patient Height and Weight 5' 8\" (1.727 m)    Wt Readings from Last 1 Encounters:   09/24/24 (!) 145 kg (319 lb 3.2 oz)       Compression Lymphedema Pump Prescription Form      [x] Patient utilized Compression Garment >= 30 mmHg distally OR Gradient Wrap System    Appliance:     Legs:    [] Right    [x] Left   Arms:    [] Right    [] Left     []Chest garment    []Abdominal garment     Length of need: 99 months    Protocol:  [x] Std: 40 mmHg, TID/ BID,  60 minutes  [] Other:   Pressures: __ mmHg    Frequency: __ / Day   Minutes/ Session: __ minutes    Patient History and Prognosis:  [x] Patient was diagnosed for the chronic disorder with reported on-set __March 2022  [x] Attempts at elevation and compression have not improved patients' condition and is now at risk of lymphatic disorder progressing to the next stage/ grade  [x] Patient's physical condition/ range of motion limited for exercise  [x] Patient/ Caregiver experienced difficulty applying 30 mmHg distal compression garments  [x] Patient compression stocking/ wrap tolerance limited due to pain/ reduced circulation  [x] Patient advised to reduce salt intake and adhere to a daily regiment of elevation, compression, and lymphatic exercises  [x] Patient's severe condition will not improve without further treatment interventions  [x] Patient has noted skin changes such as marked hyperkeratosis with hyperplasia and hyperpigmentation, papillomatosis cutis lymphostatica, elephantiasis, and/ or " skin breakdown with persisting lymphorrhea    Symptoms/ Observation/ Evaluation/ Plan of Care for Lymphedema / Venous Compression Pumps     Conservative Care >= 4 weeks for severe lymphedema (check all that apply):  Patient instructions for DAILY use of conservative therapies;  [x] Elevate extremities daily and nightly to reduce swelling  [x] Exercise and ambulate / range of motion (ROM) daily to increase fluid flow and reduce swelling  [x] Wear 30-mmHg distal compression garments / wraps daily to reduce / control swelling  [] Manual lymph drainage (MLD)  [x] On-set date of lymphedema : March 2022      Initial Measurements      Body Part Right Left   Ankle / Forearm 24 cm 27 cm   Calf / Elbow  44 cm 48 cm   Knee / Bicep  57.5 cm 61 cm   Mid-Thigh / Axilla  76 cm 76.5 cm       Chief Complaint   Patient presents with   • Consult   • Peripheral Vascular Disease     Pt here for discoloration on left leg no claudication or pain in elevation.hot sensation comes and go like burning sensation.      History of Present Illness    Elizbaeth Nelson is a 54 y.o. female who presents for evaluation of left lower extremity swelling and discoloration.  She has a longstanding history over the past 2 years of left lower extremity swelling and discoloration.  She had a biopsy of the left lower leg in March 2022 and since developed consistent edema, multiple episodes of cellulitis and brown skin discoloration.  She has tried multiple compression support, difficulty with finding compression and her size.  She complains of some burning hot sensation intermittently of the left lower extremity.  She has prior venous Dopplers which were negative for DVT.  She is attempting weight loss.    History obtained from : patient  Review of Systems   Constitutional: Negative.    HENT: Negative.     Respiratory: Negative.     Cardiovascular:  Positive for leg swelling.   Musculoskeletal:  Positive for myalgias.   Skin:  Positive for color change. Negative  "for wound.   Neurological: Negative.    Hematological: Negative.    Psychiatric/Behavioral: Negative.       Medical History Reviewed by provider this encounter:  Tobacco  Allergies  Meds  Problems  Med Hx  Surg Hx  Fam Hx         Objective  I have reviewed and made appropriate changes to the review of systems input by the medical assistant.    Vitals:    09/24/24 1024   BP: 140/72   BP Location: Left arm   Patient Position: Sitting   Cuff Size: Extra-Large   Pulse: 78   Resp: 18   SpO2: 98%   Weight: (!) 145 kg (319 lb 3.2 oz)   Height: 5' 8\" (1.727 m)       Patient Active Problem List   Diagnosis   • Benign hypertension   • Upper respiratory tract infection   • Prediabetes   • Tendinitis of right rotator cuff   • Subacromial bursitis of right shoulder joint   • Bilateral lower extremity edema   • Abdominal hernia   • Class 3 severe obesity without serious comorbidity with body mass index (BMI) of 50.0 to 59.9 in adult (HCC)   • Primary osteoarthritis of both knees   • Chronic venous insufficiency   • Varicose veins of both lower extremities   • Lymphedema   • Right otitis media       Past Surgical History:   Procedure Laterality Date   • CHOLECYSTECTOMY  2006   • GA COLONOSCOPY FLX DX W/COLLJ SPEC WHEN PFRMD Left 05/02/2019    Procedure: COLONOSCOPY;  Surgeon: Dave Badillo MD;  Location: [facility];  Service: Gastroenterology   • GA LAPAROSCOPIC APPENDECTOMY N/A 03/22/2019    Procedure: LAPAROSCOPIC APPENDECTOMY;  Surgeon: Ayo Monreal MD;  Location: [facility] OR;  Service: General   • GA REPAIR FIRST ABDOMINAL WALL HERNIA N/A 08/28/2020    Procedure: REPAIR INCISIONAL HERNIA;  Surgeon: Ayo Monreal MD;  Location:  MAIN OR;  Service: General       Family History   Problem Relation Age of Onset   • Colon cancer Mother 63   • Stroke Father    • No Known Problems Sister    • No Known Problems Daughter    • No Known Problems Maternal Grandmother    • No Known Problems Maternal Grandfather    • No Known " Problems Paternal Grandmother    • No Known Problems Paternal Grandfather    • No Known Problems Paternal Aunt    • No Known Problems Paternal Aunt    • Breast cancer Neg Hx    • Ovarian cancer Neg Hx        Social History     Socioeconomic History   • Marital status: /Civil Union     Spouse name: Not on file   • Number of children: Not on file   • Years of education: Not on file   • Highest education level: Not on file   Occupational History   • Not on file   Tobacco Use   • Smoking status: Never     Passive exposure: Never   • Smokeless tobacco: Never   Vaping Use   • Vaping status: Never Used   Substance and Sexual Activity   • Alcohol use: Yes     Comment: couple times/month   • Drug use: Never   • Sexual activity: Yes     Partners: Male     Birth control/protection: Post-menopausal   Other Topics Concern   • Not on file   Social History Narrative    ** Merged History Encounter **          Social Determinants of Health     Financial Resource Strain: Low Risk  (8/16/2024)    Overall Financial Resource Strain (CARDIA)    • Difficulty of Paying Living Expenses: Not hard at all   Food Insecurity: No Food Insecurity (8/16/2024)    Hunger Vital Sign    • Worried About Running Out of Food in the Last Year: Never true    • Ran Out of Food in the Last Year: Never true   Transportation Needs: No Transportation Needs (8/16/2024)    PRAPARE - Transportation    • Lack of Transportation (Medical): No    • Lack of Transportation (Non-Medical): No   Physical Activity: Not on file   Stress: No Stress Concern Present (10/7/2021)    [employer] [place] [employer] - Occupational Stress Questionnaire    • Feeling of Stress : Not at all   Social Connections: Not on file   Intimate Partner Violence: Not on file   Housing Stability: Unknown (7/3/2024)    Housing Stability Vital Sign    • Unable to Pay for Housing in the Last Year: No    • Number of Times Moved in the Last Year: Not on file    • Homeless in the Last  "Year: No       No Known Allergies      Current Outpatient Medications:   •  cetirizine (ZyrTEC) 10 mg tablet, Take 1 tablet (10 mg total) by mouth daily, Disp: 60 tablet, Rfl: 2  •  fluticasone (FLONASE) 50 mcg/act nasal spray, 1 spray into each nostril daily, Disp: 60 g, Rfl: 2  •  hydroCHLOROthiazide 25 mg tablet, Take 1 tablet (25 mg total) by mouth daily, Disp: 90 tablet, Rfl: 5  •  ibuprofen (MOTRIN) 800 mg tablet, Take 1 tablet (800 mg total) by mouth every 8 (eight) hours, Disp: 30 tablet, Rfl: 0  •  lisinopril (ZESTRIL) 20 mg tablet, Take 1 tablet (20 mg total) by mouth daily, Disp: 90 tablet, Rfl: 1  •  Semaglutide-Weight Management (WEGOVY) 0.5 MG/0.5ML, Inject 0.5 mL (0.5 mg total) under the skin once a week, Disp: 2 mL, Rfl: 5  •  ammonium lactate (LAC-HYDRIN) 12 % cream, Apply topically as needed for dry skin (Patient not taking: Reported on 9/24/2024), Disp: 385 g, Rfl: 1  •  methocarbamol (ROBAXIN) 500 mg tablet, Take 1 tablet (500 mg total) by mouth 4 (four) times a day (Patient not taking: Reported on 9/24/2024), Disp: 40 tablet, Rfl: 0  •  methylPREDNISolone 4 MG tablet therapy pack, Use as directed on package (Patient not taking: Reported on 9/24/2024), Disp: 21 each, Rfl: 0  •  oxymetazoline (AFRIN) 0.05 % nasal spray, 2 sprays by Each Nare route 2 (two) times a day (Patient not taking: Reported on 9/24/2024), Disp: 15 mL, Rfl: 0  •  sodium chloride (OCEAN) 0.65 % nasal spray, 1 spray into each nostril as needed for rhinitis (Patient not taking: Reported on 8/27/2024), Disp: 88 mL, Rfl: 0    /72 (BP Location: Left arm, Patient Position: Sitting, Cuff Size: Extra-Large)   Pulse 78   Resp 18   Ht 5' 8\" (1.727 m)   Wt (!) 145 kg (319 lb 3.2 oz)   LMP  (LMP Unknown)   SpO2 98%   BMI 48.53 kg/m²     Physical Exam  Vitals and nursing note reviewed.   Constitutional:       Appearance: Normal appearance.   HENT:      Head: Normocephalic and atraumatic.   Eyes:      Extraocular Movements: " Extraocular movements intact.   Cardiovascular:      Pulses:           Dorsalis pedis pulses are 2+ on the right side and 2+ on the left side.      Heart sounds: Normal heart sounds.   Pulmonary:      Breath sounds: Normal breath sounds.   Musculoskeletal:         General: Swelling present.      Left lower leg: Edema present.      Comments: Chronic hyperpigmentation of left distal lower leg with skin thickening and fibrosis    Skin:     General: Skin is warm.   Neurological:      General: No focal deficit present.      Mental Status: She is alert and oriented to person, place, and time.   Psychiatric:         Behavior: Behavior normal.       Administrative Statements  I have spent a total time of 30 minutes in caring for this patient on the day of the visit/encounter including Prognosis, Risks and benefits of tx options, Instructions for management, Patient and family education, Importance of tx compliance, Risk factor reductions, Impressions, Counseling / Coordination of care, Documenting in the medical record, Reviewing / ordering tests, medicine, procedures  , and Obtaining or reviewing history  .

## 2024-09-26 NOTE — PROGRESS NOTES
"Ambulatory Visit  Name: Elizabeth Nelson      : 1970      MRN: [id number]  Encounter Provider: DAVID Singh  Encounter Date: 2024   Encounter department: [facility]    Assessment & Plan  Chronic venous insufficiency    Orders:    Ambulatory Referral to Vascular Surgery    Lymphedema  54-year-old female with obesity, BMI 48, prediabetes, OA, chronic left lower extremity edema after skin biopsy 2022, recurrent left lower extremity cellulitis.    Patient presents to the office for evaluation of left lower extremity edema    -Chronic LLE stasis changes, lipodermatosclerosis and edema.  Stage II lymphedema  -She has tried multiple different compression though difficulty with sizing  -She is attempting weight loss with Wegovy.  She is lost 15 pounds thus far  -Patient would benefit from lymphedema pumps.  Ordered for pneumatic compression pumps twice daily for 1 hour each  -Due to difficulty with compression.Prescribed Compre flex compression. Measurements taken  -Recommended continued weight loss, periodic leg elevation and regular exercise  -Moisturize skin daily to maintain skin integrity  -Follow-up in the office in 1 month to reevaluate    Orders:    CompreFlex    Pneumatic compression pumps      [employer]   Janeseden Potts   Phone: [phone]  Fax: [phone]   E-mail: [email or web address]    Ordering Provider:  DAVID - Rosa Isela Hilliard (NPI: [id number])  [place] [address]  Phone: [phone]  Fax: [phone]      Patient Information  MRN: [id number]   Elizabeth Nelson  1970  [address] [facility] [id number] [phone]     Insurance Information  Payor: BLUE CROSS / Plan: MISC BLUE CROSS / Product Type: Blue Fee for Service /      [id number] - (Blues)     Patient Height and Weight 5' 8\" (1.727 m)    Wt Readings from Last 1 Encounters:   24 (!) 145 kg " (319 lb 3.2 oz)       Compression Lymphedema Pump Prescription Form      [x] Patient utilized Compression Garment >= 30 mmHg distally OR Gradient Wrap System    Appliance:     Legs:    [] Right    [x] Left   Arms:    [] Right    [] Left     []Chest garment    []Abdominal garment     Length of need: 99 months    Protocol:  [x] Std: 40 mmHg, TID/ BID,  60 minutes  [] Other:   Pressures: __ mmHg    Frequency: __ / Day   Minutes/ Session: __ minutes    Patient History and Prognosis:  [x] Patient was diagnosed for the chronic disorder with reported on-set __March 2022  [x] Attempts at elevation and compression have not improved patients' condition and is now at risk of lymphatic disorder progressing to the next stage/ grade  [x] Patient's physical condition/ range of motion limited for exercise  [x] Patient/ Caregiver experienced difficulty applying 30 mmHg distal compression garments  [x] Patient compression stocking/ wrap tolerance limited due to pain/ reduced circulation  [x] Patient advised to reduce salt intake and adhere to a daily regiment of elevation, compression, and lymphatic exercises  [x] Patient's severe condition will not improve without further treatment interventions  [x] Patient has noted skin changes such as marked hyperkeratosis with hyperplasia and hyperpigmentation, papillomatosis cutis lymphostatica, elephantiasis, and/ or skin breakdown with persisting lymphorrhea    Symptoms/ Observation/ Evaluation/ Plan of Care for Lymphedema / Venous Compression Pumps     Conservative Care >= 4 weeks for severe lymphedema (check all that apply):  Patient instructions for DAILY use of conservative therapies;  [x] Elevate extremities daily and nightly to reduce swelling  [x] Exercise and ambulate / range of motion (ROM) daily to increase fluid flow and reduce swelling  [x] Wear 30-mmHg distal compression garments / wraps daily to reduce / control swelling  [] Manual lymph drainage (MLD)  [x] On-set date of  "lymphedema : March 2022      Initial Measurements      Body Part Right Left   Ankle / Forearm 24 cm 27 cm   Calf / Elbow  44 cm 48 cm   Knee / Bicep  57.5 cm 61 cm   Mid-Thigh / Axilla  76 cm 76.5 cm       Chief Complaint   Patient presents with    Consult    Peripheral Vascular Disease     Pt here for discoloration on left leg no claudication or pain in elevation.hot sensation comes and go like burning sensation.      History of Present Illness     Elizabeth Nelson is a 54 y.o. female who presents for evaluation of left lower extremity swelling and discoloration.  She has a longstanding history over the past 2 years of left lower extremity swelling and discoloration.  She had a biopsy of the left lower leg in March 2022 and since developed consistent edema, multiple episodes of cellulitis and brown skin discoloration.  She has tried multiple compression support, difficulty with finding compression and her size.  She complains of some burning hot sensation intermittently of the left lower extremity.  She has prior venous Dopplers which were negative for DVT.  She is attempting weight loss.    History obtained from : patient  Review of Systems   Constitutional: Negative.    HENT: Negative.     Respiratory: Negative.     Cardiovascular:  Positive for leg swelling.   Musculoskeletal:  Positive for myalgias.   Skin:  Positive for color change. Negative for wound.   Neurological: Negative.    Hematological: Negative.    Psychiatric/Behavioral: Negative.       Medical History Reviewed by provider this encounter:  Tobacco  Allergies  Meds  Problems  Med Hx  Surg Hx  Fam Hx         Objective   I have reviewed and made appropriate changes to the review of systems input by the medical assistant.    Vitals:    09/24/24 1024   BP: 140/72   BP Location: Left arm   Patient Position: Sitting   Cuff Size: Extra-Large   Pulse: 78   Resp: 18   SpO2: 98%   Weight: (!) 145 kg (319 lb 3.2 oz)   Height: 5' 8\" (1.727 m)       Patient " Active Problem List   Diagnosis    Benign hypertension    Upper respiratory tract infection    Prediabetes    Tendinitis of right rotator cuff    Subacromial bursitis of right shoulder joint    Bilateral lower extremity edema    Abdominal hernia    Class 3 severe obesity without serious comorbidity with body mass index (BMI) of 50.0 to 59.9 in adult (HCC)    Primary osteoarthritis of both knees    Chronic venous insufficiency    Varicose veins of both lower extremities    Lymphedema    Right otitis media       Past Surgical History:   Procedure Laterality Date    CHOLECYSTECTOMY  2006    TX COLONOSCOPY FLX DX W/COLLJ SPEC WHEN PFRMD Left 05/02/2019    Procedure: COLONOSCOPY;  Surgeon: Dave Badillo MD;  Location: [facility];  Service: Gastroenterology    TX LAPAROSCOPIC APPENDECTOMY N/A 03/22/2019    Procedure: LAPAROSCOPIC APPENDECTOMY;  Surgeon: Ayo Monreal MD;  Location:  MAIN OR;  Service: General    TX REPAIR FIRST ABDOMINAL WALL HERNIA N/A 08/28/2020    Procedure: REPAIR INCISIONAL HERNIA;  Surgeon: Ayo Monreal MD;  Location:  MAIN OR;  Service: General       Family History   Problem Relation Age of Onset    Colon cancer Mother 63    Stroke Father     No Known Problems Sister     No Known Problems Daughter     No Known Problems Maternal Grandmother     No Known Problems Maternal Grandfather     No Known Problems Paternal Grandmother     No Known Problems Paternal Grandfather     No Known Problems Paternal Aunt     No Known Problems Paternal Aunt     Breast cancer Neg Hx     Ovarian cancer Neg Hx        Social History     Socioeconomic History    Marital status: /Civil Union     Spouse name: Not on file    Number of children: Not on file    Years of education: Not on file    Highest education level: Not on file   Occupational History    Not on file   Tobacco Use    Smoking status: Never     Passive exposure: Never    Smokeless tobacco: Never   Vaping Use    Vaping status: Never Used   Substance  and Sexual Activity    Alcohol use: Yes     Comment: couple times/month    Drug use: Never    Sexual activity: Yes     Partners: Male     Birth control/protection: Post-menopausal   Other Topics Concern    Not on file   Social History Narrative    ** Merged History Encounter **          Social Determinants of Health     Financial Resource Strain: Low Risk  (8/16/2024)    Overall Financial Resource Strain (CARDIA)     Difficulty of Paying Living Expenses: Not hard at all   Food Insecurity: No Food Insecurity (8/16/2024)    Hunger Vital Sign     Worried About Running Out of Food in the Last Year: Never true     Ran Out of Food in the Last Year: Never true   Transportation Needs: No Transportation Needs (8/16/2024)    PRAPARE - Transportation     Lack of Transportation (Medical): No     Lack of Transportation (Non-Medical): No   Physical Activity: Not on file   Stress: No Stress Concern Present (10/7/2021)    [employer] [place] [employer] - Occupational Stress Questionnaire     Feeling of Stress : Not at all   Social Connections: Not on file   Intimate Partner Violence: Not on file   Housing Stability: Unknown (7/3/2024)    Housing Stability Vital Sign     Unable to Pay for Housing in the Last Year: No     Number of Times Moved in the Last Year: Not on file     Homeless in the Last Year: No       No Known Allergies      Current Outpatient Medications:     cetirizine (ZyrTEC) 10 mg tablet, Take 1 tablet (10 mg total) by mouth daily, Disp: 60 tablet, Rfl: 2    fluticasone (FLONASE) 50 mcg/act nasal spray, 1 spray into each nostril daily, Disp: 60 g, Rfl: 2    hydroCHLOROthiazide 25 mg tablet, Take 1 tablet (25 mg total) by mouth daily, Disp: 90 tablet, Rfl: 5    ibuprofen (MOTRIN) 800 mg tablet, Take 1 tablet (800 mg total) by mouth every 8 (eight) hours, Disp: 30 tablet, Rfl: 0    lisinopril (ZESTRIL) 20 mg tablet, Take 1 tablet (20 mg total) by mouth daily, Disp: 90 tablet, Rfl: 1    Semaglutide-Weight  "Management (WEGOVY) 0.5 MG/0.5ML, Inject 0.5 mL (0.5 mg total) under the skin once a week, Disp: 2 mL, Rfl: 5    ammonium lactate (LAC-HYDRIN) 12 % cream, Apply topically as needed for dry skin (Patient not taking: Reported on 9/24/2024), Disp: 385 g, Rfl: 1    methocarbamol (ROBAXIN) 500 mg tablet, Take 1 tablet (500 mg total) by mouth 4 (four) times a day (Patient not taking: Reported on 9/24/2024), Disp: 40 tablet, Rfl: 0    methylPREDNISolone 4 MG tablet therapy pack, Use as directed on package (Patient not taking: Reported on 9/24/2024), Disp: 21 each, Rfl: 0    oxymetazoline (AFRIN) 0.05 % nasal spray, 2 sprays by Each Nare route 2 (two) times a day (Patient not taking: Reported on 9/24/2024), Disp: 15 mL, Rfl: 0    sodium chloride (OCEAN) 0.65 % nasal spray, 1 spray into each nostril as needed for rhinitis (Patient not taking: Reported on 8/27/2024), Disp: 88 mL, Rfl: 0    /72 (BP Location: Left arm, Patient Position: Sitting, Cuff Size: Extra-Large)   Pulse 78   Resp 18   Ht 5' 8\" (1.727 m)   Wt (!) 145 kg (319 lb 3.2 oz)   LMP  (LMP Unknown)   SpO2 98%   BMI 48.53 kg/m²     Physical Exam  Vitals and nursing note reviewed.   Constitutional:       Appearance: Normal appearance.   HENT:      Head: Normocephalic and atraumatic.   Eyes:      Extraocular Movements: Extraocular movements intact.   Cardiovascular:      Pulses:           Dorsalis pedis pulses are 2+ on the right side and 2+ on the left side.      Heart sounds: Normal heart sounds.   Pulmonary:      Breath sounds: Normal breath sounds.   Musculoskeletal:         General: Swelling present.      Left lower leg: Edema present.      Comments: Chronic hyperpigmentation of left distal lower leg with skin thickening and fibrosis    Skin:     General: Skin is warm.   Neurological:      General: No focal deficit present.      Mental Status: She is alert and oriented to person, place, and time.   Psychiatric:         Behavior: Behavior normal. "       Administrative Statements   I have spent a total time of 30 minutes in caring for this patient on the day of the visit/encounter including Prognosis, Risks and benefits of tx options, Instructions for management, Patient and family education, Importance of tx compliance, Risk factor reductions, Impressions, Counseling / Coordination of care, Documenting in the medical record, Reviewing / ordering tests, medicine, procedures  , and Obtaining or reviewing history  .

## 2024-09-26 NOTE — ASSESSMENT & PLAN NOTE
54-year-old female with obesity, BMI 48, prediabetes, OA, chronic left lower extremity edema after skin biopsy March 2022, recurrent left lower extremity cellulitis.    Patient presents to the office for evaluation of left lower extremity edema    -Chronic LLE stasis changes, lipodermatosclerosis and edema.  Stage II lymphedema  -She has tried multiple different compression though difficulty with sizing  -She is attempting weight loss with Wegovy.  She is lost 15 pounds thus far  -Patient would benefit from lymphedema pumps.  Ordered for pneumatic compression pumps twice daily for 1 hour each  -Due to difficulty with compression.Prescribed Compre flex compression. Measurements taken  -Recommended continued weight loss, periodic leg elevation and regular exercise  -Moisturize skin daily to maintain skin integrity  -Follow-up in the office in 1 month to reevaluate    Orders:    CompreFlex    Pneumatic compression pumps

## 2024-10-02 ENCOUNTER — OFFICE VISIT (OUTPATIENT)
Dept: FAMILY MEDICINE CLINIC | Facility: CLINIC | Age: 54
End: 2024-10-02

## 2024-10-02 VITALS
HEART RATE: 93 BPM | SYSTOLIC BLOOD PRESSURE: 126 MMHG | RESPIRATION RATE: 18 BRPM | DIASTOLIC BLOOD PRESSURE: 72 MMHG | OXYGEN SATURATION: 97 % | HEIGHT: 68 IN | WEIGHT: 293 LBS | BODY MASS INDEX: 44.41 KG/M2 | TEMPERATURE: 96.3 F

## 2024-10-02 DIAGNOSIS — I10 BENIGN HYPERTENSION: ICD-10-CM

## 2024-10-02 DIAGNOSIS — H02.846 SWELLING OF EYELID, LEFT: ICD-10-CM

## 2024-10-02 DIAGNOSIS — E66.813 CLASS 3 SEVERE OBESITY DUE TO EXCESS CALORIES WITHOUT SERIOUS COMORBIDITY WITH BODY MASS INDEX (BMI) OF 45.0 TO 49.9 IN ADULT (HCC): Primary | ICD-10-CM

## 2024-10-02 DIAGNOSIS — H60.543 DERMATITIS OF EAR CANAL, BILATERAL: ICD-10-CM

## 2024-10-02 DIAGNOSIS — E66.01 CLASS 3 SEVERE OBESITY DUE TO EXCESS CALORIES WITHOUT SERIOUS COMORBIDITY WITH BODY MASS INDEX (BMI) OF 45.0 TO 49.9 IN ADULT (HCC): Primary | ICD-10-CM

## 2024-10-02 PROCEDURE — 99213 OFFICE O/P EST LOW 20 MIN: CPT | Performed by: FAMILY MEDICINE

## 2024-10-02 RX ORDER — ONDANSETRON 4 MG/1
4 TABLET, FILM COATED ORAL EVERY 8 HOURS PRN
Qty: 25 TABLET | Refills: 0 | Status: SHIPPED | OUTPATIENT
Start: 2024-10-02

## 2024-10-02 RX ORDER — LISINOPRIL 20 MG/1
20 TABLET ORAL DAILY
Qty: 90 TABLET | Refills: 3 | Status: SHIPPED | OUTPATIENT
Start: 2024-10-02

## 2024-10-02 RX ORDER — TRIAMCINOLONE ACETONIDE 0.25 MG/G
CREAM TOPICAL 2 TIMES DAILY
Qty: 15 G | Refills: 0 | Status: SHIPPED | OUTPATIENT
Start: 2024-10-02

## 2024-10-02 NOTE — ASSESSMENT & PLAN NOTE
-BP today of 126/72 and at goal of less than 140/90 per HILDA  -Home medications include hydrochlorothiazide 25 mg daily and lisinopril 20 mg daily  -Requesting refills    PLAN  -Lisinopril refilled, hydrochlorothiazide no need to refill at this time    Orders:    lisinopril (ZESTRIL) 20 mg tablet; Take 1 tablet (20 mg total) by mouth daily

## 2024-10-02 NOTE — ASSESSMENT & PLAN NOTE
-Chronically pruritic eyes, likely seasonal  -Although previously prescribed Zyrtec patient states that pharmacy does not cover it so she buys over-the-counter loratadine    PLAN  -Recommended warm compresses and over-the-counter antihistamine drops  -Suggested trying a different brand of antihistamine

## 2024-10-02 NOTE — ASSESSMENT & PLAN NOTE
Wt Readings from Last 3 Encounters:   10/02/24 (!) 142 kg (313 lb 12.8 oz)   09/24/24 (!) 145 kg (319 lb 3.2 oz)   08/27/24 (!) 143 kg (316 lb)     -Currently on 0.5 mg Wegovy weekly  -Initially had a loss of 8.5 pounds, in the past month she has lost about 2-1/2 pounds  -states appetite has increased; now eating about two meals a day  -has not started exercising yet because knees hurt.     PLAN  -Today will increase Wegovy dose to 1mg weekly  -Education/counseling  -Provided with zofran in case has nausea and vomiting with dose increase as she did when initially began wegovy  -Gave her the option to maintain a food diary to review on our follow-up visit   -Follow-up in 1 month    Orders:    Semaglutide-Weight Management (WEGOVY) 1 MG/0.5ML; Inject 0.5 mL (1 mg total) under the skin once a week    ondansetron (ZOFRAN) 4 mg tablet; Take 1 tablet (4 mg total) by mouth every 8 (eight) hours as needed for nausea or vomiting

## 2024-10-02 NOTE — ASSESSMENT & PLAN NOTE
Wt Readings from Last 3 Encounters:   10/02/24 (!) 142 kg (313 lb 12.8 oz)   09/24/24 (!) 145 kg (319 lb 3.2 oz)   08/27/24 (!) 143 kg (316 lb)     -Currently on 0.5 mg Wegovy weekly  -Initially had a loss of 8.5 pounds, in the past month she has lost about 2-1/2 pounds  -states appetite has increased; now eating about two meals a day  -has not started exercising yet because knees hurt.     PLAN  -Today will increase Wegovy dose to 1mg weekly  -education/counseling  -provided with zofran in case has nausea and vomiting with dose increase as she did when initially began wegovy  -gave her the option to maintain a food diary to review on our follow-up visit.     Orders:    Semaglutide-Weight Management (WEGOVY) 1 MG/0.5ML; Inject 0.5 mL (1 mg total) under the skin once a week    ondansetron (ZOFRAN) 4 mg tablet; Take 1 tablet (4 mg total) by mouth every 8 (eight) hours as needed for nausea or vomiting

## 2024-10-02 NOTE — PROGRESS NOTES
Ambulatory Visit  Name: Elizabeth Nelson      : 1970      MRN: [id number]  Encounter Provider: Rebecca Fay Kab-Perlman, MD  Encounter Date: 10/2/2024   Encounter department: [facility]    Assessment & Plan  Class 3 severe obesity due to excess calories without serious comorbidity with body mass index (BMI) of 45.0 to 49.9 in adult (HCC)  Wt Readings from Last 3 Encounters:   10/02/24 (!) 142 kg (313 lb 12.8 oz)   24 (!) 145 kg (319 lb 3.2 oz)   24 (!) 143 kg (316 lb)     -Currently on 0.5 mg Wegovy weekly  -Initially had a loss of 8.5 pounds, in the past month she has lost about 2-1/2 pounds  -states appetite has increased; now eating about two meals a day  -has not started exercising yet because knees hurt.     PLAN  -Today will increase Wegovy dose to 1mg weekly  -Education/counseling  -Provided with zofran in case has nausea and vomiting with dose increase as she did when initially began wegovy  -Gave her the option to maintain a food diary to review on our follow-up visit   -Follow-up in 1 month    Orders:    Semaglutide-Weight Management (WEGOVY) 1 MG/0.5ML; Inject 0.5 mL (1 mg total) under the skin once a week    ondansetron (ZOFRAN) 4 mg tablet; Take 1 tablet (4 mg total) by mouth every 8 (eight) hours as needed for nausea or vomiting    Benign hypertension  -BP today of 126/72 and at goal of less than 140/90 per HILDA  -Home medications include hydrochlorothiazide 25 mg daily and lisinopril 20 mg daily  -Requesting refills    PLAN  -Lisinopril refilled, hydrochlorothiazide no need to refill at this time    Orders:    lisinopril (ZESTRIL) 20 mg tablet; Take 1 tablet (20 mg total) by mouth daily    Dermatitis of ear canal, bilateral  -Found incidentally on physical exam  -Uses Q-tips daily and has a lot of pruritus    PLAN  -Triamcinolone with education/counseling on steroid use  -Encouraged to discontinue using Q-tips daily    Orders:    triamcinolone (KENALOG)  0.025 % cream; Apply topically 2 (two) times a day    Swelling of eyelid, left  -Chronically pruritic eyes, likely seasonal  -Although previously prescribed Zyrtec patient states that pharmacy does not cover it so she buys over-the-counter loratadine    PLAN  -Recommended warm compresses and over-the-counter antihistamine drops  -Suggested trying a different brand of antihistamine            History of Present Illness       Elizabeth Nelson is a 54-year-old female patient with a BMI of 47.71 presenting today to follow-up regarding her weight loss.  Additionally she would like refills on her blood pressure medications.      History obtained from : patient  Review of Systems   Constitutional:  Positive for appetite change. Negative for fever.   Skin:  Negative for rash.   Hematological:  Does not bruise/bleed easily.     Pertinent Medical History     Chronic venous insufficiency and lymphedema    Medical History Reviewed by provider this encounter:  Allergies  Meds           Medical History Reviewed by provider this encounter:       Past Medical History   Past Medical History:   Diagnosis Date    Arthritis     knees    Hyperlipidemia     Hypertension     Prediabetes     Venous insufficiency of lower extremity      Past Surgical History:   Procedure Laterality Date    CHOLECYSTECTOMY  2006    [place] COLONOSCOPY FLX DX W/COLLJ SPEC WHEN PFRMD Left 05/02/2019    Procedure: COLONOSCOPY;  Surgeon: Dave Badillo MD;  Location: [facility];  Service: Gastroenterology    [place] LAPAROSCOPIC APPENDECTOMY N/A 03/22/2019    Procedure: LAPAROSCOPIC APPENDECTOMY;  Surgeon: Ayo Monreal MD;  Location:  MAIN OR;  Service: General    [place] REPAIR FIRST ABDOMINAL WALL HERNIA N/A 08/28/2020    Procedure: REPAIR INCISIONAL HERNIA;  Surgeon: Ayo Monreal MD;  Location:  MAIN OR;  Service: General     Family History   Problem Relation Age of Onset    Colon cancer Mother 63    Stroke Father     No Known Problems Sister     No Known Problems  Daughter     No Known Problems Maternal Grandmother     No Known Problems Maternal Grandfather     No Known Problems Paternal Grandmother     No Known Problems Paternal Grandfather     No Known Problems Paternal Aunt     No Known Problems Paternal Aunt     Breast cancer Neg Hx     Ovarian cancer Neg Hx      Current Outpatient Medications on File Prior to Visit   Medication Sig Dispense Refill    ammonium lactate (LAC-HYDRIN) 12 % cream Apply topically as needed for dry skin (Patient not taking: Reported on 9/24/2024) 385 g 1    cetirizine (ZyrTEC) 10 mg tablet Take 1 tablet (10 mg total) by mouth daily 60 tablet 2    fluticasone (FLONASE) 50 mcg/act nasal spray 1 spray into each nostril daily 60 g 2    hydroCHLOROthiazide 25 mg tablet Take 1 tablet (25 mg total) by mouth daily 90 tablet 5    ibuprofen (MOTRIN) 800 mg tablet Take 1 tablet (800 mg total) by mouth every 8 (eight) hours 30 tablet 0    methocarbamol (ROBAXIN) 500 mg tablet Take 1 tablet (500 mg total) by mouth 4 (four) times a day (Patient not taking: Reported on 9/24/2024) 40 tablet 0    methylPREDNISolone 4 MG tablet therapy pack Use as directed on package (Patient not taking: Reported on 9/24/2024) 21 each 0    oxymetazoline (AFRIN) 0.05 % nasal spray 2 sprays by Each Nare route 2 (two) times a day (Patient not taking: Reported on 9/24/2024) 15 mL 0    sodium chloride (OCEAN) 0.65 % nasal spray 1 spray into each nostril as needed for rhinitis (Patient not taking: Reported on 8/27/2024) 88 mL 0    [DISCONTINUED] lisinopril (ZESTRIL) 20 mg tablet Take 1 tablet (20 mg total) by mouth daily 90 tablet 1    [DISCONTINUED] Semaglutide-Weight Management (WEGOVY) 0.5 MG/0.5ML Inject 0.5 mL (0.5 mg total) under the skin once a week 2 mL 5     No current facility-administered medications on file prior to visit.   No Known Allergies   Current Outpatient Medications on File Prior to Visit   Medication Sig Dispense Refill    ammonium lactate (LAC-HYDRIN) 12 % cream  "Apply topically as needed for dry skin (Patient not taking: Reported on 9/24/2024) 385 g 1    cetirizine (ZyrTEC) 10 mg tablet Take 1 tablet (10 mg total) by mouth daily 60 tablet 2    fluticasone (FLONASE) 50 mcg/act nasal spray 1 spray into each nostril daily 60 g 2    hydroCHLOROthiazide 25 mg tablet Take 1 tablet (25 mg total) by mouth daily 90 tablet 5    ibuprofen (MOTRIN) 800 mg tablet Take 1 tablet (800 mg total) by mouth every 8 (eight) hours 30 tablet 0    methocarbamol (ROBAXIN) 500 mg tablet Take 1 tablet (500 mg total) by mouth 4 (four) times a day (Patient not taking: Reported on 9/24/2024) 40 tablet 0    methylPREDNISolone 4 MG tablet therapy pack Use as directed on package (Patient not taking: Reported on 9/24/2024) 21 each 0    oxymetazoline (AFRIN) 0.05 % nasal spray 2 sprays by Each Nare route 2 (two) times a day (Patient not taking: Reported on 9/24/2024) 15 mL 0    sodium chloride (OCEAN) 0.65 % nasal spray 1 spray into each nostril as needed for rhinitis (Patient not taking: Reported on 8/27/2024) 88 mL 0    [DISCONTINUED] lisinopril (ZESTRIL) 20 mg tablet Take 1 tablet (20 mg total) by mouth daily 90 tablet 1    [DISCONTINUED] Semaglutide-Weight Management (WEGOVY) 0.5 MG/0.5ML Inject 0.5 mL (0.5 mg total) under the skin once a week 2 mL 5     No current facility-administered medications on file prior to visit.      Social History     Tobacco Use    Smoking status: Never     Passive exposure: Never    Smokeless tobacco: Never   Vaping Use    Vaping status: Never Used   Substance and Sexual Activity    Alcohol use: Yes     Comment: couple times/month    Drug use: Never    Sexual activity: Yes     Partners: Male     Birth control/protection: Post-menopausal         Objective     /72 (BP Location: Left arm, Patient Position: Sitting, Cuff Size: Large)   Pulse 93   Temp (!) 96.3 °F (35.7 °C) (Temporal)   Resp 18   Ht 5' 8\" (1.727 m)   Wt (!) 142 kg (313 lb 12.8 oz)   LMP  (LMP Unknown) "   SpO2 97%   BMI 47.71 kg/m²     Physical Exam  Constitutional:       Appearance: Normal appearance. She is obese.   HENT:      Head: Normocephalic and atraumatic.      Right Ear: Tympanic membrane normal. There is no impacted cerumen.      Left Ear: Tympanic membrane normal. There is no impacted cerumen.      Ears:      Comments: Erythematous external ear canals bilaterally     Nose: Nose normal.      Mouth/Throat:      Mouth: Mucous membranes are moist.      Pharynx: Oropharynx is clear.   Eyes:      Extraocular Movements: Extraocular movements intact.      Conjunctiva/sclera: Conjunctivae normal.      Comments: -Mild swelling of left upper eyelid   Cardiovascular:      Rate and Rhythm: Normal rate and regular rhythm.      Pulses: Normal pulses.      Heart sounds: Normal heart sounds. No murmur heard.     No friction rub. No gallop.   Pulmonary:      Effort: Pulmonary effort is normal.      Breath sounds: Normal breath sounds. No wheezing, rhonchi or rales.   Skin:     General: Skin is warm.   Neurological:      Mental Status: She is alert and oriented to person, place, and time.   Psychiatric:         Mood and Affect: Mood normal.         Behavior: Behavior normal.

## 2024-10-08 ENCOUNTER — OFFICE VISIT (OUTPATIENT)
Dept: OBGYN CLINIC | Facility: MEDICAL CENTER | Age: 54
End: 2024-10-08
Payer: COMMERCIAL

## 2024-10-08 VITALS
WEIGHT: 293 LBS | BODY MASS INDEX: 44.41 KG/M2 | DIASTOLIC BLOOD PRESSURE: 72 MMHG | HEIGHT: 68 IN | HEART RATE: 90 BPM | SYSTOLIC BLOOD PRESSURE: 117 MMHG

## 2024-10-08 DIAGNOSIS — M17.0 BILATERAL PRIMARY OSTEOARTHRITIS OF KNEE: Primary | ICD-10-CM

## 2024-10-08 DIAGNOSIS — M75.31 CALCIFIC TENDONITIS OF RIGHT SHOULDER: ICD-10-CM

## 2024-10-08 DIAGNOSIS — M25.562 PAIN IN BOTH KNEES, UNSPECIFIED CHRONICITY: ICD-10-CM

## 2024-10-08 DIAGNOSIS — M25.561 PAIN IN BOTH KNEES, UNSPECIFIED CHRONICITY: ICD-10-CM

## 2024-10-08 PROCEDURE — 20610 DRAIN/INJ JOINT/BURSA W/O US: CPT | Performed by: ORTHOPAEDIC SURGERY

## 2024-10-08 PROCEDURE — 99214 OFFICE O/P EST MOD 30 MIN: CPT | Performed by: ORTHOPAEDIC SURGERY

## 2024-10-08 RX ORDER — BUPIVACAINE HYDROCHLORIDE 5 MG/ML
2 INJECTION, SOLUTION EPIDURAL; INTRACAUDAL
Status: COMPLETED | OUTPATIENT
Start: 2024-10-08 | End: 2024-10-08

## 2024-10-08 RX ORDER — TRIAMCINOLONE ACETONIDE 40 MG/ML
40 INJECTION, SUSPENSION INTRA-ARTICULAR; INTRAMUSCULAR
Status: COMPLETED | OUTPATIENT
Start: 2024-10-08 | End: 2024-10-08

## 2024-10-08 RX ADMIN — BUPIVACAINE HYDROCHLORIDE 2 ML: 5 INJECTION, SOLUTION EPIDURAL; INTRACAUDAL at 10:15

## 2024-10-08 RX ADMIN — TRIAMCINOLONE ACETONIDE 40 MG: 40 INJECTION, SUSPENSION INTRA-ARTICULAR; INTRAMUSCULAR at 10:15

## 2024-10-08 NOTE — PROGRESS NOTES
Ortho Sports Medicine Shoulder Follow Up Visit     Assesment:   54 y.o. female right shoulder calcific tendonitis with mild glenohumeral and AC osteoarthritis.  Severe bilateral knee osteoarthritis.    Plan:    Conservative treatment:    Explained we can consider US guided needling and lavage to right shoulder if it continues to bother her; however, she notes significant relief with right subacromial CSI performed on 08/27/2024.  Bilateral knee CSI performed during today's visit. Explained if no improvement, patient should call our office to order visco supplementation. Explained this can be performed 6 weeks after CSI but to call sooner to have visco ordered.  Goal weight 263 lb if she would like to consider TKA. Patient reports she recently started Wegovy for weight loss and would like to see if losing weight helps her knee pain. She is currently not interested in TKA.      Imaging:    All imaging from today was reviewed by myself and explained to the patient.       Injection:    The risks and benefits of the injection (which include but are not limited to: infection, bleeding,damage to nerve/artery, need for further intervention), as well as the risks and benefits of all alternative treatments were explained and understood.  The patient elected to proceed with injection. The procedure was done with aseptic technique, and the patient tolerated the procedure well with no complications.    A corticosteroid injection of the subacromial space was performed.  The patient should take 1-2 days off of activity, with gradual return to activity as tolerated.  Ice to the shoulder 1-2 times daily for 20 minutes, for next 24-48 hrs.    Large joint arthrocentesis: bilateral knee  Universal Protocol:  procedure performed by consultantConsent: Verbal consent obtained.  Risks and benefits: risks, benefits and alternatives were discussed  Consent given by: patient  Timeout called at: 10/8/2024 10:39 AM.  Patient identity confirmed:  verbally with patient  Supporting Documentation  Indications: pain   Procedure Details  Location: knee - bilateral knee  Needle size: 22 G  Ultrasound guidance: no  Approach: lateral    Medications (Right): 2 mL bupivacaine (PF) 0.5 %; 40 mg triamcinolone acetonide 40 mg/mLMedications (Left): 2 mL bupivacaine (PF) 0.5 %; 40 mg triamcinolone acetonide 40 mg/mL   Patient tolerance: patient tolerated the procedure well with no immediate complications              Surgery:     No surgery is recommended at this point, continue with conservative treatment plan as noted.      Follow up:    PRN      Chief Complaint   Patient presents with    Right Shoulder - Follow-up     Pain with movement         History of Present Illness:    The patient is returns for follow up of right shoulder calcific tendonitis. Patient had subacromial CSI performed on 08/27/2024 with significant relief of her symptoms. She reports mild shoulder pain with abduction and overhead movements. Pain is lateral and in the posterior shoulder. Patient declined PT at her previous appointment.     Patient reports bilateral knee pain for years she was previously treated for with CSI performed by her PCP in 2021 with no relief of her symptoms. Pain today is located medial and posterior. She is interested in injections for her knee. She reports her PCP discussed visco supplementation with her. No prior knee injuries or surgeries. Left knee is more symptomatic for her. Patient reports prior history of MRSA vs vasculitis of left lower leg she took antibiotics for.      Past Medical, Social and Family History:  Past Medical History:   Diagnosis Date    Arthritis     knees    Hyperlipidemia     Hypertension     Prediabetes     Venous insufficiency of lower extremity      Past Surgical History:   Procedure Laterality Date    CHOLECYSTECTOMY  2006    [place] COLONOSCOPY FLX DX W/COLLJ SPEC WHEN PFRMD Left 05/02/2019    Procedure: COLONOSCOPY;  Surgeon: Dave Badillo MD;   Location: [facility];  Service: Gastroenterology    [place] LAPAROSCOPIC APPENDECTOMY N/A 03/22/2019    Procedure: LAPAROSCOPIC APPENDECTOMY;  Surgeon: Ayo Monreal MD;  Location:  MAIN OR;  Service: General    [place] REPAIR FIRST ABDOMINAL WALL HERNIA N/A 08/28/2020    Procedure: REPAIR INCISIONAL HERNIA;  Surgeon: Ayo Monreal MD;  Location:  MAIN OR;  Service: General     No Known Allergies  Current Outpatient Medications on File Prior to Visit   Medication Sig Dispense Refill    cetirizine (ZyrTEC) 10 mg tablet Take 1 tablet (10 mg total) by mouth daily 60 tablet 2    fluticasone (FLONASE) 50 mcg/act nasal spray 1 spray into each nostril daily 60 g 2    hydroCHLOROthiazide 25 mg tablet Take 1 tablet (25 mg total) by mouth daily 90 tablet 5    ibuprofen (MOTRIN) 800 mg tablet Take 1 tablet (800 mg total) by mouth every 8 (eight) hours 30 tablet 0    lisinopril (ZESTRIL) 20 mg tablet Take 1 tablet (20 mg total) by mouth daily 90 tablet 3    ondansetron (ZOFRAN) 4 mg tablet Take 1 tablet (4 mg total) by mouth every 8 (eight) hours as needed for nausea or vomiting 25 tablet 0    Semaglutide-Weight Management (WEGOVY) 1 MG/0.5ML Inject 0.5 mL (1 mg total) under the skin once a week 2 mL 1    ammonium lactate (LAC-HYDRIN) 12 % cream Apply topically as needed for dry skin (Patient not taking: Reported on 9/24/2024) 385 g 1    methocarbamol (ROBAXIN) 500 mg tablet Take 1 tablet (500 mg total) by mouth 4 (four) times a day (Patient not taking: Reported on 9/24/2024) 40 tablet 0    methylPREDNISolone 4 MG tablet therapy pack Use as directed on package (Patient not taking: Reported on 9/24/2024) 21 each 0    oxymetazoline (AFRIN) 0.05 % nasal spray 2 sprays by Each Nare route 2 (two) times a day (Patient not taking: Reported on 9/24/2024) 15 mL 0    sodium chloride (OCEAN) 0.65 % nasal spray 1 spray into each nostril as needed for rhinitis (Patient not taking: Reported on 8/27/2024) 88 mL 0    triamcinolone (KENALOG) 0.025  % cream Apply topically 2 (two) times a day 15 g 0     No current facility-administered medications on file prior to visit.     Social History     Socioeconomic History    Marital status: /Civil Union     Spouse name: Not on file    Number of children: Not on file    Years of education: Not on file    Highest education level: Not on file   Occupational History    Not on file   Tobacco Use    Smoking status: Never     Passive exposure: Never    Smokeless tobacco: Never   Vaping Use    Vaping status: Never Used   Substance and Sexual Activity    Alcohol use: Yes     Comment: couple times/month    Drug use: Never    Sexual activity: Yes     Partners: Male     Birth control/protection: Post-menopausal   Other Topics Concern    Not on file   Social History Narrative    ** Merged History Encounter **          Social Determinants of Health     Financial Resource Strain: Low Risk  (8/16/2024)    Overall Financial Resource Strain (CARDIA)     Difficulty of Paying Living Expenses: Not hard at all   Food Insecurity: No Food Insecurity (8/16/2024)    Hunger Vital Sign     Worried About Running Out of Food in the Last Year: Never true     Ran Out of Food in the Last Year: Never true   Transportation Needs: No Transportation Needs (8/16/2024)    PRAPARE - Transportation     Lack of Transportation (Medical): No     Lack of Transportation (Non-Medical): No   Physical Activity: Not on file   Stress: No Stress Concern Present (10/7/2021)    [employer] - Occupational Stress Questionnaire     Feeling of Stress : Not at all   Social Connections: Not on file   Intimate Partner Violence: Not on file   Housing Stability: Unknown (7/3/2024)    Housing Stability Vital Sign     Unable to Pay for Housing in the Last Year: No     Number of Times Moved in the Last Year: Not on file     Homeless in the Last Year: No       I have reviewed the past medical, surgical, social and family history, medications and  "allergies as documented in the EMR.    Review of systems: ROS is negative other than that noted in the HPI.  Constitutional: Negative for fatigue and fever.      Physical Exam:    Blood pressure 117/72, pulse 90, height 5' 8\" (1.727 m), weight (!) 142 kg (314 lb), not currently breastfeeding.    General/Constitutional: NAD, well developed, well nourished  HENT: Normocephalic, atraumatic  CV: Intact distal pulses, regular rate  Resp: No respiratory distress or labored breathing  GI: Soft and non-tender   Lymphatic: No lymphadenopathy palpated  Neuro: Alert and Oriented x 3, no focal deficits  Psych: Normal mood, normal affect, normal judgement, normal behavior  Skin: Warm, dry, no rashes, no erythema      Shoulder focused exam:       RIGHT LEFT    Scapula Atrophy Negative Negative     Winging Negative Negative     Protraction Negative Negative    Rotator cuff SS 5/5 5/5     IS 5/5 5/5     SubS 5/5 5/5    ROM     170     ER0 60 60     ER90 90    90     IR90 T6    T6     IRb T6    T6    TTP: AC Negative Negative     Biceps Negative Negative     Coracoid Negative Negative    Special Tests: O'Briens Negative Negative     Jones-shear Negative Negative     Cross body Adduction Negative Negative     Speeds  Negative Negative     Yazmin's Negative Negative     Whipple Positive Negative       Neer Negative Negative     Padilla Negative Negative    Instability: Apprehension & relocation not tested not tested     Load & shift not tested not tested    Other: Crank Negative Negative             Knee Exam (focused):                RIGHT LEFT   ROM:   0-100 0-100   Palpation: Effusion negative negative     MJL tenderness Positive  TTP pes anserine Positive  TTP pes anserine     LJL tenderness Negative Negative   Meniscus: Ayo Negative Negative    Apley's Compression Negative Negative   Instability: Varus stable stable     Valgus stable stable   Special Tests: Lachman Negative Negative     Posterior drawer Negative Negative "     Anterior drawer Negative Negative     Pivot shift not tested not tested     Dial not tested not tested   Patella: Palpation no tenderness no tenderness     Mobility 1/4 1/4     Apprehension Negative Negative   Other: Single leg 1/4 squat not tested not tested      LE NV Exam: +2 DP/PT pulses bilaterally  Sensation intact to light touch L2-S1 bilaterally     Bilateral hip ROM demonstrates no pain actively or passively    No calf tenderness to palpation bilaterally      UE NV Exam: +2 Radial pulses bilaterally  Sensation intact to light touch C5-T1 bilaterally, Radial/median/ulnar nerve motor intact    Cervical ROM is full without pain, numbness or tingling      Shoulder Imaging    Xrays of the RIGHT shoulder from the prior visit were reviewed again with the patient today    Xrays of the bilateral knees from 05/03/2021 was reviewed which demonstrates severe tricompartmental osteoarthritis with joint space narrowing, osteophyte formation, and subchondral sclerosis. Right worse than left on xray.        Scribe Attestation      I,:  Danielle Monge am acting as a scribe while in the presence of the attending physician.:       I,:  Jacob Treviño DO personally performed the services described in this documentation    as scribed in my presence.:

## 2024-10-24 ENCOUNTER — ANNUAL EXAM (OUTPATIENT)
Dept: OBGYN CLINIC | Facility: CLINIC | Age: 54
End: 2024-10-24

## 2024-10-24 VITALS
HEART RATE: 100 BPM | SYSTOLIC BLOOD PRESSURE: 137 MMHG | WEIGHT: 293 LBS | DIASTOLIC BLOOD PRESSURE: 85 MMHG | BODY MASS INDEX: 46.16 KG/M2

## 2024-10-24 DIAGNOSIS — Z12.4 SCREENING FOR CERVICAL CANCER: ICD-10-CM

## 2024-10-24 DIAGNOSIS — N95.0 POSTMENOPAUSAL BLEEDING: ICD-10-CM

## 2024-10-24 DIAGNOSIS — Z12.31 ENCOUNTER FOR SCREENING MAMMOGRAM FOR MALIGNANT NEOPLASM OF BREAST: ICD-10-CM

## 2024-10-24 DIAGNOSIS — Z12.39 ENCOUNTER FOR BREAST CANCER SCREENING USING NON-MAMMOGRAM MODALITY: ICD-10-CM

## 2024-10-24 DIAGNOSIS — Z01.419 ENCOUNTER FOR GYNECOLOGICAL EXAMINATION WITHOUT ABNORMAL FINDING: Primary | ICD-10-CM

## 2024-10-24 PROBLEM — H66.91 RIGHT OTITIS MEDIA: Status: RESOLVED | Noted: 2024-01-23 | Resolved: 2024-10-24

## 2024-10-24 PROBLEM — H02.846 SWELLING OF EYELID, LEFT: Status: RESOLVED | Noted: 2024-10-02 | Resolved: 2024-10-24

## 2024-10-24 PROBLEM — J06.9 UPPER RESPIRATORY TRACT INFECTION: Status: RESOLVED | Noted: 2018-10-19 | Resolved: 2024-10-24

## 2024-10-24 PROCEDURE — S0612 ANNUAL GYNECOLOGICAL EXAMINA: HCPCS | Performed by: NURSE PRACTITIONER

## 2024-10-24 NOTE — PATIENT INSTRUCTIONS
Thank you for your confidence in our team.   We appreciate you and welcome your feedback.   If you receive a survey from us, please take a few moments to let us know how we are doing.   Sincerely,  DAVID Wright       OBESITY     Obesity is defined as a body mass index (BMI) which is greater than 30. Your Body mass index is 46.16 kg/m²..    The risks of obesity include  many health problems, such as injuries or physical disability. You may need tests to check for the following:  Diabetes     High blood pressure or high cholesterol     Heart disease     Gallbladder or liver disease     Cancer of the colon, breast, prostate, liver, or kidney     Sleep apnea     Arthritis or gout    Seek care immediately if:   You have a severe headache, confusion, or difficulty speaking.     You have weakness on one side of your body.     You have chest pain, sweating, or shortness of breath.    Contact your healthcare provider if:   You have symptoms of gallbladder or liver disease, such as pain in your upper abdomen.    You have knee or hip pain and discomfort while walking.     You have symptoms of diabetes, such as intense hunger and thirst, and frequent urination.     You have symptoms of sleep apnea, such as snoring or daytime sleepiness.     You have questions or concerns about your condition or care.    Treatment for obesity  focuses on helping you lose weight to improve your health. Even a small decrease in BMI can reduce the risk for many health problems. Your healthcare provider will help you set a weight-loss goal.  Lifestyle changes  are the first step in treating obesity. These include making healthy food choices and getting regular physical activity. Your healthcare provider may suggest a weight-loss program that involves coaching, education, and therapy.     Medicine  may help you lose weight when it is used with a healthy diet and physical activity.     Surgery  can help you lose weight if you are very  obese and have other health problems. There are several types of weight-loss surgery. Ask your healthcare provider for more information.    Be successful losing weight:   Set small, realistic goals.  An example of a small goal is to walk for 20 minutes 5 days a week. Anther goal is to lose 5% of your body weight.    Tell friends, family members, and coworkers about your goals  and ask for their support. Ask a friend to lose weight with you, or join a weight-loss support group.    Identify foods or triggers that may cause you to overeat , and find ways to avoid them. Remove tempting high-calorie foods from your home and workplace. Place a bowl of fresh fruit on your kitchen counter. If stress causes you to eat, then find other ways to cope with stress.     Keep a diary to track what you eat and drink.  Also write down how many minutes of physical activity you do each day. Weigh yourself once a week and record it in your diary.    Eating changes:  You will need to eat 500 to 1,000 fewer calories each day than you currently eat to lose 1 to 2 pounds a week. The following changes will help you cut calories:  Eat smaller portions.  Use small plates, no larger than 9 inches in diameter. Fill your plate half full of fruits and vegetables. Measure your food using measuring cups until you know what a serving size looks like.     Eat 3 meals and 1 or 2 snacks each day.  Plan your meals in advance. Cook and eat at home most of the time. Eat slowly.     Eat fruits and vegetables at every meal.  They are low in calories and high in fiber, which makes you feel full. Do not add butter, margarine, or cream sauce to vegetables. Use herbs to season steamed vegetables.     Eat less fat and fewer fried foods.  Eat more baked or grilled chicken and fish. These protein sources are lower in calories and fat than red meat. Limit fast food. Dress your salads with olive oil and vinegar instead of bottled dressing.     Limit the amount of  sugar you eat.  Do not drink sugary beverages. Limit alcohol.    Activity changes:  Physical activity is good for your body in many ways. It helps you burn calories and build strong muscles. It decreases stress and depression, and improves your mood. It can also help you sleep better. Talk to your healthcare provider before you begin an exercise program.  Exercise for at least 30 minutes 5 days a week.  Start slowly. Set aside time each day for physical activity that you enjoy and that is convenient for you. It is best to do both weight training and an activity that increases your heart rate, such as walking, bicycling, or swimming.     Find ways to be more active.  Do yard work and housecleaning. Walk up the stairs instead of using elevators. Spend your leisure time going to events that require walking, such as outdoor festivals or fairs. This extra physical activity can help you lose weight and keep it off.    Follow up with your primary healthcare provider as directed.  You may need to meet with a dietitian. Write down your questions so you remember to ask them during your visits.

## 2024-10-24 NOTE — PROGRESS NOTES
ANNUAL GYNECOLOGICAL EXAMINATION    Elizabeth Nelson is a 54 y.o. female who presents today for annual GYN exam.  Her last pap smear was performed 10/18/2023 and result was NILM with negative HPV.  She reports no history of abnormal pap smears in her past.  Her last mammogram was performed 2024 and result was WNL.  She had colon cancer screening performed 2019 via colonoscopy and repeat was recommended in 10 years.  She had HIV screening performed 2021 and it was negative.  She reports menses as absent since , but reports for the past 4 days she has been experiencing vaginal spotting.  Her general medical history has been reviewed and she reports it as follows:    Past Medical History:   Diagnosis Date    Arthritis     knees    Hyperlipidemia     Hypertension     Prediabetes     Venous insufficiency of lower extremity      Past Surgical History:   Procedure Laterality Date    CHOLECYSTECTOMY      [place] COLONOSCOPY FLX DX W/COLLJ SPEC WHEN PFRMD Left 2019    Procedure: COLONOSCOPY;  Surgeon: Dave Badillo MD;  [facility]: [facility];  Service: Gastroenterology    [place] LAPAROSCOPIC APPENDECTOMY N/A 2019    Procedure: LAPAROSCOPIC APPENDECTOMY;  Surgeon: Ayo Monreal MD;  Location:  [facility] OR;  Service: General    [place] REPAIR FIRST ABDOMINAL WALL HERNIA N/A 2020    Procedure: REPAIR INCISIONAL HERNIA;  Surgeon: Ayo Monreal MD;  Location:  MAIN OR;  Service: General     OB History          4    Para   2    Term   2       0    AB   2    Living   2         SAB   2    IAB   0    Ectopic   0    Multiple   0    Live Births   2               Social History     Tobacco Use    Smoking status: Never     Passive exposure: Never    Smokeless tobacco: Never   Vaping Use    Vaping status: Never Used   Substance Use Topics    Alcohol use: Yes     Comment: couple times/month    Drug use: Never     Social History     Substance and Sexual Activity   Sexual Activity Yes    Partners: Male     Birth control/protection: Post-menopausal     Cancer-related family history includes Colon cancer (age of onset: 63) in her mother. There is no history of Breast cancer or Ovarian cancer.    Current Outpatient Medications   Medication Instructions    cetirizine (ZYRTEC) 10 mg, Oral, Daily    fluticasone (FLONASE) 50 mcg/act nasal spray 1 spray, Nasal, Daily    hydroCHLOROthiazide 25 mg, Oral, Daily    ibuprofen (MOTRIN) 800 mg, Oral, Every 8 hours    lisinopril (ZESTRIL) 20 mg, Oral, Daily    ondansetron (ZOFRAN) 4 mg, Oral, Every 8 hours PRN    Semaglutide-Weight Management (WEGOVY) 1 mg, Subcutaneous, Weekly    triamcinolone (KENALOG) 0.025 % cream Topical, 2 times daily       Review of Systems:  Review of Systems   Constitutional: Negative.    Gastrointestinal: Negative.    Genitourinary:  Positive for vaginal bleeding (spotting x4 days). Negative for difficulty urinating, pelvic pain and vaginal discharge.   Skin: Negative.        Physical Exam:  /85 (BP Location: Right arm, Patient Position: Sitting, Cuff Size: Large)   Pulse 100   Wt (!) 138 kg (303 lb 9.6 oz)   LMP  (LMP Unknown)   BMI 46.16 kg/m²   Physical Exam  Constitutional:       General: She is not in acute distress.     Appearance: She is well-developed.   Genitourinary:      Vulva normal.      No lesions in the vagina.      Vaginal bleeding present.        Right Adnexa: not tender and no mass present.     Left Adnexa: not tender and no mass present.     No cervical motion tenderness or lesion.      Uterus is not tender.   Breasts:     Right: No mass, nipple discharge, skin change or tenderness.      Left: No mass, nipple discharge, skin change or tenderness.   Neck:      Thyroid: No thyromegaly.   Cardiovascular:      Rate and Rhythm: Normal rate and regular rhythm.   Pulmonary:      Effort: Pulmonary effort is normal.   Abdominal:      Palpations: Abdomen is soft.      Tenderness: There is no abdominal tenderness.   Musculoskeletal:       Cervical back: Neck supple.   Neurological:      Mental Status: She is alert and oriented to person, place, and time.   Skin:     General: Skin is warm and dry.   Vitals reviewed.       Assessment/Plan:   1. Normal well-woman GYN exam.  2. Cervical cancer screening:  Normal cervical exam.  Pap smear not indicated at this time.  Has not received HPV vaccine in the past and is beyond the age of recommended administration.   3. STD screening:  Patient declines.   4. Breast cancer screening:  Normal breast exam.  Order placed for bilateral screening mammogram.  Reviewed breast self-awareness.   5. Colon cancer screening:  Up to date.   6. Depression Screening: Patient's depression screening was assessed with a PHQ-2 score of 0. Clinically patient does not have depression. No treatment is required.    7. BMI Counseling: Body mass index is 46.16 kg/m². Discussed the patient's BMI with her. The BMI is above normal. Nutrition recommendations include reducing portion sizes and decreasing overall calorie intake.   8. Postmenopausal Bleeding: Order placed for pelvic ultrasound.  Return 1 week after performed to review results and perform EMB.   9. Return to office in 1 year for annual GYN exam.    Reviewed with patient that test results are available in [facility] immediately, but that they will not necessarily be reviewed by me immediately.  Explained that I will review results at my earliest opportunity and contact patient appropriately.

## 2024-10-31 ENCOUNTER — HOSPITAL ENCOUNTER (OUTPATIENT)
Dept: ULTRASOUND IMAGING | Facility: HOSPITAL | Age: 54
End: 2024-10-31
Attending: NURSE PRACTITIONER
Payer: COMMERCIAL

## 2024-10-31 DIAGNOSIS — N95.0 POSTMENOPAUSAL BLEEDING: ICD-10-CM

## 2024-10-31 PROCEDURE — 76856 US EXAM PELVIC COMPLETE: CPT

## 2024-10-31 PROCEDURE — 76830 TRANSVAGINAL US NON-OB: CPT

## 2024-11-05 ENCOUNTER — OFFICE VISIT (OUTPATIENT)
Dept: VASCULAR SURGERY | Facility: CLINIC | Age: 54
End: 2024-11-05
Payer: COMMERCIAL

## 2024-11-05 VITALS
WEIGHT: 293 LBS | DIASTOLIC BLOOD PRESSURE: 80 MMHG | RESPIRATION RATE: 18 BRPM | OXYGEN SATURATION: 97 % | HEIGHT: 68 IN | BODY MASS INDEX: 44.41 KG/M2 | SYSTOLIC BLOOD PRESSURE: 118 MMHG | HEART RATE: 95 BPM

## 2024-11-05 DIAGNOSIS — I89.0 LYMPHEDEMA: Primary | ICD-10-CM

## 2024-11-05 PROCEDURE — 99213 OFFICE O/P EST LOW 20 MIN: CPT | Performed by: NURSE PRACTITIONER

## 2024-11-05 NOTE — PROGRESS NOTES
Ambulatory Visit  Name: Elizabeth Nelson      : 1970      MRN: [id number]  Encounter Provider: DAVID Singh  Encounter Date: 2024   Encounter department: [facility]    Assessment & Plan  Lymphedema  54-year-old female with obesity, BMI 48, prediabetes, OA, chronic left lower extremity edema after skin biopsy 2022, recurrent left lower extremity cellulitis and intermittent flare ups of LLE swelling      Patient returns to the office for 1 month follow up for left lower extremity edema     -Chronic LLE stasis changes, lipodermatosclerosis and edema.  Stage II lymphedema  -She has tried multiple different compression though difficulty with sizing.  Advised replacement compression as current pair of compression is worn out. Awaiting insurance approval for compreflex compression   -She is attempting weight loss with Wegovy.  She is lost 30 pounds thus far  -Patient would benefit from lymphedema pumps.  Ordered for pneumatic compression pumps twice daily for 1 hour each. Awaiting insurance approval  -Recommended continued weight loss, periodic leg elevation and regular exercise  -Moisturize skin daily to maintain skin integrity. Increase in dryness of skin since last visit. In part due to weather changes. Showers twice daily, only moisturizes in AM. Start to moisturize BID, Eucerin healing ointment recommended for night time after shower    -Follow-up in the office in 1 month to reevaluate           Chief Complaint   Patient presents with    Venous Disease     Pt here for I month compression pumps.       History of Present Illness     Elizabeth Nelson is a 54 y.o. female who presents to the office for 1 month follow-up left lower extremity edema.  She has a chronic history of left lower extremity swelling, recurrent cellulitis, discoloration and intermittent flareups.  Since last visit she had episode of increased left lower extremity swelling and left lateral distal lower leg pain  "relieved with cool compress and icing.  She has increase in bilateral lower extremity skin dryness despite moisturizing daily with CeraVe.  She does shower daily and moisturizes in AM.  Advised starting to moisturize twice daily after shower.  No open wounds.  No cellulitic changes.  She is utilizing compression although current pair is worn out.  She would benefit from pneumatic compression pumps to deter additional skin changes and manage lower extremity edema.  Awaiting authorization from insurance.  She has lost an additional 12 pounds since last office visit.  Advised to increase exercise    History obtained from : patient  Review of Systems   Constitutional: Negative.    Cardiovascular:  Positive for leg swelling.   Skin:  Positive for color change. Negative for wound.   All other systems reviewed and are negative.    Medical History Reviewed by provider this encounter:  Tobacco  Allergies  Meds  Problems  Med Hx  Surg Hx  Fam Hx           Objective   I have reviewed and made appropriate changes to the review of systems input by the medical assistant.    Vitals:    11/05/24 1040   BP: 118/80   BP Location: Left arm   Patient Position: Sitting   Cuff Size: Extra-Large   Pulse: 95   Resp: 18   SpO2: 97%   Weight: (!) 139 kg (307 lb 3.2 oz)   Height: 5' 8\" (1.727 m)       Patient Active Problem List   Diagnosis    Benign hypertension    Prediabetes    Tendinitis of right rotator cuff    Subacromial bursitis of right shoulder joint    Bilateral lower extremity edema    Abdominal hernia    Class 3 severe obesity due to excess calories with body mass index (BMI) of 45.0 to 49.9 in adult (HCC)    Primary osteoarthritis of both knees    Chronic venous insufficiency    Varicose veins of both lower extremities    Lymphedema       Past Surgical History:   Procedure Laterality Date    CHOLECYSTECTOMY  2006    [place] COLONOSCOPY FLX DX W/COLLJ SPEC WHEN PFRMD Left 05/02/2019    Procedure: COLONOSCOPY;  Surgeon: Dave " MD Justino;  [facility]: [facility];  Service: Gastroenterology    [place] LAPAROSCOPIC APPENDECTOMY N/A 03/22/2019    Procedure: LAPAROSCOPIC APPENDECTOMY;  Surgeon: Ayo Monreal MD;  Location:  MAIN OR;  Service: General    [place] REPAIR FIRST ABDOMINAL WALL HERNIA N/A 08/28/2020    Procedure: REPAIR INCISIONAL HERNIA;  Surgeon: Ayo Monreal MD;  Location:  MAIN OR;  Service: General       Family History   Problem Relation Age of Onset    Colon cancer Mother 63    Stroke Father     No Known Problems Sister     No Known Problems Daughter     No Known Problems Maternal Grandmother     No Known Problems Maternal Grandfather     No Known Problems Paternal Grandmother     No Known Problems Paternal Grandfather     No Known Problems Paternal Aunt     No Known Problems Paternal Aunt     Breast cancer Neg Hx     Ovarian cancer Neg Hx        Social History     Socioeconomic History    Marital status: /Civil Union     Spouse name: Not on file    Number of children: Not on file    Years of education: Not on file    Highest education level: Not on file   Occupational History    Not on file   Tobacco Use    Smoking status: Never     Passive exposure: Never    Smokeless tobacco: Never   Vaping Use    Vaping status: Never Used   Substance and Sexual Activity    Alcohol use: Yes     Comment: couple times/month    Drug use: Never    Sexual activity: Yes     Partners: Male     Birth control/protection: Post-menopausal   Other Topics Concern    Not on file   Social History Narrative    ** Merged History Encounter **          Social Determinants of Health     Financial Resource Strain: Low Risk  (10/24/2024)    Overall Financial Resource Strain (CARDIA)     Difficulty of Paying Living Expenses: Not hard at all   Food Insecurity: No Food Insecurity (10/24/2024)    Hunger Vital Sign     Worried About Running Out of Food in the Last Year: Never true     Ran Out of Food in the Last Year: Never true   Transportation Needs: No  "Transportation Needs (10/24/2024)    PRAPARE - Transportation     Lack of Transportation (Medical): No     Lack of Transportation (Non-Medical): No   Physical Activity: Not on file   Stress: No Stress Concern Present (10/7/2021)    [employer] - Occupational Stress Questionnaire     Feeling of Stress : Not at all   Social Connections: Not on file   Intimate Partner Violence: Not on file   Housing Stability: Low Risk  (10/24/2024)    Housing Stability Vital Sign     Unable to Pay for Housing in the Last Year: No     Number of Times Moved in the Last Year: 0     Homeless in the Last Year: No       No Known Allergies      Current Outpatient Medications:     cetirizine (ZyrTEC) 10 mg tablet, Take 1 tablet (10 mg total) by mouth daily, Disp: 60 tablet, Rfl: 2    fluticasone (FLONASE) 50 mcg/act nasal spray, 1 spray into each nostril daily, Disp: 60 g, Rfl: 2    hydroCHLOROthiazide 25 mg tablet, Take 1 tablet (25 mg total) by mouth daily, Disp: 90 tablet, Rfl: 5    ibuprofen (MOTRIN) 800 mg tablet, Take 1 tablet (800 mg total) by mouth every 8 (eight) hours, Disp: 30 tablet, Rfl: 0    lisinopril (ZESTRIL) 20 mg tablet, Take 1 tablet (20 mg total) by mouth daily, Disp: 90 tablet, Rfl: 3    ondansetron (ZOFRAN) 4 mg tablet, Take 1 tablet (4 mg total) by mouth every 8 (eight) hours as needed for nausea or vomiting, Disp: 25 tablet, Rfl: 0    Semaglutide-Weight Management (WEGOVY) 1 MG/0.5ML, Inject 0.5 mL (1 mg total) under the skin once a week, Disp: 2 mL, Rfl: 1    triamcinolone (KENALOG) 0.025 % cream, Apply topically 2 (two) times a day, Disp: 15 g, Rfl: 0    /80 (BP Location: Left arm, Patient Position: Sitting, Cuff Size: Extra-Large)   Pulse 95   Resp 18   Ht 5' 8\" (1.727 m)   Wt (!) 139 kg (307 lb 3.2 oz)   LMP  (LMP Unknown)   SpO2 97%   BMI 46.71 kg/m²     Physical Exam  Vitals and nursing note reviewed.   Constitutional:       Appearance: Normal appearance. She is obese. "   HENT:      Head: Normocephalic and atraumatic.   Eyes:      Extraocular Movements: Extraocular movements intact.   Cardiovascular:      Pulses:           Dorsalis pedis pulses are 2+ on the right side and 2+ on the left side.      Heart sounds: Normal heart sounds.   Pulmonary:      Effort: Pulmonary effort is normal.   Musculoskeletal:      Left lower leg: Edema present.      Comments: Chronic stasis changes left lower extremity with hyperpigmentation, skin fibrosis and thickening.  Skin is dry.  No cellulitic changes.  No weeping wounds.   Skin:     General: Skin is dry.   Neurological:      General: No focal deficit present.      Mental Status: She is alert and oriented to person, place, and time.   Psychiatric:         Behavior: Behavior normal.       Administrative Statements   I have spent a total time of 25 minutes in caring for this patient on the day of the visit/encounter including Prognosis, Risks and benefits of tx options, Instructions for management, Patient and family education, Importance of tx compliance, Risk factor reductions, Impressions, Counseling / Coordination of care, Documenting in the medical record, Reviewing / ordering tests, medicine, procedures  , Obtaining or reviewing history  , and Communicating with other healthcare professionals .

## 2024-11-05 NOTE — ASSESSMENT & PLAN NOTE
54-year-old female with obesity, BMI 48, prediabetes, OA, chronic left lower extremity edema after skin biopsy March 2022, recurrent left lower extremity cellulitis and intermittent flare ups of LLE swelling      Patient returns to the office for 1 month follow up for left lower extremity edema     -Chronic LLE stasis changes, lipodermatosclerosis and edema.  Stage II lymphedema  -She has tried multiple different compression though difficulty with sizing.  Advised replacement compression as current pair of compression is worn out. Awaiting insurance approval for compreflex compression   -She is attempting weight loss with Wegovy.  She is lost 30 pounds thus far  -Patient would benefit from lymphedema pumps.  Ordered for pneumatic compression pumps twice daily for 1 hour each. Awaiting insurance approval  -Recommended continued weight loss, periodic leg elevation and regular exercise  -Moisturize skin daily to maintain skin integrity. Increase in dryness of skin since last visit. In part due to weather changes. Showers twice daily, only moisturizes in AM. Start to moisturize BID, Eucerin healing ointment recommended for night time after shower    -Follow-up in the office in 1 month to reevaluate

## 2024-11-05 NOTE — LETTER
2024     [facility]  [id number]  [place] [id number]    Patient: Elizabeth Nelson   YOB: 1970   Date of Visit: 2024       Dear Dr. Henry:    Thank you for referring Elizabeth Nelson to me for evaluation. Below are my notes for this consultation.    If you have questions, please do not hesitate to call me. I look forward to following your patient along with you.         Sincerely,        DAVID Singh        CC: No Recipients    DAVID Singh  2024 11:20 AM  Sign when Signing Visit  Ambulatory Visit  Name: Elizabeth Nelson      : 1970      MRN: [id number]  Encounter Provider: DAVID Singh  Encounter Date: 2024   [facility]: [facility] [place]    Assessment & Plan  Lymphedema  54-year-old female with obesity, BMI 48, prediabetes, OA, chronic left lower extremity edema after skin biopsy 2022, recurrent left lower extremity cellulitis and intermittent flare ups of LLE swelling      Patient returns to the office for 1 month follow up for left lower extremity edema     -Chronic LLE stasis changes, lipodermatosclerosis and edema.  Stage II lymphedema  -She has tried multiple different compression though difficulty with sizing.  Advised replacement compression as current pair of compression is worn out. Awaiting insurance approval for compreflex compression   -She is attempting weight loss with Wegovy.  She is lost 30 pounds thus far  -Patient would benefit from lymphedema pumps.  Ordered for pneumatic compression pumps twice daily for 1 hour each. Awaiting insurance approval  -Recommended continued weight loss, periodic leg elevation and regular exercise  -Moisturize skin daily to maintain skin integrity. Increase in dryness of skin since last visit. In part due to weather changes. Showers twice daily, only moisturizes in AM. Start to moisturize BID, Eucerin healing ointment recommended for night  "time after shower    -Follow-up in the office in 1 month to reevaluate           Chief Complaint   Patient presents with   • Venous Disease     Pt here for I month compression pumps.       History of Present Illness    Elizabeth Nelson is a 54 y.o. female who presents to the office for 1 month follow-up left lower extremity edema.  She has a chronic history of left lower extremity swelling, recurrent cellulitis, discoloration and intermittent flareups.  Since last visit she had episode of increased left lower extremity swelling and left lateral distal lower leg pain relieved with cool compress and icing.  She has increase in bilateral lower extremity skin dryness despite moisturizing daily with CeraVe.  She does shower daily and moisturizes in AM.  Advised starting to moisturize twice daily after shower.  No open wounds.  No cellulitic changes.  She is utilizing compression although current pair is worn out.  She would benefit from pneumatic compression pumps to deter additional skin changes and manage lower extremity edema.  Awaiting authorization from insurance.  She has lost an additional 12 pounds since last office visit.  Advised to increase exercise    History obtained from : patient  Review of Systems   Constitutional: Negative.    Cardiovascular:  Positive for leg swelling.   Skin:  Positive for color change. Negative for wound.   All other systems reviewed and are negative.    Medical History Reviewed by provider this encounter:  Tobacco  Allergies  Meds  Problems  Med Hx  Surg Hx  Fam Hx           Objective  I have reviewed and made appropriate changes to the review of systems input by the medical assistant.    Vitals:    11/05/24 1040   BP: 118/80   BP Location: Left arm   Patient Position: Sitting   Cuff Size: Extra-Large   Pulse: 95   Resp: 18   SpO2: 97%   Weight: (!) 139 kg (307 lb 3.2 oz)   Height: 5' 8\" (1.727 m)       Patient Active Problem List   Diagnosis   • Benign hypertension   • Prediabetes "   • Tendinitis of right rotator cuff   • Subacromial bursitis of right shoulder joint   • Bilateral lower extremity edema   • Abdominal hernia   • Class 3 severe obesity due to excess calories with body mass index (BMI) of 45.0 to 49.9 in adult (HCC)   • Primary osteoarthritis of both knees   • Chronic venous insufficiency   • Varicose veins of both lower extremities   • Lymphedema       Past Surgical History:   Procedure Laterality Date   • CHOLECYSTECTOMY  2006   • [place] COLONOSCOPY FLX DX W/COLLJ SPEC WHEN PFRMD Left 05/02/2019    Procedure: COLONOSCOPY;  Surgeon: Dave Badillo MD;  [facility];  Service: Gastroenterology   • [place] LAPAROSCOPIC APPENDECTOMY N/A 03/22/2019    Procedure: LAPAROSCOPIC APPENDECTOMY;  Surgeon: Ayo Monreal MD;  Location:  MAIN OR;  Service: General   • [place] REPAIR FIRST ABDOMINAL WALL HERNIA N/A 08/28/2020    Procedure: REPAIR INCISIONAL HERNIA;  Surgeon: Ayo Monreal MD;  Location:  MAIN OR;  Service: General       Family History   Problem Relation Age of Onset   • Colon cancer Mother 63   • Stroke Father    • No Known Problems Sister    • No Known Problems Daughter    • No Known Problems Maternal Grandmother    • No Known Problems Maternal Grandfather    • No Known Problems Paternal Grandmother    • No Known Problems Paternal Grandfather    • No Known Problems Paternal Aunt    • No Known Problems Paternal Aunt    • Breast cancer Neg Hx    • Ovarian cancer Neg Hx        Social History     Socioeconomic History   • Marital status: /Civil Union     Spouse name: Not on file   • Number of children: Not on file   • Years of education: Not on file   • Highest education level: Not on file   Occupational History   • Not on file   Tobacco Use   • Smoking status: Never     Passive exposure: Never   • Smokeless tobacco: Never   Vaping Use   • Vaping status: Never Used   Substance and Sexual Activity   • Alcohol use: Yes     Comment: couple times/month   • Drug use: Never   •  Sexual activity: Yes     Partners: Male     Birth control/protection: Post-menopausal   Other Topics Concern   • Not on file   Social History Narrative    ** Merged History Encounter **          Social Determinants of Health     Financial Resource Strain: Low Risk  (10/24/2024)    Overall Financial Resource Strain (CARDIA)    • Difficulty of Paying Living Expenses: Not hard at all   Food Insecurity: No Food Insecurity (10/24/2024)    Hunger Vital Sign    • Worried About Running Out of Food in the Last Year: Never true    • Ran Out of Food in the Last Year: Never true   Transportation Needs: No Transportation Needs (10/24/2024)    PRAPARE - Transportation    • Lack of Transportation (Medical): No    • Lack of Transportation (Non-Medical): No   Physical Activity: Not on file   Stress: No Stress Concern Present (10/7/2021)    [employer] - Occupational Stress Questionnaire    • Feeling of Stress : Not at all   Social Connections: Not on file   Intimate Partner Violence: Not on file   Housing Stability: Low Risk  (10/24/2024)    Housing Stability Vital Sign    • Unable to Pay for Housing in the Last Year: No    • Number of Times Moved in the Last Year: 0    • Homeless in the Last Year: No       No Known Allergies      Current Outpatient Medications:   •  cetirizine (ZyrTEC) 10 mg tablet, Take 1 tablet (10 mg total) by mouth daily, Disp: 60 tablet, Rfl: 2  •  fluticasone (FLONASE) 50 mcg/act nasal spray, 1 spray into each nostril daily, Disp: 60 g, Rfl: 2  •  hydroCHLOROthiazide 25 mg tablet, Take 1 tablet (25 mg total) by mouth daily, Disp: 90 tablet, Rfl: 5  •  ibuprofen (MOTRIN) 800 mg tablet, Take 1 tablet (800 mg total) by mouth every 8 (eight) hours, Disp: 30 tablet, Rfl: 0  •  lisinopril (ZESTRIL) 20 mg tablet, Take 1 tablet (20 mg total) by mouth daily, Disp: 90 tablet, Rfl: 3  •  ondansetron (ZOFRAN) 4 mg tablet, Take 1 tablet (4 mg total) by mouth every 8 (eight) hours as needed for  "nausea or vomiting, Disp: 25 tablet, Rfl: 0  •  Semaglutide-Weight Management (WEGOVY) 1 MG/0.5ML, Inject 0.5 mL (1 mg total) under the skin once a week, Disp: 2 mL, Rfl: 1  •  triamcinolone (KENALOG) 0.025 % cream, Apply topically 2 (two) times a day, Disp: 15 g, Rfl: 0    /80 (BP Location: Left arm, Patient Position: Sitting, Cuff Size: Extra-Large)   Pulse 95   Resp 18   Ht 5' 8\" (1.727 m)   Wt (!) 139 kg (307 lb 3.2 oz)   LMP  (LMP Unknown)   SpO2 97%   BMI 46.71 kg/m²     Physical Exam  Vitals and nursing note reviewed.   Constitutional:       Appearance: Normal appearance. She is obese.   HENT:      Head: Normocephalic and atraumatic.   Eyes:      Extraocular Movements: Extraocular movements intact.   Cardiovascular:      Pulses:           Dorsalis pedis pulses are 2+ on the right side and 2+ on the left side.      Heart sounds: Normal heart sounds.   Pulmonary:      Effort: Pulmonary effort is normal.   Musculoskeletal:      Left lower leg: Edema present.      Comments: Chronic stasis changes left lower extremity with hyperpigmentation, skin fibrosis and thickening.  Skin is dry.  No cellulitic changes.  No weeping wounds.   Skin:     General: Skin is dry.   Neurological:      General: No focal deficit present.      Mental Status: She is alert and oriented to person, place, and time.   Psychiatric:         Behavior: Behavior normal.       Administrative Statements  I have spent a total time of 25 minutes in caring for this patient on the day of the visit/encounter including Prognosis, Risks and benefits of tx options, Instructions for management, Patient and family education, Importance of tx compliance, Risk factor reductions, Impressions, Counseling / Coordination of care, Documenting in the medical record, Reviewing / ordering tests, medicine, procedures  , Obtaining or reviewing history  , and Communicating with other healthcare professionals .  "

## 2024-11-07 ENCOUNTER — PROCEDURE VISIT (OUTPATIENT)
Dept: OBGYN CLINIC | Facility: CLINIC | Age: 54
End: 2024-11-07

## 2024-11-07 ENCOUNTER — OFFICE VISIT (OUTPATIENT)
Dept: FAMILY MEDICINE CLINIC | Facility: CLINIC | Age: 54
End: 2024-11-07

## 2024-11-07 VITALS
BODY MASS INDEX: 46.38 KG/M2 | DIASTOLIC BLOOD PRESSURE: 84 MMHG | SYSTOLIC BLOOD PRESSURE: 130 MMHG | WEIGHT: 293 LBS | HEART RATE: 80 BPM

## 2024-11-07 VITALS
WEIGHT: 293 LBS | DIASTOLIC BLOOD PRESSURE: 70 MMHG | BODY MASS INDEX: 44.41 KG/M2 | HEART RATE: 92 BPM | TEMPERATURE: 96.3 F | SYSTOLIC BLOOD PRESSURE: 120 MMHG | HEIGHT: 68 IN | OXYGEN SATURATION: 98 % | RESPIRATION RATE: 16 BRPM

## 2024-11-07 DIAGNOSIS — Z71.2 ENCOUNTER TO DISCUSS TEST RESULTS: Primary | ICD-10-CM

## 2024-11-07 DIAGNOSIS — I10 BENIGN HYPERTENSION: ICD-10-CM

## 2024-11-07 DIAGNOSIS — E66.813 CLASS 3 SEVERE OBESITY DUE TO EXCESS CALORIES WITHOUT SERIOUS COMORBIDITY WITH BODY MASS INDEX (BMI) OF 45.0 TO 49.9 IN ADULT (HCC): Primary | ICD-10-CM

## 2024-11-07 DIAGNOSIS — R73.03 PREDIABETES: ICD-10-CM

## 2024-11-07 DIAGNOSIS — N95.0 POSTMENOPAUSAL BLEEDING: ICD-10-CM

## 2024-11-07 DIAGNOSIS — E66.01 CLASS 3 SEVERE OBESITY DUE TO EXCESS CALORIES WITHOUT SERIOUS COMORBIDITY WITH BODY MASS INDEX (BMI) OF 45.0 TO 49.9 IN ADULT (HCC): Primary | ICD-10-CM

## 2024-11-07 PROCEDURE — 58100 BIOPSY OF UTERUS LINING: CPT | Performed by: NURSE PRACTITIONER

## 2024-11-07 PROCEDURE — 99213 OFFICE O/P EST LOW 20 MIN: CPT | Performed by: INTERNAL MEDICINE

## 2024-11-07 PROCEDURE — 99212 OFFICE O/P EST SF 10 MIN: CPT | Performed by: NURSE PRACTITIONER

## 2024-11-07 PROCEDURE — 88305 TISSUE EXAM BY PATHOLOGIST: CPT | Performed by: PATHOLOGY

## 2024-11-07 NOTE — ASSESSMENT & PLAN NOTE
-wegovy increased on last office visit from 0.5 to 1mg   -weight change from last office visit on 10/2/24 of 142kg (313 lb 12.8 oz) to 138 kg (304 lb 9.6 oz) making for a weight loss of about 4 kg or about 9 lbs.   -opted not to keep a food diary  -has very mild nausea going up in her wegovy dose, did not need to use zofran    PLAN  -will continue with wegovy 1mg and f/u again in one month  -congratulations and encouragement provided    Orders:    Semaglutide-Weight Management (WEGOVY) 1 MG/0.5ML; Inject 0.5 mL (1 mg total) under the skin once a week

## 2024-11-07 NOTE — ASSESSMENT & PLAN NOTE
Lab Results   Component Value Date    HGBA1C 6.1 (H) 08/01/2024     -needs repeat HBA1C did not perform today     PLAN  -Counseling/education  -Continued encouragement with emphasis on dietary lifestyle changes and to continue Wegovy for weight loss  -repeat HBA1C on f/u in one month

## 2024-11-07 NOTE — PROGRESS NOTES
"Endometrial biopsy    Date/Time: 11/7/2024 9:30 AM    Performed by: DAVID Wright  Authorized by: DAVID Wright  Universal Protocol:  procedure performed by consultantConsent: Verbal consent obtained. Written consent obtained.  Risks and benefits: risks, benefits and alternatives were discussed  Consent given by: patient  Time out: Immediately prior to procedure a \"time out\" was called to verify the correct patient, procedure, equipment, support staff and site/side marked as required.  Patient understanding: patient states understanding of the procedure being performed  Patient consent: the patient's understanding of the procedure matches consent given  Procedure consent: procedure consent matches procedure scheduled  Required items: required blood products, implants, devices, and special equipment available  Patient identity confirmed: verbally with patient    Indication:     Indications: Post-menopausal bleeding      Chronicity of post-menopausal bleeding:  New    Progression of post-menopausal bleeding:  Partially resolved  Procedure:     Procedure: endometrial biopsy with Pipelle      A bivalve speculum was placed in the vagina: yes      Cervix cleaned and prepped: yes      Uterus sounded: yes      Uterus sound depth (cm):  9    Specimen collected: specimen collected and sent to pathology      Patient tolerated procedure well with no complications: yes    Findings:     Uterus size:  Non-gravid    Cervix: normal    Comments:     Procedure comments:  Small amount blood noted in vaginal vault      "

## 2024-11-07 NOTE — PROGRESS NOTES
Elizabeth Nelson presents today to review results of pelvic ultrasound performed 10/31/2024.  Reviewed that two small uterine fibroids were noted and endometrium borderline thickened at 5mm.  She reports vaginal bleeding persisted after last seen here, but none yesterday and so far today.    EMB performed without issue.  Return in 1 week to review results.

## 2024-11-07 NOTE — ASSESSMENT & PLAN NOTE
-BP today of 120/70 and at goal <140/90 per HILDA  -Home medication of lisinopril 20mg daily    PLAN  -continue lisinopril 20mg daily

## 2024-11-07 NOTE — PATIENT INSTRUCTIONS
Thank you for your confidence in our team.   We appreciate you and welcome your feedback.   If you receive a survey from us, please take a few moments to let us know how we are doing.   Sincerely,  DAVID Wright

## 2024-11-07 NOTE — PROGRESS NOTES
Ambulatory Visit  Name: Elizabeth Nelson      : 1970      MRN: [id number]  Encounter Provider: Rebecca Fay Kab-Perlman, MD  Encounter Date: 2024   Encounter department: [facility]    Assessment & Plan  Class 3 severe obesity due to excess calories without serious comorbidity with body mass index (BMI) of 45.0 to 49.9 in adult (HCC)    -wegovy increased on last office visit from 0.5 to 1mg   -weight change from last office visit on 10/2/24 of 142kg (313 lb 12.8 oz) to 138 kg (304 lb 9.6 oz) making for a weight loss of about 4 kg or about 9 lbs.   -opted not to keep a food diary  -has very mild nausea going up in her wegovy dose, did not need to use zofran    PLAN  -will continue with wegovy 1mg and f/u again in one month  -congratulations and encouragement provided    Orders:    Semaglutide-Weight Management (WEGOVY) 1 MG/0.5ML; Inject 0.5 mL (1 mg total) under the skin once a week    Benign hypertension  -BP today of 120/70 and at goal <140/90 per HILDA  -Home medication of lisinopril 20mg daily    PLAN  -continue lisinopril 20mg daily       Prediabetes  Lab Results   Component Value Date    HGBA1C 6.1 (H) 2024     -needs repeat HBA1C did not perform today     PLAN  -Counseling/education  -Continued encouragement with emphasis on dietary lifestyle changes and to continue Wegovy for weight loss  -repeat HBA1C on f/u in one month             History of Present Illness       Elizabeth Nelson is a 53 yo female patient presenting today to f/u regarding obesity.     History obtained from : patient  Review of Systems   Constitutional:  Negative for fever.   Cardiovascular:  Negative for chest pain.   Gastrointestinal:  Negative for abdominal pain, constipation, diarrhea, nausea and vomiting.   Hematological:  Does not bruise/bleed easily.     Pertinent Medical History   Chronic venous insufficiency and lymphedema    Medical History Reviewed by provider this encounter:  Allergies   Meds           Medical History Reviewed by provider this encounter:  Meds       Past Medical History   Past Medical History:   Diagnosis Date    Arthritis     knees    Fibroids     Hyperlipidemia     Hypertension     Prediabetes     Venous insufficiency of lower extremity      Past Surgical History:   Procedure Laterality Date    CHOLECYSTECTOMY  2006    [place] COLONOSCOPY FLX DX W/COLLJ SPEC WHEN PFRMD Left 05/02/2019    Procedure: COLONOSCOPY;  Surgeon: Dave Badillo MD;  [facility];  Service: Gastroenterology    [place] LAPAROSCOPIC APPENDECTOMY N/A 03/22/2019    Procedure: LAPAROSCOPIC APPENDECTOMY;  Surgeon: Ayo Monreal MD;  Location:  MAIN OR;  Service: General    [place] REPAIR FIRST ABDOMINAL WALL HERNIA N/A 08/28/2020    Procedure: REPAIR INCISIONAL HERNIA;  Surgeon: Ayo Monreal MD;  Location:  MAIN OR;  Service: General     Family History   Problem Relation Age of Onset    Colon cancer Mother 63    Stroke Father     No Known Problems Sister     No Known Problems Daughter     No Known Problems Maternal Grandmother     No Known Problems Maternal Grandfather     No Known Problems Paternal Grandmother     No Known Problems Paternal Grandfather     No Known Problems Paternal Aunt     No Known Problems Paternal Aunt     Breast cancer Neg Hx     Ovarian cancer Neg Hx      Current Outpatient Medications on File Prior to Visit   Medication Sig Dispense Refill    cetirizine (ZyrTEC) 10 mg tablet Take 1 tablet (10 mg total) by mouth daily 60 tablet 2    fluticasone (FLONASE) 50 mcg/act nasal spray 1 spray into each nostril daily 60 g 2    hydroCHLOROthiazide 25 mg tablet Take 1 tablet (25 mg total) by mouth daily 90 tablet 5    ibuprofen (MOTRIN) 800 mg tablet Take 1 tablet (800 mg total) by mouth every 8 (eight) hours 30 tablet 0    lisinopril (ZESTRIL) 20 mg tablet Take 1 tablet (20 mg total) by mouth daily 90 tablet 3    ondansetron (ZOFRAN) 4 mg tablet Take 1 tablet (4 mg total) by mouth every 8 (eight)  "hours as needed for nausea or vomiting 25 tablet 0    triamcinolone (KENALOG) 0.025 % cream Apply topically 2 (two) times a day 15 g 0    [DISCONTINUED] Semaglutide-Weight Management (WEGOVY) 1 MG/0.5ML Inject 0.5 mL (1 mg total) under the skin once a week 2 mL 1     No current facility-administered medications on file prior to visit.   No Known Allergies   Current Outpatient Medications on File Prior to Visit   Medication Sig Dispense Refill    cetirizine (ZyrTEC) 10 mg tablet Take 1 tablet (10 mg total) by mouth daily 60 tablet 2    fluticasone (FLONASE) 50 mcg/act nasal spray 1 spray into each nostril daily 60 g 2    hydroCHLOROthiazide 25 mg tablet Take 1 tablet (25 mg total) by mouth daily 90 tablet 5    ibuprofen (MOTRIN) 800 mg tablet Take 1 tablet (800 mg total) by mouth every 8 (eight) hours 30 tablet 0    lisinopril (ZESTRIL) 20 mg tablet Take 1 tablet (20 mg total) by mouth daily 90 tablet 3    ondansetron (ZOFRAN) 4 mg tablet Take 1 tablet (4 mg total) by mouth every 8 (eight) hours as needed for nausea or vomiting 25 tablet 0    triamcinolone (KENALOG) 0.025 % cream Apply topically 2 (two) times a day 15 g 0    [DISCONTINUED] Semaglutide-Weight Management (WEGOVY) 1 MG/0.5ML Inject 0.5 mL (1 mg total) under the skin once a week 2 mL 1     No current facility-administered medications on file prior to visit.      Social History     Tobacco Use    Smoking status: Never     Passive exposure: Never    Smokeless tobacco: Never   Vaping Use    Vaping status: Never Used   Substance and Sexual Activity    Alcohol use: Yes     Comment: couple times/month    Drug use: Never    Sexual activity: Yes     Partners: Male     Birth control/protection: Post-menopausal         Objective     /70 (BP Location: Right arm, Patient Position: Sitting, Cuff Size: Large)   Pulse 92   Temp (!) 96.3 °F (35.7 °C) (Temporal)   Resp 16   Ht 5' 8\" (1.727 m)   Wt (!) 138 kg (304 lb 9.6 oz)   LMP  (LMP Unknown)   SpO2 98%   " BMI 46.31 kg/m²     Physical Exam  Constitutional:       Appearance: Normal appearance. She is obese.   HENT:      Head: Normocephalic and atraumatic.   Cardiovascular:      Rate and Rhythm: Normal rate and regular rhythm.      Heart sounds: Normal heart sounds. No murmur heard.     No friction rub. No gallop.   Pulmonary:      Effort: Pulmonary effort is normal.      Breath sounds: Normal breath sounds. No wheezing, rhonchi or rales.   Abdominal:      General: Abdomen is flat. Bowel sounds are normal. There is no distension.      Palpations: Abdomen is soft.      Tenderness: There is no abdominal tenderness.   Skin:     General: Skin is warm.   Neurological:      Mental Status: She is alert and oriented to person, place, and time.   Psychiatric:         Mood and Affect: Mood normal.         Behavior: Behavior normal.

## 2024-11-12 ENCOUNTER — TELEPHONE (OUTPATIENT)
Dept: FAMILY MEDICINE CLINIC | Facility: CLINIC | Age: 54
End: 2024-11-12

## 2024-11-12 NOTE — TELEPHONE ENCOUNTER
PCP SIGNATURE NEEDED FOR CoverMyMeds  FORM RECEIVED VIA FAX AND PLACED IN PCP FOLDER TO BE DELIVERED AT ASSIGNED TIMES.    Wegovy 1 Mg/0.5ML

## 2024-11-13 ENCOUNTER — APPOINTMENT (OUTPATIENT)
Dept: LAB | Facility: CLINIC | Age: 54
End: 2024-11-13
Payer: COMMERCIAL

## 2024-11-13 ENCOUNTER — HOSPITAL ENCOUNTER (OUTPATIENT)
Dept: NON INVASIVE DIAGNOSTICS | Facility: HOSPITAL | Age: 54
Discharge: HOME | End: 2024-11-13
Payer: COMMERCIAL

## 2024-11-13 ENCOUNTER — OFFICE VISIT (OUTPATIENT)
Dept: FAMILY MEDICINE CLINIC | Facility: CLINIC | Age: 54
End: 2024-11-13

## 2024-11-13 VITALS
HEART RATE: 96 BPM | DIASTOLIC BLOOD PRESSURE: 70 MMHG | HEIGHT: 68 IN | BODY MASS INDEX: 44.41 KG/M2 | WEIGHT: 293 LBS | RESPIRATION RATE: 18 BRPM | OXYGEN SATURATION: 97 % | TEMPERATURE: 96.5 F | SYSTOLIC BLOOD PRESSURE: 118 MMHG

## 2024-11-13 DIAGNOSIS — M79.89 PAIN AND SWELLING OF LEFT LOWER EXTREMITY: Primary | ICD-10-CM

## 2024-11-13 DIAGNOSIS — M79.89 PAIN AND SWELLING OF LEFT LOWER EXTREMITY: ICD-10-CM

## 2024-11-13 DIAGNOSIS — M79.605 PAIN AND SWELLING OF LEFT LOWER EXTREMITY: ICD-10-CM

## 2024-11-13 DIAGNOSIS — M79.605 PAIN AND SWELLING OF LEFT LOWER EXTREMITY: Primary | ICD-10-CM

## 2024-11-13 LAB
ANA SER QL IA: NEGATIVE
D DIMER PPP FEU-MCNC: <0.27 UG/ML FEU

## 2024-11-13 PROCEDURE — 36415 COLL VENOUS BLD VENIPUNCTURE: CPT

## 2024-11-13 PROCEDURE — 88305 TISSUE EXAM BY PATHOLOGIST: CPT | Performed by: PATHOLOGY

## 2024-11-13 PROCEDURE — 86038 ANTINUCLEAR ANTIBODIES: CPT

## 2024-11-13 PROCEDURE — 85379 FIBRIN DEGRADATION QUANT: CPT

## 2024-11-13 PROCEDURE — 93971 EXTREMITY STUDY: CPT

## 2024-11-13 PROCEDURE — 99213 OFFICE O/P EST LOW 20 MIN: CPT | Performed by: FAMILY MEDICINE

## 2024-11-13 PROCEDURE — 93971 EXTREMITY STUDY: CPT | Performed by: SURGERY

## 2024-11-13 RX ORDER — CEPHALEXIN 500 MG/1
500 CAPSULE ORAL 2 TIMES DAILY
Qty: 14 CAPSULE | Refills: 0 | Status: SHIPPED | OUTPATIENT
Start: 2024-11-13 | End: 2024-11-13

## 2024-11-13 RX ORDER — SULFAMETHOXAZOLE AND TRIMETHOPRIM 800; 160 MG/1; MG/1
1 TABLET ORAL 2 TIMES DAILY
Qty: 14 TABLET | Refills: 0 | Status: CANCELLED | OUTPATIENT
Start: 2024-11-13 | End: 2024-11-20

## 2024-11-13 NOTE — PROGRESS NOTES
Ambulatory Visit  Name: Elizabeth Nelson      : 1970      MRN: [id number]  Encounter Provider: Kevin Yeboah MD  Encounter Date: 2024   Encounter department: [facility]    Assessment & Plan  Pain and swelling of left lower extremity  This likely represent cellulitis of the lower extremity   Associated subjective fever yesterday     Plan   Will prescribe Keflex 500 mg q6h joni to complete 7 day course of abx   STAT order for US LLE to exclude DVT, in the setting of calf tenderness  D-dimer ordered  ED precaution counseling   Follow up in 1 week     Orders:    D-dimer, quantitative; Future    VAS VENOUS DUPLEX -LOWER LIMB UNILATERAL; Future    cephalexin (KEFLEX) 250 mg capsule; Take 2 capsules (500 mg total) by mouth every 6 (six) hours for 7 days       History of Present Illness     55 yo female patient with a PMH of venous insufficiency and recurrect cellulitis of the LLE comes to the office due to LLE pain and swelling. It started on Monday evening, and has been progressively worsening, with warmth, erythema and pain of the left ankle and calf. She reports she was seen by vascular surgery that day with prescription of compression stockings and a new cream for her venous insuf. She denies a hx of DVT or PE. Last Cellulitis was treated by her PCP with keflex for 7 days with resolutions of symptoms.         History obtained from : patient  Review of Systems   Constitutional:  Negative for chills, fatigue and fever.   HENT:  Negative for congestion, ear pain, rhinorrhea and sore throat.    Eyes:  Negative for visual disturbance.   Respiratory:  Negative for cough, chest tightness and shortness of breath.    Cardiovascular:  Negative for chest pain and palpitations.   Gastrointestinal:  Negative for abdominal pain, constipation, diarrhea, nausea and vomiting.   Genitourinary:  Negative for difficulty urinating, dysuria and hematuria.   Musculoskeletal:  Negative for  "arthralgias and back pain.        Left leg swelling, pain and redness   Skin:  Negative for rash.   Neurological:  Negative for seizures, syncope, light-headedness and headaches.   All other systems reviewed and are negative.          Objective     /70 (BP Location: Right arm, Patient Position: Sitting, Cuff Size: Large)   Pulse 96   Temp (!) 96.5 °F (35.8 °C) (Temporal)   Resp 18   Ht 5' 8\" (1.727 m)   Wt (!) 139 kg (306 lb 9.6 oz)   LMP  (LMP Unknown)   SpO2 97%   BMI 46.62 kg/m²     Physical Exam  Vitals and nursing note reviewed.   Constitutional:       General: She is not in acute distress.     Appearance: She is well-developed.   HENT:      Head: Normocephalic and atraumatic.   Eyes:      Conjunctiva/sclera: Conjunctivae normal.   Cardiovascular:      Rate and Rhythm: Normal rate and regular rhythm.      Heart sounds: No murmur heard.  Pulmonary:      Effort: Pulmonary effort is normal. No respiratory distress.      Breath sounds: Normal breath sounds.   Abdominal:      Palpations: Abdomen is soft.      Tenderness: There is no abdominal tenderness.   Musculoskeletal:         General: Swelling (left lower extremity left lower extremity - erythema and non pitting edema. significant tenderness to palpation of the poorly demarcated erythematous area, along with calf tenderness. Bairon sign negative bialterally) present.      Cervical back: Neck supple.   Skin:     General: Skin is warm and dry.      Capillary Refill: Capillary refill takes less than 2 seconds.   Neurological:      Mental Status: She is alert.   Psychiatric:         Mood and Affect: Mood normal.         "

## 2024-11-14 ENCOUNTER — OFFICE VISIT (OUTPATIENT)
Dept: OBGYN CLINIC | Facility: CLINIC | Age: 54
End: 2024-11-14

## 2024-11-14 VITALS
HEART RATE: 94 BPM | SYSTOLIC BLOOD PRESSURE: 145 MMHG | BODY MASS INDEX: 46.62 KG/M2 | WEIGHT: 293 LBS | DIASTOLIC BLOOD PRESSURE: 85 MMHG

## 2024-11-14 DIAGNOSIS — Z71.2 ENCOUNTER TO DISCUSS TEST RESULTS: Primary | ICD-10-CM

## 2024-11-14 PROCEDURE — 99212 OFFICE O/P EST SF 10 MIN: CPT | Performed by: NURSE PRACTITIONER

## 2024-11-14 NOTE — PROGRESS NOTES
Elizabeth Nelson presents today to review results of EMB performed 11/7/2024.  Discussed that results are benign.  She reports she continues with vaginal spotting off/on.  She has lost 35# since started Wegovy 3 months ago.  Explained that I recommend repeating pelvic ultrasound next year.    Return if vaginal bleeding becomes heavy, otherwise will see for annual GYN exam in 10/2025.

## 2024-11-20 ENCOUNTER — OFFICE VISIT (OUTPATIENT)
Dept: FAMILY MEDICINE CLINIC | Facility: CLINIC | Age: 54
End: 2024-11-20

## 2024-11-20 VITALS
HEIGHT: 68 IN | OXYGEN SATURATION: 99 % | TEMPERATURE: 96.3 F | DIASTOLIC BLOOD PRESSURE: 66 MMHG | WEIGHT: 293 LBS | RESPIRATION RATE: 16 BRPM | HEART RATE: 91 BPM | SYSTOLIC BLOOD PRESSURE: 112 MMHG | BODY MASS INDEX: 44.41 KG/M2

## 2024-11-20 DIAGNOSIS — Z23 ENCOUNTER FOR IMMUNIZATION: ICD-10-CM

## 2024-11-20 DIAGNOSIS — I10 BENIGN HYPERTENSION: ICD-10-CM

## 2024-11-20 DIAGNOSIS — L03.116 CELLULITIS OF LEFT LOWER EXTREMITY: Primary | ICD-10-CM

## 2024-11-20 PROCEDURE — 99213 OFFICE O/P EST LOW 20 MIN: CPT | Performed by: FAMILY MEDICINE

## 2024-11-20 PROCEDURE — 90673 RIV3 VACCINE NO PRESERV IM: CPT | Performed by: FAMILY MEDICINE

## 2024-11-20 PROCEDURE — 90471 IMMUNIZATION ADMIN: CPT | Performed by: FAMILY MEDICINE

## 2024-11-20 RX ORDER — LISINOPRIL 20 MG/1
20 TABLET ORAL DAILY
Qty: 90 TABLET | Refills: 3 | Status: SHIPPED | OUTPATIENT
Start: 2024-11-20

## 2024-11-20 RX ORDER — CAPSAICIN 0.025 %
1 CREAM (GRAM) TOPICAL 2 TIMES DAILY
Qty: 120 G | Refills: 1 | Status: SHIPPED | OUTPATIENT
Start: 2024-11-20

## 2024-11-20 RX ORDER — HYDROCHLOROTHIAZIDE 25 MG/1
25 TABLET ORAL DAILY
Qty: 90 TABLET | Refills: 3 | Status: SHIPPED | OUTPATIENT
Start: 2024-11-20

## 2024-11-20 NOTE — ASSESSMENT & PLAN NOTE
-Blood pressure today of 112/66 and at goal of less than 140/90 per HILDA  -Home medications include HCTZ 25 mg tablet and lisinopril 20 mg tablet: Requesting refills last CMP  -In July 2024 with normal creatinine and GFR stage II of 83    PLAN  -Refills provided: Continue home medications    Orders:    hydroCHLOROthiazide 25 mg tablet; Take 1 tablet (25 mg total) by mouth daily    lisinopril (ZESTRIL) 20 mg tablet; Take 1 tablet (20 mg total) by mouth daily

## 2024-11-20 NOTE — PROGRESS NOTES
Name: Elizabeth Nelson      : 1970      MRN: [id number]  Encounter Provider: Rebecca Fay Kab-Perlman, MD  Encounter Date: 2024   Encounter department: [facility]  :  Assessment & Plan  Cellulitis of left lower extremity  -On 24 was provided with Keflex every 6 hours for 7 days: Almost completed entire regimen  -Significantly improved, does not appear that there is continued cellulitis  -Complains of some mild pain  -Risk factors include known lymphedema    PLAN  -Reassurance provided today and recommended completing the antibiotics  -Education regarding the lymphatic system provided today and explained continued weight loss will be beneficial  -Capsaicin cream provided to assist with suspected neuropathic pain    Orders:    capsaicin (ZOSTRIX) 0.025 % cream; Apply 1 Application topically 2 (two) times a day    Benign hypertension  -Blood pressure today of 112/66 and at goal of less than 140/90 per HILDA  -Home medications include HCTZ 25 mg tablet and lisinopril 20 mg tablet: Requesting refills last CMP  -In 2024 with normal creatinine and GFR stage II of 83    PLAN  -Refills provided: Continue home medications    Orders:    hydroCHLOROthiazide 25 mg tablet; Take 1 tablet (25 mg total) by mouth daily    lisinopril (ZESTRIL) 20 mg tablet; Take 1 tablet (20 mg total) by mouth daily    Encounter for immunization  Influenza vaccine provided today with education and counseling including to use Tylenol if develops fever rather than an NSAID in light of her kidney function and antihypertensive drugs    Orders:    influenza vaccine, recombinant, PF, 0.5 mL IM (Flublok)           History of Present Illness     Elizabeth Nelson is a 54-year-old female patient presenting today to follow-up regarding left lower extremity cellulitis.  She was seen earlier this month, was diagnosed with cellulitis and provided with a 7-day course of Keflex to which she has been adherent to and is  almost completed the entire course.  She is concerned about tingling pain in her left lower leg.  She states she has not had any fevers.  This is in the backdrop of known lymphedema for which she follows with vascular for.    Review of Systems   Constitutional:  Negative for appetite change, chills, diaphoresis and fever.   Cardiovascular:  Positive for leg swelling. Negative for chest pain and palpitations.   Skin:  Negative for rash.   Hematological:  Negative for adenopathy. Does not bruise/bleed easily.     Chronic venous insufficiency and lymphedema    Medical History Reviewed by provider this encounter:  Allergies  Meds       Chronic venous insufficiency and lymphedema    Medical History Reviewed by provider this encounter:  Allergies  Meds       Chronic venous insufficiency and lymphedema      Medical History Reviewed by provider this encounter:     .  Past Medical History   Past Medical History:   Diagnosis Date    Arthritis     knees    Fibroids     Hyperlipidemia     Hypertension     Prediabetes     Venous insufficiency of lower extremity      Past Surgical History:   Procedure Laterality Date    CHOLECYSTECTOMY  2006    VT COLONOSCOPY FLX DX W/COLLJ SPEC WHEN PFRMD Left 05/02/2019    Procedure: COLONOSCOPY;  Surgeon: Dave Badillo MD;  Location: [facility];  Service: Gastroenterology    VT LAPAROSCOPIC APPENDECTOMY N/A 03/22/2019    Procedure: LAPAROSCOPIC APPENDECTOMY;  Surgeon: Ayo Monreal MD;  Location:  MAIN OR;  Service: General    VT REPAIR FIRST ABDOMINAL WALL HERNIA N/A 08/28/2020    Procedure: REPAIR INCISIONAL HERNIA;  Surgeon: Ayo Monreal MD;  Location:  MAIN OR;  Service: General     Family History   Problem Relation Age of Onset    Colon cancer Mother 63    Stroke Father     No Known Problems Sister     No Known Problems Daughter     No Known Problems Maternal Grandmother     No Known Problems Maternal Grandfather     No Known Problems Paternal Grandmother     No Known Problems  Paternal Grandfather     No Known Problems Paternal Aunt     No Known Problems Paternal Aunt     Breast cancer Neg Hx     Ovarian cancer Neg Hx       reports that she has never smoked. She has never been exposed to tobacco smoke. She has never used smokeless tobacco. She reports current alcohol use. She reports that she does not use drugs.  Current Outpatient Medications on File Prior to Visit   Medication Sig Dispense Refill    cetirizine (ZyrTEC) 10 mg tablet Take 1 tablet (10 mg total) by mouth daily 60 tablet 2    fluticasone (FLONASE) 50 mcg/act nasal spray 1 spray into each nostril daily 60 g 2    ibuprofen (MOTRIN) 800 mg tablet Take 1 tablet (800 mg total) by mouth every 8 (eight) hours 30 tablet 0    ondansetron (ZOFRAN) 4 mg tablet Take 1 tablet (4 mg total) by mouth every 8 (eight) hours as needed for nausea or vomiting 25 tablet 0    Semaglutide-Weight Management (WEGOVY) 1 MG/0.5ML Inject 0.5 mL (1 mg total) under the skin once a week 2 mL 1    triamcinolone (KENALOG) 0.025 % cream Apply topically 2 (two) times a day 15 g 0     No current facility-administered medications on file prior to visit.   No Known Allergies   Current Outpatient Medications on File Prior to Visit   Medication Sig Dispense Refill    cetirizine (ZyrTEC) 10 mg tablet Take 1 tablet (10 mg total) by mouth daily 60 tablet 2    fluticasone (FLONASE) 50 mcg/act nasal spray 1 spray into each nostril daily 60 g 2    ibuprofen (MOTRIN) 800 mg tablet Take 1 tablet (800 mg total) by mouth every 8 (eight) hours 30 tablet 0    ondansetron (ZOFRAN) 4 mg tablet Take 1 tablet (4 mg total) by mouth every 8 (eight) hours as needed for nausea or vomiting 25 tablet 0    Semaglutide-Weight Management (WEGOVY) 1 MG/0.5ML Inject 0.5 mL (1 mg total) under the skin once a week 2 mL 1    triamcinolone (KENALOG) 0.025 % cream Apply topically 2 (two) times a day 15 g 0     No current facility-administered medications on file prior to visit.      Social  "History     Tobacco Use    Smoking status: Never     Passive exposure: Never    Smokeless tobacco: Never   Vaping Use    Vaping status: Never Used   Substance and Sexual Activity    Alcohol use: Yes     Comment: couple times/month    Drug use: Never    Sexual activity: Yes     Partners: Male     Birth control/protection: Post-menopausal        Objective   /66 (BP Location: Left arm, Patient Position: Sitting, Cuff Size: Large)   Pulse 91   Temp (!) 96.3 °F (35.7 °C) (Temporal)   Resp 16   Ht 5' 8\" (1.727 m)   Wt (!) 138 kg (304 lb 3.2 oz)   LMP  (LMP Unknown)   SpO2 99%   BMI 46.25 kg/m²      Physical Exam  Constitutional:       Appearance: Normal appearance. She is obese.   HENT:      Head: Normocephalic and atraumatic.   Eyes:      Conjunctiva/sclera: Conjunctivae normal.   Cardiovascular:      Rate and Rhythm: Normal rate and regular rhythm.      Heart sounds: Normal heart sounds. No murmur heard.     No friction rub. No gallop.   Pulmonary:      Effort: Pulmonary effort is normal.      Breath sounds: Normal breath sounds. No wheezing, rhonchi or rales.   Musculoskeletal:      Right lower leg: Edema (1+ pitting edema; chronic and at baseline) present.      Left lower leg: Edema (1+ pitting edema; chronic) present.   Skin:     General: Skin is warm.      Comments: -Left lower extremity is not hot to touch, same temperature as right lower extremity, no tenderness to palpation, and no erythema   -Bilateral lower extremity venous stasis changes namely hyperpigmentation   Neurological:      General: No focal deficit present.      Mental Status: She is alert and oriented to person, place, and time.      Motor: No weakness.   Psychiatric:         Mood and Affect: Mood normal.         Behavior: Behavior normal.         "

## 2024-12-10 ENCOUNTER — OFFICE VISIT (OUTPATIENT)
Dept: VASCULAR SURGERY | Facility: CLINIC | Age: 54
End: 2024-12-10
Payer: COMMERCIAL

## 2024-12-10 VITALS
SYSTOLIC BLOOD PRESSURE: 126 MMHG | HEART RATE: 90 BPM | HEIGHT: 68 IN | BODY MASS INDEX: 44.41 KG/M2 | WEIGHT: 293 LBS | DIASTOLIC BLOOD PRESSURE: 76 MMHG | OXYGEN SATURATION: 98 %

## 2024-12-10 DIAGNOSIS — I87.2 CHRONIC VENOUS INSUFFICIENCY: Primary | ICD-10-CM

## 2024-12-10 DIAGNOSIS — I89.0 LYMPHEDEMA: ICD-10-CM

## 2024-12-10 PROCEDURE — 99213 OFFICE O/P EST LOW 20 MIN: CPT | Performed by: NURSE PRACTITIONER

## 2024-12-10 NOTE — PROGRESS NOTES
Name: Elizabeth Nelson      : 1970      MRN: [id number]  Encounter Provider: DAVID Singh  Encounter Date: 12/10/2024   Enc[facility]: [facility] [place]  :  Assessment & Plan  Lymphedema  54-year-old female with obesity, BMI 48, prediabetes, OA, chronic left lower extremity edema after skin biopsy 2022, recurrent left lower extremity cellulitis and intermittent flare ups of LLE swelling      Patient returns to the office for 2 month follow up for left lower extremity edema     -Chronic LLE stasis changes, lipodermatosclerosis and edema.  Stage II lymphedema  -Flareup of cellulitis left lower extremity since last visit.  PCP treated with Keflex.  L EVD 2024 negative for left lower extremity DVT  -She has tried multiple different compression though difficulty with sizing.  Compreflex ordered with compression pumps, will check status with [employer]omat.  Rx 20-30 mmHg compression socks given.  She will get measured and fitted.  Wear stockings daily and remove at night   Since last visit started using compression pumps.  She notes leg pain pain with extended use (greater than 45 minutes) or twice daily usage.  Also notes knee pain occasionally with use.  After use legs feel better  Advised dialing back compression pumps to 35 or 40 mmHg compression.  Can also try to use pumps for 30 minutes twice daily and gradually increase to 45 minutes and then 60 minutes  -She is attempting weight loss with Wegovy.  She is lost 30 pounds thus far.  Difficulty with insurance authorization.  Follows with PCP tomorrow.  Weight loss would make a significant impact in lower extremity edema  -Recommended continued weight loss, periodic leg elevation and regular exercise  -Moisturize skin daily to maintain skin integrity. Increase in dryness of skin since last visit. In part due to weather changes. Eucerin healing ointment recommended for night time after shower    -We discussed  referral to lymphedema clinic for manual lymph drainage. She has tenderness to touch LLE and will hold off at this time  -Follow-up in the office in 2 month to reevaluate    Orders:    Compression Stocking      Chief Complaint   Patient presents with    Lymphedema     Pt is here for 1 mo fu for compression pumps. Pt states that swelling has been better since wearing compression pumps. Pt states that compression pumps are painful after using pumps for an hour. Pt states Lt leg is sore/ tender. Pt denies tiredness, heaviness, bulging veins. Pt elevates legs sometimes.          History of Present Illness   Elizabeth Nelson is a 54 y.o. female who presents to the office for 2-month follow-up left lower extremity edema.  Since last visit she obtained compression pumps.  She also had a flareup of cellulitis of the left lower extremity which is completely resolved.  She was treated with Keflex and also ordered for venous Doppler which was negative for DVT/SVT.  She had some difficulty with using pumps after cellulitis.  She notes pumps are painful with extended use for more than 45 minutes, legs are sore if she uses twice daily.  She also notes occasional knee pain with use.  She is wearing compression stockings from [employer].  She was previously measured for 20 to 30 mmHg thigh-high compression which they did not have in her size.  She was ordered for Compro flex last visit though this was not supplied ?  Insurance approval or supplier issue  History obtained from: patient    Review of Systems   Constitutional: Negative.    HENT: Negative.     Respiratory: Negative.     Cardiovascular:  Positive for leg swelling.   Genitourinary: Negative.    Skin:  Positive for color change. Negative for wound.     Medical History Reviewed by provider this encounter:  Tobacco  Allergies  Meds  Problems  Med Hx  Surg Hx  Fam Hx     .     Objective   I have reviewed and made appropriate changes to the review of systems input  "by the medical assistant.    Vitals:    12/10/24 1138   BP: 126/76   BP Location: Right arm   Patient Position: Sitting   Cuff Size: Large   Pulse: 90   SpO2: 98%   Weight: (!) 141 kg (310 lb)   Height: 5' 8\" (1.727 m)       Patient Active Problem List   Diagnosis    Benign hypertension    Prediabetes    Tendinitis of right rotator cuff    Subacromial bursitis of right shoulder joint    Bilateral lower extremity edema    Abdominal hernia    Class 3 severe obesity due to excess calories with body mass index (BMI) of 45.0 to 49.9 in adult (HCC)    Primary osteoarthritis of both knees    Chronic venous insufficiency    Varicose veins of both lower extremities    Lymphedema       Past Surgical History:   Procedure Laterality Date    CHOLECYSTECTOMY  2006    HI COLONOSCOPY FLX DX W/COLLJ SPEC WHEN PFRMD Left 05/02/2019    Procedure: COLONOSCOPY;  Surgeon: Dave Badillo MD;  [facility];  Service: Gastroenterology    HI LAPAROSCOPIC APPENDECTOMY N/A 03/22/2019    Procedure: LAPAROSCOPIC APPENDECTOMY;  Surgeon: Ayo Monreal MD;  Location: [facility] OR;  Service: General    HI REPAIR FIRST ABDOMINAL WALL HERNIA N/A 08/28/2020    Procedure: REPAIR INCISIONAL HERNIA;  Surgeon: Ayo Monreal MD;  Location:  MAIN OR;  Service: General       Family History   Problem Relation Age of Onset    Colon cancer Mother 63    Stroke Father     No Known Problems Sister     No Known Problems Daughter     No Known Problems Maternal Grandmother     No Known Problems Maternal Grandfather     No Known Problems Paternal Grandmother     No Known Problems Paternal Grandfather     No Known Problems Paternal Aunt     No Known Problems Paternal Aunt     Breast cancer Neg Hx     Ovarian cancer Neg Hx        Social History     Socioeconomic History    Marital status: /Civil Union     Spouse name: Not on file    Number of children: Not on file    Years of education: Not on file    Highest education level: Not on file   Occupational " History    Not on file   Tobacco Use    Smoking status: Never     Passive exposure: Never    Smokeless tobacco: Never   Vaping Use    Vaping status: Never Used   Substance and Sexual Activity    Alcohol use: Yes     Comment: couple times/month    Drug use: Never    Sexual activity: Yes     Partners: Male     Birth control/protection: Post-menopausal   Other Topics Concern    Not on file   Social History Narrative    ** Merged History Encounter **          Social Drivers of Health     Financial Resource Strain: Low Risk  (10/24/2024)    Overall Financial Resource Strain (CARDIA)     Difficulty of Paying Living Expenses: Not hard at all   Food Insecurity: No Food Insecurity (10/24/2024)    Hunger Vital Sign     Worried About Running Out of Food in the Last Year: Never true     Ran Out of Food in the Last Year: Never true   Transportation Needs: No Transportation Needs (10/24/2024)    PRAPARE - Transportation     Lack of Transportation (Medical): No     Lack of Transportation (Non-Medical): No   Physical Activity: Not on file   Stress: No Stress Concern Present (10/7/2021)    [employer] - Occupational Stress Questionnaire     Feeling of Stress : Not at all   Social Connections: Not on file   Intimate Partner Violence: Not on file   Housing Stability: Low Risk  (10/24/2024)    Housing Stability Vital Sign     Unable to Pay for Housing in the Last Year: No     Number of Times Moved in the Last Year: 0     Homeless in the Last Year: No       No Known Allergies      Current Outpatient Medications:     capsaicin (ZOSTRIX) 0.025 % cream, Apply 1 Application topically 2 (two) times a day, Disp: 120 g, Rfl: 1    cetirizine (ZyrTEC) 10 mg tablet, Take 1 tablet (10 mg total) by mouth daily, Disp: 60 tablet, Rfl: 2    fluticasone (FLONASE) 50 mcg/act nasal spray, 1 spray into each nostril daily, Disp: 60 g, Rfl: 2    hydroCHLOROthiazide 25 mg tablet, Take 1 tablet (25 mg total) by mouth daily, Disp:  "90 tablet, Rfl: 3    ibuprofen (MOTRIN) 800 mg tablet, Take 1 tablet (800 mg total) by mouth every 8 (eight) hours, Disp: 30 tablet, Rfl: 0    lisinopril (ZESTRIL) 20 mg tablet, Take 1 tablet (20 mg total) by mouth daily, Disp: 90 tablet, Rfl: 3    ondansetron (ZOFRAN) 4 mg tablet, Take 1 tablet (4 mg total) by mouth every 8 (eight) hours as needed for nausea or vomiting, Disp: 25 tablet, Rfl: 0    triamcinolone (KENALOG) 0.025 % cream, Apply topically 2 (two) times a day, Disp: 15 g, Rfl: 0    Semaglutide-Weight Management (WEGOVY) 1 MG/0.5ML, Inject 0.5 mL (1 mg total) under the skin once a week (Patient not taking: Reported on 12/10/2024), Disp: 2 mL, Rfl: 1    /76 (BP Location: Right arm, Patient Position: Sitting, Cuff Size: Large)   Pulse 90   Ht 5' 8\" (1.727 m)   Wt (!) 141 kg (310 lb)   LMP  (LMP Unknown)   SpO2 98%   BMI 47.14 kg/m²      Physical Exam  Vitals and nursing note reviewed.   Constitutional:       Appearance: She is obese.   Eyes:      Extraocular Movements: Extraocular movements intact.   Cardiovascular:      Pulses:           Dorsalis pedis pulses are 2+ on the right side and 2+ on the left side.   Pulmonary:      Effort: Pulmonary effort is normal.   Musculoskeletal:      Comments: Stage II left lower extremity lymphedema, no open wounds or ulcerations.  No evidence of cellulitis.   Skin:     General: Skin is dry.   Neurological:      General: No focal deficit present.      Mental Status: She is alert and oriented to person, place, and time.   Psychiatric:         Mood and Affect: Mood normal.         Behavior: Behavior normal.         Administrative Statements   I have spent a total time of 30 minutes in caring for this patient on the day of the visit/encounter including Prognosis, Risks and benefits of tx options, Instructions for management, Importance of tx compliance, Risk factor reductions, Impressions, Counseling / Coordination of care, and Documenting in the medical " record.

## 2024-12-10 NOTE — ASSESSMENT & PLAN NOTE
54-year-old female with obesity, BMI 48, prediabetes, OA, chronic left lower extremity edema after skin biopsy March 2022, recurrent left lower extremity cellulitis and intermittent flare ups of LLE swelling      Patient returns to the office for 2 month follow up for left lower extremity edema     -Chronic LLE stasis changes, lipodermatosclerosis and edema.  Stage II lymphedema  -Flareup of cellulitis left lower extremity since last visit.  PCP treated with Keflex.  L EVD 11/13/2024 negative for left lower extremity DVT  -She has tried multiple different compression though difficulty with sizing.  Compreflex ordered with compression pumps, will check status with [employer].  Rx 20-30 mmHg compression socks given.  She will get measured and fitted.  Wear stockings daily and remove at night   Since last visit started using compression pumps.  She notes leg pain pain with extended use (greater than 45 minutes) or twice daily usage.  Also notes knee pain occasionally with use.  After use legs feel better  Advised dialing back compression pumps to 35 or 40 mmHg compression.  Can also try to use pumps for 30 minutes twice daily and gradually increase to 45 minutes and then 60 minutes  -She is attempting weight loss with Wegovy.  She is lost 30 pounds thus far.  Difficulty with insurance authorization.  Follows with PCP tomorrow.  Weight loss would make a significant impact in lower extremity edema  -Recommended continued weight loss, periodic leg elevation and regular exercise  -Moisturize skin daily to maintain skin integrity. Increase in dryness of skin since last visit. In part due to weather changes. Eucerin healing ointment recommended for night time after shower    -We discussed referral to lymphedema clinic for manual lymph drainage. She has tenderness to touch LLE and will hold off at this time  -Follow-up in the office in 2 month to reevaluate    Orders:    Compression Stocking

## 2024-12-11 ENCOUNTER — OFFICE VISIT (OUTPATIENT)
Dept: FAMILY MEDICINE CLINIC | Facility: CLINIC | Age: 54
End: 2024-12-11

## 2024-12-11 ENCOUNTER — APPOINTMENT (OUTPATIENT)
Dept: LAB | Facility: CLINIC | Age: 54
End: 2024-12-11
Payer: COMMERCIAL

## 2024-12-11 VITALS
DIASTOLIC BLOOD PRESSURE: 70 MMHG | OXYGEN SATURATION: 96 % | HEART RATE: 101 BPM | BODY MASS INDEX: 44.41 KG/M2 | HEIGHT: 68 IN | RESPIRATION RATE: 18 BRPM | WEIGHT: 293 LBS | TEMPERATURE: 98.7 F | SYSTOLIC BLOOD PRESSURE: 116 MMHG

## 2024-12-11 DIAGNOSIS — I89.0 LYMPHEDEMA: ICD-10-CM

## 2024-12-11 DIAGNOSIS — E66.813 CLASS 3 SEVERE OBESITY DUE TO EXCESS CALORIES WITH SERIOUS COMORBIDITY AND BODY MASS INDEX (BMI) OF 45.0 TO 49.9 IN ADULT (HCC): Primary | ICD-10-CM

## 2024-12-11 DIAGNOSIS — R73.03 PREDIABETES: ICD-10-CM

## 2024-12-11 DIAGNOSIS — E66.01 CLASS 3 SEVERE OBESITY DUE TO EXCESS CALORIES WITH SERIOUS COMORBIDITY AND BODY MASS INDEX (BMI) OF 45.0 TO 49.9 IN ADULT (HCC): Primary | ICD-10-CM

## 2024-12-11 DIAGNOSIS — E66.813 CLASS 3 SEVERE OBESITY DUE TO EXCESS CALORIES WITH SERIOUS COMORBIDITY AND BODY MASS INDEX (BMI) OF 45.0 TO 49.9 IN ADULT (HCC): ICD-10-CM

## 2024-12-11 DIAGNOSIS — E66.01 CLASS 3 SEVERE OBESITY DUE TO EXCESS CALORIES WITH SERIOUS COMORBIDITY AND BODY MASS INDEX (BMI) OF 45.0 TO 49.9 IN ADULT (HCC): ICD-10-CM

## 2024-12-11 LAB
CHOLEST SERPL-MCNC: 191 MG/DL (ref ?–200)
EST. AVERAGE GLUCOSE BLD GHB EST-MCNC: 117 MG/DL
HBA1C MFR BLD: 5.7 %
HDLC SERPL-MCNC: 52 MG/DL
LDLC SERPL CALC-MCNC: 118 MG/DL (ref 0–100)
TRIGL SERPL-MCNC: 104 MG/DL (ref ?–150)

## 2024-12-11 PROCEDURE — 36415 COLL VENOUS BLD VENIPUNCTURE: CPT

## 2024-12-11 PROCEDURE — 80061 LIPID PANEL: CPT

## 2024-12-11 PROCEDURE — 99213 OFFICE O/P EST LOW 20 MIN: CPT | Performed by: FAMILY MEDICINE

## 2024-12-11 PROCEDURE — 83036 HEMOGLOBIN GLYCOSYLATED A1C: CPT

## 2024-12-11 NOTE — ASSESSMENT & PLAN NOTE
-follows with vascular  -using compression socks  -using pneumatic pumps per vascular     PLAN  -continue per vascular recommendations  -recommended JOBST compression socks as did vascular

## 2024-12-11 NOTE — ASSESSMENT & PLAN NOTE
-Previously successful weight loss on Wegovy when initially provided however it has been about one month where unable to get approved. Today brings in denial of coverage request from [facility]. Since not being on wegovy has gained weight. Has comorbid conditions including lymphedema, prediabetes, hypertension, and at increased risk of adverse health outcomes including morbidity and mortality.   -Previously completed weight management program without efficacy about 3 years ago  -Has tried working with a nutritionist, also not effective  -Has gained weight; weight on last office visit of 138 kg (304 lb 3.2 oz)  and today weight is 141 kg (311 lb)   -Failed to lose weight with 150 minutes exercise weekly  -Failed to lose weight on calorie restrictive diet for 6 months    PLAN  -submitted EPIC embedded prior authorization today  -education/counseling on dietary and lifestyle modifications; will try to use her at home treadmill and on f/u will hold accountable  -today ordered HBA1C and lipid panel as is due     Orders:    Semaglutide-Weight Management (WEGOVY) 1 MG/0.5ML; Inject 0.5 mL (1 mg total) under the skin once a week    Hemoglobin A1C; Future    Lipid Panel with Direct LDL reflex; Future

## 2024-12-11 NOTE — PROGRESS NOTES
Name: Elizabeth Nelson      : 1970      MRN: [id number]  Encounter Provider: Rebecca Fay Kab-Perlman, MD  Encounter Date: 2024   Encounter department: [facility]  :  Assessment & Plan  Class 3 severe obesity due to excess calories with serious comorbidity and body mass index (BMI) of 45.0 to 49.9 in adult (HCC)    -Previously successful weight loss on Wegovy when initially provided however it has been about one month where unable to get approved. Today brings in denial of coverage request from [facility]. Since not being on wegovy has gained weight. Has comorbid conditions including lymphedema, prediabetes, hypertension, and at increased risk of adverse health outcomes including morbidity and mortality.   -Previously completed weight management program without efficacy about 3 years ago  -Has tried working with a nutritionist, also not effective  -Has gained weight; weight on last office visit of 138 kg (304 lb 3.2 oz)  and today weight is 141 kg (311 lb)   -Failed to lose weight with 150 minutes exercise weekly  -Failed to lose weight on calorie restrictive diet for 6 months    PLAN  -submitted EPIC embedded prior authorization today  -education/counseling on dietary and lifestyle modifications; will try to use her at home treadmill and on f/u will hold accountable  -today ordered HBA1C and lipid panel as is due     Orders:    Semaglutide-Weight Management (WEGOVY) 1 MG/0.5ML; Inject 0.5 mL (1 mg total) under the skin once a week    Hemoglobin A1C; Future    Lipid Panel with Direct LDL reflex; Future    Prediabetes  -currently not on any medications, has used metformin in the past  -will await wegovy approval and if unable consider metformin    PLAN  -education/counseling today about prediabetes   -ordered HBA1C today         Lymphedema  -follows with vascular  -using compression socks  -using pneumatic pumps per vascular     PLAN  -continue per vascular  "recommendations  -recommended JOBST compression socks as did vascular              History of Present Illness     Elizabeth Nelson is  55 yo morbidly obese patient with chronic conditions secondary to her obesity presenting today to follow-up regarding her weight and wegovy management. She is currently on 1mg wegovy weekly. Today Elizabeth states she has not had any abdominal symptoms.     Review of Systems   Constitutional:  Negative for fever.   Gastrointestinal:  Negative for abdominal pain, nausea and vomiting.   Skin:  Negative for rash.   Hematological:  Negative for adenopathy. Does not bruise/bleed easily.       Objective   /70 (BP Location: Right arm, Patient Position: Sitting, Cuff Size: Large)   Pulse 101   Temp 98.7 °F (37.1 °C) (Temporal)   Resp 18   Ht 5' 8\" (1.727 m)   Wt (!) 141 kg (311 lb)   LMP  (LMP Unknown)   SpO2 96%   Breastfeeding No   BMI 47.29 kg/m²      Physical Exam  Constitutional:       Appearance: Normal appearance. She is obese.   HENT:      Head: Normocephalic and atraumatic.   Eyes:      General: No scleral icterus.     Conjunctiva/sclera: Conjunctivae normal.   Cardiovascular:      Rate and Rhythm: Normal rate and regular rhythm.      Heart sounds: Normal heart sounds. No murmur heard.     No friction rub. No gallop.   Pulmonary:      Effort: Pulmonary effort is normal.      Breath sounds: Normal breath sounds. No wheezing, rhonchi or rales.   Musculoskeletal:      Right lower leg: No edema.      Left lower leg: No edema.   Skin:     General: Skin is warm.   Neurological:      General: No focal deficit present.      Mental Status: She is alert and oriented to person, place, and time.   Psychiatric:         Mood and Affect: Mood normal.         Behavior: Behavior normal.         Thought Content: Thought content normal.         Judgment: Judgment normal.       "

## 2024-12-11 NOTE — ASSESSMENT & PLAN NOTE
-currently not on any medications, has used metformin in the past  -will await wegovy approval and if unable consider metformin    PLAN  -education/counseling today about prediabetes   -ordered HBA1C today

## 2024-12-30 ENCOUNTER — TELEPHONE (OUTPATIENT)
Dept: FAMILY MEDICINE CLINIC | Facility: CLINIC | Age: 54
End: 2024-12-30

## 2024-12-31 ENCOUNTER — RESULTS FOLLOW-UP (OUTPATIENT)
Dept: FAMILY MEDICINE CLINIC | Facility: CLINIC | Age: 54
End: 2024-12-31

## 2024-12-31 ENCOUNTER — TELEPHONE (OUTPATIENT)
Dept: FAMILY MEDICINE CLINIC | Facility: CLINIC | Age: 54
End: 2024-12-31

## 2024-12-31 NOTE — TELEPHONE ENCOUNTER
Assisted pcp with wegovy prior auth appeal recocmendations via epic msg    Pharmacist Tracking Tool  Reason For Outreach: Embedded Pharmacist  Demographics:  Intervention Method: Chart Review  Type of Intervention: New  Topics Addressed: Obesity  Pharmacologic Interventions: N/A  Non-Pharmacologic Interventions: Care coordination and Cost  Time:  Direct Patient Care:  0  mins  Care Coordination:  10  mins  Recommendation Recipient: Provider  Outcome: Pending/ Follow-up Required    Wyatt White PharmD, BCACP  Ambulatory Care Clinical Pharmacist

## 2025-01-13 ENCOUNTER — OFFICE VISIT (OUTPATIENT)
Dept: FAMILY MEDICINE CLINIC | Facility: CLINIC | Age: 55
End: 2025-01-13

## 2025-01-13 VITALS
RESPIRATION RATE: 18 BRPM | DIASTOLIC BLOOD PRESSURE: 80 MMHG | BODY MASS INDEX: 44.41 KG/M2 | OXYGEN SATURATION: 96 % | TEMPERATURE: 98.6 F | SYSTOLIC BLOOD PRESSURE: 126 MMHG | WEIGHT: 293 LBS | HEART RATE: 103 BPM | HEIGHT: 68 IN

## 2025-01-13 DIAGNOSIS — R73.03 PREDIABETES: ICD-10-CM

## 2025-01-13 DIAGNOSIS — E66.01 CLASS 3 SEVERE OBESITY DUE TO EXCESS CALORIES WITH SERIOUS COMORBIDITY AND BODY MASS INDEX (BMI) OF 45.0 TO 49.9 IN ADULT (HCC): Primary | ICD-10-CM

## 2025-01-13 DIAGNOSIS — Z76.0 MEDICATION REFILL: ICD-10-CM

## 2025-01-13 DIAGNOSIS — I10 BENIGN HYPERTENSION: ICD-10-CM

## 2025-01-13 DIAGNOSIS — E78.2 MODERATE MIXED HYPERLIPIDEMIA NOT REQUIRING STATIN THERAPY: ICD-10-CM

## 2025-01-13 DIAGNOSIS — E66.813 CLASS 3 SEVERE OBESITY DUE TO EXCESS CALORIES WITH SERIOUS COMORBIDITY AND BODY MASS INDEX (BMI) OF 45.0 TO 49.9 IN ADULT (HCC): Primary | ICD-10-CM

## 2025-01-13 PROBLEM — E78.5 HYPERLIPIDEMIA: Status: ACTIVE | Noted: 2025-01-13

## 2025-01-13 PROCEDURE — 99213 OFFICE O/P EST LOW 20 MIN: CPT | Performed by: FAMILY MEDICINE

## 2025-01-13 RX ORDER — IBUPROFEN 800 MG/1
800 TABLET, FILM COATED ORAL EVERY 8 HOURS
Qty: 60 TABLET | Refills: 2 | Status: SHIPPED | OUTPATIENT
Start: 2025-01-13

## 2025-01-13 NOTE — PROGRESS NOTES
Name: Elizabeth Nelson      : 1970      MRN: [id number]  Encounter Provider: Rebecca Fay Kab-Perlman, MD  Encounter Date: 2025   Encounter department: [facility]  :  Assessment & Plan  Class 3 severe obesity due to excess calories with serious comorbidity and body mass index (BMI) of 45.0 to 49.9 in adult (HCC)  -Since  difficulty getting GLP-1 covered. Got covered for a month or two then denied  -During the visit called her insurance company to get GLP-1 covered and they put us on hold  -Explained that if motivated can call insurance company and find out exactly what we need, also explained if wants to do the appeals process I would be happy to complete any and all paperwork  -States has been going to the gym 5 times weekly and trying to eat healthy    PLAN  -at this point in time Elizabeth does not wish to communicate extensively with the insurance company  -encouraged continued lifestyle changes including exercise and healthy eating         Benign hypertension  -Today bp 126/80 and at goal <140/90 per [facility]  -Home meds: lisinopril 20mg and hctz 25mg    PLAN  -continue home meds         Medication refill  -Refilled ibuprofen with education/counseling mainly to take with food and drink adequate water    Orders:    ibuprofen (MOTRIN) 800 mg tablet; Take 1 tablet (800 mg total) by mouth every 8 (eight) hours    Prediabetes  Lab Results   Component Value Date    HGBA1C 5.7 (H) 2024     -Improved from 6.1 3 months ago  -Attributed to going to the gym and healthier eating particularly decreasing carbohydrate intake    PLAN  -congratulations provided and stated if we can get to 5.6 we can remove prediabetes diagnosis       Moderate mixed hyperlipidemia not requiring statin therapy    Lab Results   Component Value Date    CHOLESTEROL 191 2024    CHOLESTEROL 200 (H) 11/15/2022    CHOLESTEROL 181 2020     Lab Results   Component Value Date    HDL 52 2024    HDL  45 (L) 11/15/2022    HDL 46 01/18/2020     Lab Results   Component Value Date    TRIG 104 12/11/2024    TRIG 113 11/15/2022    TRIG 68 01/18/2020     Lab Results   Component Value Date    NONHDLC 155 11/15/2022    NONHDLC 131 10/11/2018     -Cholesterol improved howeveer LDL remains elevated    PLAN  -encouraged continued lifestyle modifications including exercise and healthy eating; pointed out the improved HDL as a sign that she has been exercising more           BMI Counseling: Body mass index is 46.83 kg/m². The BMI is above normal. Nutrition recommendations include decreasing portion sizes, encouraging healthy choices of fruits and vegetables, decreasing fast food intake, consuming healthier snacks, limiting drinks that contain sugar, moderation in carbohydrate intake, increasing intake of lean protein, reducing intake of saturated and trans fat and reducing intake of cholesterol. Exercise recommendations include exercising 3-5 times per week. No pharmacotherapy was ordered. Patient referred to PCP. Rationale for BMI follow-up plan is due to patient being overweight or obese.       History of Present Illness     Elizabeth Ruiz is a 55 yo female patient presenting today to f/u regarding her obesity. Although we initially obtianed coverage for wegovy, insurance then denied the wegovy. As such today we will discuss further what we can do to get it covered to decrease risk of morbidity and mortality as well as further health complications.     Today she states that she did not receive the mychart messages because she is not able to get into mycharts.     Review of Systems   Constitutional:  Negative for fever.   Gastrointestinal:  Negative for abdominal pain and vomiting.   Skin:  Negative for rash.   Hematological:  Negative for adenopathy. Does not bruise/bleed easily.       Objective   /80 (BP Location: Right arm, Patient Position: Sitting, Cuff Size: Extra-Large)   Pulse 103   Temp 98.6 °F (37 °C)  "(Temporal)   Resp 18   Ht 5' 8\" (1.727 m)   Wt (!) 140 kg (308 lb)   LMP  (LMP Unknown)   SpO2 96%   Breastfeeding No   BMI 46.83 kg/m²      Physical Exam  Constitutional:       Appearance: Normal appearance. She is obese.   HENT:      Head: Normocephalic and atraumatic.   Eyes:      Conjunctiva/sclera: Conjunctivae normal.   Cardiovascular:      Rate and Rhythm: Normal rate and regular rhythm.      Heart sounds: Normal heart sounds. No murmur heard.     No friction rub. No gallop.   Pulmonary:      Breath sounds: Normal breath sounds. No wheezing, rhonchi or rales.   Skin:     General: Skin is warm.   Neurological:      General: No focal deficit present.      Mental Status: She is alert and oriented to person, place, and time.   Psychiatric:         Mood and Affect: Mood normal.         Behavior: Behavior normal.         Thought Content: Thought content normal.         Judgment: Judgment normal.         "

## 2025-01-13 NOTE — ASSESSMENT & PLAN NOTE
-Today bp 126/80 and at goal <140/90 per [facility]ILDA  -Home meds: lisinopril 20mg and hctz 25mg    PLAN  -continue home meds

## 2025-01-13 NOTE — ASSESSMENT & PLAN NOTE
-Since 2023 difficulty getting GLP-1 covered. Got covered for a month or two then denied  -During the visit called her insurance company to get GLP-1 covered and they put us on hold  -Explained that if motivated can call insurance company and find out exactly what we need, also explained if wants to do the appeals process I would be happy to complete any and all paperwork  -States has been going to the gym 5 times weekly and trying to eat healthy    PLAN  -at this point in time Elizabeth does not wish to communicate extensively with the insurance company  -encouraged continued lifestyle changes including exercise and healthy eating

## 2025-01-14 NOTE — ASSESSMENT & PLAN NOTE
Lab Results   Component Value Date    CHOLESTEROL 191 12/11/2024    CHOLESTEROL 200 (H) 11/15/2022    CHOLESTEROL 181 01/18/2020     Lab Results   Component Value Date    HDL 52 12/11/2024    HDL 45 (L) 11/15/2022    HDL 46 01/18/2020     Lab Results   Component Value Date    TRIG 104 12/11/2024    TRIG 113 11/15/2022    TRIG 68 01/18/2020     Lab Results   Component Value Date    NONHDLC 155 11/15/2022    NONHDLC 131 10/11/2018     -Cholesterol improved howeveer LDL remains elevated    PLAN  -encouraged continued lifestyle modifications including exercise and healthy eating; pointed out the improved HDL as a sign that she has been exercising more

## 2025-01-14 NOTE — ASSESSMENT & PLAN NOTE
Lab Results   Component Value Date    HGBA1C 5.7 (H) 12/11/2024     -Improved from 6.1 3 months ago  -Attributed to going to the gym and healthier eating particularly decreasing carbohydrate intake    PLAN  -congratulations provided and stated if we can get to 5.6 we can remove prediabetes diagnosis

## 2025-01-20 ENCOUNTER — TELEPHONE (OUTPATIENT)
Dept: FAMILY MEDICINE CLINIC | Facility: CLINIC | Age: 55
End: 2025-01-20

## 2025-01-20 NOTE — TELEPHONE ENCOUNTER
----- Message from Rebecca Kab-Perlman, MD sent at 1/18/2025  8:48 AM EST -----  Regarding: appointment  Dear team,     Elizabeth has an upcoming appointment with me at the end of March. May someone please call her and let her know that we may be able to get zepbound covered (apparently more insurances are covering it now; it's in the same class of medications as wegovy and ozempic), and I would like to schedule her appointment sooner so we can complete the prior authorization that is embedded in the electronic health record.     Thank you!  Dr. Kab-Perlman

## 2025-01-23 ENCOUNTER — OFFICE VISIT (OUTPATIENT)
Dept: FAMILY MEDICINE CLINIC | Facility: CLINIC | Age: 55
End: 2025-01-23

## 2025-01-23 ENCOUNTER — PATIENT OUTREACH (OUTPATIENT)
Dept: FAMILY MEDICINE CLINIC | Facility: CLINIC | Age: 55
End: 2025-01-23

## 2025-01-23 VITALS
HEIGHT: 68 IN | SYSTOLIC BLOOD PRESSURE: 120 MMHG | BODY MASS INDEX: 44.41 KG/M2 | OXYGEN SATURATION: 97 % | WEIGHT: 293 LBS | TEMPERATURE: 96.6 F | RESPIRATION RATE: 16 BRPM | HEART RATE: 102 BPM | DIASTOLIC BLOOD PRESSURE: 80 MMHG

## 2025-01-23 DIAGNOSIS — L81.9 HYPERPIGMENTATION: ICD-10-CM

## 2025-01-23 DIAGNOSIS — E66.813 CLASS 3 SEVERE OBESITY DUE TO EXCESS CALORIES WITH SERIOUS COMORBIDITY AND BODY MASS INDEX (BMI) OF 45.0 TO 49.9 IN ADULT (HCC): Primary | ICD-10-CM

## 2025-01-23 DIAGNOSIS — E66.01 CLASS 3 SEVERE OBESITY DUE TO EXCESS CALORIES WITH SERIOUS COMORBIDITY AND BODY MASS INDEX (BMI) OF 45.0 TO 49.9 IN ADULT (HCC): Primary | ICD-10-CM

## 2025-01-23 DIAGNOSIS — L30.9 ECZEMA OF BOTH HANDS: ICD-10-CM

## 2025-01-23 RX ORDER — TRIAMCINOLONE ACETONIDE 1 MG/ML
LOTION TOPICAL
Qty: 60 ML | Refills: 1 | Status: SHIPPED | OUTPATIENT
Start: 2025-01-23

## 2025-01-23 NOTE — PROGRESS NOTES
Name: Elizabeth Nelson      : 1970      MRN: [id number]  Encounter Provider: Rebecca Fay Kab-Perlman, MD  Encounter Date: 2025   Encounter department: [facility]  :  Assessment & Plan  Class 3 severe obesity due to excess calories with serious comorbidity and body mass index (BMI) of 45.0 to 49.9 in adult (HCC)  Prior Authorization Clinical Questions for Weight Management Pharmacotherapy    1. Does the patient have a contrainidcation to medication prescribed for weight management?: No  2. Does the patient have a diagnosis of obesity, confirmed by a BMI greater than or equal to 30 kg/m^2?: Yes  3. Does the patient have a BMI of greater than or equal to 27 kg/m^2 with at least one weight-related comorbidity/risk factor/complication (e.g. diabetes, dyslipidemia, coronary artery disease)?: Yes  4. Weight-related co-morbidities/risk factors: prediabetes, dyslipidemia, hypertension  5. Has the patient been on a weight loss regimen of low-calorie diet, increased physical activity, and lifestyle modifications for a minimum of 6 months?: Yes  6. Has the patient completed a comprehensive weight loss program (ie, Weight Watchers, Noom, Bariatrics, other katherine on phone)? If so, what?: Yes   -- Q6 Further Explanation: About 3 years ago   7. Does the patient have a history of type 2 diabetes?: No  8. Has the member tried and failed other weight loss medication within the past 12 months?: No  9. Will the member use requested medication in combination with another GLP agonist or weight loss drug?: No  10. Is the medication a controlled substance?: No  For renewals: Has the patient had a positive outcome with current weight management medication (i.e., change in body weight of at least 4-5% after 12-16 weeks on maximally tolerated dose)?: No     Baseline weight (in pounds): 306 lbs  Current weight (in pounds): 306 lbs  Weight loss percentage: 0%       -comorbidities include prediabetes,  hyperlipidemia, osteoarthritis of both knees, chronic venous insufficiency, hypertension, and lymphedema  -previous on wegovy however treatment interrupted due to lack of repeat insurance coverage which then caused weight gain and was sited as a reason not to refill the wegovy  -has dieted and participated in programs for >6 months to no avail  -currently going to the gym daily and being careful of food intake  -goes to the gym Monday through Friday daily     PLAN  -Today we will prescribe zepbound; education/counseling provided about zepbound  -Completed Epic [facility] prior authorization form today         Eczema of both hands  -And elbows    PLAN  -Provided with triamcinolone with steroid counseling  -Education/counseling to avoid over exposure to water and exposure ot harsh chemicals; recommended gloves    Orders:    triamcinolone (KENALOG) 0.1 % lotion; Apply topically to eczema twice daily for up to two weeks at a time with a one week holiday in between    Hyperpigmentation  -macular hyperpigmented and asymptomatic spots on bilateral plantar feet  -noticed incidentally bu her son  -differentials includes plantar melanosis as a benign finding  -unclear when began; vascular prescribed pneumatic pumps for her lymphedema which she uses daily for an hour     PLAN  -messaged her vascular surgeon Rosa Isela Hilliard today to see if she has any ideas and if she thinks it's vascular in origin                History of Present Illness     Elizabeth Nelson is a 55 yo female patient presenting today to discuss zepbound after failed repeat coverage for wegovy. We saw an initial weight loss on the wegovy however due to interruption there was subsequent weight gain which was sited as a reason not to have insurance approval; namely, that there wasn't an overall 10% reduction in weight.     Review of Systems   Constitutional:  Negative for fatigue and fever.   Gastrointestinal:  Negative for abdominal pain, constipation, diarrhea, nausea  "and vomiting.   Skin:  Positive for rash.   Hematological:  Negative for adenopathy. Does not bruise/bleed easily.     Objective   /80 (BP Location: Left arm, Patient Position: Sitting, Cuff Size: Large)   Pulse 102   Temp (!) 96.6 °F (35.9 °C) (Temporal)   Resp 16   Ht 5' 8\" (1.727 m)   Wt (!) 139 kg (306 lb 6.4 oz)   LMP  (LMP Unknown)   SpO2 97%   BMI 46.59 kg/m²      Physical Exam  Constitutional:       Appearance: Normal appearance. She is obese.   HENT:      Head: Normocephalic and atraumatic.   Cardiovascular:      Rate and Rhythm: Normal rate and regular rhythm.      Heart sounds: Normal heart sounds. No murmur heard.     No friction rub. No gallop.   Pulmonary:      Effort: Pulmonary effort is normal.      Breath sounds: Normal breath sounds. No wheezing, rhonchi or rales.   Abdominal:      General: Abdomen is flat. Bowel sounds are normal. There is no distension.      Palpations: Abdomen is soft.      Tenderness: There is no abdominal tenderness.   Musculoskeletal:      Right lower leg: Edema present.      Left lower leg: Edema present.   Skin:     General: Skin is warm.      Findings: No rash.      Comments: Bilateral dorsal hands with scaly, thickened skin patches with erythema   Neurological:      General: No focal deficit present.      Mental Status: She is alert and oriented to person, place, and time.   Psychiatric:         Mood and Affect: Mood normal.         Behavior: Behavior normal.           "

## 2025-01-23 NOTE — PROGRESS NOTES
Medication Patient Assistance Program (MPAP) Specialist  was requested by clinician regarding Zepbound. There are currently no patient assistance program's for Zepbound. MPAP specialist printed coupon card for patient. MPAP specialist thanked patient and clinician.

## 2025-01-23 NOTE — ASSESSMENT & PLAN NOTE
Prior Authorization Clinical Questions for Weight Management Pharmacotherapy    1. Does the patient have a contrainidcation to medication prescribed for weight management?: No  2. Does the patient have a diagnosis of obesity, confirmed by a BMI greater than or equal to 30 kg/m^2?: Yes  3. Does the patient have a BMI of greater than or equal to 27 kg/m^2 with at least one weight-related comorbidity/risk factor/complication (e.g. diabetes, dyslipidemia, coronary artery disease)?: Yes  4. Weight-related co-morbidities/risk factors: prediabetes, dyslipidemia, hypertension  5. Has the patient been on a weight loss regimen of low-calorie diet, increased physical activity, and lifestyle modifications for a minimum of 6 months?: Yes  6. Has the patient completed a comprehensive weight loss program (ie, Weight Watchers, Noom, Bariatrics, other katherine on phone)? If so, what?: Yes   -- Q6 Further Explanation: About 3 years ago   7. Does the patient have a history of type 2 diabetes?: No  8. Has the member tried and failed other weight loss medication within the past 12 months?: No  9. Will the member use requested medication in combination with another GLP agonist or weight loss drug?: No  10. Is the medication a controlled substance?: No  For renewals: Has the patient had a positive outcome with current weight management medication (i.e., change in body weight of at least 4-5% after 12-16 weeks on maximally tolerated dose)?: No     Baseline weight (in pounds): 306 lbs  Current weight (in pounds): 306 lbs  Weight loss percentage: 0%       -comorbidities include prediabetes, hyperlipidemia, osteoarthritis of both knees, chronic venous insufficiency, hypertension, and lymphedema  -previous on wegovy however treatment interrupted due to lack of repeat insurance coverage which then caused weight gain and was sited as a reason not to refill the wegovy  -has dieted and participated in programs for >6 months to no avail  -currently  going to the gym daily and being careful of food intake  -goes to the gym Monday through Friday daily     PLAN  -Today we will prescribe zepbound; education/counseling provided about zepbound  -Completed Epic embedded prior authorization form today

## 2025-01-24 NOTE — ASSESSMENT & PLAN NOTE
-macular hyperpigmented and asymptomatic spots on bilateral plantar feet  -noticed incidentally bu her son  -differentials includes plantar melanosis as a benign finding  -unclear when began; vascular prescribed pneumatic pumps for her lymphedema which she uses daily for an hour     PLAN  -messaged her vascular surgeon Rosa Isela Hilliard today to see if she has any ideas and if she thinks it's vascular in origin

## 2025-02-11 ENCOUNTER — OFFICE VISIT (OUTPATIENT)
Dept: VASCULAR SURGERY | Facility: CLINIC | Age: 55
End: 2025-02-11
Payer: COMMERCIAL

## 2025-02-11 VITALS
HEART RATE: 82 BPM | WEIGHT: 293 LBS | RESPIRATION RATE: 18 BRPM | OXYGEN SATURATION: 100 % | HEIGHT: 68 IN | DIASTOLIC BLOOD PRESSURE: 64 MMHG | BODY MASS INDEX: 44.41 KG/M2 | SYSTOLIC BLOOD PRESSURE: 110 MMHG

## 2025-02-11 DIAGNOSIS — I89.0 LYMPHEDEMA: Primary | ICD-10-CM

## 2025-02-11 PROCEDURE — 99213 OFFICE O/P EST LOW 20 MIN: CPT | Performed by: NURSE PRACTITIONER

## 2025-02-11 NOTE — ASSESSMENT & PLAN NOTE
54-year-old female with obesity, BMI 48, prediabetes, OA, chronic left lower extremity edema after skin biopsy March 2022, recurrent left lower extremity cellulitis and intermittent flare ups of LLE swelling      Patient returns to the office for 2 month follow up for left lower extremity edema     -Chronic LLE stasis changes, lipodermatosclerosis and edema.  Stage II lymphedema  -Using compression stockings and compression pumps with improvement in edema and firmness of the skin   -No flare up of cellulitis   -Since last visit developed painless hyperpigmented spots sprinkled on the plantar foot bilaterally. Unclear etiology. Not related to compression pumps and does not have an appearance concerning for embolic process  -She is attempting weight loss with Wegovy.  Difficulty with insurance authorization.  Encouraged weight loss   -Recommended continued weight loss, periodic leg elevation and regular exercise, compression pumps and compression stockings.  Use moisturizer daily to maintain skin integrity  -Follow-up in the office in 3 months         Chief Complaint   Patient presents with    Follow-up     2 months follow up compression pump, some discoloration, some old bulging veins,some claudication when at night down uses compressions stocking daily and elevates. Pt states dots on the bottom of her feet both.

## 2025-02-11 NOTE — PROGRESS NOTES
Name: Elizabeth Nelson      : 1970      MRN: [id number]  Encounter Provider: DAVID Singh  Encounter Date: 2025   Encounter department: [facility] [place]  :  Assessment & Plan  Lymphedema  54-year-old female with obesity, BMI 48, prediabetes, OA, chronic left lower extremity edema after skin biopsy 2022, recurrent left lower extremity cellulitis and intermittent flare ups of LLE swelling      Patient returns to the office for 2 month follow up for left lower extremity edema     -Chronic LLE stasis changes, lipodermatosclerosis and edema.  Stage II lymphedema  -Using compression stockings and compression pumps with improvement in edema and firmness of the skin   -No flare up of cellulitis   -Since last visit developed painless hyperpigmented spots sprinkled on the plantar foot bilaterally. Unclear etiology. Not related to compression pumps and does not have an appearance concerning for embolic process  -She is attempting weight loss with Wegovy.  Difficulty with insurance authorization.  Encouraged weight loss   -Recommended continued weight loss, periodic leg elevation and regular exercise, compression pumps and compression stockings.  Use moisturizer daily to maintain skin integrity  -Follow-up in the office in 3 months         Chief Complaint   Patient presents with    Follow-up     2 months follow up compression pump, some discoloration, some old bulging veins,some claudication when at night down uses compressions stocking daily and elevates. Pt states dots on the bottom of her feet both.       History of Present Illness   Elizabeth Nelson is a 54 y.o. female who presents to the office for 2-month follow-up lower extremity lymphedema.  She is using compression pumps and compression stockings with good response.  She has chronic discoloration left anterior shin.  She is using pumps daily for 30 minutes to 1 hour in the evening.  She has discomfort with using pumps if legs are not  "swollen, reports legs are not swollen in the morning.  She did have 1 episode of redness of the left lower extremity after a day of not wearing compression stockings.  She has since been compliant with compression stockings daily and has not had any issue with the visit she did develop some spotty discoloration hyperpigmentation of the plantar foot bilaterally, no pain or discomfort associated with this.  Pictures on chart.  History obtained from: patient    Review of Systems   Cardiovascular:  Positive for leg swelling.   Skin:  Positive for color change. Negative for wound.     Medical History Reviewed by provider this encounter:  Tobacco  Allergies  Meds  Problems  Med Hx  Surg Hx  Fam Hx     .     Objective   I have reviewed and made appropriate changes to the review of systems input by the medical assistant.    Vitals:    02/11/25 0928   BP: 110/64   BP Location: Right arm   Patient Position: Sitting   Cuff Size: Large   Pulse: 82   Resp: 18   SpO2: 100%   Weight: (!) 142 kg (314 lb 1.6 oz)   Height: 5' 8\" (1.727 m)       Patient Active Problem List   Diagnosis    Benign hypertension    Prediabetes    Tendinitis of right rotator cuff    Subacromial bursitis of right shoulder joint    Bilateral lower extremity edema    Abdominal hernia    Class 3 severe obesity due to excess calories with body mass index (BMI) of 45.0 to 49.9 in adult (HCC)    Primary osteoarthritis of both knees    Chronic venous insufficiency    Varicose veins of both lower extremities    Lymphedema    Hyperlipidemia    Hyperpigmentation       Past Surgical History:   Procedure Laterality Date    CHOLECYSTECTOMY  2006    WI COLONOSCOPY FLX DX W/COLLJ SPEC WHEN PFRMD Left 05/02/2019    Procedure: COLONOSCOPY;  Surgeon: Dave Badillo MD;  Location: [facility];  Service: Gastroenterology    WI LAPAROSCOPIC APPENDECTOMY N/A 03/22/2019    Procedure: LAPAROSCOPIC APPENDECTOMY;  Surgeon: Ayo Monreal MD;  Location:  MAIN OR;  Service: " General    [place] REPAIR FIRST ABDOMINAL WALL HERNIA N/A 08/28/2020    Procedure: REPAIR INCISIONAL HERNIA;  Surgeon: Ayo Monreal MD;  Location:  MAIN OR;  Service: General       Family History   Problem Relation Age of Onset    Colon cancer Mother 63    Stroke Father     No Known Problems Sister     No Known Problems Daughter     No Known Problems Maternal Grandmother     No Known Problems Maternal Grandfather     No Known Problems Paternal Grandmother     No Known Problems Paternal Grandfather     No Known Problems Paternal Aunt     No Known Problems Paternal Aunt     Breast cancer Neg Hx     Ovarian cancer Neg Hx        Social History     Socioeconomic History    Marital status: /Civil Union     Spouse name: Not on file    Number of children: Not on file    Years of education: Not on file    Highest education level: Not on file   Occupational History    Not on file   Tobacco Use    Smoking status: Never     Passive exposure: Never    Smokeless tobacco: Never   Vaping Use    Vaping status: Never Used   Substance and Sexual Activity    Alcohol use: Yes     Comment: couple times/month    Drug use: Never    Sexual activity: Yes     Partners: Male     Birth control/protection: Post-menopausal   Other Topics Concern    Not on file   Social History Narrative    ** Merged History Encounter **          Social Drivers of Health     Financial Resource Strain: Low Risk  (10/24/2024)    Overall Financial Resource Strain (CARDIA)     Difficulty of Paying Living Expenses: Not hard at all   Food Insecurity: No Food Insecurity (10/24/2024)    Hunger Vital Sign     Worried About Running Out of Food in the Last Year: Never true     Ran Out of Food in the Last Year: Never true   Transportation Needs: No Transportation Needs (10/24/2024)    PRAPARE - Transportation     Lack of Transportation (Medical): No     Lack of Transportation (Non-Medical): No   Physical Activity: Not on file   Stress: No Stress Concern Present  "(10/7/2021)    [employer] - Occupational Stress Questionnaire     Feeling of Stress : Not at all   Social Connections: Not on file   Intimate Partner Violence: Not on file   Housing Stability: Low Risk  (10/24/2024)    Housing Stability Vital Sign     Unable to Pay for Housing in the Last Year: No     Number of Times Moved in the Last Year: 0     Homeless in the Last Year: No       No Known Allergies      Current Outpatient Medications:     capsaicin (ZOSTRIX) 0.025 % cream, Apply 1 Application topically 2 (two) times a day, Disp: 120 g, Rfl: 1    cetirizine (ZyrTEC) 10 mg tablet, Take 1 tablet (10 mg total) by mouth daily, Disp: 60 tablet, Rfl: 2    fluticasone (FLONASE) 50 mcg/act nasal spray, 1 spray into each nostril daily, Disp: 60 g, Rfl: 2    hydroCHLOROthiazide 25 mg tablet, Take 1 tablet (25 mg total) by mouth daily, Disp: 90 tablet, Rfl: 3    ibuprofen (MOTRIN) 800 mg tablet, Take 1 tablet (800 mg total) by mouth every 8 (eight) hours, Disp: 60 tablet, Rfl: 2    lisinopril (ZESTRIL) 20 mg tablet, Take 1 tablet (20 mg total) by mouth daily, Disp: 90 tablet, Rfl: 3    triamcinolone (KENALOG) 0.1 % lotion, Apply topically to eczema twice daily for up to two weeks at a time with a one week holiday in between, Disp: 60 mL, Rfl: 1    /64 (BP Location: Right arm, Patient Position: Sitting, Cuff Size: Large)   Pulse 82   Resp 18   Ht 5' 8\" (1.727 m)   Wt (!) 142 kg (314 lb 1.6 oz)   LMP  (LMP Unknown)   SpO2 100%   BMI 47.76 kg/m²      Physical Exam  Vitals and nursing note reviewed.   Constitutional:       Appearance: Normal appearance. She is obese.   HENT:      Head: Normocephalic and atraumatic.   Eyes:      Extraocular Movements: Extraocular movements intact.   Cardiovascular:      Pulses:           Dorsalis pedis pulses are 2+ on the right side and 2+ on the left side.   Pulmonary:      Effort: Pulmonary effort is normal.   Musculoskeletal:      Comments: Stage II " lymphedema left lower extremity, chronic stasis changes with skin thickening and discoloration.  No open wounds or lymphangitis   Skin:     General: Skin is warm.   Neurological:      General: No focal deficit present.      Mental Status: She is alert and oriented to person, place, and time.   Psychiatric:         Behavior: Behavior normal.       Administrative Statements   I have spent a total time of 20 minutes in caring for this patient on the day of the visit/encounter including Prognosis, Risks and benefits of tx options, Instructions for management, Patient and family education, Importance of tx compliance, Risk factor reductions, Impressions, Counseling / Coordination of care, and Documenting in the medical record.

## 2025-02-27 ENCOUNTER — TELEPHONE (OUTPATIENT)
Dept: FAMILY MEDICINE CLINIC | Facility: CLINIC | Age: 55
End: 2025-02-27

## 2025-02-27 NOTE — TELEPHONE ENCOUNTER
----- Message from Rebecca Kab-Perlman, MD sent at 2/26/2025  6:20 PM EST -----  Regarding: hyperpigmentation of plantar feet bilaterally  Dear Clinical Team,     May someone kindly call Elizabeth and let her know that after speaking with Rosa Isela Hilliard the vascular specialist, she does not think the dark spots on her feet are from the pumps or of vascular origin.     As such, let her know that I have high suspicion it's a benign plantar melanosis and I wouldn't do anything at this time.     If she would like, we can refer her o dermatology to get another opinion. Let me know and I will place the order in our system if she would like it!    Thank you!  Dr. Kab-Perlman

## 2025-03-24 NOTE — ASSESSMENT & PLAN NOTE
Prior Authorization Clinical Questions for Weight Management Pharmacotherapy       Baseline weight (in pounds): 306 lbs       Wt Readings from Last 3 Encounters:   03/27/25 (!) 143 kg (314 lb 12.8 oz)   02/11/25 (!) 142 kg (314 lb 1.6 oz)   01/23/25 (!) 139 kg (306 lb 6.4 oz)     -comorbidities include prediabetes, hyperlipidemia, osteoarthritis of both knees, chronic venous insufficiency, hypertension, and lymphedema  -previous on wegovy however treatment interrupted due to lack of repeat insurance coverage which then caused weight gain and was sited as a reason not to refill the wegovy  -has dieted and participated in programs for >6 months to no avail  -currently going to the gym daily and being careful of food intake  -goes to the gym Monday through Friday daily   -On last office visit was prescribed zepbound with completion of epic embedded prior authorization form    PLAN  -

## 2025-03-24 NOTE — PROGRESS NOTES
urName: Elizabeth Nelson      : 1970      MRN: [id number]  Encounter Provider: Rebecca Fay Kab-Perlman, MD  Encounter Date: 3/27/2025   Encounter department: [facility] [place]  :  Assessment & Plan  Class 3 severe obesity with serious comorbidity and body mass index (BMI) of 45.0 to 49.9 in adult, unspecified obesity type (HCC)  Prior Authorization Clinical Questions for Weight Management Pharmacotherapy    1. Does the patient have a contrainidcation to medication prescribed for weight management?: No  2. Does the patient have a diagnosis of obesity, confirmed by a BMI greater than or equal to 30 kg/m^2?: Yes  3. Does the patient have a BMI of greater than or equal to 27 kg/m^2 with at least one weight-related comorbidity/risk factor/complication (e.g. diabetes, dyslipidemia, coronary artery disease)?: Yes  4. Weight-related co-morbidities/risk factors: prediabetes, metabolic syndrome, dyslipidemia, hypertension, osteoarthritis  5. WEGOVY CVA Indication: Does patient have established documented cardiovascular disease (history of a prior heart attack (myocardial infarction), stroke, or symptomatic peripheral arterial disease (PAD)?: Yes  6. ZEPBOUND CHRISTOPH Indication: Does patient have documented CHRISTOPH diagnosed via sleep study (insurance will require copy of sleep study results for approval)?: No  7. Has the patient been on a weight loss regimen of low-calorie diet, increased physical activity, and lifestyle modifications for a minimum of 6 months?: Yes  8. Has the patient completed a comprehensive weight loss program (ie, Weight Watchers, Noom, Bariatrics, other katherine on phone)? If so, what?: Yes  9. Does the patient have a history of type 2 diabetes?: No  10. Has the member tried and failed other weight loss medication within the past 12 months?: Yes   -- Q10 Further explanation: Was approved for GLP-1 before in  however discontinued due to insurance coverage issues and as a result  gained weight after not using for a month   11. Will the member use requested medication in combination with another GLP agonist or weight loss drug?: No  12. Is the medication a controlled substance?: No     Baseline weight (in pounds): 314 lbs       Wt Readings from Last 3 Encounters:   03/27/25 (!) 143 kg (314 lb 12.8 oz)   02/11/25 (!) 142 kg (314 lb 1.6 oz)   01/23/25 (!) 139 kg (306 lb 6.4 oz)     -Comorbidities include prediabetes, hyperlipidemia, osteoarthritis of both knees, chronic venous insufficiency, hypertension, and lymphedema  -Previous on wegovy however treatment interrupted due to lack of repeat insurance coverage which then caused weight gain and was sited as a reason not to refill the wegovy  -Has dieted and participated in programs for >6 months to no avail  -Currently going to the gym daily and being careful of food intake; goes to the gym Monday through Friday daily, this week hasn't due to arthritis pain    PLAN  -Changed pharmacy to [facility] on [address]; called while in office with patient and confirmed they received zepbound which they have and provided with fax number for prior authorization to be sent  -Completed epic embedded prior auth smart form today     Orders:    tirzepatide (Zepbound) 2.5 mg/0.5 mL auto-injector; Inject 0.5 mL (2.5 mg total) under the skin once a week for 28 days    Benign hypertension  -Today bp 120/70 and at goal <140/90 per HILDA  -Home meds: lisinopril 20mg and hctz 25mg    PLAN  -Continue home meds  -Refilled to new [facility]    Orders:    lisinopril (ZESTRIL) 20 mg tablet; Take 1 tablet (20 mg total) by mouth daily    hydroCHLOROthiazide 25 mg tablet; Take 1 tablet (25 mg total) by mouth daily    Hyperpigmentation  -macular hyperpigmented and asymptomatic spots on bilateral plantar feet  -noticed incidentally by her son  -differentials includes plantar melanosis as a benign finding  -unclear when began; vascular prescribed pneumatic pumps for her lymphedema which she uses  daily for an hour   -No known family history of something similar  -Spoke with vascular surgeon Rosa Isela Hilliard previously and she does not think this is vascular in origin or that the pumps are the cause    PLAN  -explained high suspicion that this is plantar melanosis and that it is benign; advised to pay attention to changes and that we can always consider further evaluation and/or referral to dermatology however at this point it is not necessary; she expressed understanding       Eczema of both hands  -Previously provided with triamcinolone and advised to use gloves when handling harsh chemicals  -Has been using gloves  -Used the triamcinolone once daily in the evenings and not effective; was concerned of using it in the morning due to son exposure    PLAN  -Counseled on twice daily use for a max of two weeks       Benign hypertension  -Today bp 120/70 and at goal <140/90 per HILDA  -Home meds: lisinopril 20mg and hctz 25mg    PLAN  -continue home meds    Orders:    lisinopril (ZESTRIL) 20 mg tablet; Take 1 tablet (20 mg total) by mouth daily    hydroCHLOROthiazide 25 mg tablet; Take 1 tablet (25 mg total) by mouth daily    Medication refill  Counseled not to use lisinopril and ibuprofen together; expressed understanding     Orders:    ibuprofen (MOTRIN) 800 mg tablet; Take 1 tablet (800 mg total) by mouth every 8 (eight) hours    Prediabetes  Lab Results   Component Value Date    HGBA1C 5.7 (H) 12/11/2024     -Improved from 6.1 3 months ago  -Attributed to going to the gym and healthier eating particularly decreasing carbohydrate intake    PLAN  -On annual physical in one month repeat HBA1C              History of Present Illness     Elizabeth Nelson is a 55 yp female patient presenting today to f/u regarding her obesity. She has been going to the gym 5 days a week however this week stopped because of arthritic pain.; Has been trying to eat well and despite this efforts has gained weight.    Review of Systems  "  Constitutional:  Negative for fever.   Respiratory:  Negative for shortness of breath.    Cardiovascular:  Negative for chest pain and palpitations.   Gastrointestinal:  Negative for abdominal pain, constipation, diarrhea, nausea and vomiting.   Musculoskeletal:  Positive for arthralgias.   Skin:  Positive for rash.   Hematological:  Negative for adenopathy. Does not bruise/bleed easily.       Objective   /70 (BP Location: Left arm, Patient Position: Sitting, Cuff Size: Large)   Pulse 89   Temp (!) 97.2 °F (36.2 °C) (Temporal)   Resp 18   Ht 5' 8\" (1.727 m)   Wt (!) 143 kg (314 lb 12.8 oz)   LMP  (LMP Unknown)   SpO2 97%   BMI 47.87 kg/m²      Physical Exam  Constitutional:       Appearance: Normal appearance. She is obese.   HENT:      Head: Normocephalic and atraumatic.   Eyes:      Conjunctiva/sclera: Conjunctivae normal.   Cardiovascular:      Rate and Rhythm: Normal rate and regular rhythm.      Heart sounds: Normal heart sounds. No murmur heard.     No friction rub. No gallop.   Pulmonary:      Breath sounds: Normal breath sounds. No wheezing, rhonchi or rales.   Musculoskeletal:         General: Normal range of motion.   Skin:     General: Skin is warm.      Findings: Rash present.      Comments: Hyperpigmented raised and rough patches on dorsal hands    Neurological:      General: No focal deficit present.      Mental Status: She is alert and oriented to person, place, and time.   Psychiatric:         Mood and Affect: Mood normal.         Behavior: Behavior normal.         "

## 2025-03-27 ENCOUNTER — OFFICE VISIT (OUTPATIENT)
Dept: FAMILY MEDICINE CLINIC | Facility: CLINIC | Age: 55
End: 2025-03-27

## 2025-03-27 VITALS
HEIGHT: 68 IN | RESPIRATION RATE: 18 BRPM | TEMPERATURE: 97.2 F | OXYGEN SATURATION: 97 % | WEIGHT: 293 LBS | HEART RATE: 89 BPM | BODY MASS INDEX: 44.41 KG/M2 | SYSTOLIC BLOOD PRESSURE: 120 MMHG | DIASTOLIC BLOOD PRESSURE: 70 MMHG

## 2025-03-27 DIAGNOSIS — Z76.0 MEDICATION REFILL: ICD-10-CM

## 2025-03-27 DIAGNOSIS — E66.813 CLASS 3 SEVERE OBESITY WITH SERIOUS COMORBIDITY AND BODY MASS INDEX (BMI) OF 45.0 TO 49.9 IN ADULT, UNSPECIFIED OBESITY TYPE (HCC): Primary | ICD-10-CM

## 2025-03-27 DIAGNOSIS — L81.9 HYPERPIGMENTATION: ICD-10-CM

## 2025-03-27 DIAGNOSIS — I10 BENIGN HYPERTENSION: ICD-10-CM

## 2025-03-27 DIAGNOSIS — E66.01 CLASS 3 SEVERE OBESITY WITH SERIOUS COMORBIDITY AND BODY MASS INDEX (BMI) OF 45.0 TO 49.9 IN ADULT, UNSPECIFIED OBESITY TYPE (HCC): Primary | ICD-10-CM

## 2025-03-27 DIAGNOSIS — L30.9 ECZEMA OF BOTH HANDS: ICD-10-CM

## 2025-03-27 DIAGNOSIS — R73.03 PREDIABETES: ICD-10-CM

## 2025-03-27 PROCEDURE — 99213 OFFICE O/P EST LOW 20 MIN: CPT | Performed by: INTERNAL MEDICINE

## 2025-03-27 RX ORDER — IBUPROFEN 800 MG/1
800 TABLET, FILM COATED ORAL EVERY 8 HOURS
Qty: 60 TABLET | Refills: 2 | Status: SHIPPED | OUTPATIENT
Start: 2025-03-27

## 2025-03-27 RX ORDER — LISINOPRIL 20 MG/1
20 TABLET ORAL DAILY
Qty: 90 TABLET | Refills: 3 | Status: SHIPPED | OUTPATIENT
Start: 2025-03-27

## 2025-03-27 RX ORDER — HYDROCHLOROTHIAZIDE 25 MG/1
25 TABLET ORAL DAILY
Qty: 90 TABLET | Refills: 3 | Status: SHIPPED | OUTPATIENT
Start: 2025-03-27

## 2025-03-27 RX ORDER — TIRZEPATIDE 2.5 MG/.5ML
2.5 INJECTION, SOLUTION SUBCUTANEOUS WEEKLY
Qty: 2 ML | Refills: 0 | Status: SHIPPED | OUTPATIENT
Start: 2025-03-27 | End: 2025-03-27

## 2025-03-27 RX ORDER — TIRZEPATIDE 2.5 MG/.5ML
2.5 INJECTION, SOLUTION SUBCUTANEOUS WEEKLY
Qty: 2 ML | Refills: 0 | Status: SHIPPED | OUTPATIENT
Start: 2025-03-27 | End: 2025-04-24

## 2025-03-27 NOTE — ASSESSMENT & PLAN NOTE
-macular hyperpigmented and asymptomatic spots on bilateral plantar feet  -noticed incidentally by her son  -differentials includes plantar melanosis as a benign finding  -unclear when began; vascular prescribed pneumatic pumps for her lymphedema which she uses daily for an hour   -No known family history of something similar  -Spoke with vascular surgeon Rosa Isela Hilliard previously and she does not think this is vascular in origin or that the pumps are the cause    PLAN  -explained high suspicion that this is plantar melanosis and that it is benign; advised to pay attention to changes and that we can always consider further evaluation and/or referral to dermatology however at this point it is not necessary; she expressed understanding

## 2025-03-27 NOTE — ASSESSMENT & PLAN NOTE
Lab Results   Component Value Date    HGBA1C 5.7 (H) 12/11/2024     -Improved from 6.1 3 months ago  -Attributed to going to the gym and healthier eating particularly decreasing carbohydrate intake    PLAN  -On annual physical in one month repeat HBA1C

## 2025-03-27 NOTE — ASSESSMENT & PLAN NOTE
-Today bp 120/70 and at goal <140/90 per HILDA  -Home meds: lisinopril 20mg and hctz 25mg    PLAN  -continue home meds    Orders:    lisinopril (ZESTRIL) 20 mg tablet; Take 1 tablet (20 mg total) by mouth daily    hydroCHLOROthiazide 25 mg tablet; Take 1 tablet (25 mg total) by mouth daily

## 2025-04-25 ENCOUNTER — OFFICE VISIT (OUTPATIENT)
Dept: FAMILY MEDICINE CLINIC | Facility: CLINIC | Age: 55
End: 2025-04-25

## 2025-04-25 VITALS
OXYGEN SATURATION: 97 % | DIASTOLIC BLOOD PRESSURE: 64 MMHG | WEIGHT: 293 LBS | HEIGHT: 68 IN | HEART RATE: 103 BPM | BODY MASS INDEX: 44.41 KG/M2 | TEMPERATURE: 98 F | SYSTOLIC BLOOD PRESSURE: 100 MMHG | RESPIRATION RATE: 20 BRPM

## 2025-04-25 DIAGNOSIS — L03.116 CELLULITIS OF LEFT LOWER EXTREMITY: ICD-10-CM

## 2025-04-25 DIAGNOSIS — E66.813 CLASS 3 SEVERE OBESITY DUE TO EXCESS CALORIES WITH BODY MASS INDEX (BMI) OF 45.0 TO 49.9 IN ADULT: Primary | ICD-10-CM

## 2025-04-25 DIAGNOSIS — M19.91 PRIMARY OSTEOARTHRITIS, UNSPECIFIED SITE: ICD-10-CM

## 2025-04-25 DIAGNOSIS — L30.9 ECZEMA OF BOTH HANDS: ICD-10-CM

## 2025-04-25 PROCEDURE — 99213 OFFICE O/P EST LOW 20 MIN: CPT | Performed by: INTERNAL MEDICINE

## 2025-04-25 RX ORDER — CEPHALEXIN 500 MG/1
500 CAPSULE ORAL EVERY 6 HOURS SCHEDULED
Qty: 28 CAPSULE | Refills: 0 | Status: SHIPPED | OUTPATIENT
Start: 2025-04-25 | End: 2025-05-02

## 2025-04-25 NOTE — PROGRESS NOTES
Name: Elizabeth Nelson      : 1970      MRN: [id number]  Encounter Provider: Rebecca Fay Kab-Perlman, MD  Encounter Date: 2025   Encounter department: [facility] [place]  :  Assessment & Plan  Class 3 severe obesity due to excess calories with body mass index (BMI) of 45.0 to 49.9 in adult      Wt Readings from Last 3 Encounters:   25 (!) 140 kg (309 lb)   25 (!) 142 kg (312 lb)   25 (!) 143 kg (314 lb 12.8 oz)     -comorbidities include prediabetes, hyperlipidemia, osteoarthritis of both knees, chronic venous insufficiency, hypertension, and lymphedema  -previous on wegovy however treatment interrupted due to lack of repeat insurance coverage which then caused weight gain and was sited as a reason not to refill the wegovy  -has dieted and participated in programs for >6 months to no avail  -currently going to the gym daily and being careful of food intake  -goes to the gym Monday through Friday daily    PLAN  -Awaiting on Prior Auth for zepound       Eczema of both hands  -Previously given triamcinolone with improvement        Cellulitis of left lower extremity  -Chronically occurs; used to be once every several months now once every 6 months since following with vascular and using pneumatic pumps    PLAN  -Antibiotics as below and f/u one week    Orders:    cephalexin (KEFLEX) 500 mg capsule; Take 1 capsule (500 mg total) by mouth every 6 (six) hours for 7 days    Primary osteoarthritis, unspecified site  -Multiple sites; knees, shoulders, back  -States that in the past diclofenac has worked very well for her not sure of strength    PLAN  -Diclofenac 50mg as below explained can cut pill in half and take 1.5 tablets; counseled on NSAIDS including to take with food and adequate hydration                History of Present Illness     Elizabeth Nelson is a 56 yo female patient presenting today to f/u regarding her obesity. We have been attempting to get GLP-1 coverage and  "on our last office visit we prescribed Zepbound. Previoulsy she was on wegovy with weight loss however due to an interruption in insurance approval she gained weight which was then sited as a reason to discontinue coverage for her.     Today concern is left leg cellulitis that began on Wednesday night with a pain in her left ankle. No fevers or chills.    Review of Systems   Constitutional:  Negative for chills, diaphoresis and fever.   Respiratory:  Negative for shortness of breath.    Cardiovascular:  Negative for chest pain and palpitations.   Gastrointestinal:  Negative for nausea and vomiting.   Skin:  Negative for rash.   Neurological:  Negative for dizziness, light-headedness, numbness and headaches.   Hematological:  Negative for adenopathy. Does not bruise/bleed easily.       Objective   /64 (BP Location: Right arm, Patient Position: Sitting, Cuff Size: Large)   Pulse 103   Temp 98 °F (36.7 °C) (Temporal)   Resp 20   Ht 5' 8\" (1.727 m)   Wt (!) 142 kg (312 lb)   LMP  (LMP Unknown)   SpO2 97%   BMI 47.44 kg/m²      Physical Exam  Constitutional:       Appearance: Normal appearance. She is obese.   HENT:      Head: Normocephalic and atraumatic.   Eyes:      Conjunctiva/sclera: Conjunctivae normal.   Cardiovascular:      Rate and Rhythm: Normal rate and regular rhythm.      Heart sounds: Normal heart sounds. No murmur heard.  Pulmonary:      Effort: Pulmonary effort is normal.      Breath sounds: No wheezing, rhonchi or rales.   Musculoskeletal:         General: Swelling and tenderness (of left lower ankle where there is erythema) present. Normal range of motion.   Lymphadenopathy:      Cervical: No cervical adenopathy.   Skin:     General: Skin is warm.      Capillary Refill: Capillary refill takes less than 2 seconds.      Findings: Erythema (of left lower extremity about 1/3 up shin with ill defined borders, warm to touch, neurovascularly intact with normal DP, PT pulses bilaterally) present. "   Neurological:      Mental Status: She is alert and oriented to person, place, and time.   Psychiatric:         Mood and Affect: Mood normal.

## 2025-04-25 NOTE — ASSESSMENT & PLAN NOTE
Wt Readings from Last 3 Encounters:   05/02/25 (!) 140 kg (309 lb)   04/25/25 (!) 142 kg (312 lb)   03/27/25 (!) 143 kg (314 lb 12.8 oz)     -comorbidities include prediabetes, hyperlipidemia, osteoarthritis of both knees, chronic venous insufficiency, hypertension, and lymphedema  -previous on wegovy however treatment interrupted due to lack of repeat insurance coverage which then caused weight gain and was sited as a reason not to refill the wegovy  -has dieted and participated in programs for >6 months to no avail  -currently going to the gym daily and being careful of food intake  -goes to the gym Monday through Friday daily    PLAN  -Awaiting on Prior Auth for zeund

## 2025-05-01 ENCOUNTER — TELEPHONE (OUTPATIENT)
Dept: FAMILY MEDICINE CLINIC | Facility: CLINIC | Age: 55
End: 2025-05-01

## 2025-05-01 NOTE — PROGRESS NOTES
"Name: Elizabeth Nelson      : 1970      MRN: [id number]  Encounter Provider: DAVID Singh  Encounter Date: 2025   Encounter department: [facility] [place]  :  Assessment & Plan  Lymphedema  55-year-old female with obesity, BMI 48, prediabetes, OA, chronic left lower extremity edema after skin biopsy 2022, recurrent left lower extremity cellulitis and intermittent flare ups of LLE swelling      Patient returns to the office for 3month follow up for left lower extremity edema     -Chronic LLE stasis changes, lipodermatosclerosis and edema.  Stage II lymphedema  -Using compression stockings and compression pumps with improvement in edema and firmness of the skin   -Recent flare up of LLE cellulitis   treated with Keflex  -She is attempting weight loss  -Recommended continued weight loss, periodic leg elevation and regular exercise, compression pumps and compression stockings  -Use moisturizer daily to maintain skin integrity  -Follow-up in the office in 3-6 months         History of Present Illness     Pt is here for a 3 month f/u and presents with LLE edema. Pt states she had a edema flare up last week, but states it is getting better.       History obtained from: patient    Review of Systems   Cardiovascular:  Positive for leg swelling.   Skin:  Positive for color change. Negative for wound.     Medical History Reviewed by provider this encounter:  Tobacco  Allergies  Meds  Problems  Med Hx  Surg Hx  Fam Hx     .     Objective   I have reviewed and made appropriate changes to the review of systems input by the medical assistant.    Vitals:    25 0930   BP: 110/70   BP Location: Left arm   Patient Position: Sitting   Cuff Size: Large   Pulse: 81   SpO2: 98%   Weight: (!) 140 kg (309 lb 11.2 oz)   Height: 5' 8\" (1.727 m)       Patient Active Problem List   Diagnosis    Benign hypertension    Prediabetes    Tendinitis of right rotator cuff    Healthcare maintenance    " Subacromial bursitis of right shoulder joint    Bilateral lower extremity edema    Abdominal hernia    Class 3 severe obesity due to excess calories with body mass index (BMI) of 45.0 to 49.9 in adult    Primary osteoarthritis of both knees    Chronic venous insufficiency    Varicose veins of both lower extremities    Lymphedema    Hyperlipidemia    Hyperpigmentation       Past Surgical History:   Procedure Laterality Date    CHOLECYSTECTOMY  2006    AR COLONOSCOPY FLX DX W/COLLJ SPEC WHEN PFRMD Left 05/02/2019    Procedure: COLONOSCOPY;  Surgeon: Dave Badillo MD;  [facility]: [facility];  Service: Gastroenterology    AR LAPAROSCOPIC APPENDECTOMY N/A 03/22/2019    Procedure: LAPAROSCOPIC APPENDECTOMY;  Surgeon: Ayo Monreal MD;  Location:  MAIN OR;  Service: General    AR REPAIR FIRST ABDOMINAL WALL HERNIA N/A 08/28/2020    Procedure: REPAIR INCISIONAL HERNIA;  Surgeon: Ayo Monreal MD;  Location:  MAIN OR;  Service: General       Family History   Problem Relation Age of Onset    Colon cancer Mother 63    Stroke Father     No Known Problems Sister     No Known Problems Daughter     No Known Problems Maternal Grandmother     No Known Problems Maternal Grandfather     No Known Problems Paternal Grandmother     No Known Problems Paternal Grandfather     No Known Problems Paternal Aunt     No Known Problems Paternal Aunt     Breast cancer Neg Hx     Ovarian cancer Neg Hx        Social History     Socioeconomic History    Marital status: /Civil Union     Spouse name: Not on file    Number of children: Not on file    Years of education: Not on file    Highest education level: Not on file   Occupational History    Not on file   Tobacco Use    Smoking status: Never     Passive exposure: Never    Smokeless tobacco: Never   Vaping Use    Vaping status: Never Used   Substance and Sexual Activity    Alcohol use: Yes     Comment: couple times/month    Drug use: Never    Sexual activity: Yes     Partners: Male      Birth control/protection: Post-menopausal   Other Topics Concern    Not on file   Social History Narrative    ** Merged History Encounter **          Social Drivers of Health     Financial Resource Strain: Low Risk  (5/2/2025)    Overall Financial Resource Strain (CARDIA)     Difficulty of Paying Living Expenses: Not hard at all   Food Insecurity: No Food Insecurity (5/2/2025)    Hunger Vital Sign     Worried About Running Out of Food in the Last Year: Never true     Ran Out of Food in the Last Year: Never true   Transportation Needs: No Transportation Needs (10/24/2024)    PRAPARE - Transportation     Lack of Transportation (Medical): No     Lack of Transportation (Non-Medical): No   Physical Activity: Not on file   Stress: No Stress Concern Present (10/7/2021)    [employer] - Occupational Stress Questionnaire     Feeling of Stress : Not at all   Social Connections: Not on file   Intimate Partner Violence: Not on file   Housing Stability: Low Risk  (5/2/2025)    Housing Stability Vital Sign     Unable to Pay for Housing in the Last Year: No     Number of Times Moved in the Last Year: 0     Homeless in the Last Year: No       No Known Allergies      Current Outpatient Medications:     cetirizine (ZyrTEC) 10 mg tablet, Take 1 tablet (10 mg total) by mouth daily, Disp: 60 tablet, Rfl: 2    diclofenac (VOLTAREN) 75 mg EC tablet, Take 1 tablet (75 mg total) by mouth 2 (two) times a day, Disp: 60 tablet, Rfl: 1    fluticasone (FLONASE) 50 mcg/act nasal spray, 1 spray into each nostril daily, Disp: 60 g, Rfl: 2    hydroCHLOROthiazide 25 mg tablet, Take 1 tablet (25 mg total) by mouth daily, Disp: 90 tablet, Rfl: 3    lisinopril (ZESTRIL) 20 mg tablet, Take 1 tablet (20 mg total) by mouth daily, Disp: 90 tablet, Rfl: 3    triamcinolone (KENALOG) 0.1 % lotion, Apply topically to eczema twice daily for up to two weeks at a time with a one week holiday in between, Disp: 60 mL, Rfl: 1    /70  "(BP Location: Left arm, Patient Position: Sitting, Cuff Size: Large)   Pulse 81   Ht 5' 8\" (1.727 m)   Wt (!) 140 kg (309 lb 11.2 oz)   LMP  (LMP Unknown)   SpO2 98%   BMI 47.09 kg/m²      Physical Exam    Administrative Statements   I have spent a total time of 25 minutes in caring for this patient on the day of the visit/encounter including Prognosis, Instructions for management, Patient and family education, Importance of tx compliance, Risk factor reductions, Impressions, Counseling / Coordination of care, and Documenting in the medical record.  "

## 2025-05-01 NOTE — TELEPHONE ENCOUNTER
Called patient today to reschedule her appointment, due PCP wont be in office today, vm left to call us back. If patient call please assist.

## 2025-05-02 ENCOUNTER — OFFICE VISIT (OUTPATIENT)
Dept: FAMILY MEDICINE CLINIC | Facility: CLINIC | Age: 55
End: 2025-05-02

## 2025-05-02 ENCOUNTER — APPOINTMENT (OUTPATIENT)
Dept: LAB | Facility: CLINIC | Age: 55
End: 2025-05-02
Payer: COMMERCIAL

## 2025-05-02 VITALS
TEMPERATURE: 98 F | BODY MASS INDEX: 44.41 KG/M2 | DIASTOLIC BLOOD PRESSURE: 70 MMHG | RESPIRATION RATE: 18 BRPM | SYSTOLIC BLOOD PRESSURE: 100 MMHG | HEART RATE: 62 BPM | WEIGHT: 293 LBS | OXYGEN SATURATION: 96 % | HEIGHT: 68 IN

## 2025-05-02 DIAGNOSIS — R73.03 PREDIABETES: ICD-10-CM

## 2025-05-02 DIAGNOSIS — I10 BENIGN HYPERTENSION: ICD-10-CM

## 2025-05-02 DIAGNOSIS — M17.0 PRIMARY OSTEOARTHRITIS OF BOTH KNEES: ICD-10-CM

## 2025-05-02 DIAGNOSIS — E66.813 CLASS 3 SEVERE OBESITY DUE TO EXCESS CALORIES WITH BODY MASS INDEX (BMI) OF 45.0 TO 49.9 IN ADULT: ICD-10-CM

## 2025-05-02 DIAGNOSIS — Z00.00 HEALTHCARE MAINTENANCE: ICD-10-CM

## 2025-05-02 DIAGNOSIS — L03.116 CELLULITIS OF LEFT LOWER EXTREMITY: ICD-10-CM

## 2025-05-02 DIAGNOSIS — Z00.00 ANNUAL PHYSICAL EXAM: Primary | ICD-10-CM

## 2025-05-02 LAB
ALBUMIN SERPL BCG-MCNC: 4.1 G/DL (ref 3.5–5)
ALP SERPL-CCNC: 40 U/L (ref 34–104)
ALT SERPL W P-5'-P-CCNC: 28 U/L (ref 7–52)
ANION GAP SERPL CALCULATED.3IONS-SCNC: 10 MMOL/L (ref 4–13)
AST SERPL W P-5'-P-CCNC: 18 U/L (ref 13–39)
BILIRUB SERPL-MCNC: 0.53 MG/DL (ref 0.2–1)
BUN SERPL-MCNC: 20 MG/DL (ref 5–25)
CALCIUM SERPL-MCNC: 9.2 MG/DL (ref 8.4–10.2)
CHLORIDE SERPL-SCNC: 102 MMOL/L (ref 96–108)
CO2 SERPL-SCNC: 28 MMOL/L (ref 21–32)
CREAT SERPL-MCNC: 0.83 MG/DL (ref 0.6–1.3)
GFR SERPL CREATININE-BSD FRML MDRD: 78 ML/MIN/1.73SQ M
GLUCOSE P FAST SERPL-MCNC: 99 MG/DL (ref 65–99)
MAGNESIUM SERPL-MCNC: 2.2 MG/DL (ref 1.9–2.7)
POTASSIUM SERPL-SCNC: 3.4 MMOL/L (ref 3.5–5.3)
PROT SERPL-MCNC: 7.3 G/DL (ref 6.4–8.4)
SODIUM SERPL-SCNC: 140 MMOL/L (ref 135–147)

## 2025-05-02 PROCEDURE — 80053 COMPREHEN METABOLIC PANEL: CPT

## 2025-05-02 PROCEDURE — 99396 PREV VISIT EST AGE 40-64: CPT | Performed by: INTERNAL MEDICINE

## 2025-05-02 PROCEDURE — 83036 HEMOGLOBIN GLYCOSYLATED A1C: CPT

## 2025-05-02 PROCEDURE — 83735 ASSAY OF MAGNESIUM: CPT

## 2025-05-02 PROCEDURE — 36415 COLL VENOUS BLD VENIPUNCTURE: CPT

## 2025-05-02 PROCEDURE — 99213 OFFICE O/P EST LOW 20 MIN: CPT | Performed by: INTERNAL MEDICINE

## 2025-05-02 RX ORDER — DICLOFENAC SODIUM 75 MG/1
75 TABLET, DELAYED RELEASE ORAL 2 TIMES DAILY
Qty: 60 TABLET | Refills: 1 | Status: SHIPPED | OUTPATIENT
Start: 2025-05-02

## 2025-05-02 NOTE — ASSESSMENT & PLAN NOTE
Colon Cancer Screening: Colonoscopy 2019 in media repeat in 2029   Cervical Cancer Screening: Last Pap 10/18/2023 NILM with negative HPV   Breast Cancer Screening: Last screening 6/28/24 and normal repeat in 1-2 years (No family hx of breast/ovarian cancer)

## 2025-05-02 NOTE — PATIENT INSTRUCTIONS
"Patient Education     Routine physical for adults   The Basics   Written by the doctors and editors at [facility]   What is a physical? -- A physical is a routine visit, or \"check-up,\" with your doctor. You might also hear it called a \"wellness visit\" or \"preventive visit.\"  During each visit, the doctor will:   Ask about your physical and mental health   Ask about your habits, behaviors, and lifestyle   Do an exam   Give you vaccines if needed   Talk to you about any medicines you take   Give advice about your health   Answer your questions  Getting regular check-ups is an important part of taking care of your health. It can help your doctor find and treat any problems you have. But it's also important for preventing health problems.  A routine physical is different from a \"sick visit.\" A sick visit is when you see a doctor because of a health concern or problem. Since physicals are scheduled ahead of time, you can think about what you want to ask the doctor.  How often should I get a physical? -- It depends on your age and health. In general, for people age 21 years and older:   If you are younger than 50 years, you might be able to get a physical every 3 years.   If you are 50 years or older, your doctor might recommend a physical every year.  If you have an ongoing health condition, like diabetes or high blood pressure, your doctor will probably want to see you more often.  What happens during a physical? -- In general, each visit will include:   Physical exam - The doctor or nurse will check your height, weight, heart rate, and blood pressure. They will also look at your eyes and ears. They will ask about how you are feeling and whether you have any symptoms that bother you.   Medicines - It's a good idea to bring a list of all the medicines you take to each doctor visit. Your doctor will talk to you about your medicines and answer any questions. Tell them if you are having any side effects that bother you. You " "should also tell them if you are having trouble paying for any of your medicines.   Habits and behaviors - This includes:   Your diet   Your exercise habits   Whether you smoke, drink alcohol, or use drugs   Whether you are sexually active   Whether you feel safe at home  Your doctor will talk to you about things you can do to improve your health and lower your risk of health problems. They will also offer help and support. For example, if you want to quit smoking, they can give you advice and might prescribe medicines. If you want to improve your diet or get more physical activity, they can help you with this, too.   Lab tests, if needed - The tests you get will depend on your age and situation. For example, your doctor might want to check your:   Cholesterol   Blood sugar   Iron level   Vaccines - The recommended vaccines will depend on your age, health, and what vaccines you already had. Vaccines are very important because they can prevent certain serious or deadly infections.   Discussion of screening - \"Screening\" means checking for diseases or other health problems before they cause symptoms. Your doctor can recommend screening based on your age, risk, and preferences. This might include tests to check for:   Cancer, such as breast, prostate, cervical, ovarian, colorectal, prostate, lung, or skin cancer   Sexually transmitted infections, such as chlamydia and gonorrhea   Mental health conditions like depression and anxiety  Your doctor will talk to you about the different types of screening tests. They can help you decide which screenings to have. They can also explain what the results might mean.   Answering questions - The physical is a good time to ask the doctor or nurse questions about your health. If needed, they can refer you to other doctors or specialists, too.  Adults older than 65 years often need other care, too. As you get older, your doctor will talk to you about:   How to prevent falling at " home   Hearing or vision tests   Memory testing   How to take your medicines safely   Making sure that you have the help and support you need at home  All topics are updated as new evidence becomes available and our peer review process is complete.  This topic retrieved from [employer] on: May 02, 2024.  Topic 923190 Version 1.0  Release: 32.4.3 - C32.122  © 2024 UpToDate, Inc. and/or its affiliates. All rights reserved.  Consumer Information Use and Disclaimer   Disclaimer: This generalized information is a limited summary of diagnosis, treatment, and/or medication information. It is not meant to be comprehensive and should be used as a tool to help the user understand and/or assess potential diagnostic and treatment options. It does NOT include all information about conditions, treatments, medications, side effects, or risks that may apply to a specific patient. It is not intended to be medical advice or a substitute for the medical advice, diagnosis, or treatment of a health care provider based on the health care provider's examination and assessment of a patient's specific and unique circumstances. Patients must speak with a health care provider for complete information about their health, medical questions, and treatment options, including any risks or benefits regarding use of medications. This information does not endorse any treatments or medications as safe, effective, or approved for treating a specific patient. UpToDate, Inc. and its affiliates disclaim any warranty or liability relating to this information or the use thereof.The use of this information is governed by the Terms of Use, available at https://www.woltersConscious Boxuwer.com/en/know/clinical-effectiveness-terms. 2024© UpToDate, Inc. and its affiliates and/or licensors. All rights reserved.  Copyright   © 2024 UpToDate, Inc. and/or its affiliates. All rights reserved.

## 2025-05-02 NOTE — ASSESSMENT & PLAN NOTE
-Last HBA1C of 5.7 about 3 months ago    PLAN  -Repeat HBA1C     Orders:    Hemoglobin A1C; Future

## 2025-05-02 NOTE — ASSESSMENT & PLAN NOTE
-Previously given diclofenac 50mg, today increased to 75mg with strict counseling to separate from use with lisinopril by at leadt 4 hours and have adequate hydration    Orders:    diclofenac (VOLTAREN) 75 mg EC tablet; Take 1 tablet (75 mg total) by mouth 2 (two) times a day

## 2025-05-02 NOTE — ASSESSMENT & PLAN NOTE
-Blood pressure to day 100/70 and at goal <140/90 per HILDA   -Home medication HCTZ 25mg and lisinopril 20mg daily    PLAN  -CMP and Mag  -Continue home medication    Orders:    Comprehensive metabolic panel; Future    Magnesium; Future

## 2025-05-02 NOTE — ASSESSMENT & PLAN NOTE
Wt Readings from Last 3 Encounters:   05/06/25 (!) 140 kg (309 lb 11.2 oz)   05/02/25 (!) 140 kg (309 lb)   04/25/25 (!) 142 kg (312 lb)     Today called [facility] and informed they sent me a Prior authorization which I explained I have not received. A number was provided for me to call and I called; I spoke with Jarod at the Prior Auth department number at [phone] prior authorization completed over the phone with [facility] and they requested I fax documentation of her BMI with Attention to [facility] [place] Prior Authorization Department Commercial fax to [phone]

## 2025-05-02 NOTE — PROGRESS NOTES
Adult Annual Physical  Name: Elizabeth Nelson      : 1970      MRN: [id number]  Encounter Provider: Rebecca Fay Kab-Perlman, MD  Encounter Date: 2025   Encounter department: [facility]    :  Assessment & Plan  Annual physical exam  -The following portions of the patient's history were reviewed and updated as appropriate: allergies, current medications, past family history, past medical history, past social history, past surgical history, and problem list.       Healthcare maintenance  Colon Cancer Screening: Colonoscopy 2019 in media repeat in    Cervical Cancer Screening: Last Pap 10/18/2023 NILM with negative HPV   Breast Cancer Screening: Last screening 24 and normal repeat in 1-2 years (No family hx of breast/ovarian cancer)        Class 3 severe obesity due to excess calories with body mass index (BMI) of 45.0 to 49.9 in adult    Wt Readings from Last 3 Encounters:   25 (!) 140 kg (309 lb 11.2 oz)   25 (!) 140 kg (309 lb)   25 (!) 142 kg (312 lb)     Today called [facility] and informed they sent me a Prior authorization which I explained I have not received. A number was provided for me to call and I called; I spoke with Jarod at the Prior Auth department number at [phone] prior authorization completed over the phone with [facility] and they requested I fax documentation of her BMI with Attention to [facility] Prior Authorization Department Commercial fax to [phone]       Primary osteoarthritis of both knees  -Previously given diclofenac 50mg, today increased to 75mg with strict counseling to separate from use with lisinopril by at leadt 4 hours and have adequate hydration    Orders:    diclofenac (VOLTAREN) 75 mg EC tablet; Take 1 tablet (75 mg total) by mouth 2 (two) times a day    Prediabetes  -Last HBA1C of 5.7 about 3 months ago    PLAN  -Repeat HBA1C     Orders:    Hemoglobin A1C; Future    Benign hypertension  -Blood pressure to day  100/70 and at goal <140/90 per HILDA   -Home medication HCTZ 25mg and lisinopril 20mg daily    PLAN  -CMP and Mag  -Continue home medication    Orders:    Comprehensive metabolic panel; Future    Magnesium; Future    Cellulitis of left lower extremity  -Last dose of 7 day keflex used this morning; no diarrhea, no fevers/chills  -Improvement    PLAN  -Return and ED precautions discussed  -Emphasized continued weight loss and blood glucose control as preventative measures                    Preventive Screenings:  - Diabetes Screening: screening up-to-date  - Cholesterol Screening: screening not indicated and has hyperlipidemia   - Hepatitis C screening: screening up-to-date   - HIV screening: screening up-to-date   - Cervical cancer screening: screening up-to-date   - Breast cancer screening: screening up-to-date   - Colon cancer screening: screening up-to-date   - Lung cancer screening: screening not indicated     Immunizations:  - Immunizations due: Zoster (Shingrix)      Depression Screening and Follow-up Plan: Patient was screened for depression during today's encounter. They screened negative with a PHQ-2 score of 0.          History of Present Illness     Adult Annual Physical:  Patient presents for annual physical.     Diet and Physical Activity:  - Diet/Nutrition: consuming 3-5 servings of fruits/vegetables daily and well balanced diet.  - Exercise: no formal exercise, 3-4 times a week on average and 30-60 minutes on average.    Depression Screening:  - PHQ-2 Score: 0    General Health:  - Sleep: sleeps well and 7-8 hours of sleep on average.  - Hearing: normal hearing bilateral ears.  - Vision: most recent eye exam > 1 year ago and no vision problems.  - Dental: no dental visits for > 1 year and brushes teeth twice daily.    /GYN Health:  - Follows with GYN: yes.   - Menopause: postmenopausal.   - History of STDs: no  - Contraception: barrier methods.      Advanced Care Planning:  - Has an advanced  "directive?: no    - Has a durable medical POA?: no    - ACP document given to patient?: yes      Review of Systems   Constitutional:  Negative for chills, diaphoresis and fever.   Respiratory:  Negative for shortness of breath and wheezing.    Cardiovascular:  Negative for chest pain and palpitations.   Skin:  Positive for rash.   Hematological:  Negative for adenopathy. Does not bruise/bleed easily.         Objective   /70 (BP Location: Right arm, Patient Position: Sitting, Cuff Size: Large)   Pulse 62   Temp 98 °F (36.7 °C) (Temporal)   Resp 18   Ht 5' 8\" (1.727 m)   Wt (!) 140 kg (309 lb)   LMP  (LMP Unknown)   SpO2 96%   BMI 46.98 kg/m²     Physical Exam  Constitutional:       Appearance: Normal appearance. She is obese.   HENT:      Head: Normocephalic and atraumatic.      Right Ear: Tympanic membrane, ear canal and external ear normal.      Left Ear: Tympanic membrane, ear canal and external ear normal.      Nose: Nose normal.      Mouth/Throat:      Mouth: Mucous membranes are moist.      Pharynx: Oropharynx is clear.   Eyes:      Extraocular Movements: Extraocular movements intact.      Conjunctiva/sclera: Conjunctivae normal.      Pupils: Pupils are equal, round, and reactive to light.   Cardiovascular:      Rate and Rhythm: Normal rate and regular rhythm.      Heart sounds: Normal heart sounds. No murmur heard.     No friction rub. No gallop.   Pulmonary:      Effort: Pulmonary effort is normal.      Breath sounds: Normal breath sounds. No wheezing, rhonchi or rales.   Abdominal:      General: Abdomen is flat. Bowel sounds are normal. There is no distension.      Palpations: Abdomen is soft. There is no mass.      Tenderness: There is no abdominal tenderness. There is no guarding or rebound.      Hernia: No hernia is present.   Musculoskeletal:         General: Normal range of motion.      Right lower leg: Edema (mild and at baseline) present.      Left lower leg: Edema (mild and at baseline) " present.   Skin:     General: Skin is warm.      Findings: Erythema (mild residual erythema of left lower extremity not warm to touch and not tender to touch) present.   Neurological:      General: No focal deficit present.      Mental Status: She is alert and oriented to person, place, and time.      Cranial Nerves: No cranial nerve deficit.      Sensory: No sensory deficit.      Motor: No weakness (5+ muscle strength in upper and lower extremities).      Gait: Gait normal.   Psychiatric:         Mood and Affect: Mood normal.         Behavior: Behavior normal.

## 2025-05-03 LAB
EST. AVERAGE GLUCOSE BLD GHB EST-MCNC: 137 MG/DL
HBA1C MFR BLD: 6.4 %

## 2025-05-05 ENCOUNTER — TELEPHONE (OUTPATIENT)
Dept: FAMILY MEDICINE CLINIC | Facility: CLINIC | Age: 55
End: 2025-05-05

## 2025-05-05 NOTE — TELEPHONE ENCOUNTER
PCP SIGNATURE NEEDED FOR [facility] FORM RECEIVED VIA FAX AND PLACED IN PCP FOLDER TO BE DELIVERED AT ASSIGNED TIMES.    Prior authorization   Request for additional information   [employer]

## 2025-05-05 NOTE — TELEPHONE ENCOUNTER
PCP SIGNATURE NEEDED FOR [facility] FORM RECEIVED VIA FAX AND PLACED IN PCP FOLDER TO BE DELIVERED AT ASSIGNED TIMES.      [facility]/ Prescriber Response Form

## 2025-05-06 ENCOUNTER — OFFICE VISIT (OUTPATIENT)
Dept: VASCULAR SURGERY | Facility: CLINIC | Age: 55
End: 2025-05-06
Payer: COMMERCIAL

## 2025-05-06 VITALS
BODY MASS INDEX: 44.41 KG/M2 | HEART RATE: 81 BPM | WEIGHT: 293 LBS | DIASTOLIC BLOOD PRESSURE: 70 MMHG | OXYGEN SATURATION: 98 % | HEIGHT: 68 IN | SYSTOLIC BLOOD PRESSURE: 110 MMHG

## 2025-05-06 DIAGNOSIS — I89.0 LYMPHEDEMA: Primary | ICD-10-CM

## 2025-05-06 PROCEDURE — 99213 OFFICE O/P EST LOW 20 MIN: CPT | Performed by: NURSE PRACTITIONER

## 2025-05-09 ENCOUNTER — DOCUMENTATION (OUTPATIENT)
Dept: FAMILY MEDICINE CLINIC | Facility: CLINIC | Age: 55
End: 2025-05-09

## 2025-05-09 NOTE — ASSESSMENT & PLAN NOTE
55-year-old female with obesity, BMI 48, prediabetes, OA, chronic left lower extremity edema after skin biopsy March 2022, recurrent left lower extremity cellulitis and intermittent flare ups of LLE swelling      Patient returns to the office for 3month follow up for left lower extremity edema     -Chronic LLE stasis changes, lipodermatosclerosis and edema.  Stage II lymphedema  -Using compression stockings and compression pumps with improvement in edema and firmness of the skin   -Recent flare up of LLE cellulitis  4/25 treated with Keflex  -She is attempting weight loss  -Recommended continued weight loss, periodic leg elevation and regular exercise, compression pumps and compression stockings  -Use moisturizer daily to maintain skin integrity  -Follow-up in the office in 3-6 months

## 2025-05-09 NOTE — PROGRESS NOTES
Called [facility] PA department again at [phone]; last weeks over the phone PA was closed out. Today faxed over records from office visit on 5/2/25 and confirmed that hand written BMI as requested on the visit note with my signature is suffice. I spoke with Winifred and she said that she will call me within the hour (I gave her my cell phone number) to let me know if the fax was received. I faxed the paperwork out today and received confirmed transmission that it went through to fax number [phone]. I also confirmed with Winifred that we can use the same answers for the phone prior auth completed last week and we do not need to complete a new one.     I then called Elizabeth Nelson and let her know that it was faxed over and that I can reach out to her next week with updates if I get them.     The faxed forms and confirmation were placed in scan bin with instructions to scan into chart and title the document FAX to [facility] for [facility] PA

## 2025-05-09 NOTE — TELEPHONE ENCOUNTER
FAXED ON 05/09/25 TO [facility] at [phone]. FAX CONFIRMATION RECEIVED.

## 2025-06-01 PROBLEM — Z00.00 HEALTHCARE MAINTENANCE: Status: RESOLVED | Noted: 2019-01-31 | Resolved: 2025-06-01

## 2025-06-30 ENCOUNTER — HOSPITAL ENCOUNTER (OUTPATIENT)
Dept: MAMMOGRAPHY | Facility: CLINIC | Age: 55
Discharge: HOME | End: 2025-06-30
Payer: COMMERCIAL

## 2025-06-30 VITALS — BODY MASS INDEX: 44.41 KG/M2 | WEIGHT: 293 LBS | HEIGHT: 68 IN

## 2025-06-30 DIAGNOSIS — Z12.31 ENCOUNTER FOR SCREENING MAMMOGRAM FOR MALIGNANT NEOPLASM OF BREAST: ICD-10-CM

## 2025-06-30 PROCEDURE — 77067 SCR MAMMO BI INCL CAD: CPT

## 2025-06-30 PROCEDURE — 77063 BREAST TOMOSYNTHESIS BI: CPT

## 2025-07-02 DIAGNOSIS — M17.0 PRIMARY OSTEOARTHRITIS OF BOTH KNEES: ICD-10-CM

## 2025-07-02 RX ORDER — DICLOFENAC SODIUM 75 MG/1
75 TABLET, DELAYED RELEASE ORAL 2 TIMES DAILY
Qty: 60 TABLET | Refills: 1 | Status: SHIPPED | OUTPATIENT
Start: 2025-07-02

## (undated) DEVICE — STERILE POLYISOPRENE POWDER-FREE SURGICAL GLOVES: Brand: PROTEXIS

## (undated) DEVICE — SUT MONOCRYL 4-0 PS-2 27 IN Y426H

## (undated) DEVICE — STERILE POLYISOPRENE POWDER-FREE SURGICAL GLOVES WITH EMOLLIENT COATING: Brand: PROTEXIS

## (undated) DEVICE — ALLENTOWN LAP CHOLE APP PACK: Brand: CARDINAL HEALTH

## (undated) DEVICE — BASIC PACK: Brand: CONVERTORS

## (undated) DEVICE — NEEDLE 25G X 1 1/2

## (undated) DEVICE — LAMINECTOMY ARM CRADLE FOAM POSITIONER: Brand: CARDINAL HEALTH

## (undated) DEVICE — TUBING SET W. FILTER, GAS FLOW 30 L/MIN: Brand: N.A.

## (undated) DEVICE — 3L THIN WALL CAN: Brand: CRD

## (undated) DEVICE — NEEDLE BLUNT 18 G X 1 1/2IN

## (undated) DEVICE — 3M™ STERI-STRIP™ REINFORCED ADHESIVE SKIN CLOSURES, R1547, 1/2 IN X 4 IN (12 MM X 100 MM), 6 STRIPS/ENVELOPE: Brand: 3M™ STERI-STRIP™

## (undated) DEVICE — INTENDED FOR TISSUE SEPARATION, AND OTHER PROCEDURES THAT REQUIRE A SHARP SURGICAL BLADE TO PUNCTURE OR CUT.: Brand: BARD-PARKER SAFETY BLADES SIZE 15, STERILE

## (undated) DEVICE — CHLORAPREP HI-LITE 26ML ORANGE

## (undated) DEVICE — SUT ETHIBOND 0 CT-2 30 IN X412H

## (undated) DEVICE — ENDOCUFF VISION LRG GREEN ID 11.2

## (undated) DEVICE — SWABSTCK, BENZOIN TINCTURE, 1/PK, STRL: Brand: APLICARE

## (undated) DEVICE — SYRINGE 30ML LL

## (undated) DEVICE — SCD SEQUENTIAL COMPRESSION COMFORT SLEEVE MEDIUM KNEE LENGTH: Brand: KENDALL SCD

## (undated) DEVICE — INSUFFLATION NEEDLE TO ESTABLISH PNEUMOPERITONEUM.: Brand: INSUFFLATION NEEDLE

## (undated) DEVICE — SYRINGE 10ML LL CONTROL TOP

## (undated) DEVICE — PENCIL ELECTROSURG E-Z CLEAN -0035H

## (undated) DEVICE — IRRIG ENDO FLO TUBING

## (undated) DEVICE — ECHELON FLEX POWERED PLUS ARTICULATING ENDOSCOPIC LINEAR CUTTER , 60MM: Brand: ECHELON FLEX

## (undated) DEVICE — TROCAR: Brand: KII FIOS FIRST ENTRY

## (undated) DEVICE — SUT VICRYL 0 UR-6 27 IN J603H

## (undated) DEVICE — TIBURON TRANSVERSE LAPAROTOMY SHEET: Brand: CONVERTORS

## (undated) DEVICE — ENDOPATH ECHELON ENDOSCOPIC LINEAR CUTTER RELOADS, WHITE, 60MM: Brand: ECHELON ENDOPATH

## (undated) DEVICE — 1820 FOAM BLOCK NEEDLE COUNTER: Brand: DEVON

## (undated) DEVICE — SKIN MARKER DUAL TIP WITH RULER CAP, FLEXIBLE RULER AND LABELS: Brand: DEVON

## (undated) DEVICE — PAD GROUNDING ADULT

## (undated) DEVICE — SPONGE LAP 18 X 4 IN STRL RFD

## (undated) DEVICE — VIOLET BRAIDED (POLYGLACTIN 910), SYNTHETIC ABSORBABLE SUTURE: Brand: COATED VICRYL

## (undated) DEVICE — HYGIENE-FILTER  FOR SINGLE USE

## (undated) DEVICE — SWITCH BLADE TIP CURVED METZ

## (undated) DEVICE — GARMENT,MEDLINE,DVT,INT,CALF,FOAM,MED: Brand: MEDLINE

## (undated) DEVICE — TROCAR: Brand: KII® SLEEVE

## (undated) DEVICE — LIGHT GLOVE GREEN

## (undated) DEVICE — ASTOUND STANDARD SURGICAL GOWN, XXL: Brand: CONVERTORS